# Patient Record
Sex: FEMALE | Race: WHITE | NOT HISPANIC OR LATINO | Employment: OTHER | URBAN - METROPOLITAN AREA
[De-identification: names, ages, dates, MRNs, and addresses within clinical notes are randomized per-mention and may not be internally consistent; named-entity substitution may affect disease eponyms.]

---

## 2017-03-30 ENCOUNTER — ALLSCRIPTS OFFICE VISIT (OUTPATIENT)
Dept: OTHER | Facility: OTHER | Age: 67
End: 2017-03-30

## 2017-04-05 ENCOUNTER — HOSPITAL ENCOUNTER (OUTPATIENT)
Dept: RADIOLOGY | Facility: MEDICAL CENTER | Age: 67
Discharge: HOME | End: 2017-04-05
Attending: FAMILY MEDICINE | Admitting: FAMILY MEDICINE
Payer: COMMERCIAL

## 2017-04-05 ENCOUNTER — OFFICE VISIT (OUTPATIENT)
Dept: URGENT CARE | Facility: MEDICAL CENTER | Age: 67
End: 2017-04-05
Payer: COMMERCIAL

## 2017-04-05 DIAGNOSIS — M79.675 PAIN OF TOE OF LEFT FOOT: ICD-10-CM

## 2017-04-05 PROCEDURE — 73660 X-RAY EXAM OF TOE(S): CPT

## 2017-04-05 PROCEDURE — 99213 OFFICE O/P EST LOW 20 MIN: CPT

## 2017-04-05 PROCEDURE — 29550 STRAPPING OF TOES: CPT

## 2017-04-05 PROCEDURE — S9088 SERVICES PROVIDED IN URGENT: HCPCS

## 2017-04-07 ENCOUNTER — GENERIC CONVERSION - ENCOUNTER (OUTPATIENT)
Dept: OTHER | Facility: OTHER | Age: 67
End: 2017-04-07

## 2017-06-12 DIAGNOSIS — Z00.00 ENCOUNTER FOR GENERAL ADULT MEDICAL EXAMINATION WITHOUT ABNORMAL FINDINGS: ICD-10-CM

## 2017-06-12 DIAGNOSIS — R73.02 IMPAIRED GLUCOSE TOLERANCE: ICD-10-CM

## 2017-06-12 DIAGNOSIS — E78.00 PURE HYPERCHOLESTEROLEMIA: ICD-10-CM

## 2017-07-21 DIAGNOSIS — E78.00 PURE HYPERCHOLESTEROLEMIA: ICD-10-CM

## 2017-07-21 DIAGNOSIS — R73.02 IMPAIRED GLUCOSE TOLERANCE: ICD-10-CM

## 2017-07-27 ENCOUNTER — APPOINTMENT (OUTPATIENT)
Dept: LAB | Facility: MEDICAL CENTER | Age: 67
End: 2017-07-27
Payer: COMMERCIAL

## 2017-07-27 DIAGNOSIS — R73.02 IMPAIRED GLUCOSE TOLERANCE: ICD-10-CM

## 2017-07-27 DIAGNOSIS — E78.00 PURE HYPERCHOLESTEROLEMIA: ICD-10-CM

## 2017-07-27 LAB
CHOLEST SERPL-MCNC: 182 MG/DL (ref 50–200)
HDLC SERPL-MCNC: 45 MG/DL (ref 40–60)
LDLC SERPL CALC-MCNC: 88 MG/DL (ref 0–100)
TRIGL SERPL-MCNC: 247 MG/DL

## 2017-07-27 PROCEDURE — 36415 COLL VENOUS BLD VENIPUNCTURE: CPT

## 2017-07-27 PROCEDURE — 83036 HEMOGLOBIN GLYCOSYLATED A1C: CPT

## 2017-07-27 PROCEDURE — 80061 LIPID PANEL: CPT

## 2017-08-01 ENCOUNTER — ALLSCRIPTS OFFICE VISIT (OUTPATIENT)
Dept: OTHER | Facility: OTHER | Age: 67
End: 2017-08-01

## 2017-08-04 ENCOUNTER — TRANSCRIBE ORDERS (OUTPATIENT)
Dept: ADMINISTRATIVE | Facility: HOSPITAL | Age: 67
End: 2017-08-04

## 2017-08-04 ENCOUNTER — APPOINTMENT (OUTPATIENT)
Dept: LAB | Facility: MEDICAL CENTER | Age: 67
End: 2017-08-04
Payer: COMMERCIAL

## 2017-08-04 DIAGNOSIS — E78.1 PURE HYPERGLYCERIDEMIA: ICD-10-CM

## 2017-08-04 DIAGNOSIS — R73.02 IMPAIRED GLUCOSE TOLERANCE: ICD-10-CM

## 2017-08-04 DIAGNOSIS — E78.00 PURE HYPERCHOLESTEROLEMIA: ICD-10-CM

## 2017-08-04 DIAGNOSIS — I10 ESSENTIAL (PRIMARY) HYPERTENSION: ICD-10-CM

## 2017-08-04 LAB
ALBUMIN SERPL BCP-MCNC: 3.3 G/DL (ref 3.5–5)
ALP SERPL-CCNC: 71 U/L (ref 46–116)
ALT SERPL W P-5'-P-CCNC: 24 U/L (ref 12–78)
ANION GAP SERPL CALCULATED.3IONS-SCNC: 8 MMOL/L (ref 4–13)
AST SERPL W P-5'-P-CCNC: 21 U/L (ref 5–45)
BILIRUB SERPL-MCNC: 0.68 MG/DL (ref 0.2–1)
BUN SERPL-MCNC: 18 MG/DL (ref 5–25)
CALCIUM SERPL-MCNC: 8.9 MG/DL (ref 8.3–10.1)
CHLORIDE SERPL-SCNC: 102 MMOL/L (ref 100–108)
CO2 SERPL-SCNC: 29 MMOL/L (ref 21–32)
CREAT SERPL-MCNC: 0.71 MG/DL (ref 0.6–1.3)
GFR SERPL CREATININE-BSD FRML MDRD: 90 ML/MIN/1.73SQ M
GLUCOSE P FAST SERPL-MCNC: 102 MG/DL (ref 65–99)
POTASSIUM SERPL-SCNC: 4 MMOL/L (ref 3.5–5.3)
PROT SERPL-MCNC: 6.9 G/DL (ref 6.4–8.2)
SODIUM SERPL-SCNC: 139 MMOL/L (ref 136–145)

## 2017-08-04 PROCEDURE — 36415 COLL VENOUS BLD VENIPUNCTURE: CPT

## 2017-08-04 PROCEDURE — 80053 COMPREHEN METABOLIC PANEL: CPT

## 2017-10-15 DIAGNOSIS — E78.00 PURE HYPERCHOLESTEROLEMIA: ICD-10-CM

## 2017-10-15 DIAGNOSIS — Z12.11 ENCOUNTER FOR SCREENING FOR MALIGNANT NEOPLASM OF COLON: ICD-10-CM

## 2017-10-15 DIAGNOSIS — I10 ESSENTIAL (PRIMARY) HYPERTENSION: ICD-10-CM

## 2017-10-15 DIAGNOSIS — E78.1 PURE HYPERGLYCERIDEMIA: ICD-10-CM

## 2017-10-15 DIAGNOSIS — R73.02 IMPAIRED GLUCOSE TOLERANCE: ICD-10-CM

## 2017-11-11 ENCOUNTER — APPOINTMENT (OUTPATIENT)
Dept: LAB | Facility: MEDICAL CENTER | Age: 67
End: 2017-11-11
Payer: COMMERCIAL

## 2017-11-11 DIAGNOSIS — R73.02 IMPAIRED GLUCOSE TOLERANCE: ICD-10-CM

## 2017-11-11 DIAGNOSIS — E78.00 PURE HYPERCHOLESTEROLEMIA: ICD-10-CM

## 2017-11-11 DIAGNOSIS — E78.1 PURE HYPERGLYCERIDEMIA: ICD-10-CM

## 2017-11-11 DIAGNOSIS — I10 ESSENTIAL (PRIMARY) HYPERTENSION: ICD-10-CM

## 2017-11-11 DIAGNOSIS — Z12.11 ENCOUNTER FOR SCREENING FOR MALIGNANT NEOPLASM OF COLON: ICD-10-CM

## 2017-11-11 LAB
BACTERIA UR QL AUTO: ABNORMAL /HPF
BASOPHILS # BLD AUTO: 0.01 THOUSANDS/ΜL (ref 0–0.1)
BASOPHILS NFR BLD AUTO: 0 % (ref 0–1)
BILIRUB UR QL STRIP: NEGATIVE
CLARITY UR: CLEAR
COLOR UR: YELLOW
EOSINOPHIL # BLD AUTO: 0.13 THOUSAND/ΜL (ref 0–0.61)
EOSINOPHIL NFR BLD AUTO: 2 % (ref 0–6)
ERYTHROCYTE [DISTWIDTH] IN BLOOD BY AUTOMATED COUNT: 13.2 % (ref 11.6–15.1)
GLUCOSE UR STRIP-MCNC: NEGATIVE MG/DL
HCT VFR BLD AUTO: 43.5 % (ref 34.8–46.1)
HEMOCCULT STL QL IA: NEGATIVE
HGB BLD-MCNC: 14.9 G/DL (ref 11.5–15.4)
HGB UR QL STRIP.AUTO: NEGATIVE
HYALINE CASTS #/AREA URNS LPF: ABNORMAL /LPF
KETONES UR STRIP-MCNC: NEGATIVE MG/DL
LEUKOCYTE ESTERASE UR QL STRIP: ABNORMAL
LYMPHOCYTES # BLD AUTO: 2.76 THOUSANDS/ΜL (ref 0.6–4.47)
LYMPHOCYTES NFR BLD AUTO: 32 % (ref 14–44)
MCH RBC QN AUTO: 32.3 PG (ref 26.8–34.3)
MCHC RBC AUTO-ENTMCNC: 34.3 G/DL (ref 31.4–37.4)
MCV RBC AUTO: 94 FL (ref 82–98)
MONOCYTES # BLD AUTO: 0.5 THOUSAND/ΜL (ref 0.17–1.22)
MONOCYTES NFR BLD AUTO: 6 % (ref 4–12)
NEUTROPHILS # BLD AUTO: 5.14 THOUSANDS/ΜL (ref 1.85–7.62)
NEUTS SEG NFR BLD AUTO: 60 % (ref 43–75)
NITRITE UR QL STRIP: POSITIVE
NON-SQ EPI CELLS URNS QL MICRO: ABNORMAL /HPF
NRBC BLD AUTO-RTO: 0 /100 WBCS
PH UR STRIP.AUTO: 6 [PH] (ref 4.5–8)
PLATELET # BLD AUTO: 210 THOUSANDS/UL (ref 149–390)
PMV BLD AUTO: 10.6 FL (ref 8.9–12.7)
PROT UR STRIP-MCNC: NEGATIVE MG/DL
RBC # BLD AUTO: 4.61 MILLION/UL (ref 3.81–5.12)
RBC #/AREA URNS AUTO: ABNORMAL /HPF
SP GR UR STRIP.AUTO: 1.01 (ref 1–1.03)
UROBILINOGEN UR QL STRIP.AUTO: 0.2 E.U./DL
WBC # BLD AUTO: 8.56 THOUSAND/UL (ref 4.31–10.16)
WBC #/AREA URNS AUTO: ABNORMAL /HPF

## 2017-11-11 PROCEDURE — G0328 FECAL BLOOD SCRN IMMUNOASSAY: HCPCS

## 2017-11-11 PROCEDURE — 36415 COLL VENOUS BLD VENIPUNCTURE: CPT

## 2017-11-11 PROCEDURE — 85025 COMPLETE CBC W/AUTO DIFF WBC: CPT

## 2017-11-11 PROCEDURE — 80053 COMPREHEN METABOLIC PANEL: CPT

## 2017-11-11 PROCEDURE — 81001 URINALYSIS AUTO W/SCOPE: CPT

## 2017-11-11 PROCEDURE — 80061 LIPID PANEL: CPT

## 2017-11-12 LAB
ALBUMIN SERPL BCP-MCNC: 3.4 G/DL (ref 3.5–5)
ALP SERPL-CCNC: 67 U/L (ref 46–116)
ALT SERPL W P-5'-P-CCNC: 27 U/L (ref 12–78)
ANION GAP SERPL CALCULATED.3IONS-SCNC: 8 MMOL/L (ref 4–13)
AST SERPL W P-5'-P-CCNC: 20 U/L (ref 5–45)
BILIRUB SERPL-MCNC: 0.7 MG/DL (ref 0.2–1)
BUN SERPL-MCNC: 15 MG/DL (ref 5–25)
CALCIUM SERPL-MCNC: 8.8 MG/DL (ref 8.3–10.1)
CHLORIDE SERPL-SCNC: 103 MMOL/L (ref 100–108)
CHOLEST SERPL-MCNC: 184 MG/DL (ref 50–200)
CO2 SERPL-SCNC: 30 MMOL/L (ref 21–32)
CREAT SERPL-MCNC: 0.66 MG/DL (ref 0.6–1.3)
GFR SERPL CREATININE-BSD FRML MDRD: 94 ML/MIN/1.73SQ M
GLUCOSE P FAST SERPL-MCNC: 101 MG/DL (ref 65–99)
HDLC SERPL-MCNC: 47 MG/DL (ref 40–60)
LDLC SERPL CALC-MCNC: 83 MG/DL (ref 0–100)
POTASSIUM SERPL-SCNC: 4.1 MMOL/L (ref 3.5–5.3)
PROT SERPL-MCNC: 7.2 G/DL (ref 6.4–8.2)
SODIUM SERPL-SCNC: 141 MMOL/L (ref 136–145)
TRIGL SERPL-MCNC: 271 MG/DL

## 2017-11-14 ENCOUNTER — ALLSCRIPTS OFFICE VISIT (OUTPATIENT)
Dept: OTHER | Facility: OTHER | Age: 67
End: 2017-11-14

## 2017-11-17 DIAGNOSIS — Z12.31 ENCOUNTER FOR SCREENING MAMMOGRAM FOR MALIGNANT NEOPLASM OF BREAST: ICD-10-CM

## 2017-12-20 ENCOUNTER — GENERIC CONVERSION - ENCOUNTER (OUTPATIENT)
Dept: OTHER | Facility: OTHER | Age: 67
End: 2017-12-20

## 2018-01-10 NOTE — PROGRESS NOTES
Assessment    1  Acute UTI (599 0) (N39 0)   2  Obesity (BMI 30-39 9) (278 00) (E66 9)   3  Hypertension (401 9) (I10)   4  Hypercholesterolemia (272 0) (E78 00)   5  Hypertriglyceridemia (272 1) (E78 1)    Plan  Acute UTI    · Ciprofloxacin HCl - 500 MG Oral Tablet; Take 1 tablet twice daily  Glucose intolerance (impaired glucose tolerance), Hypercholesterolemia,  Hypertriglyceridemia    · (1) COMPREHENSIVE METABOLIC PANEL; Status:Active; Requested for:01Mar2018;    · (1) LIPID PANEL FASTING W DIRECT LDL REFLEX; Status:Active; Requested  CYA:[id number]; Health Maintenance    · EKG/ECG- POC; Status:Complete;   Done: 45GCS8072 01:09PM    1  Acute UTI the patient's urinalysis is consistent with a urinary tract infection  We have initiated antibiotic therapy with ciprofloxacin at 500 mg twice a day  The patient is encouraged to drink extra fluids while she is on the antibiotic  2   Hypertension adequate control continue on hydrochlorothiazide 25 mg daily and lisinopril 40 mg daily  3   Hyperlipidemia continue lovastatin 20 mg daily along with healthy diet and make her red supplement for hypertriglyceridemia  4   Obesity recommend lifestyle adjustments to decreased calorie consumption and start regular walking exercise  Discussion/Summary    In summary the patient is encouraged to have a baseline colonoscopy which she has never had performed in the past  We also recommend that she have an annual mammogram as well as visit with her gynecologist  She is encouraged to continue with annual physical examinations and blood work with us for regular monitoring of cholesterol triglycerides and hypertension  She will continue her present medications with the addition of antibiotic therapy for her UTI  I would like to see her in 4 months for her next regular check up for her hypertension  total time of encounter was 45 minutes and 30 minutes was spent counseling        Chief Complaint  physical with ekg History of Present Illness  HM, Adult Female: The patient is being seen for a health maintenance evaluation  The last health maintenance visit was 1 year(s) ago  Social History: Household members include spouse  She is   Work status: working full time  The patient has never smoked cigarettes  She reports rare alcohol use  She has never used illicit drugs  General Health: The patient's health since the last visit is described as good  She has regular dental visits  She denies vision problems  She denies hearing loss  Immunizations status: up to date  Lifestyle:  She consumes a diverse and healthy diet  She has weight concerns  She does not exercise regularly  She does not use tobacco  She denies alcohol use  She denies drug use  Screening:   HPI: This 72-year-old female patient presents today for a annual health maintenance examination  She has no new complaints or medical issues  She has a history of overweight condition as well as hypertension  She anticipates 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  At that time she plans on focusing on adjusting her diet trying to lose weight and maintaining a healthy lifestyle      Active Problems    1  Arthritis (716 90) (M19 90)   2  Chronic bilateral low back pain with bilateral sciatica (724 2,724 3,338 29)   (M54 42,M54 41,G89 29)   3  Colon cancer screening (V76 51) (Z12 11)   4  Glucose intolerance (impaired glucose tolerance) (790 22) (R73 02)   5  Hypercholesterolemia (272 0) (E78 00)   6  Hypertension (401 9) (I10)   7  Hypertriglyceridemia (272 1) (E78 1)   8  Need for immunization against influenza (V04 81) (Z23)   9  Osteoarthritis of knee (715 36) (M17 10)   10  Pain of toe of left foot (729 5) (M79 675)   11  Skin lesion of face (709 9) (L98 9)   12   Temperature elevation (780 60) (R50 9)    Past Medical History    · History of Acute lower UTI (599 0) (N39 0)   · History of Cellulitis of left elbow (682 3) (L03 114)   · History of Elbow pain, left (719 42) (M25 522)   · History of Encounter for gynecological examination without abnormal finding (V72 31)  (Z01 419)   · History of Encounter for routine gynecological examination (V72 31) (Z01 419)   · History of Encounter for screening mammogram for malignant neoplasm of breast  (V76 12) (Z12 31)   · History of epistaxis (V12 69) (V05 749)   · History of Medial meniscus tear (836 0) (S83 249A)   · History of Need for influenza vaccination (V04 81) (Z23)   · History of Right foot pain (729 5) (M79 671)   · History of Right knee pain (719 46) (M25 561)   · History of Umbilical hernia (306 9) (K42 9)   · History of Visit for screening mammogram (V76 12) (Z12 31)    Surgical History    · History of Hysterectomy   · History of Knee Arthroscopy With Medial Meniscus Repair    Family History  Mother    · No pertinent family history  Family History    · Family history of diabetes mellitus (V18 0) (Z83 3)   · Family history of thyroid disease (V18 19) (Z80 46)    Social History    · Denied: History of Alcohol   · Daily Tea Consumption (1  Cups/Day)   · Denied: History of Drug use   · Exercise habits   · Never a smoker   · Denied: History of Never A Smoker    Current Meds   1  Aspirin 81 MG TABS; Therapy: (Recorded:23Iiu2705) to Recorded   2  Calcium + D 250-125 MG-UNIT TABS; Therapy: (Recorded:07Nov2014) to Recorded   3  CVS Daily Multiple Plus Iron TABS; Therapy: (Recorded:07Nov2014) to Recorded   4  HydroCHLOROthiazide 25 MG Oral Tablet; Take 1 tablet daily; Therapy: 28SQU5537 to (Evaluate:19Jun2018)  Requested for: 78GGF6499; Last   Rx:[id number] Ordered   5  Lisinopril 40 MG Oral Tablet; TAKE 1 TABLET DAILY AS DIRECTED; Therapy: 47MFZ9962 to (Evaluate:19Jun2018)  Requested for: 25EEY5237; Last   Rx:[id number] Ordered   6  Lovastatin 20 MG Oral Tablet; TAKE 1 TABLET AT BEDTIME;    Therapy: 17LPO4701 to (Evaluate:05Nov2017)  Requested for: 62RXN1896; Last   Rx:[id number] Ordered   7  MegaRed Omega-3 Krill Oil 500 MG Oral Capsule; one capsule daily Recorded    Allergies    1  Morphine Sulfate SOLN   2  Zithromax Z-Brice TABS    Vitals   Recorded: C0270720 12:57PM   Temperature 98 3 F   Heart Rate 71   Respiration 16   Systolic 174   Diastolic 76   Height 5 ft 6 5 in   Weight 250 lb 9 6 oz   BMI Calculated 39 84   BSA Calculated 2 21   O2 Saturation 97     Physical Exam    Constitutional   General appearance: No acute distress, well appearing and well nourished  Head and Face   Head and face: Normal     Eyes   Conjunctiva and lids: No swelling, erythema or discharge  Pupils and irises: Equal, round, reactive to light  Ophthalmoscopic examination: Normal fundi and optic discs  Ears, Nose, Mouth, and Throat   External inspection of ears and nose: Normal     Otoscopic examination: Tympanic membranes translucent with normal light reflex  Canals patent without erythema  Nasal mucosa, septum, and turbinates: Normal without edema or erythema  Lips, teeth, and gums: Normal, good dentition  Oropharynx: Normal with no erythema, edema, exudate or lesions  Neck   Neck: Supple, symmetric, trachea midline, no masses  Thyroid: Normal, no thyromegaly  Pulmonary   Respiratory effort: No increased work of breathing or signs of respiratory distress  Percussion of chest: Normal     Auscultation of lungs: Clear to auscultation  Cardiovascular   Auscultation of heart: Normal rate and rhythm, normal S1 and S2, no murmurs  Carotid pulses: 2+ bilaterally  Pedal pulses: 2+ bilaterally  Peripheral vascular exam: Normal     Examination of extremities for edema and/or varicosities: Normal     Abdomen   Abdomen: Non-tender, no masses  Liver and spleen: No hepatomegaly or splenomegaly  Lymphatic   Palpation of lymph nodes in neck: No lymphadenopathy      Musculoskeletal   Gait and station: Normal     Digits and nails: Normal without clubbing or cyanosis  Joints, bones, and muscles: Normal     Range of motion: Normal     Stability: Normal     Muscle strength/tone: Normal     Skin   Skin and subcutaneous tissue: Normal without rashes or lesions  Palpation of skin and subcutaneous tissue: Normal turgor  Neurologic   Cranial nerves: Cranial nerves II-XII intact  Cortical function: Normal mental status  Reflexes: 2+ and symmetric  Sensation: No sensory loss  Coordination: Normal finger to nose and heel to shin  Psychiatric   Judgment and insight: Normal     Orientation to person, place, and time: Normal     Recent and remote memory: Intact  Mood and affect: Normal        Results/Data  EKG/ECG- POC 92QBL4455 01:09PM Valdo Porras     Test Name Result Flag Reference   EKG/ECG 11/14/2017         Future Appointments    Date/Time Provider Specialty Site   03/16/2018 11:00 AM MANI Mason  Internal Medicine [facility] INTERNAL MED   11/19/2018 01:00 PM MANI Mason   Internal Medicine [address] MED     Signatures   Electronically signed by : MANI Denise ; Nov 15 2017  4:34PM EST                       (Author)

## 2018-01-11 NOTE — MISCELLANEOUS
Message  Return to work or school:     She is not able to return to work until 04/17/2017     Broken toe  Dr Starla Pryor        Signatures   Electronically signed by : Terrence Radford, ; Apr 7 2017  9:33AM EST                       (Author)

## 2018-01-12 NOTE — PROGRESS NOTES
Assessment    1  Chronic bilateral low back pain with bilateral sciatica (724 2,724 3,338 29)   (M54 42,M54 41,G89 29)    Plan  Chronic bilateral low back pain with bilateral sciatica    · Metaxalone 800 MG Oral Tablet (Skelaxin); TAKE 1 TABLET EVERY 6-8 HRS FOR  MUSCLE SPASM   · PredniSONE 5 MG Oral Tablet; 6 pills daily with food decrease by 1 pill every two  days    Skelaxin 800 mg every 6-8 hours as needed for back muscle spasm  Warm soaks to the lower back  Prednisone 30 milligrams decrease by 5 mg every 2 days  Discussion/Summary  In summary the patient appears to have lower back as him with bilateral sciatica and we will initiate a combination of redness and and muscle relaxers  Asked her to stay home from work for the rest of this week to allow her back to relax recuperate  Should she fail to improve she'll notify me and we will consider imaging of her lower back  Chief Complaint  patient is here for bilateral sciatica pain  History of Present Illness  HPI: This 57-year-old female patient presents today with low back pain radiating into the buttocks and down both legs  He denies any recent trauma such as falls  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  She has had similar symptoms in the past which did improve with a combination of steroids and muscular relaxers  She has no muscular weakness in either leg  Review of Systems    Constitutional: No fever, no chills, feels well, no tiredness, no recent weight gain or loss  ENT: no ear ache, no loss of hearing, no nosebleeds or nasal discharge, no sore throat or hoarseness  Cardiovascular: no complaints of slow or fast heart rate, no chest pain, no palpitations, no leg claudication or lower extremity edema  Respiratory: no complaints of shortness of breath, no wheezing, no dyspnea on exertion, no orthopnea or PND  Breasts: no complaints of breast pain, breast lump or nipple discharge  Gastrointestinal: no complaints of abdominal pain, no constipation, no nausea or diarrhea, no vomiting, no bloody stools  Genitourinary: no complaints of dysuria, no incontinence, no pelvic pain, no dysmenorrhea, no vaginal discharge or abnormal vaginal bleeding  Musculoskeletal: Low back pain with radiation into the buttocks bilaterally pain radiating down both legs  Integumentary: no complaints of skin rash or lesion, no itching or dry skin, no skin wounds  Neurological: no complaints of headache, no confusion, no numbness or tingling, no dizziness or fainting  Active Problems    1  Aftercare following surgery (V58 89) (Z48 89)   2  Arthritis (716 90) (M19 90)   3  Colon cancer screening (V76 51) (Z12 11)   4  Encounter for screening mammogram for malignant neoplasm of breast (V76 12)   (Z12 31)   5  Glucose intolerance (impaired glucose tolerance) (790 22) (R73 02)   6  Hypercholesterolemia (272 0) (E78 0)   7  Hypertension (401 9) (I10)   8  Hypertriglyceridemia (272 1) (E78 1)   9  Medial meniscus tear (836 0) (S83 249A)   10  Osteoarthritis of knee (715 36) (M17 9)   11  Overweight (278 02) (E66 3)   12  Pap smear, as part of routine gynecological examination (V76 2) (Z01 419)   13  Right foot pain (729 5) (M79 671)   14  Right knee pain (719 46) (M25 561)   15  Visit for screening mammogram (V76 12) (Z12 31)    Social History    · Denied: History of Alcohol   · Daily Tea Consumption (1  Cups/Day)   · Denied: History of Drug use   · Exercise habits   · Never a smoker   · Denied: History of Never A Smoker    Current Meds   1  Aspirin 81 MG TABS; Therapy: (Recorded:11Feb2014) to Recorded   2  Calcium + D 250-125 MG-UNIT TABS; Therapy: (Recorded:07Nov2014) to Recorded   3  CVS Daily Multiple Plus Iron TABS; Therapy: (Recorded:07Nov2014) to Recorded   4  Hydrochlorothiazide 25 MG Oral Tablet; Take 1 tablet daily;    Therapy: 04SEH5203 to (Last Rx:18Sho1585)  Requested for: 34Akc7536 Ordered 5  Lisinopril 40 MG Oral Tablet; TAKE 1 TABLET DAILY AS DIRECTED; Therapy: 45MAP9286 to (Tiara Lout)  Requested for: 84BEI1779; Last   Rx:90Llr2023 Ordered   6  Lovastatin 20 MG Oral Tablet; TAKE 1 TABLET AT BEDTIME; Therapy: 89MGF2041 to (Tiara Lout)  Requested for: 99HJZ7223; Last   Rx:52Epu4589 Ordered   7  MegaRed Omega-3 Krill Oil 500 MG Oral Capsule; one capsule daily Recorded   8  Multi-Day Vitamins TABS; Therapy: (Recorded:82Qou9505) to Recorded    Allergies    1  Morphine Sulfate SOLN   2  Zithromax Z-Brice TABS    Vitals   Recorded: 48QWL6834 63:00UE   Systolic 348   Diastolic 76   Heart Rate 76   Respiration 20   Temperature 97 8 F   O2 Saturation 98   Height 5 ft 7 in   Weight 251 lb 0 16 oz   BMI Calculated 39 31   BSA Calculated 2 23     Physical Exam    Constitutional   General appearance: No acute distress, well appearing and well nourished  Eyes   Conjunctiva and lids: No swelling, erythema or discharge  Ears, Nose, Mouth, and Throat   External inspection of ears and nose: Normal     Additional Exam:  Tenderness over the lower lumbar spine on palpation  There is bilateral lower back muscular spasm present on examination  Patient has normal reflexes in the patellar and Achilles locations with no evidence of any muscular weakness in either leg  Future Appointments    Date/Time Provider Specialty Site   11/10/2016 01:00 PM MANI Garduno   Internal Medicine [address] MED     Signatures   Electronically signed by : MANI Iqbal ; Aug 22 2016  5:33PM EST                       (Author)

## 2018-01-12 NOTE — MISCELLANEOUS
Message  Return to work or school:   Chapis Briggs is under my professional care   She was seen in my office on 8/22/2016   She is able to return to work on  8/29/2016            Signatures   Electronically signed by : MANI Fam ; Aug 22 2016  3:47PM EST                       (Author)

## 2018-01-13 VITALS
HEIGHT: 67 IN | TEMPERATURE: 97.9 F | SYSTOLIC BLOOD PRESSURE: 114 MMHG | OXYGEN SATURATION: 99 % | RESPIRATION RATE: 16 BRPM | WEIGHT: 252.03 LBS | DIASTOLIC BLOOD PRESSURE: 60 MMHG | HEART RATE: 72 BPM | BODY MASS INDEX: 39.56 KG/M2

## 2018-01-13 VITALS
HEIGHT: 67 IN | WEIGHT: 251 LBS | BODY MASS INDEX: 39.39 KG/M2 | DIASTOLIC BLOOD PRESSURE: 66 MMHG | SYSTOLIC BLOOD PRESSURE: 110 MMHG | OXYGEN SATURATION: 98 % | TEMPERATURE: 99.1 F | HEART RATE: 86 BPM | RESPIRATION RATE: 20 BRPM

## 2018-01-14 VITALS
RESPIRATION RATE: 16 BRPM | DIASTOLIC BLOOD PRESSURE: 76 MMHG | OXYGEN SATURATION: 97 % | BODY MASS INDEX: 39.33 KG/M2 | SYSTOLIC BLOOD PRESSURE: 132 MMHG | HEIGHT: 67 IN | TEMPERATURE: 98.3 F | HEART RATE: 71 BPM | WEIGHT: 250.6 LBS

## 2018-01-18 NOTE — PROGRESS NOTES
Assessment    1  Acute lower UTI (599 0) (N39 0)   2  Hypertension (401 9) (I10)   3  Hypertriglyceridemia (272 1) (E78 1)   4  Hypercholesterolemia (272 0) (E78 00)   5  Osteoarthritis of knee (715 36) (M17 9)   6  Arthritis (716 90) (M19 90)   7  Glucose intolerance (impaired glucose tolerance) (790 22) (R73 02)   8  Overweight (278 02) (E66 3)    Plan  Acute lower UTI    · (1) URINE CULTURE; Source:Urine, Clean Catch; Status:Active; Requested  for:10Nov2016;   Hypertension    · EKG/ECG- POC; Status:Active - Perform Order; Requested for:10Nov2016;     Patient will continue on her present medications for hypertension which include lisinopril 40 mg and hydrochlorothiazide 25 mg  She'll continue on her lovastatin 20 mg for hyperlipidemia and tristan-red for hypertriglyceridemia  Discussion/Summary  In summary patient has symptoms consistent with a urinary tract infection with cloudy urine and foul-smelling urine  She denies any dysuria or hematuria  A review of her urinalysis is consistent with the UTI  We've asked her to obtain a urine culture  Issues hypertension appears to be adequately controlled on her present medications and we asked her to continue on those  Patient has a history of hypertriglyceridemia and hypercholesterolemia  We reviewed the appropriate diet and also ask her to continue on her lovastatin and tristan-red treatments  She has chronic osteoarthritis of the knees which is compounded by her overweight condition  He is encouraged to lose weight  He also has mild glucose intolerance with a glucose of 103  The appropriate diet was reviewed with the patient  I'll see her in 4 months for her next office visit  Chief Complaint  patient is here for a yearly physical       History of Present Illness  , Adult Female: The patient is being seen for a health maintenance evaluation  The last health maintenance visit was 1 year(s) ago  Social History: Household members include spouse   She is   Work status: working full time  The patient has never smoked cigarettes  She reports never drinking alcohol  She has never used illicit drugs  General Health: The patient's health since the last visit is described as good  She has regular dental visits  She denies vision problems  She denies hearing loss  Immunizations status: up to date  Lifestyle:  She consumes a diverse and healthy diet  She has weight concerns  She does not exercise regularly  She does not use tobacco  She denies alcohol use  She denies drug use  Reproductive health: the patient is postmenopausal    Screening: cancer screening reviewed and current  metabolic screening reviewed and current  risk screening reviewed and current  HPI: This 46-year-old female patient presents today for an annual maintenance visit  He has a history of a recent left elbow inflammation gradually improving  She was evaluated for also bursitis by the orthopedic department  They believe that the was actually more of a hematoma  Patient does have a history of obesity as well as hypertension, also has a history of steal arthritis of the knees  Review of Systems    Constitutional: No fever, no chills, feels well, no tiredness, no recent weight gain or weight loss  Eyes: No complaints of eye pain, no red eyes, no eyesight problems, no discharge, no dry eyes, no itching of eyes  ENT: no complaints of earache, no loss of hearing, no nose bleeds, no nasal discharge, no sore throat, no hoarseness  Cardiovascular: No complaints of slow heart rate, no fast heart rate, no chest pain, no palpitations, no leg claudication, no lower extremity edema  Respiratory: No complaints of shortness of breath, no wheezing, no cough, no SOB on exertion, no orthopnea, no PND  Gastrointestinal: No complaints of abdominal pain, no constipation, no nausea or vomiting, no diarrhea, no bloody stools     Genitourinary: No complaints of dysuria, no incontinence, no pelvic pain, no dysmenorrhea, no vaginal discharge or bleeding  Musculoskeletal: arthralgias and myalgias  Integumentary: No complaints of skin rash or lesions, no itching, no skin wounds, no breast pain or lump  Neurological: No complaints of headache, no confusion, no convulsions, no numbness, no dizziness or fainting, no tingling, no limb weakness, no difficulty walking  Psychiatric: Not suicidal, no sleep disturbance, no anxiety or depression, no change in personality, no emotional problems  Endocrine: No complaints of proptosis, no hot flashes, no muscle weakness, no deepening of the voice, no feelings of weakness  Hematologic/Lymphatic: No complaints of swollen glands, no swollen glands in the neck, does not bleed easily, does not bruise easily  Active Problems    1  Arthritis (716 90) (M19 90)   2  Cellulitis of left elbow (682 3) (L03 114)   3  Chronic bilateral low back pain with bilateral sciatica (724 2,724 3,338 29)   (M54 42,M54 41,G89 29)   4  Colon cancer screening (V76 51) (Z12 11)   5  Elbow pain, left (719 42) (M25 522)   6  Encounter for gynecological examination without abnormal finding (V72 31) (Z01 419)   7  Encounter for screening mammogram for malignant neoplasm of breast (V76 12)   (Z12 31)   8  Glucose intolerance (impaired glucose tolerance) (790 22) (R73 02)   9  Gout, arthritis (274 00) (M10 9)   10  Hypercholesterolemia (272 0) (E78 00)   11  Hypertension (401 9) (I10)   12  Hypertriglyceridemia (272 1) (E78 1)   13  Medial meniscus tear (836 0) (S83 249A)   14  Osteoarthritis of knee (715 36) (M17 9)   15  Overweight (278 02) (E66 3)   16  Right foot pain (729 5) (M79 671)   17  Right knee pain (719 46) (M25 561)   18   Visit for screening mammogram (V76 12) (Z12 31)    Past Medical History    · History of Encounter for routine gynecological examination (V72 31) (Z01 419)   · History of epistaxis (V12 69) (W63 204)   · History of Umbilical hernia (608 9) (K42 9)    Surgical History    · History of Hysterectomy   · History of Knee Arthroscopy With Medial Meniscus Repair    Family History  Mother    · No pertinent family history  Family History    · Family history of diabetes mellitus (V18 0) (Z83 3)   · Family history of thyroid disease (V18 19) (Z80 46)    Social History    · Denied: History of Alcohol   · Daily Tea Consumption (1  Cups/Day)   · Denied: History of Drug use   · Exercise habits   · Never a smoker   · Denied: History of Never A Smoker    Current Meds   1  Aspirin 81 MG TABS; Therapy: (Recorded:11Feb2014) to Recorded   2  Calcium + D 250-125 MG-UNIT TABS; Therapy: (Recorded:07Nov2014) to Recorded   3  Cephalexin 500 MG Oral Capsule; TAKE 1 CAPSULE 4 TIMES DAILY UNTIL GONE;   Therapy: 93JRR7904 to (Deitra Miranda)  Requested for: 98WUT6806; Last   Rx:27Oct2016 Ordered   4  CVS Daily Multiple Plus Iron TABS; Therapy: (Recorded:07Nov2014) to Recorded   5  Doxycycline Hyclate 100 MG Oral Capsule; TAKE 1 CAPSULE TWICE DAILY UNTIL   GONE;   Therapy: 33EAK8581 to (Evaluate:31Oct2016)  Requested for: 92WCF2249; Last   Rx:27Oct2016 Ordered   6  HydroCHLOROthiazide 25 MG Oral Tablet; Take 1 tablet daily; Therapy: 84VUL2534 to (Last Rx:93Xmv6554)  Requested for: 76Zut2290 Ordered   7  Lisinopril 40 MG Oral Tablet; TAKE 1 TABLET DAILY AS DIRECTED; Therapy: 83TJA4113 to (Doneta Chacorta)  Requested for: 11UWD9259; Last   Rx:[id number] Ordered   8  Lovastatin 20 MG Oral Tablet; TAKE 1 TABLET AT BEDTIME; Therapy: 84TCL3868 to (Doneta Chacorta)  Requested for: 91VHC7259; Last   Rx:[id number] Ordered   9  MegaRed Omega-3 Krill Oil 500 MG Oral Capsule; one capsule daily Recorded    Allergies    1  Morphine Sulfate SOLN   2   Zithromax Z-Brice TABS    Vitals   Recorded: 32WHW7884 04:61LL   Systolic 221   Diastolic 80   Heart Rate 71   Respiration 20   Temperature 98 6 F   O2 Saturation 98   Height 5 ft 6 5 in   Weight 251 lb 0 64 oz   BMI Calculated 39 91 BSA Calculated 2 21     Physical Exam    Constitutional   General appearance: No acute distress, well appearing and well nourished  Head and Face   Head and face: Normal     Eyes   Conjunctiva and lids: No swelling, erythema or discharge  Pupils and irises: Equal, round, reactive to light  Ophthalmoscopic examination: Normal fundi and optic discs  Ears, Nose, Mouth, and Throat   External inspection of ears and nose: Normal     Otoscopic examination: Tympanic membranes translucent with normal light reflex  Canals patent without erythema  Nasal mucosa, septum, and turbinates: Normal without edema or erythema  Lips, teeth, and gums: Normal, good dentition  Oropharynx: Normal with no erythema, edema, exudate or lesions  Neck   Neck: Supple, symmetric, trachea midline, no masses  Thyroid: Normal, no thyromegaly  Pulmonary   Respiratory effort: No increased work of breathing or signs of respiratory distress  Percussion of chest: Normal     Auscultation of lungs: Clear to auscultation  Cardiovascular   Auscultation of heart: Normal rate and rhythm, normal S1 and S2, no murmurs  Carotid pulses: 2+ bilaterally  Examination of extremities for edema and/or varicosities: Normal     Abdomen   Abdomen: Non-tender, no masses  Liver and spleen: No hepatomegaly or splenomegaly  Lymphatic   Palpation of lymph nodes in neck: No lymphadenopathy  Musculoskeletal   Gait and station: Normal     Digits and nails: Normal without clubbing or cyanosis  Joints, bones, and muscles: Abnormal   Arthritic changes in the knees bilaterally  Stability: Normal     Skin   Skin and subcutaneous tissue: Normal without rashes or lesions  Neurologic   Cranial nerves: Cranial nerves II-XII intact  Cortical function: Normal mental status  Reflexes: 2+ and symmetric  Coordination: Normal finger to nose and heel to shin      Psychiatric   Judgment and insight: Normal     Orientation to person, place, and time: Normal     Recent and remote memory: Intact  Mood and affect: Normal        Future Appointments    Date/Time Provider Specialty Site   03/17/2017 01:00 PM MANI Hood  Internal Medicine [facility] INTERNAL MED   11/14/2017 01:00 PM MANI Hood   Internal Medicine [facility] INTERNAL MED   10/03/2017 01:20 PM Kevin Merino [facility]etrics/Gynecology [facility]     Signatures   Electronically signed by : MANI Rivers ; Nov 10 2016  7:03PM EST                       (Author)

## 2018-01-23 NOTE — MISCELLANEOUS
Message  Return to work or school:   Brigette Barney is under my professional care   She was seen in my office on 12/13/17   She is able to return to work on  12/21/17            Signatures   Electronically signed by : MANI Martinez ; Dec 22 2017  7:44AM EST                       (Author)

## 2018-02-06 ENCOUNTER — HOSPITAL ENCOUNTER (OUTPATIENT)
Dept: RADIOLOGY | Facility: HOSPITAL | Age: 68
Discharge: HOME | End: 2018-02-06
Attending: INTERNAL MEDICINE
Payer: COMMERCIAL

## 2018-02-06 DIAGNOSIS — Z12.31 ENCOUNTER FOR SCREENING MAMMOGRAM FOR MALIGNANT NEOPLASM OF BREAST: ICD-10-CM

## 2018-02-06 PROCEDURE — 77067 SCR MAMMO BI INCL CAD: CPT

## 2018-03-01 DIAGNOSIS — E78.00 PURE HYPERCHOLESTEROLEMIA: ICD-10-CM

## 2018-03-01 DIAGNOSIS — R73.02 IMPAIRED GLUCOSE TOLERANCE: ICD-10-CM

## 2018-03-01 DIAGNOSIS — E78.1 PURE HYPERGLYCERIDEMIA: ICD-10-CM

## 2018-04-20 ENCOUNTER — APPOINTMENT (OUTPATIENT)
Dept: LAB | Facility: MEDICAL CENTER | Age: 68
End: 2018-04-20
Payer: COMMERCIAL

## 2018-04-20 DIAGNOSIS — E78.00 PURE HYPERCHOLESTEROLEMIA: ICD-10-CM

## 2018-04-20 DIAGNOSIS — E78.1 PURE HYPERGLYCERIDEMIA: ICD-10-CM

## 2018-04-20 DIAGNOSIS — R73.02 IMPAIRED GLUCOSE TOLERANCE: ICD-10-CM

## 2018-04-20 LAB
ALBUMIN SERPL BCP-MCNC: 3.4 G/DL (ref 3.5–5)
ALP SERPL-CCNC: 63 U/L (ref 46–116)
ALT SERPL W P-5'-P-CCNC: 23 U/L (ref 12–78)
ANION GAP SERPL CALCULATED.3IONS-SCNC: 6 MMOL/L (ref 4–13)
AST SERPL W P-5'-P-CCNC: 17 U/L (ref 5–45)
BILIRUB SERPL-MCNC: 0.68 MG/DL (ref 0.2–1)
BUN SERPL-MCNC: 15 MG/DL (ref 5–25)
CALCIUM SERPL-MCNC: 8.9 MG/DL (ref 8.3–10.1)
CHLORIDE SERPL-SCNC: 102 MMOL/L (ref 100–108)
CHOLEST SERPL-MCNC: 165 MG/DL (ref 50–200)
CO2 SERPL-SCNC: 29 MMOL/L (ref 21–32)
CREAT SERPL-MCNC: 0.69 MG/DL (ref 0.6–1.3)
GFR SERPL CREATININE-BSD FRML MDRD: 92 ML/MIN/1.73SQ M
GLUCOSE P FAST SERPL-MCNC: 97 MG/DL (ref 65–99)
HDLC SERPL-MCNC: 49 MG/DL (ref 40–60)
LDLC SERPL CALC-MCNC: 80 MG/DL (ref 0–100)
POTASSIUM SERPL-SCNC: 4.3 MMOL/L (ref 3.5–5.3)
PROT SERPL-MCNC: 7.3 G/DL (ref 6.4–8.2)
SODIUM SERPL-SCNC: 137 MMOL/L (ref 136–145)
TRIGL SERPL-MCNC: 179 MG/DL

## 2018-04-20 PROCEDURE — 80061 LIPID PANEL: CPT

## 2018-04-20 PROCEDURE — 36415 COLL VENOUS BLD VENIPUNCTURE: CPT

## 2018-04-20 PROCEDURE — 80053 COMPREHEN METABOLIC PANEL: CPT

## 2018-04-23 ENCOUNTER — OFFICE VISIT (OUTPATIENT)
Dept: INTERNAL MEDICINE CLINIC | Facility: CLINIC | Age: 68
End: 2018-04-23
Payer: COMMERCIAL

## 2018-04-23 VITALS
WEIGHT: 238.4 LBS | TEMPERATURE: 99.1 F | RESPIRATION RATE: 14 BRPM | OXYGEN SATURATION: 98 % | DIASTOLIC BLOOD PRESSURE: 74 MMHG | HEIGHT: 66 IN | HEART RATE: 80 BPM | BODY MASS INDEX: 38.31 KG/M2 | SYSTOLIC BLOOD PRESSURE: 122 MMHG

## 2018-04-23 DIAGNOSIS — R73.02 GLUCOSE INTOLERANCE (IMPAIRED GLUCOSE TOLERANCE): ICD-10-CM

## 2018-04-23 DIAGNOSIS — M54.31 SCIATICA OF RIGHT SIDE: Primary | ICD-10-CM

## 2018-04-23 DIAGNOSIS — E78.00 HYPERCHOLESTEROLEMIA: ICD-10-CM

## 2018-04-23 DIAGNOSIS — E78.1 HYPERTRIGLYCERIDEMIA: ICD-10-CM

## 2018-04-23 DIAGNOSIS — I10 ESSENTIAL HYPERTENSION: ICD-10-CM

## 2018-04-23 DIAGNOSIS — E66.9 OBESITY (BMI 30-39.9): ICD-10-CM

## 2018-04-23 DIAGNOSIS — E78.5 HYPERLIPIDEMIA, UNSPECIFIED HYPERLIPIDEMIA TYPE: ICD-10-CM

## 2018-04-23 PROCEDURE — 99214 OFFICE O/P EST MOD 30 MIN: CPT | Performed by: INTERNAL MEDICINE

## 2018-04-23 PROCEDURE — 1101F PT FALLS ASSESS-DOCD LE1/YR: CPT | Performed by: INTERNAL MEDICINE

## 2018-04-23 RX ORDER — VITAMIN E 200 UNIT
2 CAPSULE ORAL DAILY
COMMUNITY
End: 2018-12-28 | Stop reason: ALTCHOICE

## 2018-04-23 RX ORDER — METAXALONE 800 MG/1
800 TABLET ORAL 3 TIMES DAILY
Qty: 30 TABLET | Refills: 0 | Status: SHIPPED | OUTPATIENT
Start: 2018-04-23 | End: 2018-08-24 | Stop reason: CLARIF

## 2018-04-23 NOTE — ASSESSMENT & PLAN NOTE
History of glucose intolerance with the patient's 12 lb weight loss her blood sugars now back under 100 she is commended on her success so far but encouraged to continue with efforts to reduce weight by decreasing calorie consumption and mild exercise  Follow-up assessment in 4 months

## 2018-04-23 NOTE — ASSESSMENT & PLAN NOTE
History of hyperlipidemia he improved control on lovastatin 20 mg at bedtime continue present dose no apparent side effects liver enzymes are normal recommend she continue to work on losing weight and maintain a low-cholesterol diet follow-up assessment in 4 months

## 2018-04-23 NOTE — ASSESSMENT & PLAN NOTE
Blood pressure on today's visit is 122/74 representing excellent control of her hypertension she is presently on a combination of lisinopril 40 mg daily and hydrochlorothiazide 25 mg daily for blood pressure control continue present medications electrolyte package appears to be fine and kidney function is normal follow-up assessment in 4 months

## 2018-04-23 NOTE — ASSESSMENT & PLAN NOTE
History of hypertriglyceridemia excellent control at the present time continue on tristan Red supplement as well as ache calculated were controlled triglyceride consumption    Continue with efforts to lose weight follow-up assessment in 4 months

## 2018-04-23 NOTE — PROGRESS NOTES
Assessment/Plan:    Glucose intolerance (impaired glucose tolerance)  History of glucose intolerance with the patient's 12 lb weight loss her blood sugars now back under 100 she is commended on her success so far but encouraged to continue with efforts to reduce weight by decreasing calorie consumption and mild exercise  Follow-up assessment in 4 months  Hypertension  Blood pressure on today's visit is 122/74 representing excellent control of her hypertension she is presently on a combination of lisinopril 40 mg daily and hydrochlorothiazide 25 mg daily for blood pressure control continue present medications electrolyte package appears to be fine and kidney function is normal follow-up assessment in 4 months    Sciatica of right side  Right side sciatic pain radiating from the buttocks down the right leg most likely the result of lifting of patient at work as her symptoms started shortly after this activity  She is presently using rest and Skelaxin muscle relaxer 800 mg every 8 hr  Should her symptoms worsen I indicated that a tapering dose of steroids might be beneficial at the present time she prefers not to take the steroids she was out of work over the weekend as well as today hopes to return on Wednesday the 25th she will let me know if the pain gets worse  Hypercholesterolemia  History of hyperlipidemia he improved control on lovastatin 20 mg at bedtime continue present dose no apparent side effects liver enzymes are normal recommend she continue to work on losing weight and maintain a low-cholesterol diet follow-up assessment in 4 months    Hypertriglyceridemia  History of hypertriglyceridemia excellent control at the present time continue on tristan Red supplement as well as ache calculated were controlled triglyceride consumption  Continue with efforts to lose weight follow-up assessment in 4 months    Obesity (BMI 30-39  9)  Obesity condition with a body mass index today of 38 48 her weight is 12 lb less than it was in November on her last visit with us she is working hard to lose weight she is encouraged to continue with her efforts and commended with the 12 lb that she has lost so far  Reassess in 4 months       Diagnoses and all orders for this visit:    Sciatica of right side  -     metaxalone (SKELAXIN) 800 mg tablet; Take 1 tablet (800 mg total) by mouth 3 (three) times a day    Hyperlipidemia, unspecified hyperlipidemia type  -     Lipid panel; Future  -     Comprehensive metabolic panel; Future    Glucose intolerance (impaired glucose tolerance)    Essential hypertension    Hypercholesterolemia    Hypertriglyceridemia    Obesity (BMI 30-39  9)    Other orders  -     Discontinue: aspirin 81 MG tablet; Take by mouth  -     Discontinue: Calcium Carb-Ergocalciferol 250-125 MG-UNIT TABS; Take by mouth  -     Multiple Vitamins-Iron (CVS DAILY MULTIPLE PLUS IRON) TABS; Take by mouth  -     MEGARED OMEGA-3 KRILL  MG CAPS; Take 1 capsule by mouth daily        Subjective:      Patient ID: Shilo Mcdowell is a 74 y o  female  This pleasant 28-year-old female patient presents today for a routine 4 month follow-up visit  She has lost 12 lb since her last visit with us and is working hard to reduce her weight further  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 [employer] and is looking forward to her halfway  She has had right leg sciatic nerve pain over the past 2 days  She has experienced this problem in the past it usually responds to rest and muscle relaxers and occasionally require steroid treatment  She can relate the onset of the pain to lifting up the patient at work  The patient had blood work performed which reviewed with her in detail today it is to review her lipid profile as well as chemistry survey and glucose    All aspects of her lipid profile have improved as has her blood sugar which is now normal         The following portions of the patient's history were reviewed and updated as appropriate:   She  has a past medical history of Cellulitis of left elbow (10/30/2016); Epistaxis; Medial meniscus tear (56/18/8717); and Umbilical hernia  She   Patient Active Problem List    Diagnosis Date Noted    Sciatica of right side 04/23/2018    Obesity (BMI 30-39 9) 11/15/2017    Hypertriglyceridemia 02/26/2016    Glucose intolerance (impaired glucose tolerance) 02/23/2016    Hypercholesterolemia 09/12/2014    Hypertension 09/12/2014     She  has a past surgical history that includes Hysterectomy and Knee arthroscopy w/ meniscal repair (07/26/2015)  Her family history includes Diabetes in her family; No Known Problems in her mother; Thyroid disease in her family  She  reports that she has never smoked  She does not have any smokeless tobacco history on file  She reports that she does not drink alcohol or use drugs       Review of Systems   Constitutional: Negative  HENT: Negative  Eyes: Negative  Respiratory: Negative  Cardiovascular: Negative  Gastrointestinal: Negative  Endocrine: Negative  Genitourinary: Negative  Musculoskeletal: Negative  Skin: Negative  Allergic/Immunologic: Negative  Neurological:        Right leg sciatic irritation   Hematological: Negative  Psychiatric/Behavioral: Negative  Objective:      /74   Pulse 80   Temp 99 1 °F (37 3 °C)   Resp 14   Ht 5' 6" (1 676 m)   Wt 108 kg (238 lb 6 4 oz)   SpO2 98%   BMI 38 48 kg/m²          Physical Exam   Constitutional: She is oriented to person, place, and time  She appears well-developed and well-nourished  No distress  HENT:   Head: Normocephalic and atraumatic     Right Ear: Tympanic membrane and external ear normal    Left Ear: Tympanic membrane and external ear normal    Nose: Nose normal    Mouth/Throat: Uvula is midline, oropharynx is clear and moist and mucous membranes are normal    Eyes: Conjunctivae are normal  Pupils are equal, round, and reactive to light  Right eye exhibits no discharge  Left eye exhibits no discharge  No scleral icterus  Neck: No JVD present  No thyromegaly present  Cardiovascular: Normal rate, regular rhythm, normal heart sounds and intact distal pulses  No murmur heard  Pulmonary/Chest: Effort normal and breath sounds normal  No respiratory distress  She has no wheezes  She has no rales  She exhibits no tenderness  Abdominal: Soft  Bowel sounds are normal    Musculoskeletal: Normal range of motion  She exhibits no edema  Lymphadenopathy:     She has no cervical adenopathy  Neurological: She is alert and oriented to person, place, and time  She has normal reflexes  Skin: Skin is warm, dry and intact  No rash noted  She is not diaphoretic  No erythema  No pallor  Psychiatric: She has a normal mood and affect   Her speech is normal and behavior is normal  Judgment and thought content normal

## 2018-04-23 NOTE — ASSESSMENT & PLAN NOTE
Obesity condition with a body mass index today of 38 48 her weight is 12 lb less than it was in November on her last visit with us she is working hard to lose weight she is encouraged to continue with her efforts and commended with the 12 lb that she has lost so far    Reassess in 4 months

## 2018-04-23 NOTE — ASSESSMENT & PLAN NOTE
Right side sciatic pain radiating from the buttocks down the right leg most likely the result of lifting of patient at work as her symptoms started shortly after this activity  She is presently using rest and Skelaxin muscle relaxer 800 mg every 8 hr  Should her symptoms worsen I indicated that a tapering dose of steroids might be beneficial at the present time she prefers not to take the steroids she was out of work over the weekend as well as today hopes to return on Wednesday the 25th she will let me know if the pain gets worse

## 2018-06-25 DIAGNOSIS — I10 ESSENTIAL HYPERTENSION: Primary | ICD-10-CM

## 2018-06-25 RX ORDER — HYDROCHLOROTHIAZIDE 25 MG/1
25 TABLET ORAL DAILY
Qty: 90 TABLET | Refills: 2 | Status: SHIPPED | OUTPATIENT
Start: 2018-06-25 | End: 2018-10-02 | Stop reason: SDUPTHER

## 2018-06-25 RX ORDER — LISINOPRIL 40 MG/1
40 TABLET ORAL DAILY
Qty: 90 TABLET | Refills: 2 | Status: SHIPPED | OUTPATIENT
Start: 2018-06-25 | End: 2018-09-20 | Stop reason: SDUPTHER

## 2018-07-23 DIAGNOSIS — E78.5 HYPERLIPIDEMIA, UNSPECIFIED HYPERLIPIDEMIA TYPE: Primary | ICD-10-CM

## 2018-07-23 RX ORDER — LOVASTATIN 20 MG/1
20 TABLET ORAL
Qty: 90 TABLET | Refills: 3 | Status: SHIPPED | OUTPATIENT
Start: 2018-07-23 | End: 2019-10-16 | Stop reason: SDUPTHER

## 2018-08-21 ENCOUNTER — APPOINTMENT (OUTPATIENT)
Dept: LAB | Facility: MEDICAL CENTER | Age: 68
End: 2018-08-21
Payer: COMMERCIAL

## 2018-08-21 DIAGNOSIS — E78.5 HYPERLIPIDEMIA, UNSPECIFIED HYPERLIPIDEMIA TYPE: ICD-10-CM

## 2018-08-21 LAB
CHOLEST SERPL-MCNC: 182 MG/DL (ref 50–200)
HDLC SERPL-MCNC: 51 MG/DL (ref 40–60)
LDLC SERPL CALC-MCNC: 89 MG/DL (ref 0–100)
NONHDLC SERPL-MCNC: 131 MG/DL
TRIGL SERPL-MCNC: 209 MG/DL

## 2018-08-21 PROCEDURE — 36415 COLL VENOUS BLD VENIPUNCTURE: CPT

## 2018-08-21 PROCEDURE — 80061 LIPID PANEL: CPT

## 2018-08-24 ENCOUNTER — OFFICE VISIT (OUTPATIENT)
Dept: INTERNAL MEDICINE CLINIC | Facility: CLINIC | Age: 68
End: 2018-08-24
Payer: COMMERCIAL

## 2018-08-24 VITALS
OXYGEN SATURATION: 98 % | HEART RATE: 74 BPM | WEIGHT: 246 LBS | TEMPERATURE: 98.2 F | DIASTOLIC BLOOD PRESSURE: 60 MMHG | SYSTOLIC BLOOD PRESSURE: 124 MMHG | RESPIRATION RATE: 16 BRPM | BODY MASS INDEX: 39.53 KG/M2 | HEIGHT: 66 IN

## 2018-08-24 DIAGNOSIS — E66.9 OBESITY (BMI 30-39.9): ICD-10-CM

## 2018-08-24 DIAGNOSIS — E78.1 HYPERTRIGLYCERIDEMIA: ICD-10-CM

## 2018-08-24 DIAGNOSIS — E78.00 HYPERCHOLESTEROLEMIA: ICD-10-CM

## 2018-08-24 DIAGNOSIS — Z00.00 ROUTINE GENERAL MEDICAL EXAMINATION AT A HEALTH CARE FACILITY: ICD-10-CM

## 2018-08-24 DIAGNOSIS — Z12.11 COLON CANCER SCREENING: Primary | ICD-10-CM

## 2018-08-24 DIAGNOSIS — I10 ESSENTIAL HYPERTENSION: ICD-10-CM

## 2018-08-24 PROCEDURE — 3078F DIAST BP <80 MM HG: CPT | Performed by: INTERNAL MEDICINE

## 2018-08-24 PROCEDURE — 3008F BODY MASS INDEX DOCD: CPT | Performed by: INTERNAL MEDICINE

## 2018-08-24 PROCEDURE — 99214 OFFICE O/P EST MOD 30 MIN: CPT | Performed by: INTERNAL MEDICINE

## 2018-08-24 PROCEDURE — 1160F RVW MEDS BY RX/DR IN RCRD: CPT | Performed by: INTERNAL MEDICINE

## 2018-08-24 PROCEDURE — 3074F SYST BP LT 130 MM HG: CPT | Performed by: INTERNAL MEDICINE

## 2018-08-24 NOTE — ASSESSMENT & PLAN NOTE
Lipid profile reviewed with the patient today recommend increasing tristan Red to 1000 mg daily in view of persistent elevation of triglycerides  Follow-up study should be performed in 4 months would consider at that time the addition of try core to her lovastatin if triglycerides continue at an elevated level

## 2018-08-24 NOTE — ASSESSMENT & PLAN NOTE
Hypertension remains under good control continue lisinopril at 40 mg daily hydrochlorothiazide 25 mg daily and follow-up assessment in 4 months

## 2018-08-24 NOTE — ASSESSMENT & PLAN NOTE
The patient's lipid profile was reviewed with her today recommend continuation of healthy diet low in cholesterol and saturated fats and continue on lovastatin 20 mg daily at bedtime  Follow-up testing should be performed in 4 months

## 2018-08-24 NOTE — ASSESSMENT & PLAN NOTE
Obesity with a body mass index of 39 71 the patient is familiar with the dangers of obesity and overweight condition  She is encouraged to work on reducing calorie consumption and maintain regular physical activity

## 2018-08-24 NOTE — PROGRESS NOTES
Assessment/Plan:    Hypertension  Hypertension remains under good control continue lisinopril at 40 mg daily hydrochlorothiazide 25 mg daily and follow-up assessment in 4 months  Hypercholesterolemia  The patient's lipid profile was reviewed with her today recommend continuation of healthy diet low in cholesterol and saturated fats and continue on lovastatin 20 mg daily at bedtime  Follow-up testing should be performed in 4 months  Hypertriglyceridemia  Lipid profile reviewed with the patient today recommend increasing tristan Red to 1000 mg daily in view of persistent elevation of triglycerides  Follow-up study should be performed in 4 months would consider at that time the addition of try core to her lovastatin if triglycerides continue at an elevated level  Obesity (BMI 30-39  9)  Obesity with a body mass index of 39 71 the patient is familiar with the dangers of obesity and overweight condition  She is encouraged to work on reducing calorie consumption and maintain regular physical activity  Diagnoses and all orders for this visit:    Colon cancer screening  -     Ambulatory referral to Gastroenterology; Future  -     Occult Blood, Fecal Immunochemical; Future    Routine general medical examination at a health care facility  -     Comprehensive metabolic panel; Future  -     CBC and differential; Future  -     Lipid panel; Future  -     UA w Reflex to Microscopic w Reflex to Culture -Lab Collect    Essential hypertension    Hypercholesterolemia    Hypertriglyceridemia    Obesity (BMI 30-39  9)        Subjective:      Patient ID: Pramod Red is a 74 y o  female  This is a routine follow-up visit for this 79-year-old female patient  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 and finds that she is busy of than ever since her long term  Today she and her  are going to [facility] does celebrate their anniversary  She has no specific medical complaints at this time          The following portions of the patient's history were reviewed and updated as appropriate:   She  has a past medical history of Cellulitis of left elbow (10/30/2016); Epistaxis; Medial meniscus tear (51/88/4405); and Umbilical hernia  She   Patient Active Problem List    Diagnosis Date Noted    Sciatica of right side 04/23/2018    Obesity (BMI 30-39 9) 11/15/2017    Hypertriglyceridemia 02/26/2016    Glucose intolerance (impaired glucose tolerance) 02/23/2016    Hypercholesterolemia 09/12/2014    Hypertension 09/12/2014     She  has a past surgical history that includes Hysterectomy and Knee arthroscopy w/ meniscal repair (07/26/2015)  Her family history includes Diabetes in her family; No Known Problems in her mother; Thyroid disease in her family  She  reports that she has never smoked  She does not have any smokeless tobacco history on file  She reports that she does not drink alcohol or use drugs  Current Outpatient Prescriptions   Medication Sig Dispense Refill    aspirin 81 MG tablet Take 81 mg by mouth daily   calcium-vitamin D (OSCAL) 250-125 MG-UNIT per tablet Take 1 tablet by mouth daily   hydrochlorothiazide (HYDRODIURIL) 25 mg tablet Take 1 tablet (25 mg total) by mouth daily 90 tablet 2    lisinopril (ZESTRIL) 40 mg tablet Take 1 tablet (40 mg total) by mouth daily 90 tablet 2    lovastatin (MEVACOR) 20 mg tablet Take 1 tablet (20 mg total) by mouth daily at bedtime 90 tablet 3    MEGARED OMEGA-3 KRILL  MG CAPS Take 2 capsules by mouth daily       Multiple Vitamins-Iron ([facility] DAILY MULTIPLE PLUS IRON) TABS Take by mouth       No current facility-administered medications for this visit       Review of Systems   All other systems reviewed and are negative          Objective:      /60   Pulse 74   Temp 98 2 °F (36 8 °C)   Resp 16   Ht 5' 6" (1 676 m)   Wt 112 kg (246 lb)   SpO2 98%   BMI 39 71 kg/m²          Physical Exam   Constitutional: She is oriented to person, place, and time  Vital signs are normal  She appears well-developed and well-nourished  She is cooperative  HENT:   Right Ear: Hearing, tympanic membrane, external ear and ear canal normal    Left Ear: Hearing, tympanic membrane, external ear and ear canal normal    Nose: Nose normal  No mucosal edema  Mouth/Throat: Uvula is midline, oropharynx is clear and moist and mucous membranes are normal    Eyes: Conjunctivae and lids are normal  Pupils are equal, round, and reactive to light  Neck: No JVD present  Carotid bruit is not present  No thyromegaly present  Cardiovascular: Normal rate, regular rhythm, normal heart sounds and intact distal pulses  No murmur heard  Pulmonary/Chest: Effort normal and breath sounds normal  No respiratory distress  She has no wheezes  She has no rales  She exhibits no tenderness  Abdominal: Soft  Normal appearance and bowel sounds are normal    Musculoskeletal: Normal range of motion  She exhibits no edema  Lymphadenopathy:     She has no cervical adenopathy  Neurological: She is alert and oriented to person, place, and time  She has normal reflexes  Skin: Skin is warm, dry and intact  No rash noted  No erythema  Psychiatric: She has a normal mood and affect  Her speech is normal and behavior is normal  Judgment and thought content normal  Cognition and memory are normal    Vitals reviewed

## 2018-09-20 DIAGNOSIS — I10 ESSENTIAL HYPERTENSION: ICD-10-CM

## 2018-09-20 RX ORDER — LISINOPRIL 40 MG/1
40 TABLET ORAL DAILY
Qty: 90 TABLET | Refills: 3 | Status: SHIPPED | OUTPATIENT
Start: 2018-09-20 | End: 2019-09-24 | Stop reason: SDUPTHER

## 2018-09-28 LAB
LEFT EYE DIABETIC RETINOPATHY: NORMAL
RIGHT EYE DIABETIC RETINOPATHY: NORMAL

## 2018-10-02 DIAGNOSIS — I10 ESSENTIAL HYPERTENSION: ICD-10-CM

## 2018-10-02 RX ORDER — HYDROCHLOROTHIAZIDE 25 MG/1
25 TABLET ORAL DAILY
Qty: 90 TABLET | Refills: 2 | Status: SHIPPED | OUTPATIENT
Start: 2018-10-02 | End: 2019-07-02 | Stop reason: SDUPTHER

## 2018-12-21 ENCOUNTER — APPOINTMENT (OUTPATIENT)
Dept: LAB | Facility: MEDICAL CENTER | Age: 68
End: 2018-12-21
Payer: COMMERCIAL

## 2018-12-21 ENCOUNTER — TRANSCRIBE ORDERS (OUTPATIENT)
Dept: ADMINISTRATIVE | Facility: HOSPITAL | Age: 68
End: 2018-12-21

## 2018-12-21 DIAGNOSIS — Z00.00 ROUTINE GENERAL MEDICAL EXAMINATION AT A HEALTH CARE FACILITY: ICD-10-CM

## 2018-12-21 DIAGNOSIS — Z12.11 COLON CANCER SCREENING: ICD-10-CM

## 2018-12-21 DIAGNOSIS — Z00.00 ROUTINE GENERAL MEDICAL EXAMINATION AT A HEALTH CARE FACILITY: Primary | ICD-10-CM

## 2018-12-21 LAB
ALBUMIN SERPL BCP-MCNC: 3.5 G/DL (ref 3.5–5)
ALP SERPL-CCNC: 65 U/L (ref 46–116)
ALT SERPL W P-5'-P-CCNC: 32 U/L (ref 12–78)
ANION GAP SERPL CALCULATED.3IONS-SCNC: 8 MMOL/L (ref 4–13)
AST SERPL W P-5'-P-CCNC: 23 U/L (ref 5–45)
BASOPHILS # BLD AUTO: 0.04 THOUSANDS/ΜL (ref 0–0.1)
BASOPHILS NFR BLD AUTO: 1 % (ref 0–1)
BILIRUB SERPL-MCNC: 0.55 MG/DL (ref 0.2–1)
BILIRUB UR QL STRIP: NEGATIVE
BUN SERPL-MCNC: 16 MG/DL (ref 5–25)
CALCIUM SERPL-MCNC: 9.1 MG/DL (ref 8.3–10.1)
CHLORIDE SERPL-SCNC: 103 MMOL/L (ref 100–108)
CHOLEST SERPL-MCNC: 193 MG/DL (ref 50–200)
CLARITY UR: NORMAL
CO2 SERPL-SCNC: 27 MMOL/L (ref 21–32)
COLOR UR: NORMAL
CREAT SERPL-MCNC: 0.73 MG/DL (ref 0.6–1.3)
EOSINOPHIL # BLD AUTO: 0.07 THOUSAND/ΜL (ref 0–0.61)
EOSINOPHIL NFR BLD AUTO: 1 % (ref 0–6)
ERYTHROCYTE [DISTWIDTH] IN BLOOD BY AUTOMATED COUNT: 13 % (ref 11.6–15.1)
GFR SERPL CREATININE-BSD FRML MDRD: 86 ML/MIN/1.73SQ M
GLUCOSE P FAST SERPL-MCNC: 94 MG/DL (ref 65–99)
GLUCOSE UR STRIP-MCNC: NEGATIVE MG/DL
HCT VFR BLD AUTO: 43.5 % (ref 34.8–46.1)
HDLC SERPL-MCNC: 53 MG/DL (ref 40–60)
HEMOCCULT STL QL IA: POSITIVE
HGB BLD-MCNC: 14.4 G/DL (ref 11.5–15.4)
HGB UR QL STRIP.AUTO: NEGATIVE
IMM GRANULOCYTES # BLD AUTO: 0.03 THOUSAND/UL (ref 0–0.2)
IMM GRANULOCYTES NFR BLD AUTO: 0 % (ref 0–2)
KETONES UR STRIP-MCNC: NEGATIVE MG/DL
LDLC SERPL CALC-MCNC: 104 MG/DL (ref 0–100)
LEUKOCYTE ESTERASE UR QL STRIP: NEGATIVE
LYMPHOCYTES # BLD AUTO: 2.41 THOUSANDS/ΜL (ref 0.6–4.47)
LYMPHOCYTES NFR BLD AUTO: 33 % (ref 14–44)
MCH RBC QN AUTO: 31.9 PG (ref 26.8–34.3)
MCHC RBC AUTO-ENTMCNC: 33.1 G/DL (ref 31.4–37.4)
MCV RBC AUTO: 97 FL (ref 82–98)
MONOCYTES # BLD AUTO: 0.48 THOUSAND/ΜL (ref 0.17–1.22)
MONOCYTES NFR BLD AUTO: 7 % (ref 4–12)
NEUTROPHILS # BLD AUTO: 4.23 THOUSANDS/ΜL (ref 1.85–7.62)
NEUTS SEG NFR BLD AUTO: 58 % (ref 43–75)
NITRITE UR QL STRIP: NEGATIVE
NONHDLC SERPL-MCNC: 140 MG/DL
NRBC BLD AUTO-RTO: 0 /100 WBCS
PH UR STRIP.AUTO: 5.5 [PH] (ref 4.5–8)
PLATELET # BLD AUTO: 209 THOUSANDS/UL (ref 149–390)
PMV BLD AUTO: 10.9 FL (ref 8.9–12.7)
POTASSIUM SERPL-SCNC: 4.1 MMOL/L (ref 3.5–5.3)
PROT SERPL-MCNC: 7.3 G/DL (ref 6.4–8.2)
PROT UR STRIP-MCNC: NEGATIVE MG/DL
RBC # BLD AUTO: 4.51 MILLION/UL (ref 3.81–5.12)
SODIUM SERPL-SCNC: 138 MMOL/L (ref 136–145)
SP GR UR STRIP.AUTO: 1.03 (ref 1–1.03)
TRIGL SERPL-MCNC: 179 MG/DL
UROBILINOGEN UR QL STRIP.AUTO: 0.2 E.U./DL
WBC # BLD AUTO: 7.26 THOUSAND/UL (ref 4.31–10.16)

## 2018-12-21 PROCEDURE — 36415 COLL VENOUS BLD VENIPUNCTURE: CPT

## 2018-12-21 PROCEDURE — 80061 LIPID PANEL: CPT

## 2018-12-21 PROCEDURE — 85025 COMPLETE CBC W/AUTO DIFF WBC: CPT

## 2018-12-21 PROCEDURE — 80053 COMPREHEN METABOLIC PANEL: CPT

## 2018-12-21 PROCEDURE — 81003 URINALYSIS AUTO W/O SCOPE: CPT | Performed by: INTERNAL MEDICINE

## 2018-12-21 PROCEDURE — G0328 FECAL BLOOD SCRN IMMUNOASSAY: HCPCS

## 2018-12-28 ENCOUNTER — OFFICE VISIT (OUTPATIENT)
Dept: INTERNAL MEDICINE CLINIC | Facility: CLINIC | Age: 68
End: 2018-12-28
Payer: COMMERCIAL

## 2018-12-28 VITALS
SYSTOLIC BLOOD PRESSURE: 134 MMHG | TEMPERATURE: 97.9 F | WEIGHT: 251 LBS | BODY MASS INDEX: 40.34 KG/M2 | DIASTOLIC BLOOD PRESSURE: 80 MMHG | HEIGHT: 66 IN | HEART RATE: 91 BPM | OXYGEN SATURATION: 98 %

## 2018-12-28 DIAGNOSIS — E78.5 HYPERLIPIDEMIA, UNSPECIFIED HYPERLIPIDEMIA TYPE: Primary | ICD-10-CM

## 2018-12-28 DIAGNOSIS — R19.5 HEME POSITIVE STOOL: ICD-10-CM

## 2018-12-28 DIAGNOSIS — R73.02 GLUCOSE INTOLERANCE (IMPAIRED GLUCOSE TOLERANCE): ICD-10-CM

## 2018-12-28 DIAGNOSIS — E66.9 OBESITY (BMI 30-39.9): ICD-10-CM

## 2018-12-28 DIAGNOSIS — Z12.39 SCREENING FOR BREAST CANCER: ICD-10-CM

## 2018-12-28 DIAGNOSIS — E66.01 MORBID OBESITY WITH BMI OF 40.0-44.9, ADULT (HCC): ICD-10-CM

## 2018-12-28 DIAGNOSIS — R00.2 PALPITATIONS: ICD-10-CM

## 2018-12-28 DIAGNOSIS — E78.00 HYPERCHOLESTEROLEMIA: ICD-10-CM

## 2018-12-28 DIAGNOSIS — I10 ESSENTIAL HYPERTENSION: ICD-10-CM

## 2018-12-28 DIAGNOSIS — E78.1 HYPERTRIGLYCERIDEMIA: ICD-10-CM

## 2018-12-28 PROBLEM — M54.31 SCIATICA OF RIGHT SIDE: Status: RESOLVED | Noted: 2018-04-23 | Resolved: 2018-12-28

## 2018-12-28 PROCEDURE — 99215 OFFICE O/P EST HI 40 MIN: CPT | Performed by: INTERNAL MEDICINE

## 2018-12-28 PROCEDURE — 93000 ELECTROCARDIOGRAM COMPLETE: CPT | Performed by: INTERNAL MEDICINE

## 2018-12-28 PROCEDURE — G0438 PPPS, INITIAL VISIT: HCPCS | Performed by: INTERNAL MEDICINE

## 2018-12-28 RX ORDER — OMEGA-3-ACID ETHYL ESTERS 1 G/1
2 CAPSULE, LIQUID FILLED ORAL 2 TIMES DAILY
Qty: 360 CAPSULE | Refills: 0
Start: 2018-12-28 | End: 2020-07-24 | Stop reason: ALTCHOICE

## 2018-12-28 NOTE — ASSESSMENT & PLAN NOTE
History of elevated cholesterol and triglycerides  The patient has been on tristan Red with limited success  She does have Lovaza at home which was originally prescribed for her  I suggested that she try taking this at 2 g twice a day for period of 4 months to see if it out performs the tristan Red medication  Will schedule her for follow-up blood test of lipid profile in 4 months  Proper diet exercise and weight reduction are of course recommended as well

## 2018-12-28 NOTE — ASSESSMENT & PLAN NOTE
History of glucose intolerance with mild blood sugar elevations review of her most recent blood work in sick indicates a return to normal fasting glucose  She is encouraged to strive to improve her exercise we have recommended consideration of aqua therapy and also decreasing carbohydrate and calorie consumption

## 2018-12-28 NOTE — ASSESSMENT & PLAN NOTE
Heme-positive stool with recent episode of left sided upper and lower quadrant discomfort suggestive of possible diverticulitis  I have recommended that she have a colonoscopy and I have referred her to colon and rectal services at [facility]   No localizing tenderness on today's examination nor any evidence of any masses on her examination of the abdomen

## 2018-12-28 NOTE — ASSESSMENT & PLAN NOTE
Blood pressure control remains good with blood pressure medication of 40 mg of lisinopril daily recommend follow-up assessment in 4 months

## 2018-12-28 NOTE — PROGRESS NOTES
Assessment and Plan:    Problem List Items Addressed This Visit     None        Health Maintenance Due   Topic Date Due    Hepatitis C Screening  1950    Medicare Annual Wellness Visit (AWV)  1950    CRC Screening: Colonoscopy  1950    DTaP,Tdap,and Td Vaccines (1 - Tdap) 11/27/1973    Pneumococcal PPSV23/PCV13 65+ Years / Low and Medium Risk (1 of 2 - PCV13) 11/27/2017    INFLUENZA VACCINE  07/01/2018         HPI:  Tevin Howell is a 79 y o  female here for her Initial Wellness Visit  Patient Active Problem List   Diagnosis    Glucose intolerance (impaired glucose tolerance)    Hypercholesterolemia    Hypertension    Hypertriglyceridemia    Obesity (BMI 30-39  9)    Sciatica of right side     Past Medical History:   Diagnosis Date    Cellulitis of left elbow 10/30/2016    Epistaxis     Medial meniscus tear 66/33/7299    Umbilical hernia      Past Surgical History:   Procedure Laterality Date    HYSTERECTOMY      KNEE ARTHROSCOPY W/ MENISCAL REPAIR  07/26/2015    with medial meniscus repair     Family History   Problem Relation Age of Onset    No Known Problems Mother     Thyroid disease Family     Diabetes Family         Diabetes Mellitus     History   Smoking Status    Never Smoker   Smokeless Tobacco    Not on file     Comment: Denied history of never a smoker per [employer]     History   Alcohol Use No      History   Drug Use No       Current Outpatient Prescriptions   Medication Sig Dispense Refill    aspirin 81 MG tablet Take 81 mg by mouth daily   calcium-vitamin D (OSCAL) 250-125 MG-UNIT per tablet Take 1 tablet by mouth daily        hydrochlorothiazide (HYDRODIURIL) 25 mg tablet Take 1 tablet (25 mg total) by mouth daily 90 tablet 2    lisinopril (ZESTRIL) 40 mg tablet Take 1 tablet (40 mg total) by mouth daily 90 tablet 3    lovastatin (MEVACOR) 20 mg tablet Take 1 tablet (20 mg total) by mouth daily at bedtime 90 tablet 3    MEGARED OMEGA-3 KRILL  MG CAPS Take 2 capsules by mouth daily       Multiple Vitamins-Iron (CVS DAILY MULTIPLE PLUS IRON) TABS Take by mouth       No current facility-administered medications for this visit  Allergies   Allergen Reactions    Lipitor [Atorvastatin]     Morphine      Reaction Date: [id number];     Morphine And Related     Zithromax [Azithromycin]      Reaction Date: [id number];      Immunization History   Administered Date(s) Administered    Influenza Quadrivalent, 6-35 Months IM 11/16/2016, 10/20/2017       Patient Care Team:  Matteo Alcazar MD as PCP - General    Medicare Screening Tests and Risk Assessments:      Health Risk Assessment:  Patient rates overall health as very good  Patient feels that their physical health rating is Same  Eyesight was rated as Same  Hearing was rated as Same  Patient feels that their emotional and mental health rating is Same  Pain experienced by patient in the last 7 days has been Some  Patient's pain rating has been 2/10  Patient states that she has experienced weight loss or gain in last 6 months  Emotional/Mental Health:  Patient has been feeling nervous/anxious  PHQ-9 Depression Screening:    Frequency of the following problems over the past two weeks:      1  Little interest or pleasure in doing things: 0 - not at all      2  Feeling down, depressed, or hopeless: 0 - not at all  PHQ-2 Score: 0          Broken Bones/Falls: Fall Risk Assessment:    In the past year, patient has experienced: No history of falling in past year          Bladder/Bowel:  Patient has leaked urine accidently in the last six months  Patient reports no loss of bowel control  Immunizations:  Patient has not had a flu vaccination within the last year  Patient has not received a pneumonia shot  Patient has not received a shingles shot  Patient has not received tetanus/diphtheria shot  Home Safety:  Patient does not have trouble with stairs inside or outside of their home  Patient currently reports that there are no safety hazards present in home, working smoke alarms, working carbon monoxide detectors  Preventative Screenings:   Breast cancer screening performed, 2/1/2018  colon cancer screen completed, cholesterol screen completed, glaucoma eye exam completed,     Nutrition:  Current diet: Regular with servings of the following:    Medications:  Patient is currently taking over-the-counter supplements  List of OTC medications includes: multivit, aspirin, calcium, krill oil   Patient is able to manage medications  Lifestyle Choices:  Patient reports no tobacco use  Patient has not smoked or used tobacco in the past   Patient reports no alcohol use  Patient drives a vehicle  Patient wears seat belt  Current level of exercise of physical activity described by patient as: moderate   Activities of Daily Living:  Can get out of bed by his or her self, able to dress self, able to make own meals, able to do own shopping, able to bathe self, can do own laundry/housekeeping, can manage own money, pay bills and track expenses    Previous Hospitalizations:  No hospitalization or ED visit in past 12 months        Advanced Directives:  Patient has decided on a power of   Patient has spoken to designated power of   Patient has completed advanced directive

## 2018-12-28 NOTE — PROGRESS NOTES
Assessment/Plan:    Glucose intolerance (impaired glucose tolerance)  History of glucose intolerance with mild blood sugar elevations review of her most recent blood work in sick indicates a return to normal fasting glucose  She is encouraged to strive to improve her exercise we have recommended consideration of aqua therapy and also decreasing carbohydrate and calorie consumption  Hypertension  Blood pressure control remains good with blood pressure medication of 40 mg of lisinopril daily recommend follow-up assessment in 4 months  Heme positive stool  Heme-positive stool with recent episode of left sided upper and lower quadrant discomfort suggestive of possible diverticulitis  I have recommended that she have a colonoscopy and I have referred her to colon and rectal services at [facility]   No localizing tenderness on today's examination nor any evidence of any masses on her examination of the abdomen  Hypercholesterolemia  History of elevated cholesterol and triglycerides  The patient has been on tristan Red with limited success  She does have Lovaza at home which was originally prescribed for her  I suggested that she try taking this at 2 g twice a day for period of 4 months to see if it out performs the tristan Red medication  Will schedule her for follow-up blood test of lipid profile in 4 months  Proper diet exercise and weight reduction are of course recommended as well  Hypertriglyceridemia  See discussion under hypercholesterolemia    Morbid obesity with BMI of 40 0-44 9, [facility])  Patient has a morbid obesity diagnosis based upon a body mass index of 40 51  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  We discussed of trying aqua therapy to enable her to do more exercising in the pool than she can do on land  She will consider this and evaluate the options in her community for the aqua therapy         Diagnoses and all orders for this visit:    Hyperlipidemia, unspecified hyperlipidemia type  -     omega-3-acid ethyl esters (LOVAZA) 1 g capsule; Take 2 capsules (2 g total) by mouth 2 (two) times a day  -     Lipid panel; Future    Screening for breast cancer  -     Mammo screening bilateral w cad; Future    Heme positive stool  -     Ambulatory referral to Colorectal Surgery; Future    Palpitations  -     POCT ECG    Hypercholesterolemia    Hypertriglyceridemia    Obesity (BMI 30-39  9)    Essential hypertension    Glucose intolerance (impaired glucose tolerance)        Subjective:      Patient ID: Christian Parks is a 79 y o  female  This is an physical examination and review of blood work for this 32-year-old female patient  She is now retired from the nursing profession  She is doing well with her custodial enjoying spending more time with her family that she has been able to do from many many years  She   She is  due for a mammogram in the next 3 months  She was provided with a request for that mammogram examination  Her only complaint is that of an episode of left side abdominal discomfort radiating from the upper quadrant down to the lower quadrant  She did not have any nausea or vomiting associated with this episode  She did not have any particular fevers or chills nor did she notice any blood mixed in with her stool but did have some evidence of mild mucus threads involved with the stool  She does have a heme-positive study on her stool test for this visit and she has been referred on to colon and rectal services at [facility] for a colonoscopy evaluation  The following portions of the patient's history were reviewed and updated as appropriate:   She  has a past medical history of Cellulitis of left elbow (10/30/2016); Epistaxis; Medial meniscus tear (97/48/2888); and Umbilical hernia    She   Patient Active Problem List    Diagnosis Date Noted    Heme positive stool 12/28/2018    Morbid obesity with BMI of 40 0-44 9, adult ([facility] [address] ) 12/28/2018    Hypertriglyceridemia 02/26/2016    Glucose intolerance (impaired glucose tolerance) 02/23/2016    Hypercholesterolemia 09/12/2014    Hypertension 09/12/2014     She  has a past surgical history that includes Hysterectomy and Knee arthroscopy w/ meniscal repair (07/26/2015)  Her family history includes Diabetes in her family; No Known Problems in her mother; Thyroid disease in her family  She  reports that she has never smoked  She has never used smokeless tobacco  She reports that she does not drink alcohol or use drugs  Current Outpatient Prescriptions   Medication Sig Dispense Refill    aspirin 81 MG tablet Take 81 mg by mouth daily   calcium-vitamin D (OSCAL) 250-125 MG-UNIT per tablet Take 1 tablet by mouth daily   hydrochlorothiazide (HYDRODIURIL) 25 mg tablet Take 1 tablet (25 mg total) by mouth daily 90 tablet 2    lisinopril (ZESTRIL) 40 mg tablet Take 1 tablet (40 mg total) by mouth daily 90 tablet 3    lovastatin (MEVACOR) 20 mg tablet Take 1 tablet (20 mg total) by mouth daily at bedtime 90 tablet 3    Multiple Vitamins-Iron (CVS DAILY MULTIPLE PLUS IRON) TABS Take by mouth      omega-3-acid ethyl esters (LOVAZA) 1 g capsule Take 2 capsules (2 g total) by mouth 2 (two) times a day 360 capsule 0     No current facility-administered medications for this visit       Review of Systems   Gastrointestinal: Positive for abdominal pain  Musculoskeletal: Positive for arthralgias and back pain  All other systems reviewed and are negative  Objective:      /80 (BP Location: Left arm, Patient Position: Sitting, Cuff Size: Adult)   Pulse 91   Temp 97 9 °F (36 6 °C) (Tympanic)   Ht 5' 6" (1 676 m)   Wt 114 kg (251 lb)   SpO2 98%   BMI 40 51 kg/m²          Physical Exam   Constitutional: She is oriented to person, place, and time  Vital signs are normal  She appears well-developed and well-nourished   She is cooperative  No distress  HENT:   Head: Normocephalic and atraumatic  Right Ear: Hearing, tympanic membrane, external ear and ear canal normal    Left Ear: Hearing, tympanic membrane, external ear and ear canal normal    Nose: Nose normal  No mucosal edema  Mouth/Throat: Uvula is midline, oropharynx is clear and moist and mucous membranes are normal  No oropharyngeal exudate  Eyes: Pupils are equal, round, and reactive to light  Conjunctivae and lids are normal  Right eye exhibits no discharge  Left eye exhibits no discharge  Neck: No JVD present  Carotid bruit is not present  No tracheal deviation present  No thyromegaly present  Cardiovascular: Normal rate, regular rhythm, normal heart sounds and intact distal pulses  No murmur heard  Pulmonary/Chest: Effort normal and breath sounds normal  No respiratory distress  She has no wheezes  She has no rales  She exhibits no tenderness  Abdominal: Soft  Normal appearance and bowel sounds are normal  She exhibits no distension and no mass  There is no tenderness  There is no rebound and no guarding  Musculoskeletal: Normal range of motion  She exhibits no edema, tenderness or deformity  Bilateral low back muscular tension   Lymphadenopathy:     She has no cervical adenopathy  Neurological: She is alert and oriented to person, place, and time  She has normal reflexes  No cranial nerve deficit  Coordination normal    Skin: Skin is warm, dry and intact  No rash noted  She is not diaphoretic  No erythema  No pallor  Psychiatric: She has a normal mood and affect  Her speech is normal and behavior is normal  Judgment and thought content normal  Cognition and memory are normal    Vitals reviewed

## 2018-12-28 NOTE — ASSESSMENT & PLAN NOTE
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

## 2019-01-11 PROBLEM — Z12.11 SCREENING FOR COLON CANCER: Status: ACTIVE | Noted: 2019-01-11

## 2019-01-11 PROBLEM — Z12.11 SCREENING FOR MALIGNANT NEOPLASM OF COLON: Status: ACTIVE | Noted: 2019-01-11

## 2019-01-24 PROCEDURE — 88305 TISSUE EXAM BY PATHOLOGIST: CPT | Performed by: PATHOLOGY

## 2019-01-25 ENCOUNTER — LAB REQUISITION (OUTPATIENT)
Dept: LAB | Facility: HOSPITAL | Age: 69
End: 2019-01-25
Payer: COMMERCIAL

## 2019-01-25 DIAGNOSIS — D12.5 BENIGN NEOPLASM OF SIGMOID COLON: ICD-10-CM

## 2019-01-25 DIAGNOSIS — Z12.11 ENCOUNTER FOR SCREENING FOR MALIGNANT NEOPLASM OF COLON: ICD-10-CM

## 2019-01-25 DIAGNOSIS — D12.3 BENIGN NEOPLASM OF TRANSVERSE COLON: ICD-10-CM

## 2019-01-25 DIAGNOSIS — K57.30 DIVERTICULOSIS OF LARGE INTESTINE WITHOUT PERFORATION OR ABSCESS WITHOUT BLEEDING: ICD-10-CM

## 2019-01-25 DIAGNOSIS — R19.5 OTHER FECAL ABNORMALITIES: ICD-10-CM

## 2019-02-26 ENCOUNTER — HOSPITAL ENCOUNTER (OUTPATIENT)
Dept: MAMMOGRAPHY | Facility: MEDICAL CENTER | Age: 69
Discharge: HOME | End: 2019-02-26
Payer: COMMERCIAL

## 2019-02-26 VITALS — WEIGHT: 245 LBS | BODY MASS INDEX: 38.45 KG/M2 | HEIGHT: 67 IN

## 2019-02-26 DIAGNOSIS — Z12.39 SCREENING FOR BREAST CANCER: ICD-10-CM

## 2019-02-26 PROCEDURE — 77067 SCR MAMMO BI INCL CAD: CPT

## 2019-05-03 ENCOUNTER — APPOINTMENT (OUTPATIENT)
Dept: LAB | Facility: MEDICAL CENTER | Age: 69
End: 2019-05-03
Payer: COMMERCIAL

## 2019-05-03 DIAGNOSIS — E78.5 HYPERLIPIDEMIA, UNSPECIFIED HYPERLIPIDEMIA TYPE: ICD-10-CM

## 2019-05-03 LAB
CHOLEST SERPL-MCNC: 186 MG/DL (ref 50–200)
HDLC SERPL-MCNC: 49 MG/DL (ref 40–60)
LDLC SERPL CALC-MCNC: 93 MG/DL (ref 0–100)
NONHDLC SERPL-MCNC: 137 MG/DL
TRIGL SERPL-MCNC: 222 MG/DL

## 2019-05-03 PROCEDURE — 80061 LIPID PANEL: CPT

## 2019-05-03 PROCEDURE — 36415 COLL VENOUS BLD VENIPUNCTURE: CPT

## 2019-05-07 ENCOUNTER — OFFICE VISIT (OUTPATIENT)
Dept: INTERNAL MEDICINE CLINIC | Facility: CLINIC | Age: 69
End: 2019-05-07
Payer: COMMERCIAL

## 2019-05-07 VITALS
HEIGHT: 67 IN | SYSTOLIC BLOOD PRESSURE: 122 MMHG | WEIGHT: 253.4 LBS | TEMPERATURE: 99 F | HEART RATE: 93 BPM | RESPIRATION RATE: 16 BRPM | DIASTOLIC BLOOD PRESSURE: 74 MMHG | BODY MASS INDEX: 39.77 KG/M2 | OXYGEN SATURATION: 96 %

## 2019-05-07 DIAGNOSIS — E78.1 HYPERTRIGLYCERIDEMIA: Primary | ICD-10-CM

## 2019-05-07 DIAGNOSIS — I10 ESSENTIAL HYPERTENSION: ICD-10-CM

## 2019-05-07 DIAGNOSIS — E78.00 HYPERCHOLESTEROLEMIA: ICD-10-CM

## 2019-05-07 DIAGNOSIS — E66.9 OBESITY (BMI 30-39.9): ICD-10-CM

## 2019-05-07 PROBLEM — Z12.11 SCREENING FOR MALIGNANT NEOPLASM OF COLON: Status: RESOLVED | Noted: 2019-01-11 | Resolved: 2019-05-07

## 2019-05-07 PROBLEM — E66.01 MORBID OBESITY WITH BMI OF 40.0-44.9, ADULT (HCC): Status: RESOLVED | Noted: 2018-12-28 | Resolved: 2019-05-07

## 2019-05-07 PROBLEM — R19.5 HEME POSITIVE STOOL: Status: RESOLVED | Noted: 2018-12-28 | Resolved: 2019-05-07

## 2019-05-07 PROBLEM — Z12.11 SCREENING FOR COLON CANCER: Status: RESOLVED | Noted: 2019-01-11 | Resolved: 2019-05-07

## 2019-05-07 PROCEDURE — 1160F RVW MEDS BY RX/DR IN RCRD: CPT | Performed by: INTERNAL MEDICINE

## 2019-05-07 PROCEDURE — 3078F DIAST BP <80 MM HG: CPT | Performed by: INTERNAL MEDICINE

## 2019-05-07 PROCEDURE — 3008F BODY MASS INDEX DOCD: CPT | Performed by: INTERNAL MEDICINE

## 2019-05-07 PROCEDURE — 1036F TOBACCO NON-USER: CPT | Performed by: INTERNAL MEDICINE

## 2019-05-07 PROCEDURE — 3074F SYST BP LT 130 MM HG: CPT | Performed by: INTERNAL MEDICINE

## 2019-05-07 PROCEDURE — 99214 OFFICE O/P EST MOD 30 MIN: CPT | Performed by: INTERNAL MEDICINE

## 2019-07-02 DIAGNOSIS — I10 ESSENTIAL HYPERTENSION: ICD-10-CM

## 2019-07-05 RX ORDER — HYDROCHLOROTHIAZIDE 25 MG/1
25 TABLET ORAL DAILY
Qty: 90 TABLET | Refills: 2 | Status: SHIPPED | OUTPATIENT
Start: 2019-07-05 | End: 2020-04-02 | Stop reason: SDUPTHER

## 2019-07-30 ENCOUNTER — APPOINTMENT (OUTPATIENT)
Dept: LAB | Facility: HOSPITAL | Age: 69
End: 2019-07-30
Payer: COMMERCIAL

## 2019-07-30 ENCOUNTER — OFFICE VISIT (OUTPATIENT)
Dept: INTERNAL MEDICINE CLINIC | Facility: CLINIC | Age: 69
End: 2019-07-30
Payer: COMMERCIAL

## 2019-07-30 VITALS
HEIGHT: 67 IN | TEMPERATURE: 98.5 F | OXYGEN SATURATION: 98 % | SYSTOLIC BLOOD PRESSURE: 138 MMHG | BODY MASS INDEX: 39.83 KG/M2 | WEIGHT: 253.8 LBS | HEART RATE: 105 BPM | DIASTOLIC BLOOD PRESSURE: 80 MMHG
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
## 2019-07-30 PROCEDURE — 99213 OFFICE O/P EST LOW 20 MIN: CPT | Performed by: INTERNAL MEDICINE

## 2019-07-30 PROCEDURE — 1160F RVW MEDS BY RX/DR IN RCRD: CPT | Performed by: INTERNAL MEDICINE

## 2019-07-30 PROCEDURE — 86618 LYME DISEASE ANTIBODY: CPT

## 2019-07-30 PROCEDURE — 3008F BODY MASS INDEX DOCD: CPT | Performed by: INTERNAL MEDICINE

## 2019-07-30 PROCEDURE — 1036F TOBACCO NON-USER: CPT | Performed by: INTERNAL MEDICINE

## 2019-07-30 PROCEDURE — 36415 COLL VENOUS BLD VENIPUNCTURE: CPT

## 2019-07-30 RX ORDER — DOXYCYCLINE HYCLATE 100 MG
100 TABLET ORAL 2 TIMES DAILY
Qty: 28 TABLET | Refills: 0 | Status: SHIPPED | OUTPATIENT
Start: 2019-07-30 | End: 2019-08-13

## 2019-07-30 NOTE — ASSESSMENT & PLAN NOTE
Patient is here emergently for evaluation  States that approximately 2 weeks ago did have a tick bite to the outer portion of her right ear  She was able to retrieve the tick after was imbedded  Over the past few days patient is begun to experience fever and chills myalgias and arthralgias  No identifiable rash  With a coincidence of having tick bite and having what seems to be systemic symptoms of possible Lyme disease patient will be going for Lyme disease titer  She was also started on doxycycline 100 mg p o  b i d   Patient has no rash on evaluation some slight diffuse muscle tenderness to palpation  She is afebrile with appointment today    She states that she has been taking some nonsteroidal anti-inflammatories which helped only slightly with her discomfort

## 2019-07-30 NOTE — PROGRESS NOTES
Assessment/Plan:    Tick bite  Patient is here emergently for evaluation  States that approximately 2 weeks ago did have a tick bite to the outer portion of her right ear  She was able to retrieve the tick after was imbedded  Over the past few days patient is begun to experience fever and chills myalgias and arthralgias  No identifiable rash  With a coincidence of having tick bite and having what seems to be systemic symptoms of possible Lyme disease patient will be going for Lyme disease titer  She was also started on doxycycline 100 mg p o  b i d   Patient has no rash on evaluation some slight diffuse muscle tenderness to palpation  She is afebrile with appointment today  She states that she has been taking some nonsteroidal anti-inflammatories which helped only slightly with her discomfort       Diagnoses and all orders for this visit:    Tick bite, initial encounter  -     doxycycline hyclate (VIBRA-TABS) 100 mg tablet; Take 1 tablet (100 mg total) by mouth 2 (two) times a day for 14 days  -     Lyme Antibody Profile with reflex to WB; Future          Subjective:      Patient ID: Bre Hollingsworth is a 79 y o  female  Patient is a 53-year-old female with a history of multiple medical problems as outlined previously  Patient is here today for evaluation acutely  Patient states she has had a tick bite to the outer portion of her left ear approximately 2 weeks ago and has begun to experience low-grade temperatures, myalgias and arthralgias diffusely over the last 2 days  Patient denies any rashes  She has no other systemic symptoms such as nasal congestion, sore throat or cough  The following portions of the patient's history were reviewed and updated as appropriate:   She  has a past medical history of Cellulitis of left elbow (10/30/2016), Epistaxis, Medial meniscus tear (11/11/2014), Morbid obesity with BMI of 40 0-44 9, adult ([facility] [address] ) (96/11/8790), and Umbilical hernia    She   Patient Active Problem List    Diagnosis Date Noted    Tick bite 07/30/2019    Obesity (BMI 30-39 9) 11/15/2017    Hypertriglyceridemia 02/26/2016    Hypercholesterolemia 09/12/2014    Hypertension 09/12/2014     She  has a past surgical history that includes Knee arthroscopy w/ meniscal repair (07/26/2015) and Total abdominal hysterectomy (2001)  Her family history includes Diabetes in her family; Hodgkin's lymphoma in her daughter; No Known Problems in her mother; Thyroid cancer in her daughter; Thyroid disease in her family  She  reports that she has never smoked  She has never used smokeless tobacco  She reports that she does not drink alcohol or use drugs  Current Outpatient Medications   Medication Sig Dispense Refill    aspirin 81 MG tablet Take 81 mg by mouth daily   calcium-vitamin D (OSCAL) 250-125 MG-UNIT per tablet Take 1 tablet by mouth daily   hydrochlorothiazide (HYDRODIURIL) 25 mg tablet Take 1 tablet (25 mg total) by mouth daily 90 tablet 2    lisinopril (ZESTRIL) 40 mg tablet Take 1 tablet (40 mg total) by mouth daily 90 tablet 3    lovastatin (MEVACOR) 20 mg tablet Take 1 tablet (20 mg total) by mouth daily at bedtime 90 tablet 3    Multiple Vitamins-Iron (CVS DAILY MULTIPLE PLUS IRON) TABS Take by mouth      doxycycline hyclate (VIBRA-TABS) 100 mg tablet Take 1 tablet (100 mg total) by mouth 2 (two) times a day for 14 days 28 tablet 0    omega-3-acid ethyl esters (LOVAZA) 1 g capsule Take 2 capsules (2 g total) by mouth 2 (two) times a day (Patient not taking: Reported on 7/30/2019) 360 capsule 0     No current facility-administered medications for this visit  Current Outpatient Medications on File Prior to Visit   Medication Sig    aspirin 81 MG tablet Take 81 mg by mouth daily   calcium-vitamin D (OSCAL) 250-125 MG-UNIT per tablet Take 1 tablet by mouth daily      hydrochlorothiazide (HYDRODIURIL) 25 mg tablet Take 1 tablet (25 mg total) by mouth daily    lisinopril (ZESTRIL) 40 mg tablet Take 1 tablet (40 mg total) by mouth daily    lovastatin (MEVACOR) 20 mg tablet Take 1 tablet (20 mg total) by mouth daily at bedtime    Multiple Vitamins-Iron (CVS DAILY MULTIPLE PLUS IRON) TABS Take by mouth    omega-3-acid ethyl esters (LOVAZA) 1 g capsule Take 2 capsules (2 g total) by mouth 2 (two) times a day (Patient not taking: Reported on 7/30/2019)     No current facility-administered medications on file prior to visit  She is allergic to lipitor [atorvastatin]; morphine; morphine and related; and zithromax [azithromycin]       Review of Systems   Constitutional: Positive for chills and fever  Negative for activity change, appetite change, diaphoresis, fatigue and unexpected weight change  HENT: Negative  Eyes: Negative  Respiratory: Negative  Cardiovascular: Negative  Gastrointestinal: Negative  Endocrine: Negative  Genitourinary: Negative for difficulty urinating and frequency  Musculoskeletal: Positive for arthralgias and myalgias  Negative for back pain, gait problem, joint swelling, neck pain and neck stiffness  Skin: Negative  Allergic/Immunologic: Negative  Neurological: Negative  Hematological: Negative  Psychiatric/Behavioral: Negative  Objective:      /80   Pulse 105   Temp 98 5 °F (36 9 °C)   Ht 5' 7" (1 702 m)   Wt 115 kg (253 lb 12 8 oz)   SpO2 98%   BMI 39 75 kg/m²          Physical Exam   Constitutional: She is oriented to person, place, and time  She appears well-developed and well-nourished  Pleasant articulate 68-year-old female who is awake alert in no acute distress and oriented x3   HENT:   Head: Normocephalic and atraumatic  Right Ear: External ear normal    Left Ear: External ear normal    Nose: Nose normal    Mouth/Throat: Oropharynx is clear and moist  No oropharyngeal exudate  Eyes: Pupils are equal, round, and reactive to light  Conjunctivae and EOM are normal    Neck: Normal range of motion   Neck supple  Cardiovascular: Normal rate, regular rhythm and normal heart sounds  Pulmonary/Chest: Effort normal and breath sounds normal    Abdominal: Soft  Bowel sounds are normal    Obese   Musculoskeletal: Normal range of motion  Neurological: She is alert and oriented to person, place, and time  Skin: Skin is dry  She is not diaphoretic  Psychiatric: She has a normal mood and affect  Her behavior is normal  Judgment and thought content normal    Nursing note and vitals reviewed

## 2019-07-31 LAB
B BURGDOR IGG SER IA-ACNC: 0.19
B BURGDOR IGM SER IA-ACNC: 0.2

## 2019-09-12 ENCOUNTER — TELEPHONE (OUTPATIENT)
Dept: INTERNAL MEDICINE CLINIC | Facility: CLINIC | Age: 69
End: 2019-09-12

## 2019-09-12 DIAGNOSIS — I10 ESSENTIAL HYPERTENSION: Primary | ICD-10-CM

## 2019-09-16 DIAGNOSIS — I10 ESSENTIAL HYPERTENSION: Primary | ICD-10-CM

## 2019-09-17 ENCOUNTER — APPOINTMENT (OUTPATIENT)
Dept: LAB | Facility: CLINIC | Age: 69
End: 2019-09-17
Payer: COMMERCIAL

## 2019-09-17 DIAGNOSIS — I10 ESSENTIAL HYPERTENSION: ICD-10-CM

## 2019-09-17 DIAGNOSIS — E78.00 HYPERCHOLESTEROLEMIA: ICD-10-CM

## 2019-09-17 DIAGNOSIS — E78.1 HYPERTRIGLYCERIDEMIA: ICD-10-CM

## 2019-09-17 LAB
ALBUMIN SERPL BCP-MCNC: 3.5 G/DL (ref 3.5–5)
ALP SERPL-CCNC: 73 U/L (ref 46–116)
ALT SERPL W P-5'-P-CCNC: 40 U/L (ref 12–78)
ANION GAP SERPL CALCULATED.3IONS-SCNC: 4 MMOL/L (ref 4–13)
AST SERPL W P-5'-P-CCNC: 26 U/L (ref 5–45)
BILIRUB SERPL-MCNC: 0.66 MG/DL (ref 0.2–1)
BUN SERPL-MCNC: 15 MG/DL (ref 5–25)
CALCIUM SERPL-MCNC: 9.1 MG/DL (ref 8.3–10.1)
CHLORIDE SERPL-SCNC: 105 MMOL/L (ref 100–108)
CHOLEST SERPL-MCNC: 192 MG/DL (ref 50–200)
CO2 SERPL-SCNC: 29 MMOL/L (ref 21–32)
CREAT SERPL-MCNC: 0.8 MG/DL (ref 0.6–1.3)
GFR SERPL CREATININE-BSD FRML MDRD: 77 ML/MIN/1.73SQ M
GLUCOSE P FAST SERPL-MCNC: 114 MG/DL (ref 65–99)
HDLC SERPL-MCNC: 46 MG/DL (ref 40–60)
LDLC SERPL CALC-MCNC: 94 MG/DL (ref 0–100)
NONHDLC SERPL-MCNC: 146 MG/DL
POTASSIUM SERPL-SCNC: 4.9 MMOL/L (ref 3.5–5.3)
PROT SERPL-MCNC: 7.4 G/DL (ref 6.4–8.2)
SODIUM SERPL-SCNC: 138 MMOL/L (ref 136–145)
TRIGL SERPL-MCNC: 261 MG/DL

## 2019-09-17 PROCEDURE — 80061 LIPID PANEL: CPT

## 2019-09-17 PROCEDURE — 80053 COMPREHEN METABOLIC PANEL: CPT

## 2019-09-17 PROCEDURE — 36415 COLL VENOUS BLD VENIPUNCTURE: CPT

## 2019-09-19 ENCOUNTER — OFFICE VISIT (OUTPATIENT)
Dept: INTERNAL MEDICINE CLINIC | Facility: CLINIC | Age: 69
End: 2019-09-19
Payer: COMMERCIAL

## 2019-09-19 VITALS
HEIGHT: 67 IN | WEIGHT: 253.4 LBS | DIASTOLIC BLOOD PRESSURE: 68 MMHG | OXYGEN SATURATION: 97 % | TEMPERATURE: 98.9 F | SYSTOLIC BLOOD PRESSURE: 118 MMHG | RESPIRATION RATE: 14 BRPM | HEART RATE: 81 BPM | BODY MASS INDEX: 39.77 KG/M2

## 2019-09-19 DIAGNOSIS — E66.9 OBESITY (BMI 30-39.9): ICD-10-CM

## 2019-09-19 DIAGNOSIS — E78.1 HYPERTRIGLYCERIDEMIA: ICD-10-CM

## 2019-09-19 DIAGNOSIS — E78.00 HYPERCHOLESTEROLEMIA: ICD-10-CM

## 2019-09-19 DIAGNOSIS — I10 ESSENTIAL HYPERTENSION: Primary | ICD-10-CM
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
## 2019-09-19 PROCEDURE — 99214 OFFICE O/P EST MOD 30 MIN: CPT | Performed by: INTERNAL MEDICINE

## 2019-09-19 PROCEDURE — 3008F BODY MASS INDEX DOCD: CPT | Performed by: INTERNAL MEDICINE

## 2019-09-19 PROCEDURE — 3074F SYST BP LT 130 MM HG: CPT | Performed by: INTERNAL MEDICINE

## 2019-09-19 PROCEDURE — 3078F DIAST BP <80 MM HG: CPT | Performed by: INTERNAL MEDICINE

## 2019-09-19 NOTE — ASSESSMENT & PLAN NOTE
Lipid profile reviewed with patient recommend low-cholesterol diet and continuation of lovastatin 20 mg daily and Lovaza 2 g twice a day healthy diet reviewed follow-up testing in 4 months

## 2019-09-19 NOTE — PROGRESS NOTES
Assessment/Plan:    Hypertension  Hypertension was reviewed during today's visit  Her blood pressure is noted to be 118/68 representing excellent control of her hypertension recommend continuation of present therapy including lisinopril 40 mg daily and hydrochlorothiazide 25 mg daily follow-up assessment of hypertension is recommended in 4 months  She has no event parents side effects of her medications nor any symptoms to indicate uncontrolled hypertension or uncontrolled hypotension    Obesity (BMI 30-39  9)  Of obesity with a body mass index of 39 69 recommend continued efforts to reduce calorie consumption maintain active physical exercise routine  The patient is aware the benefits of weight reduction  Recommend decreased calories by 25% daily    Hypertriglyceridemia  Triglyceride readings reviewed with the patient during today's visit  Recommend continuation of lovastatin 20 mg daily and Lovaza 2 g twice a day  Healthy diet reviewed recommend follow-up testing in 4 months    Hypercholesterolemia  Lipid profile reviewed with patient recommend low-cholesterol diet and continuation of lovastatin 20 mg daily and Lovaza 2 g twice a day healthy diet reviewed follow-up testing in 4 months  Diagnoses and all orders for this visit:    Essential hypertension  -     Comprehensive metabolic panel; Future    Hypercholesterolemia  -     Lipid panel; Future    Obesity (BMI 30-39  9)    Hypertriglyceridemia        Subjective:      Patient ID: Amy Allen is a 79 y o  female  This is a routine 4 month follow-up visit for this 56-year-old female patient  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  She has no specific complaints on today's visit  We reviewed her blood work as well as performed a routine examination today        The following portions of the patient's history were reviewed and updated as appropriate:   She  has a past medical history of Cellulitis of left elbow (10/30/2016), Epistaxis, Medial meniscus tear (11/11/2014), Morbid obesity with BMI of 40 0-44 9, adult ([facility] [address] ) (40/19/9803), and Umbilical hernia  She   Patient Active Problem List    Diagnosis Date Noted    Obesity (BMI 30-39 9) 11/15/2017    Hypertriglyceridemia 02/26/2016    Hypercholesterolemia 09/12/2014    Hypertension 09/12/2014     She  has a past surgical history that includes Knee arthroscopy w/ meniscal repair (07/26/2015) and Total abdominal hysterectomy (2001)  Her family history includes Diabetes in her family; Hodgkin's lymphoma in her daughter; No Known Problems in her mother; Thyroid cancer in her daughter; Thyroid disease in her family  She  reports that she has never smoked  She has never used smokeless tobacco  She reports that she does not drink alcohol or use drugs  Current Outpatient Medications   Medication Sig Dispense Refill    aspirin 81 MG tablet Take 81 mg by mouth daily   calcium-vitamin D (OSCAL) 250-125 MG-UNIT per tablet Take 1 tablet by mouth daily   hydrochlorothiazide (HYDRODIURIL) 25 mg tablet Take 1 tablet (25 mg total) by mouth daily 90 tablet 2    lisinopril (ZESTRIL) 40 mg tablet Take 1 tablet (40 mg total) by mouth daily 90 tablet 3    lovastatin (MEVACOR) 20 mg tablet Take 1 tablet (20 mg total) by mouth daily at bedtime 90 tablet 3    Multiple Vitamins-Iron (CVS DAILY MULTIPLE PLUS IRON) TABS Take by mouth      omega-3-acid ethyl esters (LOVAZA) 1 g capsule Take 2 capsules (2 g total) by mouth 2 (two) times a day 360 capsule 0     No current facility-administered medications for this visit       Review of Systems   All other systems reviewed and are negative  Objective:      /68   Pulse 81   Temp 98 9 °F (37 2 °C)   Resp 14   Ht 5' 7" (1 702 m)   Wt 115 kg (253 lb 6 4 oz)   SpO2 97%   BMI 39 69 kg/m²          Physical Exam   Constitutional: She is oriented to person, place, and time   Vital signs are normal  She appears well-developed and well-nourished  She is cooperative  HENT:   Right Ear: Hearing, tympanic membrane, external ear and ear canal normal    Left Ear: Hearing, tympanic membrane, external ear and ear canal normal    Nose: Nose normal  No mucosal edema  Mouth/Throat: Uvula is midline, oropharynx is clear and moist and mucous membranes are normal    Eyes: Pupils are equal, round, and reactive to light  Conjunctivae and lids are normal    Neck: No JVD present  Carotid bruit is not present  No thyromegaly present  Cardiovascular: Normal rate, regular rhythm, normal heart sounds and intact distal pulses  No murmur heard  Pulmonary/Chest: Effort normal and breath sounds normal  No respiratory distress  Abdominal: Normal appearance  Musculoskeletal: Normal range of motion  She exhibits no edema  Lymphadenopathy:     She has no cervical adenopathy  Neurological: She is alert and oriented to person, place, and time  She has normal reflexes  She displays normal reflexes  Skin: Skin is warm, dry and intact  Psychiatric: She has a normal mood and affect  Her speech is normal and behavior is normal  Judgment and thought content normal  Cognition and memory are normal    Vitals reviewed

## 2019-09-19 NOTE — ASSESSMENT & PLAN NOTE
Hypertension was reviewed during today's visit  Her blood pressure is noted to be 118/68 representing excellent control of her hypertension recommend continuation of present therapy including lisinopril 40 mg daily and hydrochlorothiazide 25 mg daily follow-up assessment of hypertension is recommended in 4 months    She has no event parents side effects of her medications nor any symptoms to indicate uncontrolled hypertension or uncontrolled hypotension

## 2019-09-19 NOTE — ASSESSMENT & PLAN NOTE
Triglyceride readings reviewed with the patient during today's visit  Recommend continuation of lovastatin 20 mg daily and Lovaza 2 g twice a day    Healthy diet reviewed recommend follow-up testing in 4 months

## 2019-09-19 NOTE — ASSESSMENT & PLAN NOTE
Of obesity with a body mass index of 39 69 recommend continued efforts to reduce calorie consumption maintain active physical exercise routine  The patient is aware the benefits of weight reduction    Recommend decreased calories by 25% daily

## 2019-09-24 DIAGNOSIS — I10 ESSENTIAL HYPERTENSION: ICD-10-CM

## 2019-09-24 RX ORDER — LISINOPRIL 40 MG/1
40 TABLET ORAL DAILY
Qty: 90 TABLET | Refills: 3 | Status: SHIPPED | OUTPATIENT
Start: 2019-09-24 | End: 2020-09-28 | Stop reason: SDUPTHER

## 2019-10-16 DIAGNOSIS — E78.5 HYPERLIPIDEMIA, UNSPECIFIED HYPERLIPIDEMIA TYPE: ICD-10-CM

## 2019-10-16 RX ORDER — LOVASTATIN 20 MG/1
20 TABLET ORAL
Qty: 90 TABLET | Refills: 3 | Status: SHIPPED | OUTPATIENT
Start: 2019-10-16 | End: 2020-12-17 | Stop reason: SDUPTHER

## 2020-01-17 ENCOUNTER — APPOINTMENT (OUTPATIENT)
Dept: LAB | Facility: MEDICAL CENTER | Age: 70
End: 2020-01-17
Payer: COMMERCIAL

## 2020-01-17 DIAGNOSIS — I10 ESSENTIAL HYPERTENSION, MALIGNANT: Primary | ICD-10-CM

## 2020-01-17 DIAGNOSIS — E78.00 HYPERCHOLESTEROLEMIA: ICD-10-CM

## 2020-01-17 LAB
ALBUMIN SERPL BCP-MCNC: 3.5 G/DL (ref 3.5–5)
ALP SERPL-CCNC: 71 U/L (ref 46–116)
ALT SERPL W P-5'-P-CCNC: 57 U/L (ref 12–78)
ANION GAP SERPL CALCULATED.3IONS-SCNC: 6 MMOL/L (ref 4–13)
AST SERPL W P-5'-P-CCNC: 38 U/L (ref 5–45)
BILIRUB SERPL-MCNC: 0.79 MG/DL (ref 0.2–1)
BUN SERPL-MCNC: 11 MG/DL (ref 5–25)
CALCIUM SERPL-MCNC: 9.1 MG/DL (ref 8.3–10.1)
CHLORIDE SERPL-SCNC: 104 MMOL/L (ref 100–108)
CHOLEST SERPL-MCNC: 201 MG/DL (ref 50–200)
CO2 SERPL-SCNC: 29 MMOL/L (ref 21–32)
CREAT SERPL-MCNC: 0.71 MG/DL (ref 0.6–1.3)
GFR SERPL CREATININE-BSD FRML MDRD: 88 ML/MIN/1.73SQ M
GLUCOSE P FAST SERPL-MCNC: 108 MG/DL (ref 65–99)
HDLC SERPL-MCNC: 52 MG/DL
LDLC SERPL CALC-MCNC: 112 MG/DL (ref 0–100)
NONHDLC SERPL-MCNC: 149 MG/DL
POTASSIUM SERPL-SCNC: 4.4 MMOL/L (ref 3.5–5.3)
PROT SERPL-MCNC: 7.4 G/DL (ref 6.4–8.2)
SODIUM SERPL-SCNC: 139 MMOL/L (ref 136–145)
TRIGL SERPL-MCNC: 184 MG/DL

## 2020-01-17 PROCEDURE — 36415 COLL VENOUS BLD VENIPUNCTURE: CPT

## 2020-01-17 PROCEDURE — 80061 LIPID PANEL: CPT

## 2020-01-17 PROCEDURE — 80053 COMPREHEN METABOLIC PANEL: CPT

## 2020-01-21 ENCOUNTER — OFFICE VISIT (OUTPATIENT)
Dept: INTERNAL MEDICINE CLINIC | Facility: CLINIC | Age: 70
End: 2020-01-21
Payer: COMMERCIAL

## 2020-01-21 VITALS
RESPIRATION RATE: 16 BRPM | HEART RATE: 85 BPM | OXYGEN SATURATION: 98 % | TEMPERATURE: 98.9 F | HEIGHT: 67 IN | DIASTOLIC BLOOD PRESSURE: 70 MMHG | WEIGHT: 252.2 LBS | BODY MASS INDEX: 39.58 KG/M2 | SYSTOLIC BLOOD PRESSURE: 128 MMHG

## 2020-01-21 DIAGNOSIS — M25.512 PAIN OF BOTH SHOULDER JOINTS: ICD-10-CM

## 2020-01-21 DIAGNOSIS — Z12.11 COLON CANCER SCREENING: ICD-10-CM

## 2020-01-21 DIAGNOSIS — Z13.29 SCREENING FOR HYPOTHYROIDISM: ICD-10-CM

## 2020-01-21 DIAGNOSIS — E78.1 HYPERTRIGLYCERIDEMIA: ICD-10-CM

## 2020-01-21 DIAGNOSIS — E78.00 HYPERCHOLESTEROLEMIA: ICD-10-CM

## 2020-01-21 DIAGNOSIS — M25.511 PAIN OF BOTH SHOULDER JOINTS: ICD-10-CM

## 2020-01-21 DIAGNOSIS — Z13.0 SCREENING FOR DEFICIENCY ANEMIA: ICD-10-CM

## 2020-01-21 DIAGNOSIS — E66.9 OBESITY (BMI 30-39.9): ICD-10-CM

## 2020-01-21 DIAGNOSIS — I10 ESSENTIAL HYPERTENSION: Primary | ICD-10-CM

## 2020-01-21 PROCEDURE — 99214 OFFICE O/P EST MOD 30 MIN: CPT | Performed by: INTERNAL MEDICINE

## 2020-01-21 PROCEDURE — 3288F FALL RISK ASSESSMENT DOCD: CPT | Performed by: INTERNAL MEDICINE

## 2020-01-21 PROCEDURE — 3008F BODY MASS INDEX DOCD: CPT | Performed by: INTERNAL MEDICINE

## 2020-01-21 PROCEDURE — 3725F SCREEN DEPRESSION PERFORMED: CPT | Performed by: INTERNAL MEDICINE

## 2020-01-21 PROCEDURE — 1036F TOBACCO NON-USER: CPT | Performed by: INTERNAL MEDICINE

## 2020-01-21 PROCEDURE — 3074F SYST BP LT 130 MM HG: CPT | Performed by: INTERNAL MEDICINE

## 2020-01-21 PROCEDURE — 1160F RVW MEDS BY RX/DR IN RCRD: CPT | Performed by: INTERNAL MEDICINE

## 2020-01-21 PROCEDURE — 1101F PT FALLS ASSESS-DOCD LE1/YR: CPT | Performed by: INTERNAL MEDICINE

## 2020-01-21 PROCEDURE — 3078F DIAST BP <80 MM HG: CPT | Performed by: INTERNAL MEDICINE

## 2020-01-22 NOTE — ASSESSMENT & PLAN NOTE
We reviewed with the patient during today's visit her lipid profile recommend continuation of low-cholesterol diet along with efforts to reduce body weight and continuation of lovastatin/Mevacor 20 mg daily follow-up testing is recommended in 4-6 months

## 2020-01-22 NOTE — ASSESSMENT & PLAN NOTE
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

## 2020-01-22 NOTE — ASSESSMENT & PLAN NOTE
Assessment of the patient's hypertension today indicates good blood pressure control reading is 128/70 recommend continuation of lisinopril at 40 mg daily and hydrochlorothiazide 25 mg daily  She has no apparent side effects of these medications and reassessment of hypertension is recommended in 4 months

## 2020-01-22 NOTE — ASSESSMENT & PLAN NOTE
Bilateral shoulder region discomfort may be arthritic in nature but also could be possibly secondary to her statin medication Mevacor 20 mg at bedtime  I have recommended a trial of coenzyme Q10 100 mg daily for period of 3-4 weeks  If benefits are shown then I would recommend continuation of this supplementation    If symptoms persist would consider x-ray evaluation of shoulders

## 2020-01-22 NOTE — ASSESSMENT & PLAN NOTE
I have reviewed the patient's triglyceride readings with her during today's visit the do show a downward trend and encouraged her to continue be careful with her carbohydrate and concentrated sugar consumption  Recommend continuation of all Mevacor 20 mg daily and a follow-up cholesterol profile in 4-6 months

## 2020-01-22 NOTE — PROGRESS NOTES
Assessment/Plan:    Hypertension  Assessment of the patient's hypertension today indicates good blood pressure control reading is 128/70 recommend continuation of lisinopril at 40 mg daily and hydrochlorothiazide 25 mg daily  She has no apparent side effects of these medications and reassessment of hypertension is recommended in 4 months  Pain of both shoulder joints  Bilateral shoulder region discomfort may be arthritic in nature but also could be possibly secondary to her statin medication Mevacor 20 mg at bedtime  I have recommended a trial of coenzyme Q10 100 mg daily for period of 3-4 weeks  If benefits are shown then I would recommend continuation of this supplementation  If symptoms persist would consider x-ray evaluation of shoulders    Obesity (BMI 30-39  9)  Recommend continued efforts to decreased calorie consumption and maintain regular walking exercise in an effort to reduce the patient's body weight  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  Hypertriglyceridemia  I have reviewed the patient's triglyceride readings with her during today's visit the do show a downward trend and encouraged her to continue be careful with her carbohydrate and concentrated sugar consumption  Recommend continuation of all Mevacor 20 mg daily and a follow-up cholesterol profile in 4-6 months  Hypercholesterolemia  We reviewed with the patient during today's visit her lipid profile recommend continuation of low-cholesterol diet along with efforts to reduce body weight and continuation of lovastatin/Mevacor 20 mg daily follow-up testing is recommended in 4-6 months  Diagnoses and all orders for this visit:    Screening for hypothyroidism  -     TSH, 3rd generation with Free T4 reflex; Future    Screening for deficiency anemia  -     CBC and differential; Future    Essential hypertension  -     Comprehensive metabolic panel;  Future  -     UA w Reflex to Microscopic w Reflex to Culture -Lab Collect    Hypercholesterolemia  -     Lipid panel; Future    Colon cancer screening  -     Occult Blood, Fecal Immunochemical; Future    Hypertriglyceridemia    Pain of both shoulder joints        Subjective:      Patient ID: Bri Simons is a 81 y o  female  This is a routine 4 month follow-up visit for this 82-year-old female patient presents today in the company of her grandchild and   She has complaints of achiness in her shoulders  We discussed the fossa bili that this could be arthritic verses possibly her statin medication  I have recommended that she take coenzyme Q 100, 100 mg daily for period of 3-4 weeks  Many times people that are on statin medications if they are having arthralgias or myalgias will find some benefit to this supplement  She has no chest pain palpitations shortness of breath to indicate any cardiac issues at the present time  Weight in this patient is essentially the same with a past 6 months  She is encouraged to continue to strive to reduce her total body weight  The following portions of the patient's history were reviewed and updated as appropriate:   She  has a past medical history of Cellulitis of left elbow (10/30/2016), Epistaxis, Medial meniscus tear (11/11/2014), Morbid obesity with BMI of 40 0-44 9, adult ([facility] [address] ) (04/61/5512), and Umbilical hernia  She   Patient Active Problem List    Diagnosis Date Noted    Pain of both shoulder joints 01/21/2020    Obesity (BMI 30-39 9) 11/15/2017    Hypertriglyceridemia 02/26/2016    Hypercholesterolemia 09/12/2014    Hypertension 09/12/2014     She  has a past surgical history that includes Knee arthroscopy w/ meniscal repair (07/26/2015) and Total abdominal hysterectomy (2001)  Her family history includes Diabetes in her family; Hodgkin's lymphoma in her daughter; No Known Problems in her mother; Thyroid cancer in her daughter; Thyroid disease in her family    She  reports that she has never smoked  She has never used smokeless tobacco  She reports that she does not drink alcohol or use drugs  Current Outpatient Medications   Medication Sig Dispense Refill    aspirin 81 MG tablet Take 81 mg by mouth daily   calcium-vitamin D (OSCAL) 250-125 MG-UNIT per tablet Take 1 tablet by mouth daily   hydrochlorothiazide (HYDRODIURIL) 25 mg tablet Take 1 tablet (25 mg total) by mouth daily 90 tablet 2    lisinopril (ZESTRIL) 40 mg tablet Take 1 tablet (40 mg total) by mouth daily 90 tablet 3    lovastatin (MEVACOR) 20 mg tablet Take 1 tablet (20 mg total) by mouth daily at bedtime 90 tablet 3    Multiple Vitamins-Iron (CVS DAILY MULTIPLE PLUS IRON) TABS Take by mouth      omega-3-acid ethyl esters (LOVAZA) 1 g capsule Take 2 capsules (2 g total) by mouth 2 (two) times a day 360 capsule 0     No current facility-administered medications for this visit       Review of Systems   Musculoskeletal: Positive for arthralgias and myalgias  All other systems reviewed and are negative  Objective:      /70   Pulse 85   Temp 98 9 °F (37 2 °C)   Resp 16   Ht 5' 7" (1 702 m)   Wt 114 kg (252 lb 3 2 oz)   SpO2 98%   BMI 39 50 kg/m²          Physical Exam   Constitutional: She is oriented to person, place, and time  Vital signs are normal  She appears well-developed and well-nourished  She is cooperative  HENT:   Right Ear: Hearing, tympanic membrane, external ear and ear canal normal    Left Ear: Hearing, tympanic membrane, external ear and ear canal normal    Nose: Nose normal  No mucosal edema  Mouth/Throat: Uvula is midline, oropharynx is clear and moist and mucous membranes are normal    Eyes: Pupils are equal, round, and reactive to light  Conjunctivae and lids are normal    Neck: No JVD present  Carotid bruit is not present  No thyromegaly present  Cardiovascular: Normal rate, regular rhythm, normal heart sounds and intact distal pulses  No murmur heard    Pulmonary/Chest: Effort normal and breath sounds normal  No stridor  She has no wheezes  She has no rales  Abdominal: Normal appearance  Musculoskeletal: Normal range of motion  She exhibits no edema  Lymphadenopathy:     She has no cervical adenopathy  Neurological: She is alert and oriented to person, place, and time  She has normal reflexes  She displays normal reflexes  Skin: Skin is warm, dry and intact  Psychiatric: She has a normal mood and affect  Her speech is normal and behavior is normal  Judgment and thought content normal  Cognition and memory are normal    Vitals reviewed

## 2020-04-02 DIAGNOSIS — I10 ESSENTIAL HYPERTENSION: ICD-10-CM

## 2020-04-02 RX ORDER — HYDROCHLOROTHIAZIDE 25 MG/1
25 TABLET ORAL DAILY
Qty: 90 TABLET | Refills: 2 | Status: SHIPPED | OUTPATIENT
Start: 2020-04-02 | End: 2021-02-10 | Stop reason: SDUPTHER

## 2020-06-29 ENCOUNTER — TELEPHONE (OUTPATIENT)
Dept: INTERNAL MEDICINE CLINIC | Facility: CLINIC | Age: 70
End: 2020-06-29

## 2020-06-29 DIAGNOSIS — F51.01 PRIMARY INSOMNIA: Primary | ICD-10-CM

## 2020-06-29 RX ORDER — ZOLPIDEM TARTRATE 5 MG/1
5 TABLET ORAL
Qty: 30 TABLET | Refills: 0 | Status: SHIPPED | OUTPATIENT
Start: 2020-06-29 | End: 2020-08-17 | Stop reason: SDUPTHER

## 2020-07-21 ENCOUNTER — APPOINTMENT (OUTPATIENT)
Dept: LAB | Facility: MEDICAL CENTER | Age: 70
End: 2020-07-21
Payer: COMMERCIAL

## 2020-07-21 ENCOUNTER — TRANSCRIBE ORDERS (OUTPATIENT)
Dept: LAB | Facility: MEDICAL CENTER | Age: 70
End: 2020-07-21

## 2020-07-21 DIAGNOSIS — I10 ESSENTIAL HYPERTENSION: Primary | ICD-10-CM

## 2020-07-21 DIAGNOSIS — Z13.29 SCREENING FOR HYPOTHYROIDISM: ICD-10-CM

## 2020-07-21 DIAGNOSIS — Z12.11 COLON CANCER SCREENING: ICD-10-CM

## 2020-07-21 DIAGNOSIS — Z13.0 SCREENING FOR DEFICIENCY ANEMIA: ICD-10-CM

## 2020-07-21 DIAGNOSIS — E78.00 HYPERCHOLESTEROLEMIA: ICD-10-CM

## 2020-07-21 DIAGNOSIS — I10 ESSENTIAL HYPERTENSION: ICD-10-CM

## 2020-07-21 LAB
ALBUMIN SERPL BCP-MCNC: 3.4 G/DL (ref 3.5–5)
ALP SERPL-CCNC: 64 U/L (ref 46–116)
ALT SERPL W P-5'-P-CCNC: 29 U/L (ref 12–78)
ANION GAP SERPL CALCULATED.3IONS-SCNC: 5 MMOL/L (ref 4–13)
AST SERPL W P-5'-P-CCNC: 19 U/L (ref 5–45)
BACTERIA UR QL AUTO: ABNORMAL /HPF
BASOPHILS # BLD AUTO: 0.03 THOUSANDS/ΜL (ref 0–0.1)
BASOPHILS NFR BLD AUTO: 0 % (ref 0–1)
BILIRUB SERPL-MCNC: 0.7 MG/DL (ref 0.2–1)
BILIRUB UR QL STRIP: NEGATIVE
BUN SERPL-MCNC: 15 MG/DL (ref 5–25)
CALCIUM SERPL-MCNC: 9.2 MG/DL (ref 8.3–10.1)
CHLORIDE SERPL-SCNC: 104 MMOL/L (ref 100–108)
CHOLEST SERPL-MCNC: 198 MG/DL (ref 50–200)
CLARITY UR: CLEAR
CO2 SERPL-SCNC: 28 MMOL/L (ref 21–32)
COLOR UR: YELLOW
CREAT SERPL-MCNC: 0.73 MG/DL (ref 0.6–1.3)
EOSINOPHIL # BLD AUTO: 0.13 THOUSAND/ΜL (ref 0–0.61)
EOSINOPHIL NFR BLD AUTO: 2 % (ref 0–6)
ERYTHROCYTE [DISTWIDTH] IN BLOOD BY AUTOMATED COUNT: 12.9 % (ref 11.6–15.1)
GFR SERPL CREATININE-BSD FRML MDRD: 85 ML/MIN/1.73SQ M
GLUCOSE P FAST SERPL-MCNC: 119 MG/DL (ref 65–99)
GLUCOSE UR STRIP-MCNC: NEGATIVE MG/DL
HCT VFR BLD AUTO: 44.2 % (ref 34.8–46.1)
HDLC SERPL-MCNC: 46 MG/DL
HEMOCCULT STL QL IA: NEGATIVE
HGB BLD-MCNC: 14.6 G/DL (ref 11.5–15.4)
HGB UR QL STRIP.AUTO: NEGATIVE
HYALINE CASTS #/AREA URNS LPF: ABNORMAL /LPF
IMM GRANULOCYTES # BLD AUTO: 0.02 THOUSAND/UL (ref 0–0.2)
IMM GRANULOCYTES NFR BLD AUTO: 0 % (ref 0–2)
KETONES UR STRIP-MCNC: NEGATIVE MG/DL
LDLC SERPL CALC-MCNC: 107 MG/DL (ref 0–100)
LEUKOCYTE ESTERASE UR QL STRIP: ABNORMAL
LYMPHOCYTES # BLD AUTO: 3.22 THOUSANDS/ΜL (ref 0.6–4.47)
LYMPHOCYTES NFR BLD AUTO: 38 % (ref 14–44)
MCH RBC QN AUTO: 32.2 PG (ref 26.8–34.3)
MCHC RBC AUTO-ENTMCNC: 33 G/DL (ref 31.4–37.4)
MCV RBC AUTO: 98 FL (ref 82–98)
MONOCYTES # BLD AUTO: 0.54 THOUSAND/ΜL (ref 0.17–1.22)
MONOCYTES NFR BLD AUTO: 6 % (ref 4–12)
NEUTROPHILS # BLD AUTO: 4.53 THOUSANDS/ΜL (ref 1.85–7.62)
NEUTS SEG NFR BLD AUTO: 54 % (ref 43–75)
NITRITE UR QL STRIP: NEGATIVE
NON-SQ EPI CELLS URNS QL MICRO: ABNORMAL /HPF
NONHDLC SERPL-MCNC: 152 MG/DL
NRBC BLD AUTO-RTO: 0 /100 WBCS
PH UR STRIP.AUTO: 5.5 [PH]
PLATELET # BLD AUTO: 227 THOUSANDS/UL (ref 149–390)
PMV BLD AUTO: 10.7 FL (ref 8.9–12.7)
POTASSIUM SERPL-SCNC: 4.6 MMOL/L (ref 3.5–5.3)
PROT SERPL-MCNC: 7.3 G/DL (ref 6.4–8.2)
PROT UR STRIP-MCNC: NEGATIVE MG/DL
RBC # BLD AUTO: 4.53 MILLION/UL (ref 3.81–5.12)
RBC #/AREA URNS AUTO: ABNORMAL /HPF
SODIUM SERPL-SCNC: 137 MMOL/L (ref 136–145)
SP GR UR STRIP.AUTO: 1.02 (ref 1–1.03)
TRIGL SERPL-MCNC: 223 MG/DL
TSH SERPL DL<=0.05 MIU/L-ACNC: 3.36 UIU/ML (ref 0.36–3.74)
UROBILINOGEN UR QL STRIP.AUTO: 0.2 E.U./DL
WBC # BLD AUTO: 8.47 THOUSAND/UL (ref 4.31–10.16)
WBC #/AREA URNS AUTO: ABNORMAL /HPF

## 2020-07-21 PROCEDURE — 85025 COMPLETE CBC W/AUTO DIFF WBC: CPT

## 2020-07-21 PROCEDURE — 36415 COLL VENOUS BLD VENIPUNCTURE: CPT

## 2020-07-21 PROCEDURE — G0328 FECAL BLOOD SCRN IMMUNOASSAY: HCPCS

## 2020-07-21 PROCEDURE — 80053 COMPREHEN METABOLIC PANEL: CPT

## 2020-07-21 PROCEDURE — 80061 LIPID PANEL: CPT

## 2020-07-21 PROCEDURE — 81001 URINALYSIS AUTO W/SCOPE: CPT | Performed by: INTERNAL MEDICINE

## 2020-07-21 PROCEDURE — 84443 ASSAY THYROID STIM HORMONE: CPT

## 2020-07-24 ENCOUNTER — OFFICE VISIT (OUTPATIENT)
Dept: INTERNAL MEDICINE CLINIC | Facility: CLINIC | Age: 70
End: 2020-07-24
Payer: COMMERCIAL

## 2020-07-24 VITALS
BODY MASS INDEX: 38.77 KG/M2 | SYSTOLIC BLOOD PRESSURE: 128 MMHG | WEIGHT: 247 LBS | DIASTOLIC BLOOD PRESSURE: 80 MMHG | HEIGHT: 67 IN | OXYGEN SATURATION: 97 % | TEMPERATURE: 98.7 F | HEART RATE: 97 BPM

## 2020-07-24 DIAGNOSIS — R73.02 GLUCOSE INTOLERANCE (IMPAIRED GLUCOSE TOLERANCE): ICD-10-CM

## 2020-07-24 DIAGNOSIS — Z12.31 SCREENING MAMMOGRAM, ENCOUNTER FOR: ICD-10-CM

## 2020-07-24 DIAGNOSIS — E78.1 HYPERTRIGLYCERIDEMIA: ICD-10-CM

## 2020-07-24 DIAGNOSIS — E78.00 HYPERCHOLESTEROLEMIA: ICD-10-CM

## 2020-07-24 DIAGNOSIS — I10 ESSENTIAL HYPERTENSION: Primary | ICD-10-CM

## 2020-07-24 PROBLEM — M25.511 PAIN OF BOTH SHOULDER JOINTS: Status: RESOLVED | Noted: 2020-01-21 | Resolved: 2020-07-24

## 2020-07-24 PROBLEM — M25.512 PAIN OF BOTH SHOULDER JOINTS: Status: RESOLVED | Noted: 2020-01-21 | Resolved: 2020-07-24

## 2020-07-24 PROCEDURE — G0439 PPPS, SUBSEQ VISIT: HCPCS | Performed by: INTERNAL MEDICINE

## 2020-07-24 PROCEDURE — 3074F SYST BP LT 130 MM HG: CPT | Performed by: INTERNAL MEDICINE

## 2020-07-24 PROCEDURE — 3079F DIAST BP 80-89 MM HG: CPT | Performed by: INTERNAL MEDICINE

## 2020-07-24 PROCEDURE — 1125F AMNT PAIN NOTED PAIN PRSNT: CPT | Performed by: INTERNAL MEDICINE

## 2020-07-24 PROCEDURE — 1170F FXNL STATUS ASSESSED: CPT | Performed by: INTERNAL MEDICINE

## 2020-07-24 PROCEDURE — 3008F BODY MASS INDEX DOCD: CPT | Performed by: INTERNAL MEDICINE

## 2020-07-24 PROCEDURE — 99215 OFFICE O/P EST HI 40 MIN: CPT | Performed by: INTERNAL MEDICINE

## 2020-07-24 PROCEDURE — 1036F TOBACCO NON-USER: CPT | Performed by: INTERNAL MEDICINE

## 2020-07-24 PROCEDURE — 1160F RVW MEDS BY RX/DR IN RCRD: CPT | Performed by: INTERNAL MEDICINE

## 2020-07-24 RX ORDER — OMEGA-3-ACID ETHYL ESTERS 1 G/1
2 CAPSULE, LIQUID FILLED ORAL 2 TIMES DAILY
COMMUNITY
End: 2020-07-24 | Stop reason: ALTCHOICE

## 2020-07-24 NOTE — ASSESSMENT & PLAN NOTE
Elevation of triglycerides reviewed during today's visit recommend reducing sugars and concentrated carbohydrates in the diet continue lovastatin at 20 mg daily and follow-up blood work in 6 months

## 2020-07-24 NOTE — ASSESSMENT & PLAN NOTE
Lipid profile reviewed with the patient today recommend continued low-cholesterol diet continuation of lovastatin at 20 mg daily and follow-up testing in 6 months

## 2020-07-24 NOTE — ASSESSMENT & PLAN NOTE
Review of the patient's blood work indicates mild elevation in fasting blood sugar with a reading 119 recommend reducing concentrated sugars as well as carbohydrates follow-up blood work in 6 months

## 2020-07-24 NOTE — PROGRESS NOTES
Assessment and Plan:     Problem List Items Addressed This Visit     None           Preventive health issues were discussed with patient, and age appropriate screening tests were ordered as noted in patient's After Visit Summary  Personalized health advice and appropriate referrals for health education or preventive services given if needed, as noted in patient's After Visit Summary  History of Present Illness:     Patient presents for Medicare Annual Wellness visit    Patient Care Team:  John Lazcano MD as PCP - General  John Lazcano MD as PCP - [address] [place], [place] [address] [facility]     Problem List:     Patient Active Problem List   Diagnosis    Hypercholesterolemia    Hypertension    Hypertriglyceridemia    Obesity (BMI 30-39  9)    Pain of both shoulder joints      Past Medical and Surgical History:     Past Medical History:   Diagnosis Date    Cellulitis of left elbow 10/30/2016    Epistaxis     Medial meniscus tear 11/11/2014    Morbid obesity with BMI of 40 0-44 9, [facility] 38/50/4485    Umbilical hernia      Past Surgical History:   Procedure Laterality Date    KNEE ARTHROSCOPY W/ MENISCAL REPAIR  07/26/2015    with medial meniscus repair    TOTAL ABDOMINAL HYSTERECTOMY  2001      Family History:     Family History   Problem Relation Age of Onset    No Known Problems Mother     Thyroid disease Family     Diabetes Family         Diabetes Mellitus    Thyroid cancer Daughter     Hodgkin's lymphoma Daughter       Social History:        Social History     Socioeconomic History    Marital status: /Civil Union     Spouse name: Not on file    Number of children: Not on file    Years of education: Not on file    Highest education level: Not on file   Occupational History    Not on file   Social Needs    Financial resource strain: Not on file    Food insecurity:     Worry: Not on file     Inability: Not on file    Transportation needs:     Medical: Not on file Non-medical: Not on file   Tobacco Use    Smoking status: Never Smoker    Smokeless tobacco: Never Used    Tobacco comment: Denied history of never a smoker per [employer]   Substance and Sexual Activity    Alcohol use: No    Drug use: No    Sexual activity: Not on file   Lifestyle    Physical activity:     Days per week: Not on file     Minutes per session: Not on file    Stress: Not on file   Relationships    Social connections:     Talks on phone: Not on file     Gets together: Not on file     Attends [employer] service: Not on file     Active member of club or organization: Not on file     Attends meetings of clubs or organizations: Not on file     Relationship status: Not on file    Intimate partner violence:     Fear of current or ex partner: Not on file     Emotionally abused: Not on file     Physically abused: Not on file     Forced sexual activity: Not on file   Other Topics Concern    Not on file   Social History Narrative    Exercise habits    Daily Tea Consumption (1 Cup/Day)      Medications and Allergies:     Current Outpatient Medications   Medication Sig Dispense Refill    calcium-vitamin D (OSCAL) 250-125 MG-UNIT per tablet Take 1 tablet by mouth daily   hydrochlorothiazide (HYDRODIURIL) 25 mg tablet Take 1 tablet (25 mg total) by mouth daily 90 tablet 2    lisinopril (ZESTRIL) 40 mg tablet Take 1 tablet (40 mg total) by mouth daily 90 tablet 3    lovastatin (MEVACOR) 20 mg tablet Take 1 tablet (20 mg total) by mouth daily at bedtime 90 tablet 3    Multiple Vitamins-Iron ([facility] DAILY MULTIPLE PLUS IRON) TABS Take by mouth      omega-3-acid ethyl esters (LOVAZA) 1 g capsule Take 2 g by mouth 2 (two) times a day      zolpidem (AMBIEN) 5 mg tablet Take 1 tablet (5 mg total) by mouth daily at bedtime as needed for sleep 30 tablet 0    aspirin 81 MG tablet Take 81 mg by mouth daily        omega-3-acid ethyl esters (LOVAZA) 1 g capsule Take 2 capsules (2 g total) by mouth 2 (two) times a day (Patient not taking: Reported on 7/24/2020) 360 capsule 0     No current facility-administered medications for this visit  Allergies   Allergen Reactions    Lipitor [Atorvastatin]     Morphine      Reaction Date: [id number];     Morphine And Related     Zithromax [Azithromycin]      Reaction Date: [id number];       Immunizations:     Immunization History   Administered Date(s) Administered    Influenza Quadrivalent, 6-35 Months IM 11/16/2016, 10/20/2017      Health Maintenance:         Topic Date Due    Hepatitis C Screening  1950    MAMMOGRAM  02/26/2020    CRC Screening: FOBTx3/FIT  07/21/2021         Topic Date Due    DTaP,Tdap,and Td Vaccines (1 - Tdap) 11/27/1963    Pneumococcal Vaccine: 65+ Years (1 of 2 - PCV13) 11/27/2017    Influenza Vaccine  07/01/2020      Medicare Health Risk Assessment:     There were no vitals taken for this visit  Last Medicare Wellness visit information reviewed, patient interviewed and updates made to the record today  Health Risk Assessment:   Patient rates overall health as very good  Patient feels that their physical health rating is same  Eyesight was rated as same  Hearing was rated as same  Patient feels that their emotional and mental health rating is same  Pain experienced in the last 7 days has been none  Patient states that she has experienced no weight loss or gain in last 6 months  Fall Risk Screening: In the past year, patient has experienced: no history of falling in past year      Urinary Incontinence Screening:   Patient has leaked urine accidently in the last six months  occasionally    Home Safety:  Patient does not have trouble with stairs inside or outside of their home  Patient has working smoke alarms and has working carbon monoxide detector  Home safety hazards include: none  Nutrition:   Current diet is Regular  Medications:   Patient is currently taking over-the-counter supplements   OTC medications include: see medication list  Patient is able to manage medications  Activities of Daily Living (ADLs)/Instrumental Activities of Daily Living (IADLs):   Walk and transfer into and out of bed and chair?: Yes  Dress and groom yourself?: Yes    Bathe or shower yourself?: Yes    Feed yourself? Yes  Do your laundry/housekeeping?: Yes  Manage your money, pay your bills and track your expenses?: Yes  Make your own meals?: Yes    Do your own shopping?: Yes    Previous Hospitalizations:   Any hospitalizations or ED visits within the last 12 months?: No      Advance Care Planning:   Living will: Yes    Durable POA for healthcare:  Yes    Advanced directive: Yes      Cognitive Screening:   Provider or family/friend/caregiver concerned regarding cognition?: No    PREVENTIVE SCREENINGS      Cardiovascular Screening:    General: Screening Not Indicated and History Lipid Disorder      Diabetes Screening:     General: Screening Current      Colorectal Cancer Screening:     General: Screening Current      Breast Cancer Screening:     General: Screening Current      Cervical Cancer Screening:    General: Screening Not Indicated      Osteoporosis Screening:    General: Screening Not Indicated      Abdominal Aortic Aneurysm (AAA) Screening:        General: Screening Not Indicated      Lung Cancer Screening:     General: Screening Not Indicated      Hepatitis C Screening:    General: Screening Not Indicated      Herlinda Green MD

## 2020-07-24 NOTE — PROGRESS NOTES
Assessment/Plan:    Glucose intolerance (impaired glucose tolerance)  Review of the patient's blood work indicates mild elevation in fasting blood sugar with a reading 119 recommend reducing concentrated sugars as well as carbohydrates follow-up blood work in 6 months    Hypertension  Assessment of hypertension today indicates good control blood pressure the patient has no signs or symptoms of uncontrolled hypertension or hypotension recommend the continuation of current hypertensive therapy which is a combination of lisinopril 40 mg daily and hydrochlorothiazide 25 mg daily follow-up evaluation in 4 months    Hypertriglyceridemia  Elevation of triglycerides reviewed during today's visit recommend reducing sugars and concentrated carbohydrates in the diet continue lovastatin at 20 mg daily and follow-up blood work in 6 months    Hypercholesterolemia  Lipid profile reviewed with the patient today recommend continued low-cholesterol diet continuation of lovastatin at 20 mg daily and follow-up testing in 6 months       Diagnoses and all orders for this visit:    Essential hypertension    Screening mammogram, encounter for  -     Mammo screening bilateral w cad; Future    Hypercholesterolemia  -     Lipid panel; Future    Glucose intolerance (impaired glucose tolerance)  -     Comprehensive metabolic panel; Future    Hypertriglyceridemia    Other orders  -     Discontinue: omega-3-acid ethyl esters (LOVAZA) 1 g capsule; Take 2 g by mouth 2 (two) times a day        Subjective:      Patient ID: Omar Cody is a 81 y o  female  This 82-year-old female patient presents today for an annual complete physical examination  We have also reviewed her current comprehensive blood work        The following portions of the patient's history were reviewed and updated as appropriate:   She  has a past medical history of Cellulitis of left elbow (10/30/2016), Epistaxis, Medial meniscus tear (11/11/2014), Morbid obesity with BMI of 40 0-44 9, adult ([facility] ) (62/84/3601), and Umbilical hernia  She   Patient Active Problem List    Diagnosis Date Noted    Glucose intolerance (impaired glucose tolerance) 07/24/2020    Obesity (BMI 30-39 9) 11/15/2017    Hypertriglyceridemia 02/26/2016    Hypercholesterolemia 09/12/2014    Hypertension 09/12/2014     She  has a past surgical history that includes Knee arthroscopy w/ meniscal repair (07/26/2015) and Total abdominal hysterectomy (2001)  Her family history includes Diabetes in her family; Hodgkin's lymphoma in her daughter; No Known Problems in her mother; Thyroid cancer in her daughter; Thyroid disease in her family  She  reports that she has never smoked  She has never used smokeless tobacco  She reports that she does not drink alcohol or use drugs  Current Outpatient Medications   Medication Sig Dispense Refill    calcium-vitamin D (OSCAL) 250-125 MG-UNIT per tablet Take 1 tablet by mouth daily   hydrochlorothiazide (HYDRODIURIL) 25 mg tablet Take 1 tablet (25 mg total) by mouth daily 90 tablet 2    lisinopril (ZESTRIL) 40 mg tablet Take 1 tablet (40 mg total) by mouth daily 90 tablet 3    lovastatin (MEVACOR) 20 mg tablet Take 1 tablet (20 mg total) by mouth daily at bedtime 90 tablet 3    Multiple Vitamins-Iron ([facility] DAILY MULTIPLE PLUS IRON) TABS Take by mouth      zolpidem (AMBIEN) 5 mg tablet Take 1 tablet (5 mg total) by mouth daily at bedtime as needed for sleep 30 tablet 0    aspirin 81 MG tablet Take 81 mg by mouth daily  No current facility-administered medications for this visit       Review of Systems   All other systems reviewed and are negative  Objective:      /80   Pulse 97   Temp 98 7 °F (37 1 °C)   Ht 5' 7" (1 702 m)   Wt 112 kg (247 lb)   SpO2 97%   BMI 38 69 kg/m²          Physical Exam   Constitutional: She is oriented to person, place, and time  Vital signs are normal  She appears well-developed and well-nourished   She is cooperative  No distress  HENT:   Right Ear: Hearing, tympanic membrane, external ear and ear canal normal    Left Ear: Hearing, tympanic membrane, external ear and ear canal normal    Nose: Nose normal  No mucosal edema  Mouth/Throat: Uvula is midline, oropharynx is clear and moist and mucous membranes are normal    Eyes: Pupils are equal, round, and reactive to light  Conjunctivae and lids are normal  Right eye exhibits no discharge  Left eye exhibits no discharge  Neck: No JVD present  Carotid bruit is not present  No thyromegaly present  Cardiovascular: Normal rate, regular rhythm, normal heart sounds and intact distal pulses  No murmur heard  Pulmonary/Chest: Effort normal and breath sounds normal  No stridor  No respiratory distress  She has no wheezes  She has no rales  Abdominal: Soft  Normal appearance and bowel sounds are normal  She exhibits no distension and no mass  There is no tenderness  There is no guarding  Musculoskeletal: Normal range of motion  She exhibits no edema, tenderness or deformity  Lymphadenopathy:     She has no cervical adenopathy  Neurological: She is alert and oriented to person, place, and time  She has normal reflexes  She displays normal reflexes  No cranial nerve deficit  Skin: Skin is warm, dry and intact  She is not diaphoretic  Psychiatric: She has a normal mood and affect  Her speech is normal and behavior is normal  Judgment and thought content normal  Cognition and memory are normal    Vitals reviewed  BMI Counseling: Body mass index is 38 69 kg/m²  The BMI is above normal  Nutrition recommendations include reducing portion sizes, consuming healthier snacks, decreasing soda and/or juice intake, moderation in carbohydrate intake and reducing intake of cholesterol

## 2020-07-24 NOTE — ASSESSMENT & PLAN NOTE
Assessment of hypertension today indicates good control blood pressure the patient has no signs or symptoms of uncontrolled hypertension or hypotension recommend the continuation of current hypertensive therapy which is a combination of lisinopril 40 mg daily and hydrochlorothiazide 25 mg daily follow-up evaluation in 4 months

## 2020-07-24 NOTE — PATIENT INSTRUCTIONS
Medicare Preventive Visit Patient Instructions  Thank you for completing your Welcome to Medicare Visit or Medicare Annual Wellness Visit today  Your next wellness visit will be due in one year (7/24/2021)  The screening/preventive services that you may require over the next 5-10 years are detailed below  Some tests may not apply to you based off risk factors and/or age  Screening tests ordered at today's visit but not completed yet may show as past due  Also, please note that scanned in results may not display below  Preventive Screenings:  Service Recommendations Previous Testing/Comments   Colorectal Cancer Screening  * Colonoscopy    * Fecal Occult Blood Test (FOBT)/Fecal Immunochemical Test (FIT)  * Fecal DNA/Cologuard Test  * Flexible Sigmoidoscopy Age: 54-65 years old   Colonoscopy: every 10 years (may be performed more frequently if at higher risk)  OR  FOBT/FIT: every 1 year  OR  Cologuard: every 3 years  OR  Sigmoidoscopy: every 5 years  Screening may be recommended earlier than age 48 if at higher risk for colorectal cancer  Also, an individualized decision between you and your healthcare provider will decide whether screening between the ages of 74-80 would be appropriate  Colonoscopy: 01/24/2019  FOBT/FIT: 07/21/2020  Cologuard: Not on file  Sigmoidoscopy: Not on file    Screening Current     Breast Cancer Screening Age: 36 years old  Frequency: every 1-2 years  Not required if history of left and right mastectomy Mammogram: 02/26/2019    Screening Current   Cervical Cancer Screening Between the ages of 21-29, pap smear recommended once every 3 years  Between the ages of 33-67, can perform pap smear with HPV co-testing every 5 years     Recommendations may differ for women with a history of total hysterectomy, cervical cancer, or abnormal pap smears in past  Pap Smear: Not on file    Screening Not Indicated   Hepatitis C Screening Once for adults born between 1945 and 1965  More frequently in patients at high risk for Hepatitis C Hep C Antibody: Not on file       Diabetes Screening 1-2 times per year if you're at risk for diabetes or have pre-diabetes Fasting glucose: 119 mg/dL   A1C: 6 4 %    Screening Current   Cholesterol Screening Once every 5 years if you don't have a lipid disorder  May order more often based on risk factors  Lipid panel: 07/21/2020    Screening Not Indicated  History Lipid Disorder     Other Preventive Screenings Covered by Medicare:  1  Abdominal Aortic Aneurysm (AAA) Screening: covered once if your at risk  You're considered to be at risk if you have a family history of AAA  2  Lung Cancer Screening: covers low dose CT scan once per year if you meet all of the following conditions: (1) Age 50-69; (2) No signs or symptoms of lung cancer; (3) Current smoker or have quit smoking within the last 15 years; (4) You have a tobacco smoking history of at least 30 pack years (packs per day multiplied by number of years you smoked); (5) You get a written order from a healthcare provider  3  Glaucoma Screening: covered annually if you're considered high risk: (1) You have diabetes OR (2) Family history of glaucoma OR (3)  aged 48 and older OR (3)  American aged 72 and older  3  Osteoporosis Screening: covered every 2 years if you meet one of the following conditions: (1) You're estrogen deficient and at risk for osteoporosis based off medical history and other findings; (2) Have a vertebral abnormality; (3) On glucocorticoid therapy for more than 3 months; (4) Have primary hyperparathyroidism; (5) On osteoporosis medications and need to assess response to drug therapy  · Last bone density test (DXA Scan): Not on file  5  HIV Screening: covered annually if you're between the age of 12-76  Also covered annually if you are younger than 13 and older than 72 with risk factors for HIV infection   For pregnant patients, it is covered up to 3 times per pregnancy  Immunizations:  Immunization Recommendations   Influenza Vaccine Annual influenza vaccination during flu season is recommended for all persons aged >= 6 months who do not have contraindications   Pneumococcal Vaccine (Prevnar and Pneumovax)  * Prevnar = PCV13  * Pneumovax = PPSV23   Adults 25-60 years old: 1-3 doses may be recommended based on certain risk factors  Adults 72 years old: Prevnar (PCV13) vaccine recommended followed by Pneumovax (PPSV23) vaccine  If already received PPSV23 since turning 65, then PCV13 recommended at least one year after PPSV23 dose  Hepatitis B Vaccine 3 dose series if at intermediate or high risk (ex: diabetes, end stage renal disease, liver disease)   Tetanus (Td) Vaccine - COST NOT COVERED BY MEDICARE PART B Following completion of primary series, a booster dose should be given every 10 years to maintain immunity against tetanus  Td may also be given as tetanus wound prophylaxis  Tdap Vaccine - COST NOT COVERED BY MEDICARE PART B Recommended at least once for all adults  For pregnant patients, recommended with each pregnancy  Shingles Vaccine (Shingrix) - COST NOT COVERED BY MEDICARE PART B  2 shot series recommended in those aged 48 and above     Health Maintenance Due:      Topic Date Due    Hepatitis C Screening  1950    MAMMOGRAM  02/26/2020    CRC Screening: FOBTx3/FIT  07/21/2021     Immunizations Due:      Topic Date Due    DTaP,Tdap,and Td Vaccines (1 - Tdap) 11/27/1963    Pneumococcal Vaccine: 65+ Years (1 of 2 - PCV13) 11/27/2017    Influenza Vaccine  07/01/2020     Advance Directives   What are advance directives? Advance directives are legal documents that state your wishes and plans for medical care  These plans are made ahead of time in case you lose your ability to make decisions for yourself  Advance directives can apply to any medical decision, such as the treatments you want, and if you want to donate organs     What are the types of advance directives? There are many types of advance directives, and each state has rules about how to use them  You may choose a combination of any of the following:  · Living will: This is a written record of the treatment you want  You can also choose which treatments you do not want, which to limit, and which to stop at a certain time  This includes surgery, medicine, IV fluid, and tube feedings  · Durable power of  for healthcare [facility]): This is a written record that states who you want to make healthcare choices for you when you are unable to make them for yourself  This person, called a proxy, is usually a family member or a friend  You may choose more than 1 proxy  · Do not resuscitate (DNR) order:  A DNR order is used in case your heart stops beating or you stop breathing  It is a request not to have certain forms of treatment, such as CPR  A DNR order may be included in other types of advance directives  · Medical directive: This covers the care that you want if you are in a coma, near death, or unable to make decisions for yourself  You can list the treatments you want for each condition  Treatment may include pain medicine, surgery, blood transfusions, dialysis, IV or tube feedings, and a ventilator (breathing machine)  · Values history: This document has questions about your views, beliefs, and how you feel and think about life  This information can help others choose the care that you would choose  Why are advance directives important? An advance directive helps you control your care  Although spoken wishes may be used, it is better to have your wishes written down  Spoken wishes can be misunderstood, or not followed  Treatments may be given even if you do not want them  An advance directive may make it easier for your family to make difficult choices about your care  Urinary Incontinence   Urinary incontinence (UI)  is when you lose control of your bladder   UI develops because your bladder cannot store or empty urine properly  The 3 most common types of UI are stress incontinence, urge incontinence, or both  Medicines:   · May be given to help strengthen your bladder control  Report any side effects of medication to your healthcare provider  Do pelvic muscle exercises often:  Your pelvic muscles help you stop urinating  Squeeze these muscles tight for 5 seconds, then relax for 5 seconds  Gradually work up to squeezing for 10 seconds  Do 3 sets of 15 repetitions a day, or as directed  This will help strengthen your pelvic muscles and improve bladder control  Train your bladder:  Go to the bathroom at set times, such as every 2 hours, even if you do not feel the urge to go  You can also try to hold your urine when you feel the urge to go  For example, hold your urine for 5 minutes when you feel the urge to go  As that becomes easier, hold your urine for 10 minutes  Self-care:   · Keep a UI record  Write down how often you leak urine and how much you leak  Make a note of what you were doing when you leaked urine  · Drink liquids as directed  You may need to limit the amount of liquid you drink to help control your urine leakage  Do not drink any liquid right before you go to bed  Limit or do not have drinks that contain caffeine or alcohol  · Prevent constipation  Eat a variety of high-fiber foods  Good examples are high-fiber cereals, beans, vegetables, and whole-grain breads  Walking is the best way to trigger your intestines to have a bowel movement  · Exercise regularly and maintain a healthy weight  Weight loss and exercise will decrease pressure on your bladder and help you control your leakage  · Use a catheter as directed  to help empty your bladder  A catheter is a tiny, plastic tube that is put into your bladder to drain your urine  · Go to behavior therapy as directed  Behavior therapy may be used to help you learn to control your urge to urinate      Weight Management   Why it is important to manage your weight:  Being overweight increases your risk of health conditions such as heart disease, high blood pressure, type 2 diabetes, and certain types of cancer  It can also increase your risk for osteoarthritis, sleep apnea, and other respiratory problems  Aim for a slow, steady weight loss  Even a small amount of weight loss can lower your risk of health problems  How to lose weight safely:  A safe and healthy way to lose weight is to eat fewer calories and get regular exercise  You can lose up about 1 pound a week by decreasing the number of calories you eat by 500 calories each day  Healthy meal plan for weight management:  A healthy meal plan includes a variety of foods, contains fewer calories, and helps you stay healthy  A healthy meal plan includes the following:  · Eat whole-grain foods more often  A healthy meal plan should contain fiber  Fiber is the part of grains, fruits, and vegetables that is not broken down by your body  Whole-grain foods are healthy and provide extra fiber in your diet  Some examples of whole-grain foods are whole-wheat breads and pastas, oatmeal, brown rice, and bulgur  · Eat a variety of vegetables every day  Include dark, leafy greens such as spinach, kale, leslie greens, and mustard greens  Eat yellow and orange vegetables such as carrots, sweet potatoes, and winter squash  · Eat a variety of fruits every day  Choose fresh or canned fruit (canned in its own juice or light syrup) instead of juice  Fruit juice has very little or no fiber  · Eat low-fat dairy foods  Drink fat-free (skim) milk or 1% milk  Eat fat-free yogurt and low-fat cottage cheese  Try low-fat cheeses such as mozzarella and other reduced-fat cheeses  · Choose meat and other protein foods that are low in fat  Choose beans or other legumes such as split peas or lentils   Choose fish, skinless poultry (chicken or turkey), or lean cuts of red meat (beef or pork)  Before you cook meat or poultry, cut off any visible fat  · Use less fat and oil  Try baking foods instead of frying them  Add less fat, such as margarine, sour cream, regular salad dressing and mayonnaise to foods  Eat fewer high-fat foods  Some examples of high-fat foods include french fries, doughnuts, ice cream, and cakes  · Eat fewer sweets  Limit foods and drinks that are high in sugar  This includes candy, cookies, regular soda, and sweetened drinks  Exercise:  Exercise at least 30 minutes per day on most days of the week  Some examples of exercise include walking, biking, dancing, and swimming  You can also fit in more physical activity by taking the stairs instead of the elevator or parking farther away from stores  Ask your healthcare provider about the best exercise plan for you  © Copyright [employer] 2018 Information is for End User's use only and may not be sold, redistributed or otherwise used for commercial purposes   All illustrations and images included in CareNotes® are the copyrighted property of [employer]  or [address] [place] [address]

## 2020-08-17 DIAGNOSIS — F51.01 PRIMARY INSOMNIA: ICD-10-CM

## 2020-08-17 RX ORDER — ZOLPIDEM TARTRATE 5 MG/1
5 TABLET ORAL
Qty: 30 TABLET | Refills: 0 | Status: SHIPPED | OUTPATIENT
Start: 2020-08-17 | End: 2020-11-24

## 2020-09-16 ENCOUNTER — TELEPHONE (OUTPATIENT)
Dept: INTERNAL MEDICINE CLINIC | Facility: CLINIC | Age: 70
End: 2020-09-16

## 2020-09-16 DIAGNOSIS — F32.9 REACTIVE DEPRESSION: Primary | ICD-10-CM

## 2020-09-16 RX ORDER — MIRTAZAPINE 7.5 MG/1
7.5 TABLET, FILM COATED ORAL
Qty: 30 TABLET | Refills: 2 | Status: SHIPPED | OUTPATIENT
Start: 2020-09-16 | End: 2020-09-28

## 2020-09-16 NOTE — TELEPHONE ENCOUNTER
Call was returned to the patient and I have reviewed her condition with sleep difficulty and prescribed new medication please see note on chart for further information

## 2020-09-16 NOTE — PROGRESS NOTES
Patient called today still having difficulty sleeping at night  She has a great deal of difficulty putting her mind at rest at the end of the day  She has been using Ambien periodically for sleep but we would prefer not to use that chronically  In view of the apparent mild depression symptoms from talking to the patient today of recommended that we start Remeron 7 5 mg at bedtime

## 2020-09-28 DIAGNOSIS — I10 ESSENTIAL HYPERTENSION: ICD-10-CM

## 2020-09-28 RX ORDER — LISINOPRIL 40 MG/1
40 TABLET ORAL DAILY
Qty: 90 TABLET | Refills: 3 | Status: SHIPPED | OUTPATIENT
Start: 2020-09-28 | End: 2021-09-23 | Stop reason: SDUPTHER

## 2020-10-01 ENCOUNTER — HOSPITAL ENCOUNTER (OUTPATIENT)
Dept: MAMMOGRAPHY | Facility: MEDICAL CENTER | Age: 70
Discharge: HOME | End: 2020-10-01
Payer: COMMERCIAL

## 2020-10-01 VITALS — HEIGHT: 67 IN | BODY MASS INDEX: 37.67 KG/M2 | WEIGHT: 240 LBS

## 2020-10-01 DIAGNOSIS — Z12.31 SCREENING MAMMOGRAM, ENCOUNTER FOR: ICD-10-CM

## 2020-10-01 PROCEDURE — 77067 SCR MAMMO BI INCL CAD: CPT

## 2020-10-01 PROCEDURE — 77063 BREAST TOMOSYNTHESIS BI: CPT

## 2020-11-03 LAB
LEFT EYE DIABETIC RETINOPATHY: NORMAL
RIGHT EYE DIABETIC RETINOPATHY: NORMAL

## 2020-11-17 ENCOUNTER — LAB (OUTPATIENT)
Dept: LAB | Facility: MEDICAL CENTER | Age: 70
End: 2020-11-17
Payer: COMMERCIAL

## 2020-11-17 ENCOUNTER — TRANSCRIBE ORDERS (OUTPATIENT)
Dept: ADMINISTRATIVE | Facility: HOSPITAL | Age: 70
End: 2020-11-17

## 2020-11-17 DIAGNOSIS — R73.02 IMPAIRED GLUCOSE TOLERANCE ASSOCIATED WITH DRUGS: ICD-10-CM

## 2020-11-17 DIAGNOSIS — E78.00 HYPERCHOLESTEROLEMIA: ICD-10-CM

## 2020-11-17 DIAGNOSIS — T50.905A IMPAIRED GLUCOSE TOLERANCE ASSOCIATED WITH DRUGS: Primary | ICD-10-CM

## 2020-11-17 DIAGNOSIS — R73.02 IMPAIRED GLUCOSE TOLERANCE ASSOCIATED WITH DRUGS: Primary | ICD-10-CM

## 2020-11-17 DIAGNOSIS — T50.905A IMPAIRED GLUCOSE TOLERANCE ASSOCIATED WITH DRUGS: ICD-10-CM

## 2020-11-17 LAB
ALBUMIN SERPL BCP-MCNC: 3.4 G/DL (ref 3.5–5)
ALP SERPL-CCNC: 67 U/L (ref 46–116)
ALT SERPL W P-5'-P-CCNC: 32 U/L (ref 12–78)
ANION GAP SERPL CALCULATED.3IONS-SCNC: 5 MMOL/L (ref 4–13)
AST SERPL W P-5'-P-CCNC: 24 U/L (ref 5–45)
BILIRUB SERPL-MCNC: 0.71 MG/DL (ref 0.2–1)
BUN SERPL-MCNC: 15 MG/DL (ref 5–25)
CALCIUM ALBUM COR SERPL-MCNC: 9.7 MG/DL (ref 8.3–10.1)
CALCIUM SERPL-MCNC: 9.2 MG/DL (ref 8.3–10.1)
CHLORIDE SERPL-SCNC: 103 MMOL/L (ref 100–108)
CHOLEST SERPL-MCNC: 196 MG/DL (ref 50–200)
CO2 SERPL-SCNC: 31 MMOL/L (ref 21–32)
CREAT SERPL-MCNC: 0.88 MG/DL (ref 0.6–1.3)
GFR SERPL CREATININE-BSD FRML MDRD: 68 ML/MIN/1.73SQ M
GLUCOSE P FAST SERPL-MCNC: 107 MG/DL (ref 65–99)
HDLC SERPL-MCNC: 54 MG/DL
LDLC SERPL CALC-MCNC: 107 MG/DL (ref 0–100)
NONHDLC SERPL-MCNC: 142 MG/DL
POTASSIUM SERPL-SCNC: 4.2 MMOL/L (ref 3.5–5.3)
PROT SERPL-MCNC: 7.2 G/DL (ref 6.4–8.2)
SODIUM SERPL-SCNC: 139 MMOL/L (ref 136–145)
TRIGL SERPL-MCNC: 174 MG/DL

## 2020-11-17 PROCEDURE — 36415 COLL VENOUS BLD VENIPUNCTURE: CPT

## 2020-11-17 PROCEDURE — 80061 LIPID PANEL: CPT

## 2020-11-17 PROCEDURE — 80053 COMPREHEN METABOLIC PANEL: CPT

## 2020-11-24 ENCOUNTER — OFFICE VISIT (OUTPATIENT)
Dept: INTERNAL MEDICINE CLINIC | Facility: CLINIC | Age: 70
End: 2020-11-24
Payer: COMMERCIAL

## 2020-11-24 VITALS
BODY MASS INDEX: 39.02 KG/M2 | SYSTOLIC BLOOD PRESSURE: 124 MMHG | TEMPERATURE: 99.4 F | DIASTOLIC BLOOD PRESSURE: 76 MMHG | HEIGHT: 67 IN | OXYGEN SATURATION: 98 % | WEIGHT: 248.6 LBS | HEART RATE: 88 BPM

## 2020-11-24 DIAGNOSIS — R73.02 GLUCOSE INTOLERANCE (IMPAIRED GLUCOSE TOLERANCE): ICD-10-CM

## 2020-11-24 DIAGNOSIS — E78.1 HYPERTRIGLYCERIDEMIA: ICD-10-CM

## 2020-11-24 DIAGNOSIS — I10 ESSENTIAL HYPERTENSION: ICD-10-CM

## 2020-11-24 DIAGNOSIS — E78.00 HYPERCHOLESTEROLEMIA: Primary | ICD-10-CM

## 2020-11-24 PROCEDURE — 3074F SYST BP LT 130 MM HG: CPT | Performed by: INTERNAL MEDICINE

## 2020-11-24 PROCEDURE — 3078F DIAST BP <80 MM HG: CPT | Performed by: INTERNAL MEDICINE

## 2020-11-24 PROCEDURE — 1160F RVW MEDS BY RX/DR IN RCRD: CPT | Performed by: INTERNAL MEDICINE

## 2020-11-24 PROCEDURE — 3008F BODY MASS INDEX DOCD: CPT | Performed by: INTERNAL MEDICINE

## 2020-11-24 PROCEDURE — 99214 OFFICE O/P EST MOD 30 MIN: CPT | Performed by: INTERNAL MEDICINE

## 2020-11-24 PROCEDURE — 1036F TOBACCO NON-USER: CPT | Performed by: INTERNAL MEDICINE

## 2020-12-17 DIAGNOSIS — E78.5 HYPERLIPIDEMIA, UNSPECIFIED HYPERLIPIDEMIA TYPE: ICD-10-CM

## 2020-12-17 RX ORDER — LOVASTATIN 20 MG/1
20 TABLET ORAL
Qty: 90 TABLET | Refills: 3 | Status: SHIPPED | OUTPATIENT
Start: 2020-12-17 | End: 2022-01-31 | Stop reason: SDUPTHER

## 2021-02-10 DIAGNOSIS — I10 ESSENTIAL HYPERTENSION: ICD-10-CM

## 2021-02-10 RX ORDER — HYDROCHLOROTHIAZIDE 25 MG/1
25 TABLET ORAL DAILY
Qty: 90 TABLET | Refills: 3 | OUTPATIENT
Start: 2021-02-10 | End: 2021-06-07

## 2021-03-19 ENCOUNTER — APPOINTMENT (OUTPATIENT)
Dept: LAB | Facility: MEDICAL CENTER | Age: 71
End: 2021-03-19
Payer: COMMERCIAL

## 2021-03-19 ENCOUNTER — TRANSCRIBE ORDERS (OUTPATIENT)
Dept: ADMINISTRATIVE | Facility: HOSPITAL | Age: 71
End: 2021-03-19

## 2021-03-19 DIAGNOSIS — R73.02 IMPAIRED GLUCOSE TOLERANCE: Primary | ICD-10-CM

## 2021-03-19 DIAGNOSIS — R73.02 IMPAIRED GLUCOSE TOLERANCE: ICD-10-CM

## 2021-03-19 DIAGNOSIS — E78.00 HYPERCHOLESTEROLEMIA: ICD-10-CM

## 2021-03-19 LAB
ALBUMIN SERPL BCP-MCNC: 3.7 G/DL (ref 3.5–5)
ALP SERPL-CCNC: 67 U/L (ref 46–116)
ALT SERPL W P-5'-P-CCNC: 44 U/L (ref 12–78)
ANION GAP SERPL CALCULATED.3IONS-SCNC: 5 MMOL/L (ref 4–13)
AST SERPL W P-5'-P-CCNC: 29 U/L (ref 5–45)
BILIRUB SERPL-MCNC: 0.86 MG/DL (ref 0.2–1)
BUN SERPL-MCNC: 18 MG/DL (ref 5–25)
CALCIUM SERPL-MCNC: 9.3 MG/DL (ref 8.3–10.1)
CHLORIDE SERPL-SCNC: 105 MMOL/L (ref 100–108)
CHOLEST SERPL-MCNC: 193 MG/DL (ref 50–200)
CO2 SERPL-SCNC: 30 MMOL/L (ref 21–32)
CREAT SERPL-MCNC: 0.8 MG/DL (ref 0.6–1.3)
GFR SERPL CREATININE-BSD FRML MDRD: 76 ML/MIN/1.73SQ M
GLUCOSE P FAST SERPL-MCNC: 120 MG/DL (ref 65–99)
HDLC SERPL-MCNC: 54 MG/DL
LDLC SERPL CALC-MCNC: 96 MG/DL (ref 0–100)
NONHDLC SERPL-MCNC: 139 MG/DL
POTASSIUM SERPL-SCNC: 4.2 MMOL/L (ref 3.5–5.3)
PROT SERPL-MCNC: 7.3 G/DL (ref 6.4–8.2)
SODIUM SERPL-SCNC: 140 MMOL/L (ref 136–145)
TRIGL SERPL-MCNC: 217 MG/DL

## 2021-03-19 PROCEDURE — 80053 COMPREHEN METABOLIC PANEL: CPT

## 2021-03-19 PROCEDURE — 80061 LIPID PANEL: CPT

## 2021-03-19 PROCEDURE — 36415 COLL VENOUS BLD VENIPUNCTURE: CPT

## 2021-03-24 ENCOUNTER — OFFICE VISIT (OUTPATIENT)
Dept: INTERNAL MEDICINE CLINIC | Facility: CLINIC | Age: 71
End: 2021-03-24
Payer: COMMERCIAL

## 2021-03-24 VITALS
SYSTOLIC BLOOD PRESSURE: 122 MMHG | BODY MASS INDEX: 40.09 KG/M2 | HEIGHT: 67 IN | TEMPERATURE: 97.7 F | DIASTOLIC BLOOD PRESSURE: 72 MMHG | HEART RATE: 96 BPM | WEIGHT: 255.4 LBS | OXYGEN SATURATION: 96 %

## 2021-03-24 DIAGNOSIS — E78.1 HYPERTRIGLYCERIDEMIA: ICD-10-CM

## 2021-03-24 DIAGNOSIS — R73.02 GLUCOSE INTOLERANCE (IMPAIRED GLUCOSE TOLERANCE): ICD-10-CM

## 2021-03-24 DIAGNOSIS — I10 ESSENTIAL HYPERTENSION: Primary | ICD-10-CM

## 2021-03-24 DIAGNOSIS — R10.32 LEFT LOWER QUADRANT ABDOMINAL PAIN: ICD-10-CM

## 2021-03-24 DIAGNOSIS — E78.00 HYPERCHOLESTEROLEMIA: ICD-10-CM

## 2021-03-24 PROCEDURE — 1160F RVW MEDS BY RX/DR IN RCRD: CPT | Performed by: INTERNAL MEDICINE

## 2021-03-24 PROCEDURE — 99215 OFFICE O/P EST HI 40 MIN: CPT | Performed by: INTERNAL MEDICINE

## 2021-03-24 PROCEDURE — 3078F DIAST BP <80 MM HG: CPT | Performed by: INTERNAL MEDICINE

## 2021-03-24 PROCEDURE — 3074F SYST BP LT 130 MM HG: CPT | Performed by: INTERNAL MEDICINE

## 2021-03-24 PROCEDURE — 1036F TOBACCO NON-USER: CPT | Performed by: INTERNAL MEDICINE

## 2021-03-24 PROCEDURE — 3008F BODY MASS INDEX DOCD: CPT | Performed by: INTERNAL MEDICINE

## 2021-03-24 NOTE — ASSESSMENT & PLAN NOTE
Her the patient on today's visit recounts an episode of left lower quadrant abdominal pain which occurred on March 7th it was accompanied by single bowel movement with blood mixed with it  She does have diverticuli in the sigmoid colon  Suspect that this was most likely a diverticular bleed  She has had no further symptoms of blood in her stool nor any abdominal pain since this episode  I reviewed her last colonoscopy which was 3 years ago I recommended that she have an updated colonoscopy this year

## 2021-03-24 NOTE — ASSESSMENT & PLAN NOTE
Blood pressure assessment of the patient today indicates good reading whole 122/72  Recommend that she continue on her current therapy of lisinopril 40 mg daily and hydrochlorothiazide 25 mg daily she reports no signs or symptoms of uncontrolled hypertension or hypotension  Follow-up assessment in 4 months has been requested

## 2021-03-24 NOTE — ASSESSMENT & PLAN NOTE
A review of the patient's triglyceride reading was performed today it shows an increase in triglyceride values  This corresponds with her upward trend in fasting blood sugars  I have recommend the patient work on adjusting her diet to decrease calorie consumption overall but especially carbohydrates and concentrated sugars  Her weight has increased over the past 5-6 months by 15 lb which is likely a factor in her increasing sugar in triglycerides a follow-up evaluation in 4 months has been requested

## 2021-03-24 NOTE — ASSESSMENT & PLAN NOTE
Assessment of the patient's hyperlipidemia was performed today reviewing her most recent blood work during today's visit    Of the patient's lipid profile remains within acceptable ranges recommend that she continue on a low-cholesterol diet and also continue her lovastatin at 20 mg a day follow-up testing in 4 months has been recommended

## 2021-03-24 NOTE — PROGRESS NOTES
Assessment/Plan:    Glucose intolerance (impaired glucose tolerance)    A review of the patient's comprehensive metabolic profile performed for this visit indicates a fasting blood sugar of 120 which is up from previous testing  I have indicated to the patient we would like her to adjust her diet to decreased calorie consumption and also decreased consumption of carbohydrates as well as concentrated sugars period of follow-up fasting blood sugar and hemoglobin A1c have been requested for her next visit in 4 months  Hypertension    Blood pressure assessment of the patient today indicates good reading whole 122/72  Recommend that she continue on her current therapy of lisinopril 40 mg daily and hydrochlorothiazide 25 mg daily she reports no signs or symptoms of uncontrolled hypertension or hypotension  Follow-up assessment in 4 months has been requested  Hypercholesterolemia    Assessment of the patient's hyperlipidemia was performed today reviewing her most recent blood work during today's visit  Of the patient's lipid profile remains within acceptable ranges recommend that she continue on a low-cholesterol diet and also continue her lovastatin at 20 mg a day follow-up testing in 4 months has been recommended    Hypertriglyceridemia   A review of the patient's triglyceride reading was performed today it shows an increase in triglyceride values  This corresponds with her upward trend in fasting blood sugars  I have recommend the patient work on adjusting her diet to decrease calorie consumption overall but especially carbohydrates and concentrated sugars  Her weight has increased over the past 5-6 months by 15 lb which is likely a factor in her increasing sugar in triglycerides a follow-up evaluation in 4 months has been requested      Left lower quadrant abdominal pain   Her the patient on today's visit recounts an episode of left lower quadrant abdominal pain which occurred on March 7th it was accompanied by single bowel movement with blood mixed with it  She does have diverticuli in the sigmoid colon  Suspect that this was most likely a diverticular bleed  She has had no further symptoms of blood in her stool nor any abdominal pain since this episode  I reviewed her last colonoscopy which was 3 years ago I recommended that she have an updated colonoscopy this year  Diagnoses and all orders for this visit:    Hypercholesterolemia  -     Lipid panel; Future    Glucose intolerance (impaired glucose tolerance)  -     Comprehensive metabolic panel; Future  -     Hemoglobin A1C; Future    Essential hypertension    Hypertriglyceridemia        Subjective:      Patient ID: Samara Olsen is a 78 y o  female  This 71-year-old female patient presents today for a routine 4 month follow-up visit  She has a history of hyper lipidemia hypertriglyceridemia, hypertension  She has describes symptoms indicating that she most likely did have a mild case of COVID-19 in January of this year  Her daughter was tested positive who lives with the patient  Patient had symptoms of chills headache tearing of the eyes and has significant decrease in sense of smell and taste  She did not seek any medical attention for the symptoms and did not have any formal testing  Given the symptoms she describes I am quite sure that she most likely had a mild case the COVID-19  Patient also indicates that since her last visit on March 7th of this year she experienced a episode of left lower quadrant abdominal discomfort of which was followed by a bowel movement with some blood mixed in with it the blood was bright red  The episode was brief did not prolong over any extended period of days  She did not have any fevers or chills  She did have some mild cramping in the left lower quadrant period by previous colonoscopy she is a history of diverticuli in the sigmoid colon    It is advisable that she have a follow-up colonoscopy this year as her last examination was 3 years ago and 2 polyps were removed  The following portions of the patient's history were reviewed and updated as appropriate:   She  has a past medical history of Cellulitis of left elbow (10/30/2016), Epistaxis, Medial meniscus tear (11/11/2014), Morbid obesity with BMI of 40 0-44 9, adult ([address] ) (16/70/[birth year]89), and Umbilical hernia  She   Patient Active Problem List    Diagnosis Date Noted    Left lower quadrant abdominal pain 03/24/2021    Glucose intolerance (impaired glucose tolerance) 07/24/2020    Obesity (BMI 30-39 9) 11/15/2017    Hypertriglyceridemia 02/26/2016    Hypercholesterolemia 09/12/2014    Hypertension 09/12/2014     She  has a past surgical history that includes Knee arthroscopy w/ meniscal repair (07/26/2015) and Total abdominal hysterectomy (2001)  Her family history includes Diabetes in her family; Hodgkin's lymphoma (age of onset: 32) in her daughter; No Known Problems in her maternal grandfather, maternal grandmother, mother, paternal aunt, paternal aunt, paternal grandfather, and paternal grandmother; Thyroid cancer (age of onset: 40) in her daughter; Thyroid disease in her family  She  reports that she has never smoked  She has never used smokeless tobacco  She reports that she does not drink alcohol or use drugs  Current Outpatient Medications   Medication Sig Dispense Refill    calcium-vitamin D (OSCAL) 250-125 MG-UNIT per tablet Take 1 tablet by mouth daily   hydrochlorothiazide (HYDRODIURIL) 25 mg tablet Take 1 tablet (25 mg total) by mouth daily 90 tablet 3    Krill Oil 500 MG CAPS       lisinopril (ZESTRIL) 40 mg tablet Take 1 tablet (40 mg total) by mouth daily 90 tablet 3    lovastatin (MEVACOR) 20 mg tablet Take 1 tablet (20 mg total) by mouth daily at bedtime 90 tablet 3    Multiple Vitamins-Minerals (MULTIVITAMIN ADULT PO)        No current facility-administered medications for this visit           Review of Systems   Gastrointestinal: Positive for blood in stool  Psychiatric/Behavioral: Positive for sleep disturbance  All other systems reviewed and are negative  Objective:      /72   Pulse 96   Temp 97 7 °F (36 5 °C) (Tympanic)   Ht 5' 7" (1 702 m)   Wt 116 kg (255 lb 6 4 oz)   SpO2 96%   BMI 40 00 kg/m²          Physical Exam  Vitals signs reviewed  Constitutional:       General: She is not in acute distress  Appearance: Normal appearance  She is well-developed  She is not ill-appearing  HENT:      Right Ear: Hearing, tympanic membrane, ear canal and external ear normal       Left Ear: Hearing, tympanic membrane, ear canal and external ear normal       Nose: Nose normal  No mucosal edema  Mouth/Throat:      Pharynx: Uvula midline  Eyes:      General: Lids are normal       Conjunctiva/sclera: Conjunctivae normal       Pupils: Pupils are equal, round, and reactive to light  Neck:      Thyroid: No thyromegaly  Vascular: No carotid bruit or JVD  Cardiovascular:      Rate and Rhythm: Normal rate and regular rhythm  Heart sounds: Normal heart sounds  No murmur  Pulmonary:      Effort: Pulmonary effort is normal  No respiratory distress  Breath sounds: Normal breath sounds  No wheezing, rhonchi or rales  Abdominal:      General: Bowel sounds are normal       Palpations: Abdomen is soft  Musculoskeletal: Normal range of motion  Lymphadenopathy:      Cervical: No cervical adenopathy  Skin:     General: Skin is warm and dry  Neurological:      Mental Status: She is alert and oriented to person, place, and time  Deep Tendon Reflexes: Reflexes are normal and symmetric  Reflexes normal    Psychiatric:         Speech: Speech normal          Behavior: Behavior normal  Behavior is cooperative  Thought Content: Thought content normal          Judgment: Judgment normal        BMI Counseling: Body mass index is 40 kg/m²   The BMI is above normal  Nutrition recommendations include reducing portion sizes, decreasing overall calorie intake, moderation in carbohydrate intake and reducing intake of saturated fat and trans fat

## 2021-03-24 NOTE — ASSESSMENT & PLAN NOTE
A review of the patient's comprehensive metabolic profile performed for this visit indicates a fasting blood sugar of 120 which is up from previous testing  I have indicated to the patient we would like her to adjust her diet to decreased calorie consumption and also decreased consumption of carbohydrates as well as concentrated sugars period of follow-up fasting blood sugar and hemoglobin A1c have been requested for her next visit in 4 months

## 2021-06-07 ENCOUNTER — HOSPITAL ENCOUNTER (INPATIENT)
Facility: HOSPITAL | Age: 71
LOS: 1 days | Discharge: HOME | DRG: 309 | End: 2021-06-07
Attending: EMERGENCY MEDICINE | Admitting: INTERNAL MEDICINE
Payer: COMMERCIAL

## 2021-06-07 VITALS
SYSTOLIC BLOOD PRESSURE: 131 MMHG | OXYGEN SATURATION: 93 % | DIASTOLIC BLOOD PRESSURE: 79 MMHG | TEMPERATURE: 99.3 F | RESPIRATION RATE: 16 BRPM | WEIGHT: 255 LBS | HEART RATE: 88 BPM | BODY MASS INDEX: 39.94 KG/M2

## 2021-06-07 DIAGNOSIS — I47.1 SVT (SUPRAVENTRICULAR TACHYCARDIA) (HCC): Primary | ICD-10-CM

## 2021-06-07 DIAGNOSIS — B02.9 ZOSTER: ICD-10-CM

## 2021-06-07 DIAGNOSIS — B02.9 SHINGLES: ICD-10-CM

## 2021-06-07 PROBLEM — I47.10 SVT (SUPRAVENTRICULAR TACHYCARDIA): Status: ACTIVE | Noted: 2021-06-07

## 2021-06-07 LAB
ANION GAP SERPL CALCULATED.3IONS-SCNC: 8 MMOL/L (ref 4–13)
ATRIAL RATE: 122 BPM
ATRIAL RATE: 125 BPM
ATRIAL RATE: 88 BPM
BASOPHILS # BLD AUTO: 0.04 THOUSANDS/ΜL (ref 0–0.1)
BASOPHILS NFR BLD AUTO: 0 % (ref 0–1)
BUN SERPL-MCNC: 10 MG/DL (ref 5–25)
CALCIUM SERPL-MCNC: 9.5 MG/DL (ref 8.3–10.1)
CHLORIDE SERPL-SCNC: 102 MMOL/L (ref 100–108)
CO2 SERPL-SCNC: 27 MMOL/L (ref 21–32)
CREAT SERPL-MCNC: 0.72 MG/DL (ref 0.6–1.3)
EOSINOPHIL # BLD AUTO: 0.04 THOUSAND/ΜL (ref 0–0.61)
EOSINOPHIL NFR BLD AUTO: 0 % (ref 0–6)
ERYTHROCYTE [DISTWIDTH] IN BLOOD BY AUTOMATED COUNT: 13.1 % (ref 11.6–15.1)
GFR SERPL CREATININE-BSD FRML MDRD: 86 ML/MIN/1.73SQ M
GLUCOSE SERPL-MCNC: 103 MG/DL (ref 65–140)
HCT VFR BLD AUTO: 46.5 % (ref 34.8–46.1)
HGB BLD-MCNC: 15.5 G/DL (ref 11.5–15.4)
IMM GRANULOCYTES # BLD AUTO: 0.06 THOUSAND/UL (ref 0–0.2)
IMM GRANULOCYTES NFR BLD AUTO: 1 % (ref 0–2)
LYMPHOCYTES # BLD AUTO: 2.84 THOUSANDS/ΜL (ref 0.6–4.47)
LYMPHOCYTES NFR BLD AUTO: 32 % (ref 14–44)
MCH RBC QN AUTO: 32.1 PG (ref 26.8–34.3)
MCHC RBC AUTO-ENTMCNC: 33.3 G/DL (ref 31.4–37.4)
MCV RBC AUTO: 96 FL (ref 82–98)
MONOCYTES # BLD AUTO: 0.65 THOUSAND/ΜL (ref 0.17–1.22)
MONOCYTES NFR BLD AUTO: 7 % (ref 4–12)
NEUTROPHILS # BLD AUTO: 5.37 THOUSANDS/ΜL (ref 1.85–7.62)
NEUTS SEG NFR BLD AUTO: 60 % (ref 43–75)
NRBC BLD AUTO-RTO: 0 /100 WBCS
P AXIS: -29 DEGREES
P AXIS: -30 DEGREES
P AXIS: 26 DEGREES
PLATELET # BLD AUTO: 221 THOUSANDS/UL (ref 149–390)
PMV BLD AUTO: 10.7 FL (ref 8.9–12.7)
POTASSIUM SERPL-SCNC: 3.7 MMOL/L (ref 3.5–5.3)
PR INTERVAL: 192 MS
PR INTERVAL: 246 MS
PR INTERVAL: 272 MS
QRS AXIS: -19 DEGREES
QRS AXIS: -34 DEGREES
QRS AXIS: -41 DEGREES
QRSD INTERVAL: 120 MS
QRSD INTERVAL: 122 MS
QRSD INTERVAL: 78 MS
QT INTERVAL: 356 MS
QT INTERVAL: 370 MS
QT INTERVAL: 394 MS
QTC INTERVAL: 447 MS
QTC INTERVAL: 513 MS
QTC INTERVAL: 561 MS
RBC # BLD AUTO: 4.83 MILLION/UL (ref 3.81–5.12)
SODIUM SERPL-SCNC: 137 MMOL/L (ref 136–145)
T WAVE AXIS: -34 DEGREES
T WAVE AXIS: -36 DEGREES
T WAVE AXIS: 0 DEGREES
TROPONIN I SERPL-MCNC: <0.02 NG/ML
TSH SERPL DL<=0.05 MIU/L-ACNC: 2.51 UIU/ML (ref 0.36–3.74)
VENTRICULAR RATE: 122 BPM
VENTRICULAR RATE: 125 BPM
VENTRICULAR RATE: 88 BPM
WBC # BLD AUTO: 9 THOUSAND/UL (ref 4.31–10.16)

## 2021-06-07 PROCEDURE — 99291 CRITICAL CARE FIRST HOUR: CPT | Performed by: EMERGENCY MEDICINE

## 2021-06-07 PROCEDURE — 84484 ASSAY OF TROPONIN QUANT: CPT | Performed by: EMERGENCY MEDICINE

## 2021-06-07 PROCEDURE — 96361 HYDRATE IV INFUSION ADD-ON: CPT

## 2021-06-07 PROCEDURE — 99223 1ST HOSP IP/OBS HIGH 75: CPT | Performed by: INTERNAL MEDICINE

## 2021-06-07 PROCEDURE — 99285 EMERGENCY DEPT VISIT HI MDM: CPT

## 2021-06-07 PROCEDURE — 80048 BASIC METABOLIC PNL TOTAL CA: CPT | Performed by: EMERGENCY MEDICINE

## 2021-06-07 PROCEDURE — 96365 THER/PROPH/DIAG IV INF INIT: CPT

## 2021-06-07 PROCEDURE — 85025 COMPLETE CBC W/AUTO DIFF WBC: CPT | Performed by: EMERGENCY MEDICINE

## 2021-06-07 PROCEDURE — 84443 ASSAY THYROID STIM HORMONE: CPT | Performed by: EMERGENCY MEDICINE

## 2021-06-07 PROCEDURE — 36415 COLL VENOUS BLD VENIPUNCTURE: CPT | Performed by: EMERGENCY MEDICINE

## 2021-06-07 PROCEDURE — 93010 ELECTROCARDIOGRAM REPORT: CPT | Performed by: INTERNAL MEDICINE

## 2021-06-07 PROCEDURE — 99222 1ST HOSP IP/OBS MODERATE 55: CPT | Performed by: INTERNAL MEDICINE

## 2021-06-07 PROCEDURE — 93005 ELECTROCARDIOGRAM TRACING: CPT

## 2021-06-07 PROCEDURE — NC001 PR NO CHARGE: Performed by: INTERNAL MEDICINE

## 2021-06-07 RX ORDER — VALACYCLOVIR HYDROCHLORIDE 1 G/1
1000 TABLET, FILM COATED ORAL EVERY 8 HOURS SCHEDULED
Qty: 21 TABLET | Refills: 0 | Status: SHIPPED | OUTPATIENT
Start: 2021-06-07 | End: 2021-06-14

## 2021-06-07 RX ORDER — ASPIRIN 81 MG/1
81 TABLET ORAL DAILY
Status: DISCONTINUED | OUTPATIENT
Start: 2021-06-07 | End: 2021-06-07 | Stop reason: HOSPADM

## 2021-06-07 RX ORDER — DILTIAZEM HYDROCHLORIDE 5 MG/ML
20 INJECTION INTRAVENOUS ONCE
Status: COMPLETED | OUTPATIENT
Start: 2021-06-07 | End: 2021-06-07

## 2021-06-07 RX ORDER — ASPIRIN 81 MG/1
81 TABLET ORAL DAILY
Qty: 90 TABLET | Refills: 0 | Status: SHIPPED | OUTPATIENT
Start: 2021-06-07

## 2021-06-07 RX ORDER — PRAVASTATIN SODIUM 20 MG
20 TABLET ORAL
Status: CANCELLED | OUTPATIENT
Start: 2021-06-07

## 2021-06-07 RX ORDER — HYDROCHLOROTHIAZIDE 25 MG/1
25 TABLET ORAL DAILY
Status: CANCELLED | OUTPATIENT
Start: 2021-06-07

## 2021-06-07 RX ORDER — LISINOPRIL 20 MG/1
40 TABLET ORAL DAILY
Status: CANCELLED | OUTPATIENT
Start: 2021-06-07

## 2021-06-07 RX ORDER — VALACYCLOVIR HYDROCHLORIDE 1 G/1
1000 TABLET, FILM COATED ORAL EVERY 8 HOURS SCHEDULED
Status: DISCONTINUED | OUTPATIENT
Start: 2021-06-07 | End: 2021-06-07 | Stop reason: HOSPADM

## 2021-06-07 RX ADMIN — DILTIAZEM HYDROCHLORIDE 5 MG/HR: 5 INJECTION INTRAVENOUS at 14:00

## 2021-06-07 RX ADMIN — SODIUM CHLORIDE 1000 ML: 0.9 INJECTION, SOLUTION INTRAVENOUS at 12:55

## 2021-06-07 RX ADMIN — DILTIAZEM HYDROCHLORIDE 20 MG: 5 INJECTION INTRAVENOUS at 13:59

## 2021-06-07 RX ADMIN — METOPROLOL TARTRATE 25 MG: 25 TABLET, FILM COATED ORAL at 16:54

## 2021-06-07 NOTE — CONSULTS
Consultation - General Cardiology Team 2  Jordyn Hendrickson 78 y o  female MRN: [id number]  Unit/Bed#: ED 16 Encounter: [id number]      Inpatient consult to Cardiology  Consult performed by: Makenzie Kaur MD  Consult ordered by: Danielle Robles MD        PCP: Tiara Caba MD     History of Present Illness   Physician Requesting Consult: Danielle Robles MD  Reason for Consult / Principal Problem: SVT    HPI: Domenico Ramos is a 78y o  year old female with no history of cardiac disease who presented from a screening colonoscopy  Patient was noted to be in a supraventricular tachycardia on preop work up but was asymptomatic  Patient denies any prior history of arrhythmia  Denies history of CP, palpitations, lightheadedness, dizziness, SOB, PND, orthopnea or any other associated complaints  While in the ED, patient tried vagal maneuvers x3 with intermittent improvement but soon reverted back  Patient was subsequently started on Cardizem ggt  Review of Systems  ROS as noted above       Historical Information   Past Medical History:   Diagnosis Date    Cellulitis of left elbow 10/30/2016    Epistaxis     Medial meniscus tear 11/11/2014    Morbid obesity with BMI of 40 0-44 9, adult ([facility] ) 26/11/4342    Umbilical hernia      Past Surgical History:   Procedure Laterality Date    KNEE ARTHROSCOPY W/ MENISCAL REPAIR  07/26/2015    with medial meniscus repair    TOTAL ABDOMINAL HYSTERECTOMY  2001     Social History     Substance and Sexual Activity   Alcohol Use No     Social History     Substance and Sexual Activity   Drug Use No     Social History     Tobacco Use   Smoking Status Never Smoker   Smokeless Tobacco Never Used   Tobacco Comment    Denied history of never a smoker per All[employer]     Family History:   Family History   Problem Relation Age of Onset    No Known Problems Mother     Thyroid disease Family     Diabetes Family         Diabetes Mellitus    Thyroid cancer Daughter 40    Hodgkin's lymphoma Daughter 32    No Known Problems Maternal Grandmother     No Known Problems Maternal Grandfather     No Known Problems Paternal Grandmother     No Known Problems Paternal Grandfather     No Known Problems Paternal Aunt     No Known Problems Paternal Aunt        Meds/Allergies   Hospital Medications:   Current Facility-Administered Medications   Medication Dose Route Frequency    valACYclovir (VALTREX) tablet 1,000 mg  1,000 mg Oral Q8H [address]     Home Medications: (Not in a hospital admission)      Allergies   Allergen Reactions    Lipitor [Atorvastatin]     Morphine      Reaction Date: [id number];     Morphine And Related     Zithromax [Azithromycin]      Reaction Date: [id number];        Objective   Vitals: Blood pressure 160/86, pulse (!) 120, temperature 99 3 °F (37 4 °C), temperature source Tympanic, resp  rate 19, weight 116 kg (255 lb), SpO2 95 %    Orthostatic Blood Pressures      Most Recent Value   Blood Pressure  160/86 filed at 06/07/2021 1205        Invasive Devices     Peripheral Intravenous Line            Peripheral IV 06/07/21 Left Antecubital less than 1 day              Physical Exam  GEN: Toby Wade appears well, alert and oriented x 3, pleasant and cooperative   HEENT:  Normocephalic, atraumatic, anicteric, moist mucous membranes  NECK: No JVD or carotid bruits   HEART: reg rhythm, tachy rate, normal S1 and S2, no murmurs, clicks, gallops or rubs   LUNGS: Clear to auscultation bilaterally; no wheezes, rales, or rhonchi; respiration nonlabored   ABDOMEN:  Normoactive bowel sounds, soft, no tenderness, no distention  EXTREMITIES: peripheral pulses palpable; no edema  NEURO: no gross focal findings; cranial nerves grossly intact   SKIN:  Dry, intact, warm to touch, circular rash patches on back consistent w/ ring worm; small vesicular patch at anal verge     Lab Results:   CBC with diff:   Results from last 7 days   Lab Units 06/07/21  1252   WBC Thousand/uL 9 00   RBC Million/uL 4  83   HEMOGLOBIN g/dL 15 5*   HEMATOCRIT % 46 5*   MCV fL 96   MCH pg 32 1   MCHC g/dL 33 3   RDW % 13 1   MPV fL 10 7   PLATELETS Thousands/uL 221     CMP:   Results from last 7 days   Lab Units 06/07/21  1252   SODIUM mmol/L 137   POTASSIUM mmol/L 3 7   CHLORIDE mmol/L 102   CO2 mmol/L 27   BUN mg/dL 10   CREATININE mg/dL 0 72   CALCIUM mg/dL 9 5   EGFR ml/min/1 73sq m 86     Troponin:   0   Lab Value Date/Time    TROPONINI <0 02 06/07/2021 1252     BNP:   Results from last 7 days   Lab Units 06/07/21  1252   POTASSIUM mmol/L 3 7   CHLORIDE mmol/L 102   CO2 mmol/L 27   BUN mg/dL 10   CREATININE mg/dL 0 72   CALCIUM mg/dL 9 5   EGFR ml/min/1 73sq m 86     Coags:     TSH:   Results from last 7 days   Lab Units 06/07/21  1252   TSH 3RD GENERATON uIU/mL 2 510     Magnesium:     Lipid Profile:     Results from last 7 days   Lab Units 06/07/21  1252   TROPONIN I ng/mL <0 02     Results from last 7 days   Lab Units 06/07/21  1252   POTASSIUM mmol/L 3 7   CO2 mmol/L 27   CHLORIDE mmol/L 102   BUN mg/dL 10   CREATININE mg/dL 0 72     Results from last 7 days   Lab Units 06/07/21  1252   HEMOGLOBIN g/dL 15 5*   HEMATOCRIT % 46 5*   PLATELETS Thousands/uL 221         Imaging: I have personally reviewed pertinent reports  No orders to display     ECHO: No results found for this or any previous visit  CATH/STRESS TEST:   n/a    EKG:   Date: 6/7/21  Interpretation: at first SVT w/ RBBB and 1st degree AVB           VTE Prophylaxis: Heparin       Assessment/Plan     Assessment:    1  Supraventricular Tachycardia Possibly 2/2 Short 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  2  hypertension    Plan:  - d/c Cardizem ggt  - c/w HCTZ 25mg PO Daily  - c/w Lisinopril 40mg PO Daily  - Replete electrolytes PRN  - start metoprolol 25 mg bid  - zio patch (can be done o/p)  - echo (can be done o/p)    Case discussed and reviewed with Dr Charlie Ross who agrees with my assessment and plan      Thank you for involving us in the care of your patient  Avery Garcia MD  Cardiology Fellow PGY-4    ==========================================================================================    Counseling / Coordination of Care  Total floor / unit time spent today 45 minutes minutes  Greater than 50% of total time was spent with the patient and / or family counseling and / or coordination of care  A description of the counseling / coordination of care:         Epic/ Allscripts/Care Everywhere records reviewed:     ** Please Note: Fluency DirectDictation voice to text software may have been used in the creation of this document   **

## 2021-06-07 NOTE — ED PROVIDER NOTES
History  Chief Complaint   Patient presents with    Rapid Heart Rate     pt did bowel prep for a colonoscopy where they found her to be tachycardic and sent her in for an eval     HPI  Patient 78year old female presenting with rapidheart rate  Patient with history of hypertension and hyperlipidemia but otherwise no significant past medical history  Patient was due to get colonoscopy today and after her bowel prep patient was noted to have rapid heart rate in the 120s to 130s and anesthesiologist did not want to proceed with the colonoscopy  Patient was thus brought to the ED for evaluation  Patient states that she is asymptomatic and was not even aware that she had a rapid heart rate  She did not experience chest pain, shortness of breath, n/v, diaphoresis, weakness, numbness  She did however feel dehydrated after the bowel prep despite having drank fluids to counter it  Prior to Admission Medications   Prescriptions Last Dose Informant Patient Reported? Taking? Krill Oil 500 MG CAPS   Yes No   Multiple Vitamins-Minerals (MULTIVITAMIN ADULT PO)   Yes No   calcium-vitamin D (OSCAL) 250-125 MG-UNIT per tablet   Yes No   Sig: Take 1 tablet by mouth daily     hydrochlorothiazide (HYDRODIURIL) 25 mg tablet   No No   Sig: Take 1 tablet (25 mg total) by mouth daily   lisinopril (ZESTRIL) 40 mg tablet   No No   Sig: Take 1 tablet (40 mg total) by mouth daily   lovastatin (MEVACOR) 20 mg tablet   No No   Sig: Take 1 tablet (20 mg total) by mouth daily at bedtime      Facility-Administered Medications: None       Past Medical History:   Diagnosis Date    Cellulitis of left elbow 10/30/2016    Epistaxis     Medial meniscus tear 11/11/2014    Morbid obesity with BMI of 40 0-44 9, adult ([facility] ) 73/31/7288    Umbilical hernia        Past Surgical History:   Procedure Laterality Date    KNEE ARTHROSCOPY W/ MENISCAL REPAIR  07/26/2015    with medial meniscus repair    TOTAL ABDOMINAL HYSTERECTOMY  2001       Family History   Problem Relation Age of Onset    No Known Problems Mother     Thyroid disease Family     Diabetes Family         Diabetes Mellitus    Thyroid cancer Daughter 40    Hodgkin's lymphoma Daughter 32    No Known Problems Maternal Grandmother     No Known Problems Maternal Grandfather     No Known Problems Paternal Grandmother     No Known Problems Paternal Grandfather     No Known Problems Paternal Aunt     No Known Problems Paternal Aunt      I have reviewed and agree with the history as documented  E-Cigarette/Vaping     E-Cigarette/Vaping Substances     Social History     Tobacco Use    Smoking status: Never Smoker    Smokeless tobacco: Never Used    Tobacco comment: Denied history of never a smoker per [employer]   Substance Use Topics    Alcohol use: No    Drug use: No        Review of Systems   Constitutional: Negative for chills, diaphoresis, fever and unexpected weight change  HENT: Negative for ear pain and sore throat  Eyes: Negative for visual disturbance  Respiratory: Negative for cough, chest tightness and shortness of breath  Cardiovascular: Negative for chest pain and leg swelling  Gastrointestinal: Negative for abdominal distention, abdominal pain, constipation, diarrhea, nausea and vomiting  Endocrine: Negative  Genitourinary: Negative for difficulty urinating and dysuria  Musculoskeletal: Negative  Skin: Positive for rash (Right buttock)  Allergic/Immunologic: Negative  Neurological: Negative  Hematological: Negative  Psychiatric/Behavioral: Negative  All other systems reviewed and are negative        Physical Exam  ED Triage Vitals   Temperature Pulse Respirations Blood Pressure SpO2   06/07/21 1205 06/07/21 1205 06/07/21 1205 06/07/21 1205 06/07/21 1205   99 3 °F (37 4 °C) (!) 120 19 160/86 95 %      Temp Source Heart Rate Source Patient Position - Orthostatic VS BP Location FiO2 (%)   06/07/21 1205 06/07/21 1615 06/07/21 1615 06/07/21 1615 --   Tympanic Monitor Sitting Right arm       Pain Score       --                    Orthostatic Vital Signs  Vitals:    06/07/21 1205 06/07/21 1615 06/07/21 1730 06/07/21 1800   BP: 160/86 146/81 122/73 131/79   Pulse: (!) 120 97 82 88   Patient Position - Orthostatic VS:  Sitting         Physical Exam  Vitals signs and nursing note reviewed  Constitutional:       General: She is not in acute distress  Appearance: Normal appearance  She is not ill-appearing  HENT:      Head: Normocephalic and atraumatic  Right Ear: External ear normal       Left Ear: External ear normal       Nose: Nose normal       Mouth/Throat:      Mouth: Mucous membranes are moist       Pharynx: Oropharynx is clear  Eyes:      General: No scleral icterus  Right eye: No discharge  Left eye: No discharge  Extraocular Movements: Extraocular movements intact  Conjunctiva/sclera: Conjunctivae normal       Pupils: Pupils are equal, round, and reactive to light  Neck:      Musculoskeletal: Normal range of motion and neck supple  Cardiovascular:      Rate and Rhythm: Regular rhythm  Tachycardia present  Pulses: Normal pulses  Heart sounds: Normal heart sounds  Pulmonary:      Effort: Pulmonary effort is normal       Breath sounds: Normal breath sounds  Abdominal:      General: Abdomen is flat  Bowel sounds are normal  There is no distension  Palpations: Abdomen is soft  Tenderness: There is no abdominal tenderness  There is no guarding or rebound  Musculoskeletal: Normal range of motion  Skin:     General: Skin is warm and dry  Capillary Refill: Capillary refill takes less than 2 seconds  Findings: Rash (Multiple vesicular rash in the right buttock) present  Neurological:      General: No focal deficit present  Mental Status: She is alert and oriented to person, place, and time  Mental status is at baseline     Psychiatric:         Mood and Affect: Mood normal  Behavior: Behavior normal          Thought Content: Thought content normal          Judgment: Judgment normal          ED Medications  Medications   sodium chloride 0 9 % bolus 1,000 mL (0 mL Intravenous Stopped 6/7/21 1355)   diltiazem (CARDIZEM) injection 20 mg (20 mg Intravenous Given 6/7/21 1359)   diltiazem (CARDIZEM) 125 mg in sodium chloride 0 9 % 125 mL infusion (0 mg/hr Intravenous Stopped 6/7/21 1656)       Diagnostic Studies  Results Reviewed     Procedure Component Value Units Date/Time    TSH [[id number]]  (Normal) Collected: 06/07/21 1252    Lab Status: Final result Specimen: Blood from Arm, Left Updated: 06/07/21 1422     TSH 3RD GENERATON 2 510 uIU/mL     Narrative:      Patients undergoing fluorescein dye angiography may retain small amounts of fluorescein in the body for 48-72 hours post procedure  Samples containing fluorescein can produce falsely depressed TSH values  If the patient had this procedure,a specimen should be resubmitted post fluorescein clearance        Troponin I [[id number]]  (Normal) Collected: 06/07/21 1252    Lab Status: Final result Specimen: Blood from Arm, Left Updated: 06/07/21 1332     Troponin I <0 02 ng/mL     Basic metabolic panel [[id number]] Collected: 06/07/21 1252    Lab Status: Final result Specimen: Blood from Arm, Left Updated: 06/07/21 1329     Sodium 137 mmol/L      Potassium 3 7 mmol/L      Chloride 102 mmol/L      CO2 27 mmol/L      ANION GAP 8 mmol/L      BUN 10 mg/dL      Creatinine 0 72 mg/dL      Glucose 103 mg/dL      Calcium 9 5 mg/dL      eGFR 86 ml/min/1 73sq m     Narrative:      [place] guidelines for Chronic Kidney Disease (CKD):     Stage 1 with normal or high GFR (GFR > 90 mL/min/1 73 square meters)    Stage 2 Mild CKD (GFR = 60-89 mL/min/1 73 square meters)    Stage 3A Moderate CKD (GFR = 45-59 mL/min/1 73 square meters)    Stage 3B Moderate CKD (GFR = 30-44 mL/min/1 73 square meters)    Stage 4 Severe CKD (GFR = 15-29 mL/min/1 73 square meters)    Stage 5 End Stage CKD (GFR <15 mL/min/1 73 square meters)  Note: GFR calculation is accurate only with a steady state creatinine    CBC and differential [[id number]]  (Abnormal) Collected: 06/07/21 1252    Lab Status: Final result Specimen: Blood from Arm, Left Updated: 06/07/21 1305     WBC 9 00 Thousand/uL      RBC 4 83 Million/uL      Hemoglobin 15 5 g/dL      Hematocrit 46 5 %      MCV 96 fL      MCH 32 1 pg      MCHC 33 3 g/dL      RDW 13 1 %      MPV 10 7 fL      Platelets 016 Thousands/uL      nRBC 0 /100 WBCs      Neutrophils Relative 60 %      Immat GRANS % 1 %      Lymphocytes Relative 32 %      Monocytes Relative 7 %      Eosinophils Relative 0 %      Basophils Relative 0 %      Neutrophils Absolute 5 37 Thousands/µL      Immature Grans Absolute 0 06 Thousand/uL      Lymphocytes Absolute 2 84 Thousands/µL      Monocytes Absolute 0 65 Thousand/µL      Eosinophils Absolute 0 04 Thousand/µL      Basophils Absolute 0 04 Thousands/µL                  No orders to display         Procedures  Procedures      ED Course     Procedure Note: EKG  Date/Time: 06/09/21 7:52 PM   Interpreted by: Wilma Anderson  Indications / Diagnosis: Palpitations  ECG reviewed by me, the ED Provider: yes   The EKG demonstrates: SVT  Rhythm: regular rhythm   Intervals: normal intervals  Axis: normal axis  QRS/Blocks: RBBB  ST Changes: No acute ST Changes, no STD/JUAN  Identification of Seniors at Risk      Most Recent Value   (ISAR) Identification of Seniors at Risk   Before the illness or injury that brought you to the Emergency, did you need someone to help you on a regular basis? 0 Filed at: 06/07/2021 1209   In the last 24 hours, have you needed more help than usual?  0 Filed at: 06/07/2021 1209   Have you been hospitalized for one or more nights during the past 6 months?   0 Filed at: 06/07/2021 1209   In general, do you see well?  0 Filed at: 06/07/2021 1209   In general, do you have serious problems with your memory? 0 Filed at: 06/07/2021 1209   Do you take more than three different medications every day? 1 Filed at: 06/07/2021 1209   ISAR Score  1 Filed at: 06/07/2021 1209                              [facility]  Number of Diagnoses or Management Options  Shingles:   SVT (supraventricular tachycardia) [facility]):   Patient 78year old female with SVT  Patient is asymptomatic   Heart rate in the 120s to 130s  Cardiac workup in addition to TSH   Patient given fluids for presumed dehydration from bowel prep  With vagal maneuver patient returns to sinus with heart rate down to 80s from 120s-130s but reverts back to SVT after about 30 seconds  Multiple vagal maneuver attempts with same result as above  Started on cardizem bolus and gtt with patient returning to sinus rhythm with rate in the 70s   Cardiology consulted and recommended admission  Patient with vesicular lesions in the right buttock that is painful most likely due to shingles  Patient treated for shingles with acyclovir  Patient to be admitted to [facility]  Cardiology following       Disposition  Final diagnoses:   SVT [facility]supraventricular tachycardia) ([facility] )   Shingles     Time reflects when diagnosis was documented in both MDM as applicable and the Disposition within this note     Time User Action Codes Description Comment    6/7/2021  2:58 PM Pepito Hendrickson Add [I47 1] SVT (supraventricular tachycardia) ([facility] )     6/7/2021  2:58 PM Pepito Hendrickson Add [B02 9] Shingles     6/7/2021  5:58 PM Arnulfo VIDALES Add [B02 9] Zoster       ED Disposition     ED Disposition Condition Date/Time Comment    Admit Stable Mon Jun 7, 2021  2:58 PM Case was discussed with Dr Abigail Ornelas and the patient's admission status was agreed to be Admission Status: inpatient status to the service of Dr Abigail Ornelas           Follow-up Information     Follow up With Specialties Details Why Contact Info Additional 23 [facility] Cardiology Follow up on 6/21/2021 @10:00AM [address] [facility] [address], [address], [facility], [address] Michelle Coyle MD Internal Medicine Follow up in 1 week(s)  [address]  [address] [facility], [address]  [phone]             Discharge Medication List as of 6/7/2021  6:11 PM      START taking these medications    Details   aspirin (ECOTRIN LOW STRENGTH) 81 mg EC tablet Take 1 tablet (81 mg total) by mouth daily, Starting Mon 6/7/2021, Normal      metoprolol tartrate (LOPRESSOR) 25 mg tablet Take 1 tablet (25 mg total) by mouth every 12 (twelve) hours, Starting Tue 6/8/2021, Normal      valACYclovir (VALTREX) 1,000 mg tablet Take 1 tablet (1,000 mg total) by mouth every 8 (eight) hours for 7 days, Starting Mon 6/7/2021, Until Mon 6/14/2021, Normal         CONTINUE these medications which have NOT CHANGED    Details   calcium-vitamin D (OSCAL) 250-125 MG-UNIT per tablet Take 1 tablet by mouth daily  , Until Discontinued, Historical Med      Krill Oil 500 MG CAPS Historical Med      lisinopril (ZESTRIL) 40 mg tablet Take 1 tablet (40 mg total) by mouth daily, Starting Mon 9/28/2020, Normal      lovastatin (MEVACOR) 20 mg tablet Take 1 tablet (20 mg total) by mouth daily at bedtime, Starting Thu 12/17/2020, Normal      Multiple Vitamins-Minerals (MULTIVITAMIN ADULT PO) Historical Med         STOP taking these medications       hydrochlorothiazide (HYDRODIURIL) 25 mg tablet Comments:   Reason for Stopping:             Outpatient Discharge Orders   Activity as tolerated       PDMP Review       Value Time User    PDMP Reviewed  Yes 8/17/2020  2:33 PM Helena Coyle MD           ED Provider  Attending physically available and evaluated Jarad Lassiter  I managed the patient along with the ED Attending      Electronically Signed by         Carmen Gordon MD  06/09/21 [id number]

## 2021-06-07 NOTE — ASSESSMENT & PLAN NOTE
Patient has vesicular rash between her buttocks  Does complain of burning  Patient started on valacyclovir    Continue for 7 days

## 2021-06-07 NOTE — ASSESSMENT & PLAN NOTE
Accidentally diagnosed when patient came in for colonoscopy  Patient asymptomatic  Patient was started on Cardizem drip  Fortunately patient converted to normal sinus rhythm  Was evaluated by Cardiology  Patient started on metoprolol, aspirin  Cleared for discharge from Cardiology standpoint with close outpatient follow-up with echo/Holter

## 2021-06-07 NOTE — ED ATTENDING ATTESTATION
6/7/2021  IXavi MD, saw and evaluated the patient  I have discussed the patient with the resident/non-physician practitioner and agree with the resident's/non-physician practitioner's findings, Plan of Care, and MDM as documented in the resident's/non-physician practitioner's note, except where noted  All available labs and Radiology studies were reviewed  I was present for key portions of any procedure(s) performed by the resident/non-physician practitioner and I was immediately available to provide assistance  At this point I agree with the current assessment done in the Emergency Department  I have conducted an independent evaluation of this patient a history and physical is as follows: This is a 80-year-old woman who presents to the emergency department tachycardia  Patient was scheduled for an outpatient colonoscopy today, did her prep last night  When she presented for the procedure, she was noted to be in a regular tachycardia and was sent to the emergency department as anesthesia was uncomfortable doing her sedation given her heart rate  The patient does not have a history of underlying arrhythmia  She does not have underlying heart disease  She denies shortness of breath  She does not feel palpitations  Inside of her prep, the patient states that she has been drinking plenty of fluids over the last 24 hours  She denies diaphoresis or nausea  Her review of systems otherwise -12 systems reviewed  On exam the patient is awake, alert, interactive  She has normal mental status, normal work of breathing, normal perfusion  On secondary survey, the patient is tachycardic with a heart rate of 135  On the monitor, her heart rate appears regular  The patient's lungs are clear bilaterally with good air movement throughout abdomen is soft nontender without masses, rebound, guarding  Extremities are intact  She has good pulses throughout  Skin is warm and well perfused    She is neurologically intact with normal skin and back exam   ED course:  EKG obtained, patient in a supraventricular tachycardia with right bundle branch block, new since her prior EKG  Vagal maneuvers attempted, patient briefly broke to sinus rhythm with a narrow QRS with no right bundle branch, but patient immediately went back up into a rapid rhythm of 125  Discussed with patient that we could attempt adenosine, although I doubt that it would work as patient did not maintain her break with vagal maneuvers  Repeat vagal maneuvers with same affect, but patient again became tachycardic  Will plan to give patient dose of Cardizem, placed on Cardizem drip    Will discuss with Cardiology regarding oral mitali blockade verses admission for new SVT with right bundle branch block  ED Course         Critical Care Time  CriticalCare Time  Performed by: Sera Goss MD  Authorized by: Sera Goss MD     Critical care provider statement:     Critical care time (minutes):  36    Critical care time was exclusive of:  Separately billable procedures and treating other patients and teaching time    Critical care was time spent personally by me on the following activities:  Blood draw for specimens, obtaining history from patient or surrogate, development of treatment plan with patient or surrogate, discussions with consultants, discussions with primary provider, evaluation of patient's response to treatment, examination of patient, review of old charts, re-evaluation of patient's condition, ordering and review of radiographic studies, ordering and review of laboratory studies and ordering and performing treatments and interventions      Critical care time for dysrhythmia

## 2021-06-07 NOTE — ASSESSMENT & PLAN NOTE
Patient has vesicular rash between her buttocks  Does complain of burning  Patient started on valacyclovir  Continue for now

## 2021-06-07 NOTE — QUICK NOTE
Lakeshia Cornejo came to endoscopy today, 120s to 130s heart rate on entering the suite, asymptomatic and without any other problem or complaint, P waves apparent when below 120s, anesthesia since this was new declined to proceed with colonoscopy and no medications were given  If she can be seen for routine visit soon for evaluation would appreciate it  I have included my office staff on note so that she can be rescheduled after workup

## 2021-06-07 NOTE — H&P
[address] [facility] 1950, 78 y o  female MRN: [id number]  Unit/Bed#: ED 16 Encounter: [id number]  Primary Care Provider: Breanna Garcia MD   Date and time admitted to hospital: 6/7/2021 11:51 AM    * SVT (supraventricular tachycardia) [facility])  Assessment & Plan  Accidentally diagnosed when patient came in for colonoscopy  Patient asymptomatic  Vagal maneuvers were attempted 3 times in the ER which would help very transiently but then patient reverts back to SVT  Patient started on Cardizem drip  Continue for now  Will consult Cardiology  Patient without any known cardiac history  Order echocardiogram     Zoster  Assessment & Plan  Patient has vesicular rash between her buttocks  Does complain of burning  Patient started on valacyclovir  Continue for now  Hypertension  Assessment & Plan  Blood pressure stable  Continue hydrochlorothiazide and lisinopril  VTE Prophylaxis: Enoxaparin (Lovenox)  / sequential compression device   Code Status: full  POLST: POLST form is not discussed and not completed at this time  Discussion with family: pt    Anticipated Length of Stay:  Patient will be admitted on an Inpatient basis with an anticipated length of stay of    2 midnights  Justification for Hospital Stay: above    Total Time for Visit, including Counseling / Coordination of Care: 45 minutes  Greater than 50% of this total time spent on direct patient counseling and coordination of care  Chief Complaint:   tachycardia    History of Present Illness:    Wagner Day is a 78 y o  female who presents with tachycardia  She came for outpatient colonoscopy and was noted to be tachycardic so sent to ER  Patient otherwise asymptomatic  Denies any known cardiac history  Review of Systems:    Review of Systems   Constitutional: Negative for chills and fever  HENT: Negative for congestion and sore throat      Respiratory: Negative for cough and shortness of breath  Cardiovascular: Negative for chest pain and palpitations  Gastrointestinal: Negative for abdominal pain, nausea and vomiting  Genitourinary: Negative for difficulty urinating, flank pain, frequency and urgency  Musculoskeletal: Negative for arthralgias and myalgias  Skin: Positive for rash  Negative for color change  Neurological: Negative for dizziness and light-headedness  Psychiatric/Behavioral: Negative for agitation, behavioral problems and confusion  Past Medical and Surgical History:     Past Medical History:   Diagnosis Date    Cellulitis of left elbow 10/30/2016    Epistaxis     Medial meniscus tear 11/11/2014    [facility] 40 0-44 9[facility] 51/94/7982    Umbilical hernia        Past Surgical History:   Procedure Laterality Date    KNEE ARTHROSCOPY W/ MENISCAL REPAIR  07/26/2015    with medial meniscus repair    TOTAL ABDOMINAL HYSTERECTOMY  2001       Meds/Allergies:    Prior to Admission medications    Medication Sig Start Date End Date Taking? Authorizing Provider   calcium-vitamin D (OSCAL) 250-125 MG-UNIT per tablet Take 1 tablet by mouth daily  Historical Provider, MD   hydrochlorothiazide (HYDRODIURIL) 25 mg tablet Take 1 tablet (25 mg total) by mouth daily 2/10/21   Isac Leventhal, MD [place] 500 MG CAPS     Historical Provider, MD   lisinopril (ZESTRIL) 40 mg tablet Take 1 tablet (40 mg total) by mouth daily 9/28/20   Isac Leventhal, MD   lovastatin (MEVACOR) 20 mg tablet Take 1 tablet (20 mg total) by mouth daily at bedtime 12/17/20   Isac Leventhal, MD   Multiple Vitamins-Minerals (MULTIVITAMIN ADULT PO)     Historical Provider, MD     I have reviewed home medications with patient personally  Allergies:    Allergies   Allergen Reactions    Lipitor [Atorvastatin]     Morphine      Reaction Date: [id number];     Morphine And Related     Zithromax [Azithromycin]      Reaction Date: [id number]; Social History:     Marital Status: /Civil Union   Occupation:   Patient Pre-hospital Living Situation:   Patient Pre-hospital Level of Mobility:   Patient Pre-hospital Diet Restrictions:   Substance Use History:   Social History     Substance and Sexual Activity   Alcohol Use No     Social History     Tobacco Use   Smoking Status Never Smoker   Smokeless Tobacco Never Used   Tobacco Comment    Denied history of never a smoker per [employer]     Social History     Substance and Sexual Activity   Drug Use No       Family History:    Family History   Problem Relation Age of Onset    No Known Problems Mother     Thyroid disease Family     Diabetes Family         Diabetes Mellitus    Thyroid cancer Daughter 40    Hodgkin's lymphoma Daughter 32    No Known Problems Maternal Grandmother     No Known Problems Maternal Grandfather     No Known Problems Paternal Grandmother     No Known Problems Paternal Grandfather     No Known Problems Paternal Aunt     No Known Problems Paternal Aunt        Physical Exam:     Vitals:   Blood Pressure: 160/86 (06/07/21 1205)  Pulse: (!) 120 (06/07/21 1205)  Temperature: 99 3 °F (37 4 °C) (06/07/21 1205)  Temp Source: Tympanic (06/07/21 1205)  Respirations: 19 (06/07/21 1205)  Weight - Scale: 116 kg (255 lb) (06/07/21 1205)  SpO2: 95 % (06/07/21 1205)    Physical Exam    Constitutional: Pt appears comfortable  Not in any acute distress  HENT:   Head: Normocephalic and atraumatic  Eyes: EOM are normal    Neck: Neck supple  Cardiovascular: tachycardia, regular rhythm, normal heart sounds  No murmur heard  Pulmonary/Chest: Effort normal, air entry b/l equal  No respiratory distress  Pt has no wheezes or crackles  Abdominal: Soft  Non-distended, Non-tender  Bowel sounds are normal    Musculoskeletal: Normal range of motion  Neurological: awake, alert  Moving all extremities spontaneously  Psychiatric: normal mood and affect       Additional Data:     Lab Results: I have personally reviewed pertinent reports  Results from last 7 days   Lab Units 06/07/21  1252   WBC Thousand/uL 9 00   HEMOGLOBIN g/dL 15 5*   HEMATOCRIT % 46 5*   PLATELETS Thousands/uL 221   NEUTROS PCT % 60   LYMPHS PCT % 32   MONOS PCT % 7   EOS PCT % 0     Results from last 7 days   Lab Units 06/07/21  1252   SODIUM mmol/L 137   POTASSIUM mmol/L 3 7   CHLORIDE mmol/L 102   CO2 mmol/L 27   BUN mg/dL 10   CREATININE mg/dL 0 72   ANION GAP mmol/L 8   CALCIUM mg/dL 9 5   GLUCOSE RANDOM mg/dL 103                       Imaging: I have personally reviewed pertinent reports  No orders to display       EKG, Pathology, and Other Studies Reviewed on Admission:   · EKG: as above    Allscripts / Epic Records Reviewed: Yes     ** Please Note: This note has been constructed using a voice recognition system   **

## 2021-06-07 NOTE — DISCHARGE SUMMARY
[address] [facility]  Discharge- Waneta Dubin 1950, 78 y o  female MRN: [id number]  Unit/Bed#: ED 16 Encounter: [id number]  Primary Care Provider: Chery Lainez MD   Date and time admitted to hospital: 6/7/2021 11:51 AM    * SVT (supraventricular tachycardia) [facility])  Assessment & Plan  Accidentally diagnosed when patient came in for colonoscopy  Patient asymptomatic  Patient was started on Cardizem drip  Fortunately patient converted to normal sinus rhythm  Was evaluated by Cardiology  Patient started on metoprolol, aspirin  Cleared for discharge from Cardiology standpoint with close outpatient follow-up with echo/Holter  Zoster  Assessment & Plan  Patient has vesicular rash between her buttocks  Does complain of burning  Patient started on valacyclovir  Continue for 7 days    Hypertension  Assessment & Plan  Blood pressure stable  Stop HCTZ  Continue lisinopril  Metoprolol started  Discharging Physician / Practitioner: Melany Tovar MD  PCP: Chery Lainez MD  Admission Date:   Admission Orders (From admission, onward)     Ordered        06/07/21 1459  Inpatient Admission  Once                   Discharge Date: 06/07/21    Resolved Problems  Date Reviewed: 3/24/2021    None          Consultations During Hospital Stay:  · cardio    Procedures Performed:   · none     Outpatient Tests Requested:  As advised by cardiology    Complications:  none    Reason for Admission:  SVT    Hospital Course:     Waneta Dubin is a 78 y o  female patient who originally presented to the hospital on 6/7/2021 due to SVT  She came for outpatient colonoscopy initially  But was noted to be tachycardic so was sent to ER for evaluation he was noted to be SVT/atrial flutter  Patient was started on Cardizem drip  She when she converted to normal sinus rhythm  Was seen by Cardiology  Cleared for discharge from this standpoint      The patient, initially admitted to the hospital as inpatient, was discharged earlier than expected given the following: Rapid improvement       Please see above list of diagnoses and related plan for additional information  Condition at Discharge: good     Discharge Day Visit / Exam:     Subjective:  Pt seen and examined by me this morning  Pt continues to be asymptomatic  Vitals: Blood Pressure: 122/73 (06/07/21 1730)  Pulse: 82 (06/07/21 1730)  Temperature: 99 3 °F (37 4 °C) (06/07/21 1205)  Temp Source: Tympanic (06/07/21 1205)  Respirations: 18 (06/07/21 1730)  Weight - Scale: 116 kg (255 lb) (06/07/21 1205)  SpO2: 95 % (06/07/21 1730)  Exam:   Physical Exam    Constitutional: Pt appears comfortable  Not in any acute distress  HENT:   Head: Normocephalic and atraumatic  Eyes: EOM are normal    Neck: Neck supple  Cardiovascular: Normal rate, regular rhythm, normal heart sounds  No murmur heard  Pulmonary/Chest: Effort normal, air entry b/l equal  No respiratory distress  Pt has no wheezes or crackles  Abdominal: Soft  Non-distended, Non-tender  Bowel sounds are normal    Musculoskeletal: Normal range of motion  Neurological: awake, alert  Moving all extremities spontaneously  Psychiatric: normal mood and affect  Discussion with Family: pt, daughter at bedside    Discharge instructions/Information to patient and family:   See after visit summary for information provided to patient and family  Provisions for Follow-Up Care:  See after visit summary for information related to follow-up care and any pertinent home health orders  Disposition:     Home    For Discharges to [facility] SNF:   · Not Applicable to this Patient - Not Applicable to this Patient    Planned Readmission: no     Discharge Statement:  I spent 35 minutes discharging the patient  This time was spent on the day of discharge  I had direct contact with the patient on the day of discharge   Greater than 50% of the total time was spent examining patient, answering all patient questions, arranging and discussing plan of care with patient as well as directly providing post-discharge instructions  Additional time then spent on discharge activities  Discharge Medications:  See after visit summary for reconciled discharge medications provided to patient and family        ** Please Note: This note has been constructed using a voice recognition system **

## 2021-06-07 NOTE — ASSESSMENT & PLAN NOTE
Accidentally diagnosed when patient came in for colonoscopy  Patient asymptomatic  Vagal maneuvers were attempted 3 times in the ER which would help very transiently but then patient reverts back to SVT  Patient started on Cardizem drip  Continue for now  Will consult Cardiology  Patient without any known cardiac history    Order echocardiogram

## 2021-06-07 NOTE — UTILIZATION REVIEW
Initial Clinical Review    Admission: Date/Time/Statement:   Admission Orders (From admission, onward)     Ordered        06/07/21 1459  Inpatient Admission  Once                   Orders Placed This Encounter   Procedures    Inpatient Admission     Standing Status:   Standing     Number of Occurrences:   1     Order Specific Question:   Level of Care     Answer:   Med Surg [16]     Order Specific Question:   Estimated length of stay     Answer:   More than 2 Midnights     Order Specific Question:   Certification     Answer:   I certify that inpatient services are medically necessary for this patient for a duration of greater than two midnights  See H&P and MD Progress Notes for additional information about the patient's course of treatment  ED Arrival Information     Expected Arrival Acuity Means of Arrival Escorted By Service Admission Type    - 6/7/2021 11:29 Urgent Walk-In Self General Medicine Urgent    Arrival Complaint    Rapid Heart Rate        Chief Complaint   Patient presents with    Rapid Heart Rate     pt did bowel prep for a colonoscopy where they found her to be tachycardic and sent her in for an eval       Initial Presentation: 78year old female, presented to the ED @ [facility] from home via  Walk in  Admitted asInpatient due to  SVT  Date: 06/07/2021   For outpatient colonoscopy and was noted to be tachycardic so sent to ER  Accidentally diagnosed when patient came in for colonoscopy  Patient asymptomatic  Vagal maneuvers were attempted 3 times in the ER which would help very transiently but then patient reverts back to SVT  Patient started on Cardizem drip  Continue for now  Consult Cardio  ECHO  VTE Prophylaxis: Enoxaparin (Lovenox)  / sequential compression device     06/07/2021  Consult Cardio:  SVT/Aflutter - now in NSR  Possibly due to prep for colonoscopy  Asymptomatic  She is stable for discharge as converted    Will need to start b-blockers, and discontinue HCTZ on discharge  We will arrange for Zio monitor and echo as outpt        ED Triage Vitals   Temperature Pulse Respirations Blood Pressure SpO2   21 1205 21 1205 21 1205 21 1205 21 1205   99 3 °F (37 4 °C) (!) 120 19 160/86 95 %      Temp Source Heart Rate Source Patient Position - Orthostatic VS BP Location FiO2 (%)   21 1205 21 1615 21 1615 21 1615 --   Tympanic Monitor Sitting Right arm       Pain Score       --                 Wt Readings from Last 1 Encounters:   21 116 kg (255 lb)     Additional Vital Signs:   Date/Time  Temp  Pulse  Resp  BP  MAP (mmHg)  SpO2  O2 Device  Patient Position - Orthostatic VS   21 1800  --  88  16  131/79  101  93 %  --  --   21 1730  --  82  18  122/73  93  95 %  --  --   21 1615  --  97  18  146/81  --  96 %  None (Room air)  Sitting     2021 @ 1201  EC, Sinus rhythm with 1st degree A-V block    Pertinent Labs/Diagnostic Test Results:     Results from last 7 days   Lab Units 21  1252   WBC Thousand/uL 9 00   HEMOGLOBIN g/dL 15 5*   HEMATOCRIT % 46 5*   PLATELETS Thousands/uL 221   NEUTROS ABS Thousands/µL 5 37     Results from last 7 days   Lab Units 21  1252   SODIUM mmol/L 137   POTASSIUM mmol/L 3 7   CHLORIDE mmol/L 102   CO2 mmol/L 27   ANION GAP mmol/L 8   BUN mg/dL 10   CREATININE mg/dL 0 72   EGFR ml/min/1 73sq m 86   CALCIUM mg/dL 9 5     Results from last 7 days   Lab Units 21  1252   GLUCOSE RANDOM mg/dL 103     Results from last 7 days   Lab Units 21  1252   TROPONIN I ng/mL <0 02     Results from last 7 days   Lab Units 21  1252   TSH 3RD GENERATON uIU/mL 2 510     ED Treatment:   Medication Administration from 2021 1129 to 2021 1854       Date/Time Order Dose Route Action     2021 1255 sodium chloride 0 9 % bolus 1,000 mL 1,000 mL Intravenous New Bag     2021 3156 diltiazem (CARDIZEM) injection 20 mg 20 mg Intravenous Given 06/07/2021 1400 diltiazem (CARDIZEM) 125 mg in sodium chloride 0 9 % 125 mL infusion 5 mg/hr Intravenous New Bag     06/07/2021 1654 metoprolol tartrate (LOPRESSOR) tablet 25 mg 25 mg Oral Given        Past Medical History:   Diagnosis Date    Cellulitis of left elbow 10/30/2016    Epistaxis     Medial meniscus tear 11/11/2014    Morbid obesity with BMI of 40 0-44 9, adult ([facility] ) 04/43/3055    Umbilical hernia      Present on Admission:   Hypertension      Admitting Diagnosis: Rapid heart rate [R00 0]  Age/Sex: 78 y o  female  Admission Orders:  Scheduled Medications:  aspirin, 81 mg, Oral, Daily  metoprolol tartrate, 25 mg, Oral, Q12H HALLE  valACYclovir, 1,000 mg, Oral, Q8H [facility] & long-term      Continuous IV Infusions:     PRN Meds:       IP CONSULT TO CARDIOLOGY    Network Utilization Review Department  ATTENTION: Please call with any questions or concerns to [phone] and carefully listen to the prompts so that you are directed to the right person  All voicemails are confidential   Cleotis Dirk all requests for admission clinical reviews, approved or denied determinations and any other requests to dedicated fax number below belonging to the campus where the patient is receiving treatment   List of dedicated fax numbers for the Facilities:  [address] DENIALS (Administrative/Medical Necessity) [phone]   [address] (Maternity/NICU/Pediatrics) [phone]   [phone] [address] [place] [address] [phone]   [place] [facility] [place] [address] [phone]

## 2021-06-08 ENCOUNTER — TRANSITIONAL CARE MANAGEMENT (OUTPATIENT)
Dept: INTERNAL MEDICINE CLINIC | Facility: CLINIC | Age: 71
End: 2021-06-08

## 2021-06-08 NOTE — UTILIZATION REVIEW
Inpatient Admission Authorization Request   NOTIFICATION OF INPATIENT ADMISSION/INPATIENT AUTHORIZATION REQUEST   SERVICING FACILITY:   [facility]  Address: [address] [place] [address], [address] [place] [address]  Tax ID: [id number]  NPI: [id number]  Place of Service: Inpatient [address] Code: 24     ATTENDING PROVIDER:  Attending Name and NPI#: Niecy Teixeira Md [[id number]]  Address: [address] [place] [address], [address] [place] [address]  Phone: [phone]     UTILIZATION REVIEW CONTACT:  Lima Sheffield Utilization   Network Utilization Review Department  Phone: [phone]  Fax: [phone]  Email: [email or web address]     PHYSICIAN ADVISORY SERVICES:  FOR [id number] REVIEW - MEDICAL NECESSITY DENIAL  Phone: [phone]  Fax: [phone]  Email: [email or web address]     TYPE OF REQUEST:  Inpatient Status     ADMISSION INFORMATION:  ADMISSION DATE/TIME: 6/7/21 1459  PATIENT DIAGNOSIS CODE/DESCRIPTION:  Rapid heart rate [R00 0]  DISCHARGE DATE/TIME: 6/7/2021  6:23 PM  DISCHARGE DISPOSITION (IF DISCHARGED): Home/Self Care     IMPORTANT INFORMATION:  Please contact the Lima Sheffield directly with any questions or concerns regarding this request  Department voicemails are confidential     Send requests for admission clinical reviews, concurrent reviews, approvals, and administrative denials due to lack of clinical to fax [phone]  Notification of Discharge   This is a Notification of Discharge from our facility [address]  Please be advised that this patient has been discharge from our facility  Below you will find the admission and discharge date and time including the patients disposition  UTILIZATION REVIEW CONTACT:  Lima Sheffield  Utilization   Network Utilization Review Department  Phone: [phone] x carefully listen to the prompts   All voicemails are confidential   Email: [email or web address]     PHYSICIAN ADVISORY SERVICES:  FOR [id number] REVIEW - MEDICAL NECESSITY DENIAL  Phone: [phone]  Fax: [phone]  Email: [email or web address]     PRESENTATION DATE: 6/7/2021 11:51 AM  OBERVATION ADMISSION DATE:   INPATIENT ADMISSION DATE: 6/7/21 1459   DISCHARGE DATE: 6/7/2021  6:23 PM  DISPOSITION: Home/Self Care Home/Self Care      IMPORTANT INFORMATION:  Send all requests for admission clinical reviews, approved or denied determinations and any other requests to dedicated fax number below belonging to the campus where the patient is receiving treatment   List of dedicated fax numbers:  [address] DENIALS (Administrative/Medical Necessity) [phone]   [address] (Maternity/NICU/Pediatrics) [phone]   Anai Crane [phone]   Reshma Blount [phone]   Vijaya Traylor [phone]   [address] [phone]   [facility]  [phone]   [address] [place] [address], [address]  [address] [address] [address] [facility] [phone]

## 2021-06-10 ENCOUNTER — RA CDI HCC (OUTPATIENT)
Dept: OTHER | Facility: HOSPITAL | Age: 71
End: 2021-06-10

## 2021-06-10 NOTE — PROGRESS NOTES
[facility]  coding opportunities          Chart reviewed, no opportunity found: CHART REVIEWED, NO OPPORTUNITY FOUND                     Patients insurance company: [facility]

## 2021-06-14 ENCOUNTER — OFFICE VISIT (OUTPATIENT)
Dept: INTERNAL MEDICINE CLINIC | Facility: CLINIC | Age: 71
End: 2021-06-14
Payer: COMMERCIAL

## 2021-06-14 VITALS
RESPIRATION RATE: 16 BRPM | TEMPERATURE: 97.6 F | SYSTOLIC BLOOD PRESSURE: 126 MMHG | BODY MASS INDEX: 40.34 KG/M2 | DIASTOLIC BLOOD PRESSURE: 80 MMHG | HEART RATE: 81 BPM | HEIGHT: 67 IN | WEIGHT: 257 LBS | OXYGEN SATURATION: 98 %

## 2021-06-14 DIAGNOSIS — I45.10 RBBB: ICD-10-CM

## 2021-06-14 DIAGNOSIS — I44.0 1ST DEGREE AV BLOCK: ICD-10-CM

## 2021-06-14 DIAGNOSIS — I10 ESSENTIAL HYPERTENSION: ICD-10-CM

## 2021-06-14 DIAGNOSIS — B02.9 HERPES ZOSTER WITHOUT COMPLICATION: ICD-10-CM

## 2021-06-14 DIAGNOSIS — B36.9 FUNGAL DERMATITIS: ICD-10-CM

## 2021-06-14 DIAGNOSIS — I47.1 SVT (SUPRAVENTRICULAR TACHYCARDIA) (HCC): Primary | ICD-10-CM

## 2021-06-14 PROBLEM — R10.32 LEFT LOWER QUADRANT ABDOMINAL PAIN: Status: RESOLVED | Noted: 2021-03-24 | Resolved: 2021-06-14

## 2021-06-14 PROCEDURE — 1111F DSCHRG MED/CURRENT MED MERGE: CPT | Performed by: INTERNAL MEDICINE

## 2021-06-14 PROCEDURE — 99495 TRANSJ CARE MGMT MOD F2F 14D: CPT | Performed by: INTERNAL MEDICINE

## 2021-06-14 PROCEDURE — 93000 ELECTROCARDIOGRAM COMPLETE: CPT | Performed by: INTERNAL MEDICINE

## 2021-06-14 NOTE — ASSESSMENT & PLAN NOTE
Patient found to be in supraventricular tachycardia when she arrived for a colonoscopy  No prior history of cardiac arrhythmia as  The patient was completely asymptomatic  She was started on metoprolol tartrate 25 mg q 12 hours during hospitalization and reports to our office today on this medication  She reports no side effects of the drug nor any symptoms consistent with SVT period upcoming she has a visit with Cardiology as well as a 24 hour Holter monitor and echocardiogram   Will review results of studies when they have been completed    Follow-up in 1 month

## 2021-06-14 NOTE — ASSESSMENT & PLAN NOTE
Electrocardiogram shows a first-degree AV block which is asymptomatic recommend continued observation    Patient currently on metoprolol tartrate 25 mg q 12 hours continue to monitor on beta-blocker therapy

## 2021-06-14 NOTE — PROGRESS NOTES
Assessment/Plan:    1st degree AV block    Electrocardiogram shows a first-degree AV block which is asymptomatic recommend continued observation  Patient currently on metoprolol tartrate 25 mg q 12 hours continue to monitor on beta-blocker therapy    Hypertension   Blood pressure assessment today indicates a reading of 126/80 utilizing a combination of  Lisinopril 40 mg daily and metoprolol tartrate 25 mg twice a day  Patient previously was on hydrochlorothiazide which was discontinued during hospitalization recommend continued surveillance of blood pressure follow-up in 1 month    RBBB   Right bundle branch block noted on electrocardiograms finding is asymptomatic to the patient  Recommend continued observation and surveillance    SVT (supraventricular tachycardia) ([facility] )   Patient found to be in supraventricular tachycardia when she arrived for a colonoscopy  No prior history of cardiac arrhythmia as  The patient was completely asymptomatic  She was started on metoprolol tartrate 25 mg q 12 hours during hospitalization and reports to our office today on this medication  She reports no side effects of the drug nor any symptoms consistent with SVT period upcoming she has a visit with Cardiology as well as a 24 hour Holter monitor and echocardiogram   Will review results of studies when they have been completed  Follow-up in 1 month    Fungal dermatitis   Fungal dermatitis of the patient's back she has been using nystatin powder seems to be working well recommend continued application twice a day    Herpes zoster without complication   Herpes zoster of the buttocks area 3 small ulcerations are noted the use of Valtrex recommended in the emergency room patient does not feel it is necessary if she does not have any symptoms from these 3 small lesions    I agree at this point she is fine without the Valtrex period    TCM Call (since 5/14/2021)     Date and time call was made  6/8/2021  1:37 PM    Hospital care reviewed  Records reviewed    Patient was hospitialized at  [facility]        Date of Admission  06/07/21    Date of discharge  06/07/21    Diagnosis  SVT    Disposition  Home    Current Symptoms  None      TCM Call (since 5/14/2021)     Post hospital issues  None    Scheduled for follow up? Yes    I have advised the patient to call PCP with any new or worsening symptoms  Shine Richards CMA    Counseling  Patient    Comments  Patient appointment scheduled for 6/14/21             Diagnoses and all orders for this visit:    SVT (supraventricular tachycardia) ([facility] )  -     POCT ECG  -     Comprehensive metabolic panel; Future  -     metoprolol tartrate (LOPRESSOR) 25 mg tablet; Take 1 tablet (25 mg total) by mouth every 12 (twelve) hours    Essential hypertension    Fungal dermatitis    Herpes zoster without complication    1st degree AV block  -     Lyme Antibody Profile with reflex to WB; Future        Subjective:      Patient ID: Omar Cody is a 78 y o  female  This is a transition of care visit for this 73-year-old female patient  She was hospitalized at [facility] after arriving for her routine colonoscopy in being found to have a tachycardia  She was transferred to the emergency room for further evaluation  She was found to be in supraventricular tachycardia and treated during the stay with Cardizem time period IV hydration was also administered  The patient was asymptomatic denying any sensation of chest pain palpitations or shortness of breath  She did not have any lightheadedness nausea or vomiting  She has no prior history of SVT  Pending at this time are a echocardiogram and 24 hour Holter monitor  Patient was discharged on metoprolol tartrate 25 mg twice a day and continues on this medication    Her during the patient's examination today we found her heart rhythm to be irregular electrocardiogram was performed and it shows a sinus rhythm with premature atrial contractions  The following portions of the patient's history were reviewed and updated as appropriate:   She  has a past medical history of Cellulitis of left elbow (10/30/2016), Epistaxis, Medial meniscus tear (11/11/2014), Morbid obesity with BMI of 40 0-44 9, adult ([facility] ) (73/68/1720), and Umbilical hernia  She   Patient Active Problem List    Diagnosis Date Noted    Fungal dermatitis 06/14/2021    1st degree AV block 06/14/2021    RBBB 06/14/2021    SVT (supraventricular tachycardia) ([facility] ) 06/07/2021    Herpes zoster without complication 49/54/9052    Glucose intolerance (impaired glucose tolerance) 07/24/2020    Obesity (BMI 30-39 9) 11/15/2017    Hypertriglyceridemia 02/26/2016    Hypercholesterolemia 09/12/2014    Hypertension 09/12/2014     She  has a past surgical history that includes Knee arthroscopy w/ meniscal repair (07/26/2015) and Total abdominal hysterectomy (2001)  Her family history includes Diabetes in her family; Hodgkin's lymphoma (age of onset: 32) in her daughter; No Known Problems in her maternal grandfather, maternal grandmother, mother, paternal aunt, paternal aunt, paternal grandfather, and paternal grandmother; Thyroid cancer (age of onset: 40) in her daughter; Thyroid disease in her family  She  reports that she has never smoked  She has never used smokeless tobacco  She reports that she does not drink alcohol and does not use drugs  Current Outpatient Medications   Medication Sig Dispense Refill    aspirin (ECOTRIN LOW STRENGTH) 81 mg EC tablet Take 1 tablet (81 mg total) by mouth daily 90 tablet 0    calcium-vitamin D (OSCAL) 250-125 MG-UNIT per tablet Take 1 tablet by mouth daily        Krill Oil 500 MG CAPS       lisinopril (ZESTRIL) 40 mg tablet Take 1 tablet (40 mg total) by mouth daily 90 tablet 3    lovastatin (MEVACOR) 20 mg tablet Take 1 tablet (20 mg total) by mouth daily at bedtime 90 tablet 3    metoprolol tartrate (LOPRESSOR) 25 mg tablet Take 1 tablet (25 mg total) by mouth every 12 (twelve) hours 60 tablet 2    Multiple Vitamins-Minerals (MULTIVITAMIN ADULT PO)        No current facility-administered medications for this visit       Review of Systems   All other systems reviewed and are negative  Objective:      /80   Pulse 81   Temp 97 6 °F (36 4 °C) (Skin)   Resp 16   Ht 5' 7" (1 702 m)   Wt 117 kg (257 lb)   SpO2 98%   BMI 40 25 kg/m²          Physical Exam  Vitals reviewed  Constitutional:       General: She is not in acute distress  Appearance: Normal appearance  She is well-developed  She is not ill-appearing  HENT:      Head: Normocephalic  Right Ear: Hearing and external ear normal       Left Ear: Hearing and external ear normal       Nose: Nose normal  No mucosal edema  Mouth/Throat:      Pharynx: Uvula midline  Eyes:      General: Lids are normal       Conjunctiva/sclera: Conjunctivae normal       Pupils: Pupils are equal, round, and reactive to light  Neck:      Thyroid: No thyromegaly  Vascular: No carotid bruit or JVD  Cardiovascular:      Rate and Rhythm: Normal rate  Rhythm irregular  Heart sounds: Normal heart sounds  No murmur heard  Pulmonary:      Effort: Pulmonary effort is normal  No respiratory distress  Breath sounds: Normal breath sounds  No wheezing, rhonchi or rales  Abdominal:      General: Bowel sounds are normal       Palpations: Abdomen is soft  Musculoskeletal:         General: Normal range of motion  Right lower leg: Edema present  Left lower leg: Edema present  Comments: Trace edema in the extremities bilaterally at the ankle level   Lymphadenopathy:      Cervical: No cervical adenopathy  Skin:     General: Skin is warm and dry  Neurological:      Mental Status: She is alert and oriented to person, place, and time  Deep Tendon Reflexes: Reflexes are normal and symmetric   Reflexes normal    Psychiatric:         Speech: Speech normal          Behavior: Behavior normal  Behavior is cooperative  Thought Content:  Thought content normal          Judgment: Judgment normal

## 2021-06-14 NOTE — ASSESSMENT & PLAN NOTE
Fungal dermatitis of the patient's back she has been using nystatin powder seems to be working well recommend continued application twice a day

## 2021-06-14 NOTE — ASSESSMENT & PLAN NOTE
Blood pressure assessment today indicates a reading of 126/80 utilizing a combination of  Lisinopril 40 mg daily and metoprolol tartrate 25 mg twice a day    Patient previously was on hydrochlorothiazide which was discontinued during hospitalization recommend continued surveillance of blood pressure follow-up in 1 month

## 2021-06-14 NOTE — ASSESSMENT & PLAN NOTE
Right bundle branch block noted on electrocardiograms finding is asymptomatic to the patient    Recommend continued observation and surveillance

## 2021-06-14 NOTE — ASSESSMENT & PLAN NOTE
Herpes zoster of the buttocks area 3 small ulcerations are noted the use of Valtrex recommended in the emergency room patient does not feel it is necessary if she does not have any symptoms from these 3 small lesions    I agree at this point she is fine without the Valtrex period

## 2021-06-15 ENCOUNTER — APPOINTMENT (OUTPATIENT)
Dept: LAB | Facility: MEDICAL CENTER | Age: 71
End: 2021-06-15
Payer: COMMERCIAL

## 2021-06-15 DIAGNOSIS — R73.02 GLUCOSE INTOLERANCE (IMPAIRED GLUCOSE TOLERANCE): ICD-10-CM

## 2021-06-15 DIAGNOSIS — I47.1 SVT (SUPRAVENTRICULAR TACHYCARDIA) (HCC): ICD-10-CM

## 2021-06-15 DIAGNOSIS — I44.0 1ST DEGREE AV BLOCK: ICD-10-CM

## 2021-06-15 LAB
ALBUMIN SERPL BCP-MCNC: 3.3 G/DL (ref 3.5–5)
ALP SERPL-CCNC: 64 U/L (ref 46–116)
ALT SERPL W P-5'-P-CCNC: 29 U/L (ref 12–78)
ANION GAP SERPL CALCULATED.3IONS-SCNC: 6 MMOL/L (ref 4–13)
AST SERPL W P-5'-P-CCNC: 24 U/L (ref 5–45)
BILIRUB SERPL-MCNC: 0.48 MG/DL (ref 0.2–1)
BUN SERPL-MCNC: 11 MG/DL (ref 5–25)
CALCIUM ALBUM COR SERPL-MCNC: 9.4 MG/DL (ref 8.3–10.1)
CALCIUM SERPL-MCNC: 8.8 MG/DL (ref 8.3–10.1)
CHLORIDE SERPL-SCNC: 108 MMOL/L (ref 100–108)
CO2 SERPL-SCNC: 28 MMOL/L (ref 21–32)
CREAT SERPL-MCNC: 0.59 MG/DL (ref 0.6–1.3)
EST. AVERAGE GLUCOSE BLD GHB EST-MCNC: 126 MG/DL
GFR SERPL CREATININE-BSD FRML MDRD: 94 ML/MIN/1.73SQ M
GLUCOSE P FAST SERPL-MCNC: 106 MG/DL (ref 65–99)
HBA1C MFR BLD: 6 %
POTASSIUM SERPL-SCNC: 4.4 MMOL/L (ref 3.5–5.3)
PROT SERPL-MCNC: 6.8 G/DL (ref 6.4–8.2)
SODIUM SERPL-SCNC: 142 MMOL/L (ref 136–145)

## 2021-06-15 PROCEDURE — 36415 COLL VENOUS BLD VENIPUNCTURE: CPT

## 2021-06-15 PROCEDURE — 83036 HEMOGLOBIN GLYCOSYLATED A1C: CPT

## 2021-06-15 PROCEDURE — 86618 LYME DISEASE ANTIBODY: CPT

## 2021-06-15 PROCEDURE — 80053 COMPREHEN METABOLIC PANEL: CPT

## 2021-06-16 LAB — B BURGDOR IGG+IGM SER-ACNC: 42

## 2021-06-21 ENCOUNTER — OFFICE VISIT (OUTPATIENT)
Dept: CARDIOLOGY CLINIC | Facility: CLINIC | Age: 71
End: 2021-06-21
Payer: COMMERCIAL

## 2021-06-21 VITALS
SYSTOLIC BLOOD PRESSURE: 132 MMHG | BODY MASS INDEX: 40.06 KG/M2 | HEART RATE: 75 BPM | WEIGHT: 255.2 LBS | HEIGHT: 67 IN | DIASTOLIC BLOOD PRESSURE: 78 MMHG

## 2021-06-21 DIAGNOSIS — I47.1 SVT (SUPRAVENTRICULAR TACHYCARDIA) (HCC): Primary | ICD-10-CM

## 2021-06-21 DIAGNOSIS — E66.9 OBESITY (BMI 35.0-39.9 WITHOUT COMORBIDITY): ICD-10-CM

## 2021-06-21 DIAGNOSIS — I10 HYPERTENSION, UNSPECIFIED TYPE: ICD-10-CM

## 2021-06-21 PROCEDURE — 99214 OFFICE O/P EST MOD 30 MIN: CPT | Performed by: NURSE PRACTITIONER

## 2021-06-21 PROCEDURE — 1036F TOBACCO NON-USER: CPT | Performed by: NURSE PRACTITIONER

## 2021-06-21 PROCEDURE — 3078F DIAST BP <80 MM HG: CPT | Performed by: NURSE PRACTITIONER

## 2021-06-21 PROCEDURE — 1160F RVW MEDS BY RX/DR IN RCRD: CPT | Performed by: NURSE PRACTITIONER

## 2021-06-21 PROCEDURE — 3075F SYST BP GE 130 - 139MM HG: CPT | Performed by: NURSE PRACTITIONER

## 2021-06-21 PROCEDURE — 3008F BODY MASS INDEX DOCD: CPT | Performed by: NURSE PRACTITIONER

## 2021-06-21 RX ORDER — ACETAMINOPHEN 160 MG
2000 TABLET,DISINTEGRATING ORAL DAILY
COMMUNITY

## 2021-06-21 NOTE — PROGRESS NOTES
Cardiology Follow Up    Jarad Lassiter  1950  [id number]  [facility]  [facility]  [address]  [phone]  [phone]    1  SVT (supraventricular tachycardia) (HCC)         Interval History:   Ms Judith Gardner presented to [place] [address] ED on 6/07/21 with a rapid heart rate  Ms Genoveva You underwent a bowel prep for a colonoscopy  Noted to have a rapid heart rate 120-130 by the anesthesiologist   They did not proceed with the colonoscopy and she was sent to the emergency room for evaluation  She was asymptomatic  Cardiology was consulted  Potassium 3 7  SVT versus atrial flutter was started on IV Cardizem and converted to normal sinus rhythm  HCTZ was stopped  Aspirin 81 mg daily metoprolol  Tartrate 25 mg b i d was started she was scheduled for an outpatient ZIO patch and echocardiogram   She was discharged home  Ms Judith Gardner presents to our office for a recent hospitalization follow up visit  Jose Toney admits to occasional irregular heart beats  She denies CP, palpitations, dyspnea with minimal exertion, lightheadedness or dizziness  She admits to waking up between 3 am and 4 am without the ability to fall back to sleep  HPI:  Hypertension  Hyperlipidemia 3/19/21 , , HDL 54, LDL 96  Obesity   Patient Active Problem List   Diagnosis    Hypercholesterolemia    Hypertension    Hypertriglyceridemia    Obesity (BMI 30-39  9)    Glucose intolerance (impaired glucose tolerance)    SVT (supraventricular tachycardia) (HCC)    Herpes zoster without complication    Fungal dermatitis    1st degree AV block    RBBB     Past Medical History:   Diagnosis Date    Cellulitis of left elbow 10/30/2016    Epistaxis     Medial meniscus tear 11/11/2014    Morbid obesity with BMI of 40 0-44 9, adult ([facility] ) 54/05/2348    Umbilical hernia      Social History     Socioeconomic History    Marital status: /Civil Union     Spouse name: Not on file    Number of children: Not on file    Years of education: Not on file    Highest education level: Not on file   Occupational History    Not on file   Tobacco Use    Smoking status: Never Smoker    Smokeless tobacco: Never Used    Tobacco comment: Denied history of never a smoker per [employer]   Substance and Sexual Activity    Alcohol use: No    Drug use: No    Sexual activity: Not on file   Other Topics Concern    Not on file   Social History Narrative    Exercise habits    Daily Tea Consumption (1 Cup/Day)     Social Determinants of Health     Financial Resource Strain:     Difficulty of Paying Living Expenses:    Food Insecurity:     Worried About Running Out of Food in the Last Year:     Ran Out of Food in the Last Year:    Transportation Needs:     Lack of Transportation (Medical):      Lack of Transportation (Non-Medical):    Physical Activity:     Days of Exercise per Week:     Minutes of Exercise per Session:    Stress:     Feeling of Stress :    Social Connections:     Frequency of Communication with Friends and Family:     Frequency of Social Gatherings with Friends and Family:     Attends Mandaeism Services:     Active Member of Clubs or Organizations:     Attends Club or Organization Meetings:     Marital Status:    Intimate Partner Violence:     Fear of Current or Ex-Partner:     Emotionally Abused:     Physically Abused:     Sexually Abused:       Family History   Problem Relation Age of Onset    No Known Problems Mother     Thyroid disease Family     Diabetes Family         Diabetes Mellitus    Thyroid cancer Daughter 40    Hodgkin's lymphoma Daughter 32    No Known Problems Maternal Grandmother     No Known Problems Maternal Grandfather     No Known Problems Paternal Grandmother     No Known Problems Paternal Grandfather     No Known Problems Paternal Aunt     No Known Problems Paternal Aunt      Past Surgical History:   Procedure Laterality Date    KNEE ARTHROSCOPY W/ MENISCAL REPAIR  07/26/2015    with medial meniscus repair    TOTAL ABDOMINAL HYSTERECTOMY  2001       Current Outpatient Medications:     aspirin (ECOTRIN LOW STRENGTH) 81 mg EC tablet, Take 1 tablet (81 mg total) by mouth daily, Disp: 90 tablet, Rfl: 0    calcium-vitamin D (OSCAL) 250-125 MG-UNIT per tablet, Take 1 tablet by mouth daily  , Disp: , Rfl:     cholecalciferol (VITAMIN D3) 1,000 units tablet, Take 1,000 Units by mouth daily, Disp: , Rfl:     Krill Oil 500 MG CAPS, , Disp: , Rfl:     lisinopril (ZESTRIL) 40 mg tablet, Take 1 tablet (40 mg total) by mouth daily, Disp: 90 tablet, Rfl: 3    lovastatin (MEVACOR) 20 mg tablet, Take 1 tablet (20 mg total) by mouth daily at bedtime, Disp: 90 tablet, Rfl: 3    metoprolol tartrate (LOPRESSOR) 25 mg tablet, Take 1 tablet (25 mg total) by mouth every 12 (twelve) hours, Disp: 180 tablet, Rfl: 2    Multiple Vitamins-Minerals (MULTIVITAMIN ADULT PO), , Disp: , Rfl:   Allergies   Allergen Reactions    Lipitor [Atorvastatin]     Morphine      Reaction Date: [id number];     Morphine And Related     Zithromax [Azithromycin]      Reaction Date: [id number];        Labs:  Appointment on 06/15/2021   Component Date Value    Lyme total antibody 06/15/2021 42     Sodium 06/15/2021 142     Potassium 06/15/2021 4 4     Chloride 06/15/2021 108     CO2 06/15/2021 28     ANION GAP 06/15/2021 6     BUN 06/15/2021 11     Creatinine 06/15/2021 0 59*    Glucose, Fasting 06/15/2021 106*    Calcium 06/15/2021 8 8     Corrected Calcium 06/15/2021 9 4     AST 06/15/2021 24     ALT 06/15/2021 29     Alkaline Phosphatase 06/15/2021 64     Total Protein 06/15/2021 6 8     Albumin 06/15/2021 3 3*    Total Bilirubin 06/15/2021 0 48     eGFR 06/15/2021 94     Hemoglobin A1C 06/15/2021 6 0*    EAG 06/15/2021 126    Admission on 06/07/2021, Discharged on 06/07/2021   Component Date Value    WBC 06/07/2021 9 00     RBC 06/07/2021 4 83     Hemoglobin 06/07/2021 15 5*    Hematocrit 06/07/2021 46 5*    MCV 06/07/2021 96     MCH 06/07/2021 32 1     MCHC 06/07/2021 33 3     RDW 06/07/2021 13 1     MPV 06/07/2021 10 7     Platelets 94/31/0381 221     nRBC 06/07/2021 0     Neutrophils Relative 06/07/2021 60     Immat GRANS % 06/07/2021 1     Lymphocytes Relative 06/07/2021 32     Monocytes Relative 06/07/2021 7     Eosinophils Relative 06/07/2021 0     Basophils Relative 06/07/2021 0     Neutrophils Absolute 06/07/2021 5 37     Immature Grans Absolute 06/07/2021 0 06     Lymphocytes Absolute 06/07/2021 2 84     Monocytes Absolute 06/07/2021 0 65     Eosinophils Absolute 06/07/2021 0 04     Basophils Absolute 06/07/2021 0 04     Sodium 06/07/2021 137     Potassium 06/07/2021 3 7     Chloride 06/07/2021 102     CO2 06/07/2021 27     ANION GAP 06/07/2021 8     BUN 06/07/2021 10     Creatinine 06/07/2021 0 72     Glucose 06/07/2021 103     Calcium 06/07/2021 9 5     eGFR 06/07/2021 86     Troponin I 06/07/2021 <0 02     TSH 3RD GENERATON 06/07/2021 2 510     Ventricular Rate 06/07/2021 88     Atrial Rate 06/07/2021 88     ME Interval 06/07/2021 192     QRSD Interval 06/07/2021 78     QT Interval 06/07/2021 370     QTC Interval 06/07/2021 447     P Axis 06/07/2021 26     QRS Axis 06/07/2021 -41     T Wave Axis 06/07/2021 0     Ventricular Rate 06/07/2021 122     Atrial Rate 06/07/2021 122     ME Interval 06/07/2021 272     QRSD Interval 06/07/2021 120     QT Interval 06/07/2021 394     QTC Interval 06/07/2021 561     P Axis 06/07/2021 -29     QRS Axis 06/07/2021 -19     T Wave Axis 06/07/2021 -36     Ventricular Rate 06/07/2021 125     Atrial Rate 06/07/2021 125     ME Interval 06/07/2021 246     QRSD Interval 06/07/2021 122     QT Interval 06/07/2021 356     QTC Interval 06/07/2021 513     P Axis 06/07/2021 -30     QRS Axis 06/07/2021 -29     T Wave Axis 06/07/2021 -34      Imaging: No results found  Review of Systems:  Review of Systems   Musculoskeletal: Positive for arthralgias and myalgias  All other systems reviewed and are negative  Physical Exam:  Physical Exam  Vitals reviewed  Constitutional:       Appearance: She is obese  Eyes:      Pupils: Pupils are equal, round, and reactive to light  Cardiovascular:      Rate and Rhythm: Normal rate and regular rhythm  Pulses: Normal pulses  Heart sounds: Normal heart sounds  Comments: Ectopic beats   Pulmonary:      Effort: Pulmonary effort is normal       Breath sounds: Normal breath sounds  Abdominal:      General: Bowel sounds are normal       Palpations: Abdomen is soft  Musculoskeletal:         General: Normal range of motion  Cervical back: Normal range of motion and neck supple  Right lower leg: No edema  Left lower leg: No edema  Skin:     General: Skin is warm and dry  Capillary Refill: Capillary refill takes less than 2 seconds  Neurological:      General: No focal deficit present  Mental Status: She is alert and oriented to person, place, and time  Psychiatric:         Mood and Affect: Mood normal          Behavior: Behavior normal          Discussion/Summary:  1  SVT vs atrial fibrillation- possibly due to hypokalemia in the setting of bowel prep for colonoscopy  EKG shows NSR, 1st degree AVB with PAC, 2 D echocardiogram to be done in the near future, Zio Patch pending  Hand out information provided on the Zio patch  Continue on Metoprolol tartrate 25mg Q 12 hours  Continue on ASA 81mg daily  2  Hypertension- controlled on Metoprolol tartrate 25mg Q12 hours and Lisinopril 40mg daily, 2gm sodium diet   3  Obesity BMI 39 97- possible sleep apnea, review Zio patch for overnight bradycardia

## 2021-06-24 ENCOUNTER — TELEPHONE (OUTPATIENT)
Dept: CARDIOLOGY CLINIC | Facility: CLINIC | Age: 71
End: 2021-06-24

## 2021-06-28 ENCOUNTER — CLINICAL SUPPORT (OUTPATIENT)
Dept: CARDIOLOGY CLINIC | Facility: CLINIC | Age: 71
End: 2021-06-28
Payer: COMMERCIAL

## 2021-06-28 DIAGNOSIS — I47.1 SVT (SUPRAVENTRICULAR TACHYCARDIA) (HCC): ICD-10-CM

## 2021-06-28 PROCEDURE — 93246 EXT ECG>7D<15D RECORDING: CPT | Performed by: NURSE PRACTITIONER

## 2021-07-07 ENCOUNTER — HOSPITAL ENCOUNTER (OUTPATIENT)
Dept: NON INVASIVE DIAGNOSTICS | Age: 71
Discharge: HOME | End: 2021-07-07
Payer: COMMERCIAL

## 2021-07-07 DIAGNOSIS — I47.1 SVT (SUPRAVENTRICULAR TACHYCARDIA) (HCC): ICD-10-CM

## 2021-07-07 PROCEDURE — 93306 TTE W/DOPPLER COMPLETE: CPT

## 2021-07-07 PROCEDURE — 93306 TTE W/DOPPLER COMPLETE: CPT | Performed by: INTERNAL MEDICINE

## 2021-07-22 ENCOUNTER — TELEPHONE (OUTPATIENT)
Dept: INTERNAL MEDICINE CLINIC | Facility: CLINIC | Age: 71
End: 2021-07-22

## 2021-07-22 NOTE — TELEPHONE ENCOUNTER
Patient has upcoming AWV and she was in ER last month and they did a lot of blood work and wants to know does she still need to get blood work and she is concerned it will not be covered by insurance  Please call patient back at [phone]

## 2021-07-22 NOTE — TELEPHONE ENCOUNTER
Please let the patient know that we have will we need from the blood work performed in the  emergency room    She does not need to go in for additional work at this time thank you

## 2021-07-27 ENCOUNTER — OFFICE VISIT (OUTPATIENT)
Dept: INTERNAL MEDICINE CLINIC | Facility: CLINIC | Age: 71
End: 2021-07-27
Payer: COMMERCIAL

## 2021-07-27 VITALS
WEIGHT: 255.4 LBS | HEIGHT: 67 IN | TEMPERATURE: 99.6 F | HEART RATE: 77 BPM | OXYGEN SATURATION: 96 % | SYSTOLIC BLOOD PRESSURE: 134 MMHG | DIASTOLIC BLOOD PRESSURE: 80 MMHG | BODY MASS INDEX: 40.09 KG/M2

## 2021-07-27 DIAGNOSIS — I47.1 SVT (SUPRAVENTRICULAR TACHYCARDIA) (HCC): Primary | ICD-10-CM

## 2021-07-27 DIAGNOSIS — Z00.00 HEALTHCARE MAINTENANCE: ICD-10-CM

## 2021-07-27 DIAGNOSIS — E78.1 HYPERTRIGLYCERIDEMIA: ICD-10-CM

## 2021-07-27 DIAGNOSIS — I10 HYPERTENSION, UNSPECIFIED TYPE: ICD-10-CM

## 2021-07-27 DIAGNOSIS — E78.00 HYPERCHOLESTEROLEMIA: ICD-10-CM

## 2021-07-27 DIAGNOSIS — R73.02 GLUCOSE INTOLERANCE (IMPAIRED GLUCOSE TOLERANCE): ICD-10-CM

## 2021-07-27 PROBLEM — B02.9 HERPES ZOSTER WITHOUT COMPLICATION: Status: RESOLVED | Noted: 2021-06-07 | Resolved: 2021-07-27

## 2021-07-27 PROCEDURE — 3725F SCREEN DEPRESSION PERFORMED: CPT | Performed by: INTERNAL MEDICINE

## 2021-07-27 PROCEDURE — 1036F TOBACCO NON-USER: CPT | Performed by: INTERNAL MEDICINE

## 2021-07-27 PROCEDURE — G0439 PPPS, SUBSEQ VISIT: HCPCS | Performed by: INTERNAL MEDICINE

## 2021-07-27 PROCEDURE — 3075F SYST BP GE 130 - 139MM HG: CPT | Performed by: INTERNAL MEDICINE

## 2021-07-27 PROCEDURE — 3288F FALL RISK ASSESSMENT DOCD: CPT | Performed by: INTERNAL MEDICINE

## 2021-07-27 PROCEDURE — 1170F FXNL STATUS ASSESSED: CPT | Performed by: INTERNAL MEDICINE

## 2021-07-27 PROCEDURE — 3079F DIAST BP 80-89 MM HG: CPT | Performed by: INTERNAL MEDICINE

## 2021-07-27 PROCEDURE — 1125F AMNT PAIN NOTED PAIN PRSNT: CPT | Performed by: INTERNAL MEDICINE

## 2021-07-27 PROCEDURE — 99215 OFFICE O/P EST HI 40 MIN: CPT | Performed by: INTERNAL MEDICINE

## 2021-07-27 PROCEDURE — 1160F RVW MEDS BY RX/DR IN RCRD: CPT | Performed by: INTERNAL MEDICINE

## 2021-07-27 PROCEDURE — 3008F BODY MASS INDEX DOCD: CPT | Performed by: INTERNAL MEDICINE

## 2021-07-27 NOTE — ASSESSMENT & PLAN NOTE
Recommend continuation of low-cholesterol diet a lipid profile has been requested and will be reviewed with the patient when completed

## 2021-07-27 NOTE — PATIENT INSTRUCTIONS
Medicare Preventive Visit Patient Instructions  Thank you for completing your Welcome to Medicare Visit or Medicare Annual Wellness Visit today  Your next wellness visit will be due in one year (7/28/2022)  The screening/preventive services that you may require over the next 5-10 years are detailed below  Some tests may not apply to you based off risk factors and/or age  Screening tests ordered at today's visit but not completed yet may show as past due  Also, please note that scanned in results may not display below  Preventive Screenings:  Service Recommendations Previous Testing/Comments   Colorectal Cancer Screening  * Colonoscopy    * Fecal Occult Blood Test (FOBT)/Fecal Immunochemical Test (FIT)  * Fecal DNA/Cologuard Test  * Flexible Sigmoidoscopy Age: 54-65 years old   Colonoscopy: every 10 years (may be performed more frequently if at higher risk)  OR  FOBT/FIT: every 1 year  OR  Cologuard: every 3 years  OR  Sigmoidoscopy: every 5 years  Screening may be recommended earlier than age 48 if at higher risk for colorectal cancer  Also, an individualized decision between you and your healthcare provider will decide whether screening between the ages of 74-80 would be appropriate  Colonoscopy: 01/24/2019  FOBT/FIT: 07/21/2020  Cologuard: Not on file  Sigmoidoscopy: Not on file    Screening Current     Breast Cancer Screening Age: 36 years old  Frequency: every 1-2 years  Not required if history of left and right mastectomy Mammogram: 10/01/2020    Screening Current   Cervical Cancer Screening Between the ages of 21-29, pap smear recommended once every 3 years  Between the ages of 33-67, can perform pap smear with HPV co-testing every 5 years     Recommendations may differ for women with a history of total hysterectomy, cervical cancer, or abnormal pap smears in past  Pap Smear: Not on file    Screening Not Indicated   Hepatitis C Screening Once for adults born between 1945 and 1965  More frequently in patients at high risk for Hepatitis C Hep C Antibody: Not on file        Diabetes Screening 1-2 times per year if you're at risk for diabetes or have pre-diabetes Fasting glucose: 106 mg/dL   A1C: 6 0 %    Screening Current   Cholesterol Screening Once every 5 years if you don't have a lipid disorder  May order more often based on risk factors  Lipid panel: 03/19/2021    Screening Not Indicated  History Lipid Disorder     Other Preventive Screenings Covered by Medicare:  1  Abdominal Aortic Aneurysm (AAA) Screening: covered once if your at risk  You're considered to be at risk if you have a family history of AAA  2  Lung Cancer Screening: covers low dose CT scan once per year if you meet all of the following conditions: (1) Age 50-69; (2) No signs or symptoms of lung cancer; (3) Current smoker or have quit smoking within the last 15 years; (4) You have a tobacco smoking history of at least 30 pack years (packs per day multiplied by number of years you smoked); (5) You get a written order from a healthcare provider  3  Glaucoma Screening: covered annually if you're considered high risk: (1) You have diabetes OR (2) Family history of glaucoma OR (3)  aged 48 and older OR (3)  American aged 72 and older  3  Osteoporosis Screening: covered every 2 years if you meet one of the following conditions: (1) You're estrogen deficient and at risk for osteoporosis based off medical history and other findings; (2) Have a vertebral abnormality; (3) On glucocorticoid therapy for more than 3 months; (4) Have primary hyperparathyroidism; (5) On osteoporosis medications and need to assess response to drug therapy  · Last bone density test (DXA Scan): Not on file  5  HIV Screening: covered annually if you're between the age of 12-76  Also covered annually if you are younger than 13 and older than 72 with risk factors for HIV infection   For pregnant patients, it is covered up to 3 times per pregnancy  Immunizations:  Immunization Recommendations   Influenza Vaccine Annual influenza vaccination during flu season is recommended for all persons aged >= 6 months who do not have contraindications   Pneumococcal Vaccine (Prevnar and Pneumovax)  * Prevnar = PCV13  * Pneumovax = PPSV23   Adults 25-60 years old: 1-3 doses may be recommended based on certain risk factors  Adults 72 years old: Prevnar (PCV13) vaccine recommended followed by Pneumovax (PPSV23) vaccine  If already received PPSV23 since turning 65, then PCV13 recommended at least one year after PPSV23 dose  Hepatitis B Vaccine 3 dose series if at intermediate or high risk (ex: diabetes, end stage renal disease, liver disease)   Tetanus (Td) Vaccine - COST NOT COVERED BY MEDICARE PART B Following completion of primary series, a booster dose should be given every 10 years to maintain immunity against tetanus  Td may also be given as tetanus wound prophylaxis  Tdap Vaccine - COST NOT COVERED BY MEDICARE PART B Recommended at least once for all adults  For pregnant patients, recommended with each pregnancy  Shingles Vaccine (Shingrix) - COST NOT COVERED BY MEDICARE PART B  2 shot series recommended in those aged 48 and above     Health Maintenance Due:      Topic Date Due    Hepatitis C Screening  Never done    Breast Cancer Screening: Mammogram  10/01/2021    Colorectal Cancer Screening  01/24/2029     Immunizations Due:      Topic Date Due    Pneumococcal Vaccine: 65+ Years (1 of 2 - PPSV23) Never done    COVID-19 Vaccine (1) Never done    DTaP,Tdap,and Td Vaccines (1 - Tdap) Never done    Influenza Vaccine (1) 09/01/2021     Advance Directives   What are advance directives? Advance directives are legal documents that state your wishes and plans for medical care  These plans are made ahead of time in case you lose your ability to make decisions for yourself   Advance directives can apply to any medical decision, such as the treatments you want, and if you want to donate organs  What are the types of advance directives? There are many types of advance directives, and each state has rules about how to use them  You may choose a combination of any of the following:  · Living will: This is a written record of the treatment you want  You can also choose which treatments you do not want, which to limit, and which to stop at a certain time  This includes surgery, medicine, IV fluid, and tube feedings  · Durable power of  for healthcare [facility]): This is a written record that states who you want to make healthcare choices for you when you are unable to make them for yourself  This person, called a proxy, is usually a family member or a friend  You may choose more than 1 proxy  · Do not resuscitate (DNR) order:  A DNR order is used in case your heart stops beating or you stop breathing  It is a request not to have certain forms of treatment, such as CPR  A DNR order may be included in other types of advance directives  · Medical directive: This covers the care that you want if you are in a coma, near death, or unable to make decisions for yourself  You can list the treatments you want for each condition  Treatment may include pain medicine, surgery, blood transfusions, dialysis, IV or tube feedings, and a ventilator (breathing machine)  · Values history: This document has questions about your views, beliefs, and how you feel and think about life  This information can help others choose the care that you would choose  Why are advance directives important? An advance directive helps you control your care  Although spoken wishes may be used, it is better to have your wishes written down  Spoken wishes can be misunderstood, or not followed  Treatments may be given even if you do not want them  An advance directive may make it easier for your family to make difficult choices about your care     Fall Prevention    Fall prevention  includes ways to make your home and other areas safer  It also includes ways you can move more carefully to prevent a fall  Health conditions that cause changes in your blood pressure, vision, or muscle strength and coordination may increase your risk for falls  Medicines may also increase your risk for falls if they make you dizzy, weak, or sleepy  Fall prevention tips:   · Stand or sit up slowly  · Use assistive devices as directed  · Wear shoes that fit well and have soles that   · Wear a personal alarm  · Stay active  · Manage your medical conditions  Home Safety Tips:  · Add items to prevent falls in the bathroom  · Keep paths clear  · Install bright lights in your home  · Keep items you use often on shelves within reach  · Paint or place reflective tape on the edges of your stairs  Urinary Incontinence   Urinary incontinence (UI)  is when you lose control of your bladder  UI develops because your bladder cannot store or empty urine properly  The 3 most common types of UI are stress incontinence, urge incontinence, or both  Medicines:   · May be given to help strengthen your bladder control  Report any side effects of medication to your healthcare provider  Do pelvic muscle exercises often:  Your pelvic muscles help you stop urinating  Squeeze these muscles tight for 5 seconds, then relax for 5 seconds  Gradually work up to squeezing for 10 seconds  Do 3 sets of 15 repetitions a day, or as directed  This will help strengthen your pelvic muscles and improve bladder control  Train your bladder:  Go to the bathroom at set times, such as every 2 hours, even if you do not feel the urge to go  You can also try to hold your urine when you feel the urge to go  For example, hold your urine for 5 minutes when you feel the urge to go  As that becomes easier, hold your urine for 10 minutes  Self-care:   · Keep a UI record  Write down how often you leak urine and how much you leak   Make a note of what you were doing when you leaked urine  · Drink liquids as directed  You may need to limit the amount of liquid you drink to help control your urine leakage  Do not drink any liquid right before you go to bed  Limit or do not have drinks that contain caffeine or alcohol  · Prevent constipation  Eat a variety of high-fiber foods  Good examples are high-fiber cereals, beans, vegetables, and whole-grain breads  Walking is the best way to trigger your intestines to have a bowel movement  · Exercise regularly and maintain a healthy weight  Weight loss and exercise will decrease pressure on your bladder and help you control your leakage  · Use a catheter as directed  to help empty your bladder  A catheter is a tiny, plastic tube that is put into your bladder to drain your urine  · Go to behavior therapy as directed  Behavior therapy may be used to help you learn to control your urge to urinate  Weight Management   Why it is important to manage your weight:  Being overweight increases your risk of health conditions such as heart disease, high blood pressure, type 2 diabetes, and certain types of cancer  It can also increase your risk for osteoarthritis, sleep apnea, and other respiratory problems  Aim for a slow, steady weight loss  Even a small amount of weight loss can lower your risk of health problems  How to lose weight safely:  A safe and healthy way to lose weight is to eat fewer calories and get regular exercise  You can lose up about 1 pound a week by decreasing the number of calories you eat by 500 calories each day  Healthy meal plan for weight management:  A healthy meal plan includes a variety of foods, contains fewer calories, and helps you stay healthy  A healthy meal plan includes the following:  · Eat whole-grain foods more often  A healthy meal plan should contain fiber  Fiber is the part of grains, fruits, and vegetables that is not broken down by your body   Whole-grain foods are healthy and provide extra fiber in your diet  Some examples of whole-grain foods are whole-wheat breads and pastas, oatmeal, brown rice, and bulgur  · Eat a variety of vegetables every day  Include dark, leafy greens such as spinach, kale, leslie greens, and mustard greens  Eat yellow and orange vegetables such as carrots, sweet potatoes, and winter squash  · Eat a variety of fruits every day  Choose fresh or canned fruit (canned in its own juice or light syrup) instead of juice  Fruit juice has very little or no fiber  · Eat low-fat dairy foods  Drink fat-free (skim) milk or 1% milk  Eat fat-free yogurt and low-fat cottage cheese  Try low-fat cheeses such as mozzarella and other reduced-fat cheeses  · Choose meat and other protein foods that are low in fat  Choose beans or other legumes such as split peas or lentils  Choose fish, skinless poultry (chicken or turkey), or lean cuts of red meat (beef or pork)  Before you cook meat or poultry, cut off any visible fat  · Use less fat and oil  Try baking foods instead of frying them  Add less fat, such as margarine, sour cream, regular salad dressing and mayonnaise to foods  Eat fewer high-fat foods  Some examples of high-fat foods include french fries, doughnuts, ice cream, and cakes  · Eat fewer sweets  Limit foods and drinks that are high in sugar  This includes candy, cookies, regular soda, and sweetened drinks  Exercise:  Exercise at least 30 minutes per day on most days of the week  Some examples of exercise include walking, biking, dancing, and swimming  You can also fit in more physical activity by taking the stairs instead of the elevator or parking farther away from stores  Ask your healthcare provider about the best exercise plan for you  © Copyright [employer] 2018 Information is for End User's use only and may not be sold, redistributed or otherwise used for commercial purposes   All illustrations and images included in CareNotes® are the copyrighted property of Radha KHAN  or Viola Barney

## 2021-07-27 NOTE — ASSESSMENT & PLAN NOTE
Blood pressure assessment today shows a reading of 134/80 she is asymptomatic of any episodes of  Hypertension or hypotension continue present care four-month follow-up assessment is recommended

## 2021-07-27 NOTE — ASSESSMENT & PLAN NOTE
Mild glucose intolerance is noted on the patient's blood work  Fasting blood sugar reading of 106 is noted hemoglobin A1c reading is a reading of 6 0%  Recommend continued care in consumption of excessive carbohydrates as well as concentrated sugars  A follow-up in 6 months is recommended

## 2021-07-27 NOTE — PROGRESS NOTES
Assessment/Plan:    Glucose intolerance (impaired glucose tolerance)    Mild glucose intolerance is noted on the patient's blood work  Fasting blood sugar reading of 106 is noted hemoglobin A1c reading is a reading of 6 0%  Recommend continued care in consumption of excessive carbohydrates as well as concentrated sugars  A follow-up in 6 months is recommended  SVT (supraventricular tachycardia) ([facility])    Recently diagnosed supraventricular tachycardia reviewed with the patient we are awaiting the return of her 2 week Holter monitor to evaluate effectiveness of current therapy with metoprolol tartrate at 25 mg every 12 hours  I have requested a magnesium level to review current levels and I reviewed her comprehensive metabolic profile as it rid pertains to electrolyte balance  Follow-up with Cardiology is recommended  Hypertension    Blood pressure assessment today shows a reading of 134/80 she is asymptomatic of any episodes of  Hypertension or hypotension continue present care four-month follow-up assessment is recommended    Hypertriglyceridemia   Patient is currently requested to have a lipid profile to evaluate current triglyceride readings low triglyceride diet for weight reduction is recommended    Hypercholesterolemia   Recommend continuation of low-cholesterol diet a lipid profile has been requested and will be reviewed with the patient when completed  Diagnoses and all orders for this visit:    SVT (supraventricular tachycardia) ([facility] [address] )  -     Magnesium; Future    Hypercholesterolemia  -     Lipid panel; Future    Glucose intolerance (impaired glucose tolerance)    Hypertension, unspecified type    Hypertriglyceridemia        Subjective:      Patient ID: Carlos De La Vega is a 78 y o  female  This 16-year-old female patient presents today for an annual complete physical examination and review of comprehensive blood work    She has recently been diagnosed with supraventricular tachycardia which was detected when she was in for a routine colonoscopy  She recently  Completed a 2 week Holter monitor the report on the study have not returned at this time  She denies any chest pains palpitations or shortness of breath has had no significant peripheral edema  The following portions of the patient's history were reviewed and updated as appropriate:   She  has a past medical history of Cellulitis of left elbow (10/30/2016), Epistaxis, Medial meniscus tear (11/11/2014), Morbid obesity with BMI of 40 0-44 9, adult ([facility] [address] ) (95/32/8965), and Umbilical hernia  She   Patient Active Problem List    Diagnosis Date Noted    Fungal dermatitis 06/14/2021    1st degree AV block 06/14/2021    RBBB 06/14/2021    SVT (supraventricular tachycardia) (HCC) 06/07/2021    Glucose intolerance (impaired glucose tolerance) 07/24/2020    Obesity (BMI 30-39 9) 11/15/2017    Hypertriglyceridemia 02/26/2016    Hypercholesterolemia 09/12/2014    Hypertension 09/12/2014     She  has a past surgical history that includes Knee arthroscopy w/ meniscal repair (07/26/2015) and Total abdominal hysterectomy (2001)  Her family history includes Diabetes in her family; Hodgkin's lymphoma (age of onset: 32) in her daughter; No Known Problems in her maternal grandfather, maternal grandmother, mother, paternal aunt, paternal aunt, paternal grandfather, and paternal grandmother; Thyroid cancer (age of onset: 40) in her daughter; Thyroid disease in her family  She  reports that she has never smoked  She has never used smokeless tobacco  She reports that she does not drink alcohol and does not use drugs  Current Outpatient Medications   Medication Sig Dispense Refill    aspirin (ECOTRIN LOW STRENGTH) 81 mg EC tablet Take 1 tablet (81 mg total) by mouth daily 90 tablet 0    calcium-vitamin D (OSCAL) 250-125 MG-UNIT per tablet Take 1 tablet by mouth daily        cholecalciferol (VITAMIN D3) 1,000 units tablet Take 1,000 Units by mouth daily  KRILL OIL PO 1200 mg QD      lisinopril (ZESTRIL) 40 mg tablet Take 1 tablet (40 mg total) by mouth daily 90 tablet 3    lovastatin (MEVACOR) 20 mg tablet Take 1 tablet (20 mg total) by mouth daily at bedtime 90 tablet 3    metoprolol tartrate (LOPRESSOR) 25 mg tablet Take 1 tablet (25 mg total) by mouth every 12 (twelve) hours 180 tablet 2    Multiple Vitamins-Minerals (MULTIVITAMIN ADULT PO)        No current facility-administered medications for this visit       Review of Systems   Constitutional: Positive for fatigue  Musculoskeletal: Positive for arthralgias and myalgias  All other systems reviewed and are negative  Objective:      /80   Pulse 77   Temp 99 6 °F (37 6 °C)   Ht 5' 7" (1 702 m)   Wt 116 kg (255 lb 6 4 oz)   SpO2 96%   BMI 40 00 kg/m²          Physical Exam  Vitals reviewed  Constitutional:       General: She is not in acute distress  Appearance: Normal appearance  She is well-developed  She is not ill-appearing  HENT:      Head: Normocephalic  Right Ear: Hearing, tympanic membrane, ear canal and external ear normal       Left Ear: Hearing, tympanic membrane, ear canal and external ear normal       Nose: Nose normal  No mucosal edema  Mouth/Throat:      Pharynx: Uvula midline  No oropharyngeal exudate or posterior oropharyngeal erythema  Eyes:      General: Lids are normal  No scleral icterus  Right eye: No discharge  Left eye: No discharge  Extraocular Movements: Extraocular movements intact  Conjunctiva/sclera: Conjunctivae normal       Pupils: Pupils are equal, round, and reactive to light  Neck:      Thyroid: No thyromegaly  Vascular: No carotid bruit or JVD  Cardiovascular:      Rate and Rhythm: Normal rate and regular rhythm  Heart sounds: Normal heart sounds  No murmur heard  Pulmonary:      Effort: Pulmonary effort is normal  No respiratory distress  Breath sounds: Normal breath sounds   No wheezing, rhonchi or rales  Abdominal:      General: Bowel sounds are normal  There is no distension  Palpations: Abdomen is soft  There is no mass  Tenderness: There is no abdominal tenderness  There is no guarding  Musculoskeletal:         General: No swelling or tenderness  Normal range of motion  Cervical back: Normal range of motion and neck supple  No rigidity or tenderness  Right lower leg: No edema  Left lower leg: No edema  Lymphadenopathy:      Cervical: No cervical adenopathy  Skin:     General: Skin is warm and dry  Coloration: Skin is not jaundiced or pale  Neurological:      Mental Status: She is alert and oriented to person, place, and time  Mental status is at baseline  Deep Tendon Reflexes: Reflexes are normal and symmetric  Reflexes normal    Psychiatric:         Mood and Affect: Mood normal          Speech: Speech normal          Behavior: Behavior normal  Behavior is cooperative  Thought Content:  Thought content normal          Judgment: Judgment normal

## 2021-07-27 NOTE — ASSESSMENT & PLAN NOTE
Recently diagnosed supraventricular tachycardia reviewed with the patient we are awaiting the return of her 2 week Holter monitor to evaluate effectiveness of current therapy with metoprolol tartrate at 25 mg every 12 hours  I have requested a magnesium level to review current levels and I reviewed her comprehensive metabolic profile as it rid pertains to electrolyte balance  Follow-up with Cardiology is recommended

## 2021-07-27 NOTE — PROGRESS NOTES
Assessment and Plan:     Problem List Items Addressed This Visit     None          Falls Plan of Care: balance, strength, and gait training instructions were provided  Preventive health issues were discussed with patient, and age appropriate screening tests were ordered as noted in patient's After Visit Summary  Personalized health advice and appropriate referrals for health education or preventive services given if needed, as noted in patient's After Visit Summary  History of Present Illness:     Patient presents for Medicare Annual Wellness visit    Patient Care Team:  Pedro Hernandez MD as PCP - General  Pedro Hernandez MD as PCP - [address] [place] [address] ([facility])  [facility]     Problem List:     Patient Active Problem List   Diagnosis    Hypercholesterolemia    Hypertension    Hypertriglyceridemia    Obesity (BMI 30-39  9)    Glucose intolerance (impaired glucose tolerance)    SVT (supraventricular tachycardia) (HCC)    Herpes zoster without complication    Fungal dermatitis    1st degree AV block    RBBB      Past Medical and Surgical History:     Past Medical History:   Diagnosis Date    Cellulitis of left elbow 10/30/2016    Epistaxis     Medial meniscus tear 11/11/2014    Morbid obesity with BMI of 40 0-44 9, adult ([facility] [address] ) 20/05/7004    Umbilical hernia      Past Surgical History:   Procedure Laterality Date    KNEE ARTHROSCOPY W/ MENISCAL REPAIR  07/26/2015    with medial meniscus repair    TOTAL ABDOMINAL HYSTERECTOMY  2001      Family History:     Family History   Problem Relation Age of Onset    No Known Problems Mother     Thyroid disease Family     Diabetes Family         Diabetes Mellitus    Thyroid cancer Daughter 40    Hodgkin's lymphoma Daughter 32    No Known Problems Maternal Grandmother     No Known Problems Maternal Grandfather     No Known Problems Paternal Grandmother     No Known Problems Paternal Grandfather     No Known Problems Paternal Aunt     No Known Problems Paternal Aunt       Social History:     Social History     Socioeconomic History    Marital status:      Spouse name: None    Number of children: None    Years of education: None    Highest education level: None   Occupational History    None   Tobacco Use    Smoking status: Never Smoker    Smokeless tobacco: Never Used    Tobacco comment: Denied history of never a smoker per [employer]   Substance and Sexual Activity    Alcohol use: No    Drug use: No    Sexual activity: None   Other Topics Concern    None   Social History Narrative    Exercise habits    Daily Tea Consumption (1 Cup/Day)     Social Determinants of Health     Financial Resource Strain:     Difficulty of Paying Living Expenses:    Food Insecurity:     Worried About Running Out of Food in the Last Year:     Ran Out of Food in the Last Year:    Transportation Needs:     Lack of Transportation (Medical):  Lack of Transportation (Non-Medical):    Physical Activity:     Days of Exercise per Week:     Minutes of Exercise per Session:    Stress:     Feeling of Stress :    Social Connections:     Frequency of Communication with Friends and Family:     Frequency of Social Gatherings with Friends and Family:     Attends Catholic Services:     Active Member of Clubs or Organizations:     Attends Club or Organization Meetings:     Marital Status:    Intimate Partner Violence:     Fear of Current or Ex-Partner:     Emotionally Abused:     Physically Abused:     Sexually Abused:       Medications and Allergies:     Current Outpatient Medications   Medication Sig Dispense Refill    aspirin (ECOTRIN LOW STRENGTH) 81 mg EC tablet Take 1 tablet (81 mg total) by mouth daily 90 tablet 0    calcium-vitamin D (OSCAL) 250-125 MG-UNIT per tablet Take 1 tablet by mouth daily        cholecalciferol (VITAMIN D3) 1,000 units tablet Take 1,000 Units by mouth daily Patient states that she has experienced no weight loss or gain in last 6 months  Depression Screening:   PHQ-2 Score: 0  PHQ-9 Score: 0      Fall Risk Screening: In the past year, patient has experienced: history of falling in past year    Number of falls: 1  Injured during fall?: Yes    Feels unsteady when standing or walking?: No    Worried about falling?: Yes      Urinary Incontinence Screening:   Patient has leaked urine accidently in the last six months  Home Safety:  Patient does not have trouble with stairs inside or outside of their home  Patient has working smoke alarms and has working carbon monoxide detector  Home safety hazards include: none  Nutrition:   Current diet is Regular  Medications:   Patient is currently taking over-the-counter supplements  OTC medications include: see medication list  Patient is able to manage medications  Activities of Daily Living (ADLs)/Instrumental Activities of Daily Living (IADLs):   Walk and transfer into and out of bed and chair?: Yes  Dress and groom yourself?: Yes    Bathe or shower yourself?: Yes    Feed yourself? Yes  Do your laundry/housekeeping?: Yes  Manage your money, pay your bills and track your expenses?: Yes  Make your own meals?: Yes    Do your own shopping?: Yes    Previous Hospitalizations:   Any hospitalizations or ED visits within the last 12 months?: Yes    How many hospitalizations have you had in the last year?: 1-2    Advance Care Planning:   Living will: Yes    Durable POA for healthcare:  Yes    Advanced directive: Yes      PREVENTIVE SCREENINGS      Cardiovascular Screening:    General: Screening Not Indicated and History Lipid Disorder      Diabetes Screening:     General: Screening Current      Colorectal Cancer Screening:     General: Screening Current      Breast Cancer Screening:     General: Screening Current      Cervical Cancer Screening:    General: Screening Not Indicated      Lung Cancer Screening:     General: Screening Not Indicated    Screening, Brief Intervention, and Referral to Treatment (SBIRT)    Screening  Typical number of drinks in a day: 0  Typical number of drinks in a week: 0  Interpretation: Low risk drinking behavior      Single Item Drug Screening:  How often have you used an illegal drug (including marijuana) or a prescription medication for non-medical reasons in the past year? never    Single Item Drug Screen Score: 0  Interpretation: Negative screen for possible drug use disorder      Holly Huagn MD

## 2021-07-27 NOTE — ASSESSMENT & PLAN NOTE
Patient is currently requested to have a lipid profile to evaluate current triglyceride readings low triglyceride diet for weight reduction is recommended

## 2021-07-28 ENCOUNTER — CLINICAL SUPPORT (OUTPATIENT)
Dept: CARDIOLOGY CLINIC | Facility: CLINIC | Age: 71
End: 2021-07-28
Payer: COMMERCIAL

## 2021-07-28 DIAGNOSIS — I47.1 SVT (SUPRAVENTRICULAR TACHYCARDIA) (HCC): ICD-10-CM

## 2021-07-28 PROCEDURE — 93246 EXT ECG>7D<15D RECORDING: CPT | Performed by: NURSE PRACTITIONER

## 2021-07-29 ENCOUNTER — TELEPHONE (OUTPATIENT)
Dept: INTERNAL MEDICINE CLINIC | Facility: CLINIC | Age: 71
End: 2021-07-29

## 2021-07-29 NOTE — TELEPHONE ENCOUNTER
Call returned to the patient she shows a fairly high burden of supraventricular tachycardia on her 2 week Holter monitor    Recommend consultation with Cardiology for further guidance my thoughts would be to increase her beta-blocker from 25 mg of metoprolol tartrate twice a day to 50 mg twice a day

## 2021-07-30 ENCOUNTER — TELEPHONE (OUTPATIENT)
Dept: INTERNAL MEDICINE CLINIC | Facility: CLINIC | Age: 71
End: 2021-07-30

## 2021-07-30 DIAGNOSIS — I47.1 SVT (SUPRAVENTRICULAR TACHYCARDIA) (HCC): ICD-10-CM

## 2021-07-30 NOTE — PROGRESS NOTES
Her discussed 2 week Holter with patient there is significant burden of supraventricular tachycardia despite being on metoprolol 25 mg twice a day will increase metoprolol to 50 mg twice a day patient will watch her blood pressure at home if she has any hypotension from this higher dose metoprolol we can back off the lisinopril to make room for the metoprolol    She has an appointment to see [facility]iology in several weeks

## 2021-08-03 ENCOUNTER — APPOINTMENT (OUTPATIENT)
Dept: LAB | Facility: MEDICAL CENTER | Age: 71
End: 2021-08-03
Payer: COMMERCIAL

## 2021-08-03 DIAGNOSIS — I47.1 SVT (SUPRAVENTRICULAR TACHYCARDIA) (HCC): ICD-10-CM

## 2021-08-03 DIAGNOSIS — E78.00 HYPERCHOLESTEROLEMIA: ICD-10-CM

## 2021-08-03 LAB
CHOLEST SERPL-MCNC: 174 MG/DL (ref 50–200)
HDLC SERPL-MCNC: 50 MG/DL
LDLC SERPL CALC-MCNC: 78 MG/DL (ref 0–100)
MAGNESIUM SERPL-MCNC: 2.1 MG/DL (ref 1.6–2.6)
NONHDLC SERPL-MCNC: 124 MG/DL
TRIGL SERPL-MCNC: 231 MG/DL

## 2021-08-03 PROCEDURE — 83735 ASSAY OF MAGNESIUM: CPT

## 2021-08-03 PROCEDURE — 80061 LIPID PANEL: CPT

## 2021-08-03 PROCEDURE — 36415 COLL VENOUS BLD VENIPUNCTURE: CPT

## 2021-08-12 DIAGNOSIS — I47.1 SVT (SUPRAVENTRICULAR TACHYCARDIA) (HCC): ICD-10-CM

## 2021-08-26 ENCOUNTER — CONSULT (OUTPATIENT)
Dept: CARDIOLOGY CLINIC | Facility: CLINIC | Age: 71
End: 2021-08-26
Payer: COMMERCIAL

## 2021-08-26 VITALS
HEART RATE: 63 BPM | SYSTOLIC BLOOD PRESSURE: 126 MMHG | BODY MASS INDEX: 40.02 KG/M2 | WEIGHT: 255 LBS | DIASTOLIC BLOOD PRESSURE: 78 MMHG | HEIGHT: 67 IN

## 2021-08-26 DIAGNOSIS — I47.1 SVT (SUPRAVENTRICULAR TACHYCARDIA) (HCC): Primary | ICD-10-CM

## 2021-08-26 DIAGNOSIS — E66.9 OBESITY (BMI 30-39.9): ICD-10-CM

## 2021-08-26 DIAGNOSIS — R73.02 GLUCOSE INTOLERANCE (IMPAIRED GLUCOSE TOLERANCE): ICD-10-CM

## 2021-08-26 DIAGNOSIS — I44.0 1ST DEGREE AV BLOCK: ICD-10-CM

## 2021-08-26 DIAGNOSIS — E78.2 MIXED HYPERLIPIDEMIA: ICD-10-CM

## 2021-08-26 DIAGNOSIS — I10 ESSENTIAL HYPERTENSION: ICD-10-CM

## 2021-08-26 PROCEDURE — 3074F SYST BP LT 130 MM HG: CPT | Performed by: INTERNAL MEDICINE

## 2021-08-26 PROCEDURE — 99204 OFFICE O/P NEW MOD 45 MIN: CPT | Performed by: INTERNAL MEDICINE

## 2021-08-26 PROCEDURE — 1036F TOBACCO NON-USER: CPT | Performed by: INTERNAL MEDICINE

## 2021-08-26 PROCEDURE — 3008F BODY MASS INDEX DOCD: CPT | Performed by: INTERNAL MEDICINE

## 2021-08-26 PROCEDURE — 93000 ELECTROCARDIOGRAM COMPLETE: CPT | Performed by: INTERNAL MEDICINE

## 2021-08-26 PROCEDURE — 3078F DIAST BP <80 MM HG: CPT | Performed by: INTERNAL MEDICINE

## 2021-08-26 NOTE — LETTER
August 31, 2021     Harvinder Otero, [address] [place] [address]  2nd Floor, [facility] [id number]    Patient: Amy Allen   YOB: 1950   Date of Visit: 8/26/2021       Dear Dr Juan Medellin:    Thank you for referring Colton Renteria to me for evaluation  Below are my notes for this consultation  If you have questions, please do not hesitate to call me  I look forward to following your patient along with you  Sincerely,        Yana Staley MD        CC: PRANAY Hart MA Waldron Noble, MD  8/31/2021 10:28 AM  Sign when Signing Visit   Consultation - Electrophysiology-Cardiology (EP)   Amy Allen 78 y o  female MRN: [id number]  Unit/Bed#:  Encounter: [id number]      1  SVT (supraventricular tachycardia) (HCC)  POCT ECG   2  1st degree AV block     3  Obesity (BMI 30-39 9)     4  Glucose intolerance (impaired glucose tolerance)     5  Essential hypertension     6   Mixed hyperlipidemia           Consults  Physician Requesting Consult: Aleah Hardy, *   Reason for Consult / Principal Problem:  Management of SVT      Assessment/Plan     SVT  PAT  History of palpitations with visit to the ED; colonoscopy was canceled due to episode of palpitation  Event monitor reveals episodes of paroxysmal atrial tachycardia  Patient is currently on metoprolol      Recommend medication use-flecainide/sotalol or dofetilide  Patient does have first-degree AV block and RBBB - Hence preferred to avoid flecainide  Dofetilide or sotalol may be the preferred medication-price them, will need admission for 3 days to start this medication and monitor for ability to tolerate the medication        Obesity / overweight  BMI-39 9  This increases the risk of-CAD, CVA, vascular disease, diabetes, kidney dysfunction, hypertension, hyperlipidemia  Diet is responsible for 80% of weight gain   Advice nutritional counseling and healthy diet  Also advised to increase activity  He is going to follow up with his primary care        Obstructive sleep apnea   Patient has history of snoring, morning fatigue and daytime sleepiness at least on some days  Examination is also suggestive  Recommended to follow up with primary care and Pulmonary Medicine        Hypertension   Blood pressure-126/78  Medication-metoprolol, lisinopril  My recommendation-continue with the same        Mixed hyperlipidemia   Medication-lovastatin  No myositis or myalgia   My recommendation-continue with the same        Impaired glucose tolerance  Hemoglobin A1c -6   Medication -none  My recommendation-follow with primary      Conduction system disease  First-degree AV block   RBBB   Patient can have worsening conduction with initiation of antiarrhythmic   This may necessitate evaluation for device if needed  We will know once we start her on her medication          Summary of my recommendation for the patient   Price dofetilide/sotalol for initiation-3 day hospital stay  Evaluate and treat for sleep apnea              History of Present Illness   HPI: Nano Melendez is a 78y o  year old female has been referred to me by Dr Dhaliwal Gravely for management of palpitation, SVT, suspected atrial tachycardia      The patient has significant medical illnesses which include  SVT, suspected atrial tachycardia   Obesity  Obstructive sleep apnea   Hypertension   Hyperlipidemia  Diabetes    June 2021, came in to [facility]'s ED with rapid heart rate  Patient's colonoscopy was postponed as she was in rapid heart rate  Patient did have an event monitor which suggests bursts of paroxysmal atrial tachycardia    As far as cardiac symptoms are concerned  Angina -negative  Orthopnea -no new worsening  Paroxysmal nocturnal dyspnea -negative  Leg swelling-some chronic swelling  Palpitations -intermittent  Presyncope-negative  Syncope -negative; patient does give a history of 1 mechanical fall in the last year  Orthostatic lightheadedness -rare  Exertional intolerance-present      Snoring-present  morning fatigue-occasional  Daytime sleepiness-occasional      Social history  Tobacco use-Never smoker  Alcohol use-does not abuse alcohol  Energy drink use-none  Recreational drug use-none      Family history  Diabetes, thyroid disease, lymphoma      Historical Information   Past Medical History:   Diagnosis Date    Cellulitis of left elbow 10/30/2016    Epistaxis     Medial meniscus tear 11/11/2014    Morbid obesity with BMI of 40 0-44 9, adult ([facility] ) 18/19/6253    Umbilical hernia      Past Surgical History:   Procedure Laterality Date    KNEE ARTHROSCOPY W/ MENISCAL REPAIR  07/26/2015    with medial meniscus repair    TOTAL ABDOMINAL HYSTERECTOMY  2001     Social History     Substance and Sexual Activity   Alcohol Use No     Social History     Substance and Sexual Activity   Drug Use No     Social History     Tobacco Use   Smoking Status Never Smoker   Smokeless Tobacco Never Used   Tobacco Comment    Denied history of never a smoker per [employer]     Social History     Socioeconomic History    Marital status:      Spouse name: Not on file    Number of children: Not on file    Years of education: Not on file    Highest education level: Not on file   Occupational History    Not on file   Tobacco Use    Smoking status: Never Smoker    Smokeless tobacco: Never Used    Tobacco comment: Denied history of never a smoker per [employer]   Substance and Sexual Activity    Alcohol use: No    Drug use: No    Sexual activity: Not on file   Other Topics Concern    Not on file   Social History Narrative    Exercise habits    Daily Tea Consumption (1 Cup/Day)     Social Determinants of Health     Financial Resource Strain:     Difficulty of Paying Living Expenses:    Food Insecurity:     Worried About Running Out of Food in the Last Year:     Ran Out of Food in the Last Year:    Transportation Needs:     Lack of Transportation (Medical):      Lack of Transportation (Non-Medical):    Physical Activity:     Days of Exercise per Week:     Minutes of Exercise per Session:    Stress:     Feeling of Stress :    Social Connections:     Frequency of Communication with Friends and Family:     Frequency of Social Gatherings with Friends and Family:     Attends Christian Services:     Active Member of Clubs or Organizations:     Attends Club or Organization Meetings:     Marital Status:    Intimate Partner Violence:     Fear of Current or Ex-Partner:     Emotionally Abused:     Physically Abused:     Sexually Abused:    Family History:  Family History   Problem Relation Age of Onset    No Known Problems Mother     Thyroid disease Family     Diabetes Family         Diabetes Mellitus    Thyroid cancer Daughter 40    Hodgkin's lymphoma Daughter 32    No Known Problems Maternal Grandmother     No Known Problems Maternal Grandfather     No Known Problems Paternal Grandmother     No Known Problems Paternal Grandfather     No Known Problems Paternal Aunt     No Known Problems Paternal Aunt          Meds/Allergies      No current facility-administered medications for this visit  (Not in a hospital admission)      Allergies   Allergen Reactions    Lipitor [Atorvastatin]     Morphine      Reaction Date: [id number];     Morphine And Related     Zithromax [Azithromycin]      Reaction Date: [id number];            Objective   Vitals:   Visit Vitals  /78 (BP Location: Left arm, Patient Position: Sitting, Cuff Size: Large)   Pulse 63   Ht 5' 7" (1 702 m)   Wt 116 kg (255 lb)   BMI 39 94 kg/m²   OB Status Hysterectomy   Smoking Status Never Smoker   BSA 2 24 m²      Vitals:    08/26/21 1556   Weight: 116 kg (255 lb)   [unfilled]    Invasive Devices     None                   ROS  Review of Systems   All other systems reviewed and are negative  As described in my history of present illness        PHYSICAL EXAM  Physical Exam  Vitals reviewed  Constitutional:       General: She is not in acute distress  Appearance: Normal appearance  She is obese  She is not ill-appearing  HENT:      Head: Normocephalic and atraumatic  Right Ear: External ear normal       Left Ear: External ear normal       Nose: Nose normal       Mouth/Throat:      Comments: Posterior pharynx is crowded  Eyes:      General: No scleral icterus  Extraocular Movements: Extraocular movements intact  Conjunctiva/sclera: Conjunctivae normal       Pupils: Pupils are equal, round, and reactive to light  Neck:      Comments: Short thick neck  Cardiovascular:      Rate and Rhythm: Normal rate and regular rhythm  Pulses: Normal pulses  Heart sounds: Normal heart sounds  No murmur heard  Pulmonary:      Effort: Pulmonary effort is normal  No respiratory distress  Breath sounds: Normal breath sounds  No wheezing  Abdominal:      General: Bowel sounds are normal  There is no distension  Tenderness: There is no abdominal tenderness  Comments: Central obesity present   Musculoskeletal:         General: No swelling, tenderness or deformity  Cervical back: No rigidity  Skin:     Coloration: Skin is not jaundiced  Findings: No bruising  Neurological:      Mental Status: She is alert  Mental status is at baseline  Motor: No weakness  Psychiatric:         Mood and Affect: Mood normal          Thought Content:  Thought content normal                LAB RESULTS:      CBC:  Results from Last 12 Months   Lab Units 06/07/21  1252   WBC Thousand/uL 9 00   HEMOGLOBIN g/dL 15 5*   HEMATOCRIT % 46 5*   MCV fL 96   PLATELETS Thousands/uL 221   MCH pg 32 1   MCHC g/dL 33 3   RDW % 13 1   MPV fL 10 7   NRBC AUTO /100 WBCs 0        CMP:  Results from Last 12 Months   Lab Units 06/15/21  0811 06/07/21  1252 03/19/21  0903 11/17/20  0823   POTASSIUM mmol/L 4 4 3 7 4 2 4 2   CHLORIDE mmol/L 108 102 105 103   CO2 mmol/L 28 27 30 31   BUN mg/dL 11 10 18 15   CREATININE mg/dL 0 59* 0 72 0 80 0 88   CALCIUM mg/dL 8 8 9 5 9 3 9 2   AST U/L 24  --  29 24   ALT U/L 29  --  44 32   ALK PHOS U/L 64  --  67 67   EGFR ml/min/1 73sq m 94 86 76 68        Magnesium:   Results from Last 12 Months   Lab Units 21  0851   MAGNESIUM mg/dL 2 1       A1C:  Results from Last 12 Months   Lab Units 06/15/21  0811   HEMOGLOBIN A1C % 6 0*        TSH:  No results for input(s): TSH in the last 8784 hours  PT/INR:  No results for input(s): PROTIME, INR in the last 8784 hours      Lipid Panel:  Results from Last 12 Months   Lab Units 21  0851 21  0903 20  0823   CHOLESTEROL mg/dL 174 193 196   TRIGLYCERIDES mg/dL 231* 217* 174*   HDL mg/dL 50 54 54   NON-HDL-CHOL (CHOL-HDL) mg/dl 124 139 142        Troponin:  Results from Last 12 Months   Lab Units 21  1252   TROPONIN I ng/mL <0 02            Event monitor   2021  Predominantly sinus rhythm  Numerous episodes of SVT  The fastest is 12 beats long   The longest is 29s long    Average heart rate is 67 per minute                Imaging:  Echocardiogram  Results for orders placed during the hospital encounter of 21    Echo complete with contrast if indicated    [address]    Transthoracic Echocardiogram  2D, M-mode, Doppler, and Color Doppler    Study date:  2021    Patient: Yovani Mckee  MR number: [id number]  Account number: [de-identified]  : 1950  Age: 78 years  Gender: Female  Status: Outpatient  Location: [facility]  Height: 67 in  Weight: 254 5 lb  BP: 132/ 78 mmHg    Indications: Supraventricular Tachycardia    Diagnoses: I47 1 - Supraventricular tachycardia    Sonographer:  Slava Moody RDCS  Primary Physician:  Garett Obando MD  Referring Physician:  Pearl Dye:  [facility]  Interpreting Physician:  Edgar Nesbitt MD    SUMMARY    LEFT VENTRICLE:  Systolic function was normal  Ejection fraction was estimated to be 60 %  There were no regional wall motion abnormalities  Wall thickness was at the upper limits of normal     MITRAL VALVE:  There was trace regurgitation  TRICUSPID VALVE:  There was mild regurgitation  PULMONIC VALVE:  There was trace regurgitation  AORTA:  There was mild dilatation of the ascending aorta  4 0 cm    PROCEDURE: The study was performed in the [facility]  This was a routine study  The transthoracic approach was used  The study included complete 2D imaging, M-mode, complete spectral Doppler, and color Doppler  The heart rate was 78 bpm, at the start of the study  Images were obtained from the parasternal, apical, subcostal, and suprasternal notch acoustic windows  Echocardiographic views were limited due to lung interference  Image quality was  adequate  LEFT VENTRICLE: Size was normal  Systolic function was normal  Ejection fraction was estimated to be 60 %  There were no regional wall motion abnormalities  Wall thickness was at the upper limits of normal  No evidence of apical thrombus  DOPPLER: Left ventricular diastolic function parameters were normal     RIGHT VENTRICLE: The size was normal  Systolic function was normal  Wall thickness was normal     LEFT ATRIUM: Size was normal     RIGHT ATRIUM: Size was normal     MITRAL VALVE: Valve structure was normal  There was normal leaflet separation  DOPPLER: The transmitral velocity was within the normal range  There was no evidence for stenosis  There was trace regurgitation  AORTIC VALVE: The valve was trileaflet  Leaflets exhibited normal thickness and normal cuspal separation  DOPPLER: Transaortic velocity was within the normal range  There was no evidence for stenosis  There was no significant  regurgitation  TRICUSPID VALVE: The valve structure was normal  There was normal leaflet separation  DOPPLER: The transtricuspid velocity was within the normal range  There was no evidence for stenosis  There was mild regurgitation  PULMONIC VALVE: Leaflets exhibited normal thickness, no calcification, and normal cuspal separation  DOPPLER: The transpulmonic velocity was within the normal range  There was trace regurgitation  PERICARDIUM: There was no pericardial effusion  The pericardium was normal in appearance  AORTA: The root exhibited normal size  There was mild dilatation of the ascending aorta  4 0 cm    SYSTEMIC VEINS: IVC: The inferior vena cava was normal in size  SYSTEM MEASUREMENT TABLES    2D  %FS: 35 32 %  Ao Diam: 3 28 cm  Ao asc: 3 98 cm  EDV(Teich): 106 34 ml  EF(Teich): 64 62 %  ESV(Teich): 37 62 ml  IVSd: 1 15 cm  LA Area: 12 19 cm2  LA Diam: 3 65 cm  LVEDV MOD A4C: 70 62 ml  LVEF MOD A4C: 67 79 %  LVESV MOD A4C: 22 75 ml  LVIDd: 4 78 cm  LVIDs: 3 09 cm  LVLd A4C: 7 14 cm  LVLs A4C: 5 9 cm  LVOT Diam: 2 cm  LVPWd: 1 02 cm  RA Area: 14 94 cm2  RVIDd: 3 26 cm  SV MOD A4C: 47 88 ml  SV(Teich): 68 72 ml    MM  TAPSE: 1 91 cm    PW  E' Sept: 0 1 m/s  E/E' Sept: 6 47  MV A G[place]: 0 87 m/s  MV Dec Charlton: 3 44 m/s2  MV DecT: 189 73 ms  MV E [place]: 0 65 m/s  MV E/A Ratio: 0 75  MV PHT: 55 02 ms  MVA By PHT: 4 cm2    [facility] Accredited [facility]chocardi[facility]    Prepared and electronically signed by    Arely Thacker MD  Signed 07-Jul-2021 15:00:59    No results found for this or any previous visit  Stress Test:   No results found for this or any previous visit  Cardiac catheterization :  No results found for this or any previous visit  DEVICE CHECK:       No results found for this or any previous visit          Code Status: [unfilled]  Advance Directive and Living Will:      Power of :    POLST:      Counseling / Coordination of Care  Detailed discussion with regards to management of SVT, paroxysmal atrial tachycardia      Black Dumont, MD

## 2021-08-26 NOTE — PROGRESS NOTES
Consultation - Electrophysiology-Cardiology (EP)   Harjinder Lowry 78 y o  female MRN: [id number]  Unit/Bed#:  Encounter: [id number]      1  SVT (supraventricular tachycardia) (HCC)  POCT ECG   2  1st degree AV block     3  Obesity (BMI 30-39 9)     4  Glucose intolerance (impaired glucose tolerance)     5  Essential hypertension     6   Mixed hyperlipidemia           Consults  Physician Requesting Consult: Nadir Vora, *   Reason for Consult / Principal Problem:  Management of SVT      Assessment/Plan     SVT  PAT  History of palpitations with visit to the ED; colonoscopy was canceled due to episode of palpitation  Event monitor reveals episodes of paroxysmal atrial tachycardia  Patient is currently on metoprolol      Recommend medication use-flecainide/sotalol or dofetilide  Patient does have first-degree AV block and RBBB - Hence preferred to avoid flecainide  Dofetilide or sotalol may be the preferred medication-price them, will need admission for 3 days to start this medication and monitor for ability to tolerate the medication        Obesity / overweight  BMI-39 9  This increases the risk of-CAD, CVA, vascular disease, diabetes, kidney dysfunction, hypertension, hyperlipidemia  Diet is responsible for 80% of weight gain   Advice nutritional counseling and healthy diet  Also advised to increase activity  He is going to follow up with his primary care        Obstructive sleep apnea   Patient has history of snoring, morning fatigue and daytime sleepiness at least on some days  Examination is also suggestive  Recommended to follow up with primary care and Pulmonary Medicine        Hypertension   Blood pressure-126/78  Medication-metoprolol, lisinopril  My recommendation-continue with the same        Mixed hyperlipidemia   Medication-lovastatin  No myositis or myalgia   My recommendation-continue with the same        Impaired glucose tolerance  Hemoglobin A1c -6   Medication -none  My recommendation-follow with primary      Conduction system disease  First-degree AV block   RBBB   Patient can have worsening conduction with initiation of antiarrhythmic   This may necessitate evaluation for device if needed  We will know once we start her on her medication          Summary of my recommendation for the patient   Price dofetilide/sotalol for initiation-3 day hospital stay  Evaluate and treat for sleep apnea              History of Present Illness   HPI: Amy Allen is a 78y o  year old female has been referred to me by Dr Juan Medellin for management of palpitation, SVT, suspected atrial tachycardia      The patient has significant medical illnesses which include  SVT, suspected atrial tachycardia   Obesity  Obstructive sleep apnea   Hypertension   Hyperlipidemia  Diabetes    June 2021, came in to [facility]'s ED with rapid heart rate  Patient's colonoscopy was postponed as she was in rapid heart rate  Patient did have an event monitor which suggests bursts of paroxysmal atrial tachycardia    As far as cardiac symptoms are concerned  Angina -negative  Orthopnea -no new worsening  Paroxysmal nocturnal dyspnea -negative  Leg swelling-some chronic swelling  Palpitations -intermittent  Presyncope-negative  Syncope -negative; patient does give a history of 1 mechanical fall in the last year  Orthostatic lightheadedness -rare  Exertional intolerance-present      Snoring-present  morning fatigue-occasional  Daytime sleepiness-occasional      Social history  Tobacco use-Never smoker  Alcohol use-does not abuse alcohol  Energy drink use-none  Recreational drug use-none      Family history  Diabetes, thyroid disease, lymphoma      Historical Information   Past Medical History:   Diagnosis Date    Cellulitis of left elbow 10/30/2016    Epistaxis     Medial meniscus tear 11/11/2014    Morbid obesity with BMI of 40 0-44 9, adult ([facility] [address] ) 23/61/8151    Umbilical hernia      Past Surgical History:   Procedure Laterality Date    KNEE ARTHROSCOPY W/ MENISCAL REPAIR  07/26/2015    with medial meniscus repair    TOTAL ABDOMINAL HYSTERECTOMY  2001     Social History     Substance and Sexual Activity   Alcohol Use No     Social History     Substance and Sexual Activity   Drug Use No     Social History     Tobacco Use   Smoking Status Never Smoker   Smokeless Tobacco Never Used   Tobacco Comment    Denied history of never a smoker per [employer]     Social History     Socioeconomic History    Marital status:      Spouse name: Not on file    Number of children: Not on file    Years of education: Not on file    Highest education level: Not on file   Occupational History    Not on file   Tobacco Use    Smoking status: Never Smoker    Smokeless tobacco: Never Used    Tobacco comment: Denied history of never a smoker per [employer]   Substance and Sexual Activity    Alcohol use: No    Drug use: No    Sexual activity: Not on file   Other Topics Concern    Not on file   Social History Narrative    Exercise habits    Daily Tea Consumption (1 Cup/Day)     Social Determinants of Health     Financial Resource Strain:     Difficulty of Paying Living Expenses:    Food Insecurity:     Worried About Running Out of Food in the Last Year:     Ran Out of Food in the Last Year:    Transportation Needs:     Lack of Transportation (Medical):  Lack of Transportation (Non-Medical):    Physical Activity:     Days of Exercise per Week:     Minutes of Exercise per Session:    Stress:     Feeling of Stress :    Social Connections:     Frequency of Communication with Friends and Family:     Frequency of Social Gatherings with Friends and Family:     Attends Buddhist Services:     Active Member of Clubs or Organizations:     Attends Club or Organization Meetings:     Marital Status:    Intimate Partner Violence:     Fear of Current or Ex-Partner:     Emotionally Abused:     Physically Abused:     Sexually Abused:          Family History:  Family History   Problem Relation Age of Onset    No Known Problems Mother     Thyroid disease Family     Diabetes Family         Diabetes Mellitus    Thyroid cancer Daughter 40    Hodgkin's lymphoma Daughter 32    No Known Problems Maternal Grandmother     No Known Problems Maternal Grandfather     No Known Problems Paternal Grandmother     No Known Problems Paternal Grandfather     No Known Problems Paternal Aunt     No Known Problems Paternal Aunt          Meds/Allergies      No current facility-administered medications for this visit  (Not in a hospital admission)      Allergies   Allergen Reactions    Lipitor [Atorvastatin]     Morphine      Reaction Date: [id number];     Morphine And Related     Zithromax [Azithromycin]      Reaction Date: [id number];            Objective   Vitals:   Visit Vitals  /78 (BP Location: Left arm, Patient Position: Sitting, Cuff Size: Large)   Pulse 63   Ht 5' 7" (1 702 m)   Wt 116 kg (255 lb)   BMI 39 94 kg/m²   OB Status Hysterectomy   Smoking Status Never Smoker   BSA 2 24 m²      Vitals:    08/26/21 1556   Weight: 116 kg (255 lb)   [unfilled]    Invasive Devices     None                   ROS  Review of Systems   All other systems reviewed and are negative  As described in my history of present illness        PHYSICAL EXAM  Physical Exam  Vitals reviewed  Constitutional:       General: She is not in acute distress  Appearance: Normal appearance  She is obese  She is not ill-appearing  HENT:      Head: Normocephalic and atraumatic  Right Ear: External ear normal       Left Ear: External ear normal       Nose: Nose normal       Mouth/Throat:      Comments: Posterior pharynx is crowded  Eyes:      General: No scleral icterus  Extraocular Movements: Extraocular movements intact  Conjunctiva/sclera: Conjunctivae normal       Pupils: Pupils are equal, round, and reactive to light     Neck:      Comments: Short thick neck  Cardiovascular:      Rate and Rhythm: Normal rate and regular rhythm  Pulses: Normal pulses  Heart sounds: Normal heart sounds  No murmur heard  Pulmonary:      Effort: Pulmonary effort is normal  No respiratory distress  Breath sounds: Normal breath sounds  No wheezing  Abdominal:      General: Bowel sounds are normal  There is no distension  Tenderness: There is no abdominal tenderness  Comments: Central obesity present   Musculoskeletal:         General: No swelling, tenderness or deformity  Cervical back: No rigidity  Skin:     Coloration: Skin is not jaundiced  Findings: No bruising  Neurological:      Mental Status: She is alert  Mental status is at baseline  Motor: No weakness  Psychiatric:         Mood and Affect: Mood normal          Thought Content: Thought content normal                LAB RESULTS:      CBC:  Results from Last 12 Months   Lab Units 06/07/21  1252   WBC Thousand/uL 9 00   HEMOGLOBIN g/dL 15 5*   HEMATOCRIT % 46 5*   MCV fL 96   PLATELETS Thousands/uL 221   MCH pg 32 1   MCHC g/dL 33 3   RDW % 13 1   MPV fL 10 7   NRBC AUTO /100 WBCs 0        CMP:  Results from Last 12 Months   Lab Units 06/15/21  0811 06/07/21  1252 03/19/21  0903 11/17/20  0823   POTASSIUM mmol/L 4 4 3 7 4 2 4 2   CHLORIDE mmol/L 108 102 105 103   CO2 mmol/L 28 27 30 31   BUN mg/dL 11 10 18 15   CREATININE mg/dL 0 59* 0 72 0 80 0 88   CALCIUM mg/dL 8 8 9 5 9 3 9 2   AST U/L 24  --  29 24   ALT U/L 29  --  44 32   ALK PHOS U/L 64  --  67 67   EGFR ml/min/1 73sq m 94 86 76 68        Magnesium:   Results from Last 12 Months   Lab Units 08/03/21  0851   MAGNESIUM mg/dL 2 1       A1C:  Results from Last 12 Months   Lab Units 06/15/21  0811   HEMOGLOBIN A1C % 6 0*        TSH:  No results for input(s): TSH in the last 8784 hours  PT/INR:  No results for input(s): PROTIME, INR in the last 8784 hours      Lipid Panel:  Results from Last 12 Months   Lab Units 21  0851 21  0903 20  0823   CHOLESTEROL mg/dL 174 193 196   TRIGLYCERIDES mg/dL 231* 217* 174*   HDL mg/dL 50 54 54   NON-HDL-CHOL (CHOL-HDL) mg/dl 124 139 142        Troponin:  Results from Last 12 Months   Lab Units 21  1252   TROPONIN I ng/mL <0 02            Event monitor   2021  Predominantly sinus rhythm  Numerous episodes of SVT  The fastest is 12 beats long   The longest is 29s long    Average heart rate is 67 per minute                Imaging:  Echocardiogram  Results for orders placed during the hospital encounter of 21    Echo complete with contrast if indicated    Narrative  [facility]  [address]    Transthoracic Echocardiogram  2D, M-mode, Doppler, and Color Doppler    Study date:  2021    Patient: Delfino Martinez  MR number: [id number]  Account number: [de-identified]  : 1950  Age: 78 years  Gender: Female  Status: Outpatient  [facility]  Height: 67 in  Weight: 254 5 lb  BP: 132/ 78 mmHg    Indications: Supraventricular Tachycardia    Diagnoses: I47 1 - Supraventricular tachycardia    Sonographer:  Heather Pryor RDCS  Primary Physician:  Norma Marcus MD  Referring Physician:  Troy Staton:  [facility]  Interpreting Physician:  Antia hCeung MD    SUMMARY    LEFT VENTRICLE:  Systolic function was normal  Ejection fraction was estimated to be 60 %  There were no regional wall motion abnormalities  Wall thickness was at the upper limits of normal     MITRAL VALVE:  There was trace regurgitation  TRICUSPID VALVE:  There was mild regurgitation  PULMONIC VALVE:  There was trace regurgitation  AORTA:  There was mild dilatation of the ascending aorta  4 0 cm    PROCEDURE: The study was performed in the [facility]  This was a routine study  The transthoracic approach was used   The study included complete 2D imaging, M-mode, complete spectral Doppler, and color Doppler  The heart rate was 78 bpm, at the start of the study  Images were obtained from the parasternal, apical, subcostal, and suprasternal notch acoustic windows  Echocardiographic views were limited due to lung interference  Image quality was  adequate  LEFT VENTRICLE: Size was normal  Systolic function was normal  Ejection fraction was estimated to be 60 %  There were no regional wall motion abnormalities  Wall thickness was at the upper limits of normal  No evidence of apical thrombus  DOPPLER: Left ventricular diastolic function parameters were normal     RIGHT VENTRICLE: The size was normal  Systolic function was normal  Wall thickness was normal     LEFT ATRIUM: Size was normal     RIGHT ATRIUM: Size was normal     MITRAL VALVE: Valve structure was normal  There was normal leaflet separation  DOPPLER: The transmitral velocity was within the normal range  There was no evidence for stenosis  There was trace regurgitation  AORTIC VALVE: The valve was trileaflet  Leaflets exhibited normal thickness and normal cuspal separation  DOPPLER: Transaortic velocity was within the normal range  There was no evidence for stenosis  There was no significant  regurgitation  TRICUSPID VALVE: The valve structure was normal  There was normal leaflet separation  DOPPLER: The transtricuspid velocity was within the normal range  There was no evidence for stenosis  There was mild regurgitation  PULMONIC VALVE: Leaflets exhibited normal thickness, no calcification, and normal cuspal separation  DOPPLER: The transpulmonic velocity was within the normal range  There was trace regurgitation  PERICARDIUM: There was no pericardial effusion  The pericardium was normal in appearance  AORTA: The root exhibited normal size  There was mild dilatation of the ascending aorta  4 0 cm    SYSTEMIC VEINS: IVC: The inferior vena cava was normal in size      SYSTEM MEASUREMENT TABLES    2D  %FS: 35 32 %  Ao Diam: 3 28 cm  Ao asc: 3 98 cm  EDV(Teich): 106 34 ml  EF(Teich): 64 62 %  ESV(Teich): 37 62 ml  IVSd: 1 15 cm  LA Area: 12 19 cm2  LA Diam: 3 65 cm  LVEDV MOD A4C: 70 62 ml  LVEF MOD A4C: 67 79 %  LVESV MOD A4C: 22 75 ml  LVIDd: 4 78 cm  LVIDs: 3 09 cm  LVLd A4C: 7 14 cm  LVLs A4C: 5 9 cm  LVOT Diam: 2 cm  LVPWd: 1 02 cm  RA Area: 14 94 cm2  RVIDd: 3 26 cm  SV MOD A4C: 47 88 ml  SV(Teich): 68 72 ml    MM  TAPSE: 1 91 cm    PW  E' Sept: 0 1 m/s  E/E' Sept: 6 47  MV A [place]: 0 87 m/s  MV Dec Linn: 3 44 m/s2  MV DecT: 189 73 ms  MV E [place]: 0 65 m/s  MV E/A Ratio: 0 75  MV PHT: 55 02 ms  MVA By PHT: 4 cm2    [facility]redited [facility]    Prepared and electronically signed by    Radha Cantrell MD  Signed 07-Jul-2021 15:00:59    No results found for this or any previous visit  Stress Test:   No results found for this or any previous visit  Cardiac catheterization :  No results found for this or any previous visit  DEVICE CHECK:       No results found for this or any previous visit          Code Status: [unfilled]  Advance Directive and Living Will:      Power of :    POLST:      Counseling / Coordination of Care  Detailed discussion with regards to management of SVT, paroxysmal atrial tachycardia      Yana Staley MD

## 2021-08-31 ENCOUNTER — TELEPHONE (OUTPATIENT)
Dept: CARDIOLOGY CLINIC | Facility: CLINIC | Age: 71
End: 2021-08-31

## 2021-08-31 NOTE — TELEPHONE ENCOUNTER
Patient referred by Dr Corine Iyer for med admission, can we have price for Dofetilide/Sotalol  Thank you

## 2021-09-01 ENCOUNTER — TELEPHONE (OUTPATIENT)
Dept: INTERNAL MEDICINE CLINIC | Facility: CLINIC | Age: 71
End: 2021-09-01

## 2021-09-01 DIAGNOSIS — I47.1 SVT (SUPRAVENTRICULAR TACHYCARDIA) (HCC): Primary | ICD-10-CM

## 2021-09-01 NOTE — TELEPHONE ENCOUNTER
Patient is waiting to set the sleep study first and the after she will call back to schedule the medication treatment

## 2021-09-01 NOTE — TELEPHONE ENCOUNTER
Niranjan Burciaga said she saw cardiology and he suggested she have a sleep study done and also see pulmonary, so she is asking for a referral   Patient advised to set up appt for referral, she refused

## 2021-09-02 DIAGNOSIS — G47.30 SLEEP APNEA, UNSPECIFIED TYPE: ICD-10-CM

## 2021-09-02 DIAGNOSIS — I47.1 SVT (SUPRAVENTRICULAR TACHYCARDIA) (HCC): Primary | ICD-10-CM

## 2021-09-02 NOTE — PROGRESS NOTES
Note from Cardiology reviewed    I have recommended and referred the patient for sleep evaluation to diagnose possible sleep apnea

## 2021-09-07 ENCOUNTER — OFFICE VISIT (OUTPATIENT)
Dept: SLEEP CENTER | Facility: CLINIC | Age: 71
End: 2021-09-07
Payer: COMMERCIAL

## 2021-09-07 VITALS
BODY MASS INDEX: 40.34 KG/M2 | WEIGHT: 257 LBS | OXYGEN SATURATION: 98 % | HEIGHT: 67 IN | SYSTOLIC BLOOD PRESSURE: 120 MMHG | DIASTOLIC BLOOD PRESSURE: 84 MMHG | HEART RATE: 61 BPM

## 2021-09-07 DIAGNOSIS — G47.30 SLEEP APNEA, UNSPECIFIED TYPE: Primary | ICD-10-CM

## 2021-09-07 PROCEDURE — 3008F BODY MASS INDEX DOCD: CPT | Performed by: NURSE PRACTITIONER

## 2021-09-07 PROCEDURE — 99205 OFFICE O/P NEW HI 60 MIN: CPT | Performed by: NURSE PRACTITIONER

## 2021-09-07 PROCEDURE — 1036F TOBACCO NON-USER: CPT | Performed by: NURSE PRACTITIONER

## 2021-09-07 PROCEDURE — 3079F DIAST BP 80-89 MM HG: CPT | Performed by: NURSE PRACTITIONER

## 2021-09-07 PROCEDURE — 3074F SYST BP LT 130 MM HG: CPT | Performed by: NURSE PRACTITIONER

## 2021-09-07 PROCEDURE — 1160F RVW MEDS BY RX/DR IN RCRD: CPT | Performed by: NURSE PRACTITIONER

## 2021-09-07 NOTE — PATIENT INSTRUCTIONS
1  Schedule diagnostic sleep study with CPAP titration study to follow, if needed  2  Schedule DME appointment for CPAP equipment, if needed  3  Schedule follow up visit for CPAP compliance and recheck        Nursing Support:  When: Monday through Friday 7A-5PM except holidays  Where: Our direct line is [phone]  If you are having a true emergency please call 911  In the event that the line is busy or it is after hours please leave a voice message and we will return your call  Please speak clearly, leaving your full name, birth date, best number to reach you and the reason for your call  Medication refills: We will need the name of the medication, the dosage, the ordering provider, whether you get a 30 or 90 day refill, and the pharmacy name and address  Medications will be ordered by the provider only  Nurses cannot call in prescriptions  Please allow 7 days for medication refills  Physician requested updates: If your provider requested that you call with an update after starting medication, please be ready to provide us the medication and dosage, what time you take your medication, the time you attempt to fall asleep, time you fall asleep, when you wake up, and what time you get out of bed  Sleep Study Results: We will contact you with sleep study results and/or next steps after the physician has reviewed your testing

## 2021-09-07 NOTE — PROGRESS NOTES
Consultation - [address], 1950, MRN: [id number]    9/7/2021        Reason for Consult / Principal Problem:  Evaluation of possible Obstructive Sleep Apnea  Obesity       Thank you for the opportunity of participating in the evaluation and care of this patient in the Sleep Clinic at [facility]  Subjective:     HPI: Nano Melendez is a 78y o  year old female  She presents for a consultation regarding concern of possible obstructive sleep apnea  She was found to be in SVT when she went to [place] [address] to have a colonoscopy  She was evaluated in the ED and sent home on metoprolol  She then had a holter monitor and was still having significant cardiac arrhythmia  She had evaluation with Dr Debi Echevarria and a diagnostic  sleep study has been ordered  She has been essentially asymptomatic throughout  She has had difficulty with sleep initiation and sleep maintenance insomnia for many years  This worsened shortly after her  passed away  She used ambien 5mg for a brief period of time, which helped with sleep initiation and sleep maintenance  She did not wish to become dependent on medication and continues to experience difficulty with both sleep initiation and sleep maintenance  In addition to the SVT, her comorbid conditions include obesity, HTN  Labs were reviewed and there is some increase in hemoglobin and hematocrit over the past year  Review of Systems      Genitourinary need to urinate more than twice a night and post menopausal (no peroids)   Cardiology palpitations/fluttering feeling in the chest   Gastrointestinal none   Neurology need to move extremities   Constitutional fatigue   Integumentary none   Psychiatry none   Musculoskeletal joint pain   Pulmonary snoring   ENT none   Endocrine none   Hematological none       Employment:  She is a retired RN    She for [employer] for 45 years and worked [employer] for many years  Sleep Schedule:       Bedtime:  11:00pm      Latency:  Up to an hour      Wakeup time:  8:00am without the use of an alarm    Awakenings:       Frequency:  2 times per night      Causes: For urination      Duration: This can take up to an hour at times    Daytime Sleepiness / Inappropriate Sleep:       Most severe:  She is busy throughout the day and may become tired if more physically active than usual       Naps :  One to two times per week      Time:  Afternoons  (2-3:00pm)      Duration:  45-60 minutes       Inappropriate drowsiness / sleep:  She does not doze unintentionally    Snoring:  She snores lightly, if napping  Night time snoring was not reported  Apnea: No witnessed apnea    Change in Weight:  Her weight has been stable    Restless Leg Syndrome:  no clinical symptoms consistent with this diagnosis     Other Complaints: It takes her some time to get positioned for sleep, due to pain in her legs and feet  No reports of sleep walking, sleep talking, sleep paralysis or hallucinations surrounding sleep  No reports of headaches in the am or during the night  No reports of bruxism  Social History:      Caffeine:  She has been avoiding caffeine since arrhythmia  She was drinking one soda and one cup of hot tea in the winter  Tobacco:   reports that she has never smoked  She has never used smokeless tobacco      E-cig/Vaping:    E-Cigarette/Vaping    E-Cigarette Use Never User       E-Cigarette/Vaping Substances    Nicotine No     THC No     CBD No     Flavoring No     Other No          Alcohol:   reports no history of alcohol use  Drugs:   reports no history of drug use         The review of systems and following portions of the patient's history were reviewed and updated as appropriate: allergies, current medications, past family history, past medical history, past social history, past surgical history and problem list         Objective:       Vitals: 09/07/21 1400   BP: 120/84   Pulse: 61   SpO2: 98%   Weight: 117 kg (257 lb)   Height: 5' 7" (1 702 m)     Body mass index is 40 25 kg/m²  Neck Circumference: 17  [place] Sleepiness Scale: Total score: 3      Current Outpatient Medications:     aspirin (ECOTRIN LOW STRENGTH) 81 mg EC tablet, Take 1 tablet (81 mg total) by mouth daily, Disp: 90 tablet, Rfl: 0    calcium-vitamin D (OSCAL) 250-125 MG-UNIT per tablet, Take 1 tablet by mouth daily  , Disp: , Rfl:     cholecalciferol (VITAMIN D3) 1,000 units tablet, Take 1,000 Units by mouth daily, Disp: , Rfl:     KRILL OIL PO, 1200 mg QD, Disp: , Rfl:     lisinopril (ZESTRIL) 40 mg tablet, Take 1 tablet (40 mg total) by mouth daily, Disp: 90 tablet, Rfl: 3    lovastatin (MEVACOR) 20 mg tablet, Take 1 tablet (20 mg total) by mouth daily at bedtime, Disp: 90 tablet, Rfl: 3    metoprolol tartrate (LOPRESSOR) 25 mg tablet, Take 2 tablets (50 mg total) by mouth every 12 (twelve) hours, Disp: 360 tablet, Rfl: 3    Multiple Vitamins-Minerals (MULTIVITAMIN ADULT PO), , Disp: , Rfl:     Physical Exam  General Appearance:   Alert, cooperative, no distress, appears stated age, obese     Head:   Normocephalic, without obvious abnormality, atraumatic     Eyes:   PERRL, conjunctiva/corneas clear, EOM's intact          Nose:  Nares normal, septum deviated, mucosa normal, no drainage or sinus tenderness           Throat:  Lips, teeth and gums normal; tongue normal size and  shape and midline in position; mucosa moist with low-lying soft palatal tissue, uvula normal, tonsils not visualized, Mallampati class 3       Neck:  Supple, symmetrical, trachea midline, no adenopathy;  Thyroid: No enlargement, tenderness or nodules; no carotid bruit or JVD     Lungs:      Clear to auscultation bilaterally, respirations unlabored     Heart:   Slightly irregular rate and rhythm, S1 and S2 normal, no murmur, rub or gallop       Extremities:  Extremities normal, atraumatic, no cyanosis or edema Skin:  Skin color, texture, turgor normal, no rashes or lesions       Neurologic:  No focal deficits noted       Sleep Study Results:  No prior sleep study      ASSESSMENT / PLAN     1  Sleep apnea, unspecified type  Ambulatory referral to Sleep Medicine    CPAP Study         Counseling / Coordination of Care  Total clinic time spent today 60 minutes  Greater than 50% of total time was spent with the patient and / or family counseling and / or coordination of care  A description of the counseling / coordination of care:     diagnostic results, instructions for management, risk factor reductions, prognosis, patient and family education, impressions, risks and benefits of treatment options and importance of compliance with treatment    Today we discussed the anatomy and physiology of the upper airway  I pointed out how changes in this region can result in both snoring and abnormal breathing events including apneas and hypopneas  I explained the most common co-morbidities of untreated sleep apnea  After this we talked about some forms of treatment including application of positive airway pressure, mandibular advancement devices and surgery  In order to evaluate the possibility of Obstructive Sleep Apnea as a cause of the patient's symptoms, a diagnostic sleep study will be completed to identify the presence or absence of abnormal nocturnal breathing  If significant abnormal nocturnal breathing is detected, nasal CPAP will be titrated to find the optimum pressure needed to maintain upper airway patency during sleep  Following testing, the patient will return to the Sleep [facility] for set up of CPAP equipment, if needed, followed by a compliance follow up visit 31-91 days after set up  The following instructions have been given to the patient today:    Patient Instructions   1  Schedule diagnostic sleep study with CPAP titration study to follow, if needed  2   Schedule DME appointment for CPAP equipment, if needed  3  Schedule follow up visit for CPAP compliance and recheck        Nursing Support:  When: Monday through Friday 7A-5PM except holidays  Where: Our direct line is [phone]  If you are having a true emergency please call 911  In the event that the line is busy or it is after hours please leave a voice message and we will return your call  Please speak clearly, leaving your full name, birth date, best number to reach you and the reason for your call  Medication refills: We will need the name of the medication, the dosage, the ordering provider, whether you get a 30 or 90 day refill, and the pharmacy name and address  Medications will be ordered by the provider only  Nurses cannot call in prescriptions  Please allow 7 days for medication refills  Physician requested updates: If your provider requested that you call with an update after starting medication, please be ready to provide us the medication and dosage, what time you take your medication, the time you attempt to fall asleep, time you fall asleep, when you wake up, and what time you get out of bed  Sleep Study Results: We will contact you with sleep study results and/or next steps after the physician has reviewed your testing          Janice Alexandre, [address] [facility]

## 2021-09-20 ENCOUNTER — HOSPITAL ENCOUNTER (OUTPATIENT)
Dept: SLEEP CENTER | Facility: CLINIC | Age: 71
Discharge: HOME | End: 2021-09-20
Payer: COMMERCIAL

## 2021-09-20 DIAGNOSIS — I47.1 SVT (SUPRAVENTRICULAR TACHYCARDIA) (HCC): ICD-10-CM

## 2021-09-20 PROCEDURE — 95810 POLYSOM 6/> YRS 4/> PARAM: CPT

## 2021-09-20 PROCEDURE — 95810 POLYSOM 6/> YRS 4/> PARAM: CPT | Performed by: INTERNAL MEDICINE

## 2021-09-21 NOTE — PROGRESS NOTES
Sleep Study Documentation    Pre-Sleep Study       Sleep testing procedure explained to patient:YES    Patient napped prior to study:NO    [address] [place] worker after 12PM   Caffeine use:NO    Alcohol:Dayshift workers after 5PM: Alcohol use:NO    Typical day for patient:YES       Study Documentation    Sleep Study Indications: Excessive daytime sleepiness and snoring  History of a-fib and cardiac arrhythmia  Sleep Study: Diagnostic   Snore: Moderate  Supplemental O2: no      Minimum SaO2 86%  Baseline SaO2 95%            EKG abnormalities: yes:  Comments: Frequent PACs  EEG abnormalities: no    Sleep Study Recorded < 2 hours: N/A    Sleep Study Recorded > 2 hours but incomplete study: N/A    Sleep Study Recorded 6 hours but no sleep obtained: NO    Patient classification: retired       Post-Sleep Study    Medication used at bedtime or during sleep study:YES other prescription medications    Patient reports time it took to fall asleep:greater than 60 minutes    Patient reports waking up during study:3 or more times  Patient reports returning to sleep in greater than 30 minutes  Patient reports sleeping 2 to 4 hours without dreaming  Patient reports sleep during study:typical    Patient rated sleepiness: Somewhat sleepy or tired    PAP treatment:no

## 2021-09-23 ENCOUNTER — TELEPHONE (OUTPATIENT)
Dept: SLEEP CENTER | Facility: CLINIC | Age: 71
End: 2021-09-23

## 2021-09-23 DIAGNOSIS — I10 ESSENTIAL HYPERTENSION: ICD-10-CM

## 2021-09-23 RX ORDER — LISINOPRIL 40 MG/1
40 TABLET ORAL DAILY
Qty: 90 TABLET | Refills: 3 | Status: SHIPPED | OUTPATIENT
Start: 2021-09-23

## 2021-09-23 NOTE — TELEPHONE ENCOUNTER
Patient called, states she saw her results on My Chart, asking to know what the next steps are? States she has another study scheduled 10/1/2021, asking if she needs that  I advised that we are awaiting Kala's recommendations and will call her  Patient has multiple questions and would like if Leann Guillermo called her  Leann Guillermo, are you willing to call her? Patient also states that the reason for having the study was SVT, but on the report it says a-fib  States she has never had a-fib and that is totally different than SVT  She would like that corrected  I did send a message to [facility], manager to have that corrected

## 2021-10-01 ENCOUNTER — HOSPITAL ENCOUNTER (OUTPATIENT)
Dept: SLEEP CENTER | Facility: CLINIC | Age: 71
Discharge: HOME | End: 2021-10-01
Payer: COMMERCIAL

## 2021-10-01 DIAGNOSIS — G47.30 SLEEP APNEA, UNSPECIFIED TYPE: ICD-10-CM

## 2021-10-01 PROCEDURE — 95811 POLYSOM 6/>YRS CPAP 4/> PARM: CPT

## 2021-10-01 PROCEDURE — 95811 POLYSOM 6/>YRS CPAP 4/> PARM: CPT | Performed by: INTERNAL MEDICINE

## 2021-10-05 ENCOUNTER — TELEPHONE (OUTPATIENT)
Dept: SLEEP CENTER | Facility: CLINIC | Age: 71
End: 2021-10-05

## 2021-10-05 DIAGNOSIS — G47.30 SLEEP APNEA, UNSPECIFIED TYPE: Primary | ICD-10-CM

## 2021-10-12 DIAGNOSIS — Z12.31 SCREENING MAMMOGRAM, ENCOUNTER FOR: Primary | ICD-10-CM

## 2021-11-23 ENCOUNTER — RA CDI HCC (OUTPATIENT)
Dept: OTHER | Facility: HOSPITAL | Age: 71
End: 2021-11-23

## 2021-11-24 ENCOUNTER — OFFICE VISIT (OUTPATIENT)
Dept: CARDIOLOGY CLINIC | Facility: CLINIC | Age: 71
End: 2021-11-24
Payer: COMMERCIAL

## 2021-11-24 VITALS
WEIGHT: 258.8 LBS | SYSTOLIC BLOOD PRESSURE: 128 MMHG | BODY MASS INDEX: 40.62 KG/M2 | HEART RATE: 66 BPM | DIASTOLIC BLOOD PRESSURE: 80 MMHG | HEIGHT: 67 IN | OXYGEN SATURATION: 96 %

## 2021-11-24 DIAGNOSIS — I47.1 SVT (SUPRAVENTRICULAR TACHYCARDIA) (HCC): Primary | ICD-10-CM

## 2021-11-24 DIAGNOSIS — E78.2 MIXED HYPERLIPIDEMIA: ICD-10-CM

## 2021-11-24 DIAGNOSIS — I10 PRIMARY HYPERTENSION: ICD-10-CM

## 2021-11-24 PROCEDURE — 99214 OFFICE O/P EST MOD 30 MIN: CPT | Performed by: INTERNAL MEDICINE

## 2021-11-24 PROCEDURE — 1036F TOBACCO NON-USER: CPT | Performed by: INTERNAL MEDICINE

## 2021-11-24 PROCEDURE — 3079F DIAST BP 80-89 MM HG: CPT | Performed by: INTERNAL MEDICINE

## 2021-11-24 PROCEDURE — 3074F SYST BP LT 130 MM HG: CPT | Performed by: INTERNAL MEDICINE

## 2021-11-24 PROCEDURE — 1160F RVW MEDS BY RX/DR IN RCRD: CPT | Performed by: INTERNAL MEDICINE

## 2021-11-24 RX ORDER — METOPROLOL TARTRATE 50 MG/1
50 TABLET, FILM COATED ORAL EVERY 12 HOURS SCHEDULED
Qty: 180 TABLET | Refills: 3 | Status: SHIPPED | OUTPATIENT
Start: 2021-11-24

## 2021-11-30 ENCOUNTER — OFFICE VISIT (OUTPATIENT)
Dept: INTERNAL MEDICINE CLINIC | Facility: CLINIC | Age: 71
End: 2021-11-30
Payer: COMMERCIAL

## 2021-11-30 VITALS
SYSTOLIC BLOOD PRESSURE: 132 MMHG | HEIGHT: 67 IN | TEMPERATURE: 96.9 F | HEART RATE: 61 BPM | WEIGHT: 261.6 LBS | BODY MASS INDEX: 41.06 KG/M2 | RESPIRATION RATE: 14 BRPM | DIASTOLIC BLOOD PRESSURE: 84 MMHG | OXYGEN SATURATION: 97 %

## 2021-11-30 DIAGNOSIS — I47.1 SVT (SUPRAVENTRICULAR TACHYCARDIA) (HCC): Primary | ICD-10-CM

## 2021-11-30 DIAGNOSIS — E78.2 MIXED HYPERLIPIDEMIA: ICD-10-CM

## 2021-11-30 DIAGNOSIS — I10 PRIMARY HYPERTENSION: ICD-10-CM

## 2021-11-30 DIAGNOSIS — L98.9 SKIN LESIONS: ICD-10-CM

## 2021-11-30 DIAGNOSIS — R73.02 GLUCOSE INTOLERANCE (IMPAIRED GLUCOSE TOLERANCE): ICD-10-CM

## 2021-11-30 DIAGNOSIS — G47.33 OSA (OBSTRUCTIVE SLEEP APNEA): ICD-10-CM

## 2021-11-30 DIAGNOSIS — Z11.59 NEED FOR HEPATITIS C SCREENING TEST: ICD-10-CM

## 2021-11-30 PROBLEM — B36.9 FUNGAL DERMATITIS: Status: RESOLVED | Noted: 2021-06-14 | Resolved: 2021-11-30

## 2021-11-30 PROCEDURE — 99214 OFFICE O/P EST MOD 30 MIN: CPT | Performed by: INTERNAL MEDICINE

## 2021-11-30 PROCEDURE — 3008F BODY MASS INDEX DOCD: CPT | Performed by: INTERNAL MEDICINE

## 2021-12-02 ENCOUNTER — HOSPITAL ENCOUNTER (OUTPATIENT)
Dept: MAMMOGRAPHY | Facility: MEDICAL CENTER | Age: 71
Discharge: HOME | End: 2021-12-02
Payer: COMMERCIAL

## 2021-12-02 DIAGNOSIS — Z12.31 SCREENING MAMMOGRAM, ENCOUNTER FOR: ICD-10-CM

## 2021-12-02 PROCEDURE — 77063 BREAST TOMOSYNTHESIS BI: CPT

## 2021-12-02 PROCEDURE — 77067 SCR MAMMO BI INCL CAD: CPT

## 2022-01-10 ENCOUNTER — OFFICE VISIT (OUTPATIENT)
Dept: SLEEP CENTER | Facility: CLINIC | Age: 72
End: 2022-01-10
Payer: COMMERCIAL

## 2022-01-10 VITALS
DIASTOLIC BLOOD PRESSURE: 82 MMHG | OXYGEN SATURATION: 97 % | BODY MASS INDEX: 41.72 KG/M2 | WEIGHT: 265.8 LBS | HEIGHT: 67 IN | TEMPERATURE: 99.7 F | HEART RATE: 62 BPM | SYSTOLIC BLOOD PRESSURE: 134 MMHG

## 2022-01-10 DIAGNOSIS — I47.1 SVT (SUPRAVENTRICULAR TACHYCARDIA) (HCC): ICD-10-CM

## 2022-01-10 DIAGNOSIS — G47.33 OSA (OBSTRUCTIVE SLEEP APNEA): Primary | ICD-10-CM

## 2022-01-10 DIAGNOSIS — I10 PRIMARY HYPERTENSION: ICD-10-CM

## 2022-01-10 PROCEDURE — 3075F SYST BP GE 130 - 139MM HG: CPT | Performed by: INTERNAL MEDICINE

## 2022-01-10 PROCEDURE — 1036F TOBACCO NON-USER: CPT | Performed by: INTERNAL MEDICINE

## 2022-01-10 PROCEDURE — 3008F BODY MASS INDEX DOCD: CPT | Performed by: INTERNAL MEDICINE

## 2022-01-10 PROCEDURE — 3079F DIAST BP 80-89 MM HG: CPT | Performed by: INTERNAL MEDICINE

## 2022-01-10 PROCEDURE — 99213 OFFICE O/P EST LOW 20 MIN: CPT | Performed by: INTERNAL MEDICINE

## 2022-01-10 PROCEDURE — 1160F RVW MEDS BY RX/DR IN RCRD: CPT | Performed by: INTERNAL MEDICINE

## 2022-01-10 NOTE — PROGRESS NOTES
Review of Systems      Genitourinary none   Cardiology palpitations/fluttering feeling in the chest   Gastrointestinal none   Neurology none   Constitutional none   Integumentary rash or dry skin and none   Psychiatry none   Musculoskeletal joint pain and sciatica   Pulmonary none   ENT none   Endocrine none   Hematological none

## 2022-01-10 NOTE — ASSESSMENT & PLAN NOTE
-Initially patient presented to sleep medicine due to SVT seen on routine colonoscopy   -Patient states palpitations have significantly decreased since starting CPAP and also increasing dose of metoprolol

## 2022-01-10 NOTE — LETTER
January 10, 2022     Octavio Anthony, [address]  [address], [address] [facility]    Patient: Tevin Howell   YOB: 1950   Date of Visit: 1/10/2022       Dear Dr Sosa Lopez:    Thank you for referring Rk Bojorquez to me for evaluation  Below are my notes for this consultation  If you have questions, please do not hesitate to call me  I look forward to following your patient along with you  Sincerely,        Preeti Birch,         CC: No Recipients  Isi Carrasquillo MD  1/10/2022 12:12 PM  Attested  Pulmonary/Sleep Follow Up Note   Tevin Howell 73 y o  female MRN: [id number]  1/10/2022      Assessment and Plan:    37-year-old female past medical history hypertension, SVT, obesity presents for follow-up of mild  LOLI  Initial AHI 5 6  Tolerating CPAP well with 100% compliance, average use 7 hours, residual AHI 0 0  Sleep study showed PLM 60 2  Patient states that that was only during the sleep study and at home she is not uncomfortable and has minimal leg movements  She states her symptoms have greatly improved  1  LOLI (obstructive sleep apnea)  Assessment & Plan:  -Diagnostic sleep study 09/20/2021 showed mild obstructive sleep apnea with AHI 5 6, PLM 52 3   -CPAP titration showed ideal pressure at 8 with PLM 60 2   -Magnesium, potassium, TSH ordered and were normal   -No iron panel was performed   -Compliance data shows 100% use with average usage 7 hours   -Pressure setting 8-15, median pressure of 10, max 14  -Residual AHI is 0 0   -Patient states her symptoms have vastly improved   -Tolerating CPAP well  -Follow up in 1 year      2  Primary hypertension  Assessment & Plan:  Stable       3   SVT (supraventricular tachycardia) (HCC)  Assessment & Plan:  -Initially patient presented to sleep medicine due to SVT seen on routine colonoscopy   -Patient states palpitations have significantly decreased since starting CPAP and also increasing dose of metoprolol No follow-ups on file  History of Present Illness   HPI:  Jarad Lassiter is a 73 y o  female Past medical history obesity, hypertension, SVT presents for follow up for mild LOLI  Patient had diagnostic sleep study on 09/20/2021 showing mild obstructive sleep apnea with 5 6 HI with PLM 52 3, CPAP titration was done subsequently  Initially she complained of daytime sleepiness, fatigue, snoring  She had difficulty with sleep initiation and sleep maintenance with insomnia for many years  Since she started using CPAP she feels her symptoms have improved greatly  She has less daytime sleepiness, less fatigue, is more alert  She states that although her sleep study showed increased limb movements she is not uncomfortable at home  She was uncomfortable during the sleep study but has no issues with like movements at home  Since starting CPAP and increasing her her dose of metoprolol she has also felt less palpitations  Review of Systems   Constitutional: Negative  Eyes: Negative  Respiratory: Negative  Cardiovascular: Positive for palpitations  Decreased since starting CPAP   Gastrointestinal: Negative  Endocrine: Negative  Genitourinary: Negative  Musculoskeletal: Positive for arthralgias  Allergic/Immunologic: Negative  Neurological: Negative  Psychiatric/Behavioral: Negative          Historical Information   Past Medical History:   Diagnosis Date    Cellulitis of left elbow 10/30/2016    Epistaxis     Medial meniscus tear 11/11/2014    Morbid obesity with BMI of 40 0-44 9, adult ([facility] [address] ) 58/98/2911    Umbilical hernia      Past Surgical History:   Procedure Laterality Date    KNEE ARTHROSCOPY W/ MENISCAL REPAIR  07/26/2015    with medial meniscus repair    OOPHORECTOMY Bilateral 2002    TOTAL ABDOMINAL HYSTERECTOMY  2002     Family History   Problem Relation Age of Onset    No Known Problems Mother     Thyroid disease Family     Diabetes Family Diabetes Mellitus    Thyroid cancer Daughter 40    Hodgkin's lymphoma Daughter 32    No Known Problems Maternal Grandmother     No Known Problems Maternal Grandfather     No Known Problems Paternal Grandmother     No Known Problems Paternal Grandfather     No Known Problems Paternal Aunt     No Known Problems Paternal Aunt          Meds/Allergies     Current Outpatient Medications:     aspirin (ECOTRIN LOW STRENGTH) 81 mg EC tablet, Take 1 tablet (81 mg total) by mouth daily, Disp: 90 tablet, Rfl: 0    calcium-vitamin D (OSCAL) 250-125 MG-UNIT per tablet, Take 1 tablet by mouth daily  , Disp: , Rfl:     Cholecalciferol (Vitamin D3) 50 MCG (2000 UT) capsule, Take 2,000 Units by mouth daily  , Disp: , Rfl:     KRILL OIL PO, 1200 mg QD, Disp: , Rfl:     lisinopril (ZESTRIL) 40 mg tablet, Take 1 tablet (40 mg total) by mouth daily, Disp: 90 tablet, Rfl: 3    lovastatin (MEVACOR) 20 mg tablet, Take 1 tablet (20 mg total) by mouth daily at bedtime, Disp: 90 tablet, Rfl: 3    metoprolol tartrate (LOPRESSOR) 50 mg tablet, Take 1 tablet (50 mg total) by mouth every 12 (twelve) hours, Disp: 180 tablet, Rfl: 3    Multiple Vitamins-Minerals (MULTIVITAMIN ADULT PO), , Disp: , Rfl:   Allergies   Allergen Reactions    Lipitor [Atorvastatin]     Morphine      Reaction Date: [id number];     Morphine And Related     Zithromax [Azithromycin]      Reaction Date: [id number];        Vitals: Blood pressure 134/82, pulse 62, temperature 99 7 °F (37 6 °C), height 5' 6 5" (1 689 m), weight 121 kg (265 lb 12 8 oz), SpO2 97 %  Body mass index is 42 26 kg/m²   Oxygen Therapy  SpO2: 97 % (ra)      Physical Exam  General:  Awake alert and oriented x 3, conversant without conversational dyspnea, NAD, normal affect  HEENT:   Sclera noninjected, nonicteric OU, Nares patent,  no craniofacial abnormalities, Mucous membranes, moist, no oral lesions, normal dentition, Mallampati class 3-4  NECK:  Trachea midline, no accessory muscle use, no stridor,  JVP not elevated  CARDIAC: Reg, single s1/S2, no m/r/g  PULM: CTA bilaterally no wheezing, rhonchi or rales  EXT: No cyanosis, no clubbing, no edema, normal capillary refill      Labs: I have personally reviewed pertinent lab results  Lab Results   Component Value Date    WBC 9 00 06/07/2021    HGB 15 5 (H) 06/07/2021    HCT 46 5 (H) 06/07/2021    MCV 96 06/07/2021     06/07/2021     Lab Results   Component Value Date    GLUCOSE 103 10/14/2015    CALCIUM 8 8 06/15/2021     10/14/2015    K 4 4 06/15/2021    CO2 28 06/15/2021     06/15/2021    BUN 11 06/15/2021    CREATININE 0 59 (L) 06/15/2021     No results found for: IGE  Lab Results   Component Value Date    ALT 29 06/15/2021    AST 24 06/15/2021    ALKPHOS 64 06/15/2021    BILITOT 0 68 10/14/2015         Imaging and other studies: I have personally reviewed pertinent reports  No orders to display         Sleep studies: I have personally reviewed pertinent reports  diagnostic sleep study on 09/20/2021 showed mild obstructive sleep apnea with AHI 5 6 and PLM 52 3     CPAP titration showed ideal pressure of 8 with PLM of 60 2    Compliance report:I have personally reviewed pertinent reports  Type of CPAP:  Auto CPAP                                   Percent usage: 100                                   Average time used: 7hrs                                                                     Residual AHI: 0 0      EKG, Pathology, and Other Studies: I have personally reviewed pertinent reports  Laurie Martinez MD  Internal medicine PGY 3       Portions of the record may have been created with voice recognition software  Occasional wrong word or "sound a like" substitutions may have occurred due to the inherent limitations of voice recognition software  Read the chart carefully and recognize, using context, where substitutions have occurred    Attestation signed by Hai Bravo DO at 1/10/2022  9:08 PM:  I have personally seen and examined the patient on 1/10/22 at 11:05  I discussed the patient with the resident including, but not limited to, verifying findings; reviewing labs and x-rays; developing the plan of care  I have reviewed the note and assessment performed by the resident and agree with the residents documented findings and plan of care with the following additions/exceptions  Please see my following comments for details and adjustments  Assessment/Plan  73 y o  y/o M with PMHx of SVT who comes in for LOLI follow up  1   Mild LOLI (AHI - 5 6) on autoPAP with excellent compliance and clinical response  Residual AHI - 0 0   (DME - [employer])  -  Check a baseline polysomnogram to assess for obstructive sleep apnea      -  I discussed in depth the diagnostic studies and treatment options involved with obstructive sleep apnea      -  I also discussed in depth the risk of leaving sleep apnea untreated including hypertension, heart failure, arrhythmia, MI and stroke  -  The patient is agreeable to undergo testing and treatment of obstructive sleep apnea  He understands that pitfalls she may encounter along the way and is willing to attempt CPAP treatment  2   Periodic limb movement in sleep (PLM index - 60)  Asymptomatic          -  No additional work up or pharmacologic therapy at this time  Mrs Anson Strange returns to the office for sleep apnea follow up  She states that she is doing very well  She denies morning headache, dry mouth or mask intolerance  She has found that she is more refreshed when she wakes up  She denies morning headache or dry mouth  We discussed periodic limb movement or restless leg symptoms  She does not  feel she needs extra testing      Vitals:    01/10/22 1100   BP: 134/82   Pulse: 62   Temp: 99 7 °F (37 6 °C)   SpO2: 97%   RA  General:  Patient is awake, alert, non-toxic and in no acute respiratory distress  Eyes: PERRL, no scleral icterus  Neck: No JVD  CV:  Regular, +S1 and S2, No murmurs, gallops or rubs appreciated  Lungs: Clear to auscultation bilateral without wheeze, rales or rhonci  Abdomen: Soft, +BS, Non-tender, non-distended  Extremities: No clubbing, cyanosis or edema  Neuro: No focal motor/sensory deficits  Skin: Warm, No rashes or ulcerations    Lab Results   Component Value Date    WBC 9 00 06/07/2021    HGB 15 5 (H) 06/07/2021    HCT 46 5 (H) 06/07/2021    MCV 96 06/07/2021     06/07/2021     Lab Results   Component Value Date    SODIUM 142 06/15/2021    K 4 4 06/15/2021     06/15/2021    CO2 28 06/15/2021    BUN 11 06/15/2021    CREATININE 0 59 (L) 06/15/2021    GLUC 103 06/07/2021    CALCIUM 8 8 06/15/2021     Lab Results   Component Value Date    ALT 29 06/15/2021    AST 24 06/15/2021    ALKPHOS 64 06/15/2021    BILITOT 0 68 10/14/2015       Sleep studies: I have personally reviewed pertinent reports  diagnostic sleep study on 09/20/2021 showed mild obstructive sleep apnea with AHI 5 6 and PLM 52 3      CPAP titration showed ideal pressure of 8 with PLM of 60 2     Compliance report:I have personally reviewed pertinent reports         Type of CPAP:  Auto CPAP                                   Percent usage: 100                                   Average time used: 7hrs                                                                     Residual AHI: 0 0

## 2022-01-10 NOTE — ASSESSMENT & PLAN NOTE
-Diagnostic sleep study 09/20/2021 showed mild obstructive sleep apnea with AHI 5 6, PLM 52 3   -CPAP titration showed ideal pressure at 8 with PLM 60 2   -Magnesium, potassium, TSH ordered and were normal   -No iron panel was performed   -Compliance data shows 100% use with average usage 7 hours   -Pressure setting 8-15, median pressure of 10, max 14  -Residual AHI is 0 0   -Patient states her symptoms have vastly improved   -Tolerating CPAP well  -Follow up in 1 year

## 2022-01-10 NOTE — PROGRESS NOTES
Pulmonary/Sleep Follow Up Note   Edmund Larios 73 y o  female MRN: [id number]  1/10/2022      Assessment and Plan:    71-year-old female past medical history hypertension, SVT, obesity presents for follow-up of mild  LOLI  Initial AHI 5 6  Tolerating CPAP well with 100% compliance, average use 7 hours, residual AHI 0 0  Sleep study showed PLM 60 2  Patient states that that was only during the sleep study and at home she is not uncomfortable and has minimal leg movements  She states her symptoms have greatly improved  1  LOLI (obstructive sleep apnea)  Assessment & Plan:  -Diagnostic sleep study 09/20/2021 showed mild obstructive sleep apnea with AHI 5 6, PLM 52 3   -CPAP titration showed ideal pressure at 8 with PLM 60 2   -Magnesium, potassium, TSH ordered and were normal   -No iron panel was performed   -Compliance data shows 100% use with average usage 7 hours   -Pressure setting 8-15, median pressure of 10, max 14  -Residual AHI is 0 0   -Patient states her symptoms have vastly improved   -Tolerating CPAP well  -Follow up in 1 year      2  Primary hypertension  Assessment & Plan:  Stable       3  SVT (supraventricular tachycardia) (HCC)  Assessment & Plan:  -Initially patient presented to sleep medicine due to SVT seen on routine colonoscopy   -Patient states palpitations have significantly decreased since starting CPAP and also increasing dose of metoprolol             No follow-ups on file  History of Present Illness   HPI:  Edmund Larios is a 73 y o  female Past medical history obesity, hypertension, SVT presents for follow up for mild LOLI  Patient had diagnostic sleep study on 09/20/2021 showing mild obstructive sleep apnea with 5 6 HI with PLM 52 3, CPAP titration was done subsequently  Initially she complained of daytime sleepiness, fatigue, snoring  She had difficulty with sleep initiation and sleep maintenance with insomnia for many years    Since she started using CPAP she feels her symptoms have improved greatly  She has less daytime sleepiness, less fatigue, is more alert  She states that although her sleep study showed increased limb movements she is not uncomfortable at home  She was uncomfortable during the sleep study but has no issues with like movements at home  Since starting CPAP and increasing her her dose of metoprolol she has also felt less palpitations  Review of Systems   Constitutional: Negative  Eyes: Negative  Respiratory: Negative  Cardiovascular: Positive for palpitations  Decreased since starting CPAP   Gastrointestinal: Negative  Endocrine: Negative  Genitourinary: Negative  Musculoskeletal: Positive for arthralgias  Allergic/Immunologic: Negative  Neurological: Negative  Psychiatric/Behavioral: Negative          Historical Information   Past Medical History:   Diagnosis Date    Cellulitis of left elbow 10/30/2016    Epistaxis     Medial meniscus tear 11/11/2014    Morbid obesity with BMI of 40 0-44 9, adult ([facility] ) 43/48/2224    Umbilical hernia      Past Surgical History:   Procedure Laterality Date    KNEE ARTHROSCOPY W/ MENISCAL REPAIR  07/26/2015    with medial meniscus repair    OOPHORECTOMY Bilateral 2002    TOTAL ABDOMINAL HYSTERECTOMY  2002     Family History   Problem Relation Age of Onset    No Known Problems Mother     Thyroid disease Family     Diabetes Family         Diabetes Mellitus    Thyroid cancer Daughter 40    Hodgkin's lymphoma Daughter 32    No Known Problems Maternal Grandmother     No Known Problems Maternal Grandfather     No Known Problems Paternal Grandmother     No Known Problems Paternal Grandfather     No Known Problems Paternal Aunt     No Known Problems Paternal Aunt          Meds/Allergies     Current Outpatient Medications:     aspirin (ECOTRIN LOW STRENGTH) 81 mg EC tablet, Take 1 tablet (81 mg total) by mouth daily, Disp: 90 tablet, Rfl: 0    calcium-vitamin D (OSCAL) 250-125 MG-UNIT per tablet, Take 1 tablet by mouth daily  , Disp: , Rfl:     Cholecalciferol (Vitamin D3) 50 MCG (2000 UT) capsule, Take 2,000 Units by mouth daily  , Disp: , Rfl:     KRILL OIL PO, 1200 mg QD, Disp: , Rfl:     lisinopril (ZESTRIL) 40 mg tablet, Take 1 tablet (40 mg total) by mouth daily, Disp: 90 tablet, Rfl: 3    lovastatin (MEVACOR) 20 mg tablet, Take 1 tablet (20 mg total) by mouth daily at bedtime, Disp: 90 tablet, Rfl: 3    metoprolol tartrate (LOPRESSOR) 50 mg tablet, Take 1 tablet (50 mg total) by mouth every 12 (twelve) hours, Disp: 180 tablet, Rfl: 3    Multiple Vitamins-Minerals (MULTIVITAMIN ADULT PO), , Disp: , Rfl:   Allergies   Allergen Reactions    Lipitor [Atorvastatin]     Morphine      Reaction Date: [id number];     Morphine And Related     Zithromax [Azithromycin]      Reaction Date: [id number];        Vitals: Blood pressure 134/82, pulse 62, temperature 99 7 °F (37 6 °C), height 5' 6 5" (1 689 m), weight 121 kg (265 lb 12 8 oz), SpO2 97 %  Body mass index is 42 26 kg/m²  Oxygen Therapy  SpO2: 97 % (ra)      Physical Exam  General:  Awake alert and oriented x 3, conversant without conversational dyspnea, NAD, normal affect  HEENT:   Sclera noninjected, nonicteric OU, Nares patent,  no craniofacial abnormalities, Mucous membranes, moist, no oral lesions, normal dentition, Mallampati class 3-4  NECK:  Trachea midline, no accessory muscle use, no stridor,  JVP not elevated  CARDIAC: Reg, single s1/S2, no m/r/g  PULM: CTA bilaterally no wheezing, rhonchi or rales  EXT: No cyanosis, no clubbing, no edema, normal capillary refill      Labs: I have personally reviewed pertinent lab results    Lab Results   Component Value Date    WBC 9 00 06/07/2021    HGB 15 5 (H) 06/07/2021    HCT 46 5 (H) 06/07/2021    MCV 96 06/07/2021     06/07/2021     Lab Results   Component Value Date    GLUCOSE 103 10/14/2015    CALCIUM 8 8 06/15/2021     10/14/2015    K 4 4 06/15/2021    CO2 28 06/15/2021    CL 108 06/15/2021    BUN 11 06/15/2021    CREATININE 0 59 (L) 06/15/2021     No results found for: IGE  Lab Results   Component Value Date    ALT 29 06/15/2021    AST 24 06/15/2021    ALKPHOS 64 06/15/2021    BILITOT 0 68 10/14/2015         Imaging and other studies: I have personally reviewed pertinent reports  No orders to display         Sleep studies: I have personally reviewed pertinent reports  diagnostic sleep study on 09/20/2021 showed mild obstructive sleep apnea with AHI 5 6 and PLM 52 3     CPAP titration showed ideal pressure of 8 with PLM of 60 2    Compliance report:I have personally reviewed pertinent reports  Type of CPAP:  Auto CPAP                                   Percent usage: 100                                   Average time used: 7hrs                                                                     Residual AHI: 0 0      EKG, Pathology, and Other Studies: I have personally reviewed pertinent reports  Pat Becker MD  Internal medicine PGY 3       Portions of the record may have been created with voice recognition software  Occasional wrong word or "sound a like" substitutions may have occurred due to the inherent limitations of voice recognition software  Read the chart carefully and recognize, using context, where substitutions have occurred

## 2022-01-24 ENCOUNTER — TELEPHONE (OUTPATIENT)
Dept: INTERNAL MEDICINE CLINIC | Facility: CLINIC | Age: 72
End: 2022-01-24

## 2022-01-24 PROBLEM — L98.9 SKIN LESIONS: Status: ACTIVE | Noted: 2022-01-24

## 2022-01-24 NOTE — TELEPHONE ENCOUNTER
Patient contacted she has multiple skin lesions on her face which should be evaluated by Dermatology    Last office note addended to reflect our discussion regarding this

## 2022-01-24 NOTE — TELEPHONE ENCOUNTER
Patient called, she states that she scheduled an appointment with Dermatology regarding her lesions  They're requesting that you document that this is something you discussed with the patient during her last appointment  I asked patient if they were referring to a doctor to doctor referral and she said no  They would like you to document it in a note for insurance purposes

## 2022-01-31 DIAGNOSIS — E78.5 HYPERLIPIDEMIA, UNSPECIFIED HYPERLIPIDEMIA TYPE: ICD-10-CM

## 2022-01-31 RX ORDER — LOVASTATIN 20 MG/1
20 TABLET ORAL
Qty: 90 TABLET | Refills: 3 | Status: SHIPPED | OUTPATIENT
Start: 2022-01-31

## 2022-04-21 ENCOUNTER — RA CDI HCC (OUTPATIENT)
Dept: OTHER | Facility: HOSPITAL | Age: 72
End: 2022-04-21

## 2022-04-25 ENCOUNTER — APPOINTMENT (OUTPATIENT)
Dept: LAB | Facility: CLINIC | Age: 72
End: 2022-04-25
Payer: COMMERCIAL

## 2022-04-25 DIAGNOSIS — R73.02 GLUCOSE INTOLERANCE (IMPAIRED GLUCOSE TOLERANCE): ICD-10-CM

## 2022-04-25 LAB
ALBUMIN SERPL BCP-MCNC: 3.4 G/DL (ref 3.5–5)
ALP SERPL-CCNC: 71 U/L (ref 46–116)
ALT SERPL W P-5'-P-CCNC: 28 U/L (ref 12–78)
ANION GAP SERPL CALCULATED.3IONS-SCNC: 6 MMOL/L (ref 4–13)
AST SERPL W P-5'-P-CCNC: 21 U/L (ref 5–45)
BILIRUB SERPL-MCNC: 0.66 MG/DL (ref 0.2–1)
BUN SERPL-MCNC: 16 MG/DL (ref 5–25)
CALCIUM ALBUM COR SERPL-MCNC: 9.8 MG/DL (ref 8.3–10.1)
CALCIUM SERPL-MCNC: 9.3 MG/DL (ref 8.3–10.1)
CHLORIDE SERPL-SCNC: 107 MMOL/L (ref 100–108)
CO2 SERPL-SCNC: 27 MMOL/L (ref 21–32)
CREAT SERPL-MCNC: 0.72 MG/DL (ref 0.6–1.3)
EST. AVERAGE GLUCOSE BLD GHB EST-MCNC: 117 MG/DL
GFR SERPL CREATININE-BSD FRML MDRD: 85 ML/MIN/1.73SQ M
GLUCOSE P FAST SERPL-MCNC: 124 MG/DL (ref 65–99)
HBA1C MFR BLD: 5.7 %
POTASSIUM SERPL-SCNC: 4.7 MMOL/L (ref 3.5–5.3)
PROT SERPL-MCNC: 7.2 G/DL (ref 6.4–8.2)
SODIUM SERPL-SCNC: 140 MMOL/L (ref 136–145)

## 2022-04-25 PROCEDURE — 83036 HEMOGLOBIN GLYCOSYLATED A1C: CPT

## 2022-04-25 PROCEDURE — 80053 COMPREHEN METABOLIC PANEL: CPT

## 2022-04-25 PROCEDURE — 36415 COLL VENOUS BLD VENIPUNCTURE: CPT

## 2022-04-28 ENCOUNTER — OFFICE VISIT (OUTPATIENT)
Dept: INTERNAL MEDICINE CLINIC | Facility: CLINIC | Age: 72
End: 2022-04-28
Payer: COMMERCIAL

## 2022-04-28 VITALS
OXYGEN SATURATION: 97 % | WEIGHT: 252.4 LBS | TEMPERATURE: 98.9 F | SYSTOLIC BLOOD PRESSURE: 124 MMHG | HEART RATE: 62 BPM | BODY MASS INDEX: 39.62 KG/M2 | HEIGHT: 67 IN | DIASTOLIC BLOOD PRESSURE: 80 MMHG

## 2022-04-28 DIAGNOSIS — I10 PRIMARY HYPERTENSION: ICD-10-CM

## 2022-04-28 DIAGNOSIS — M54.31 SCIATICA OF RIGHT SIDE: ICD-10-CM

## 2022-04-28 DIAGNOSIS — I10 HYPERTENSION, UNSPECIFIED TYPE: ICD-10-CM

## 2022-04-28 DIAGNOSIS — G47.33 OSA (OBSTRUCTIVE SLEEP APNEA): ICD-10-CM

## 2022-04-28 DIAGNOSIS — E78.00 HYPERCHOLESTEROLEMIA: ICD-10-CM

## 2022-04-28 DIAGNOSIS — R73.02 GLUCOSE INTOLERANCE (IMPAIRED GLUCOSE TOLERANCE): Primary | ICD-10-CM

## 2022-04-28 DIAGNOSIS — Z13.0 SCREENING FOR DEFICIENCY ANEMIA: ICD-10-CM

## 2022-04-28 DIAGNOSIS — Z12.11 SCREENING FOR COLON CANCER: ICD-10-CM

## 2022-04-28 DIAGNOSIS — I47.1 SVT (SUPRAVENTRICULAR TACHYCARDIA) (HCC): ICD-10-CM

## 2022-04-28 DIAGNOSIS — E78.2 MIXED HYPERLIPIDEMIA: ICD-10-CM

## 2022-04-28 PROCEDURE — 3074F SYST BP LT 130 MM HG: CPT | Performed by: INTERNAL MEDICINE

## 2022-04-28 PROCEDURE — 1036F TOBACCO NON-USER: CPT | Performed by: INTERNAL MEDICINE

## 2022-04-28 PROCEDURE — 99214 OFFICE O/P EST MOD 30 MIN: CPT | Performed by: INTERNAL MEDICINE

## 2022-04-28 PROCEDURE — 1160F RVW MEDS BY RX/DR IN RCRD: CPT | Performed by: INTERNAL MEDICINE

## 2022-04-28 PROCEDURE — 3079F DIAST BP 80-89 MM HG: CPT | Performed by: INTERNAL MEDICINE

## 2022-04-28 PROCEDURE — 3008F BODY MASS INDEX DOCD: CPT | Performed by: INTERNAL MEDICINE

## 2022-04-28 RX ORDER — METAXALONE 800 MG/1
800 TABLET ORAL 3 TIMES DAILY
Qty: 30 TABLET | Refills: 1 | Status: SHIPPED | OUTPATIENT
Start: 2022-04-28

## 2022-04-28 RX ORDER — NYSTATIN 100000 [USP'U]/G
POWDER TOPICAL
COMMUNITY
Start: 2022-02-02

## 2022-04-28 RX ORDER — ACETAMINOPHEN AND CODEINE PHOSPHATE 300; 30 MG/1; MG/1
TABLET ORAL
COMMUNITY
Start: 2022-03-14

## 2022-04-28 RX ORDER — CHLORHEXIDINE GLUCONATE 0.12 MG/ML
RINSE ORAL
COMMUNITY
Start: 2022-03-14

## 2022-04-28 NOTE — ASSESSMENT & PLAN NOTE
This 24-year-old female patient has significantly increased her physical activity over the past several months and made efforts to reduce her carbohydrate consumption  She has been successful at losing approximately 12 lb  Subsequently she shows improvement in her hemoglobin A1c with reading down to 5 7%  We also see a fasting blood sugar of 124 on this visit blood work  I have encouraged the patient to continue with these lifestyle adjustments and continue follow-up in 4 months for her next blood work

## 2022-04-28 NOTE — ASSESSMENT & PLAN NOTE
Patient remains completely compliant with CPAP treatment for her obstructive sleep apnea  She finds tremendous benefit from the treatment indicating she is more alert and focused during the day

## 2022-04-28 NOTE — ASSESSMENT & PLAN NOTE
Blood pressure assessment today confirms good control reading is 124/80 I recommend that she continue her current blood pressure medication lisinopril 40 mg daily

## 2022-04-28 NOTE — ASSESSMENT & PLAN NOTE
Patient reports no significant palpitations symptoms  She has been physically active without any chest pain or palpitations  Recommend the continuation of her current beta-blocker therapy metoprolol tartrate 50 mg every 12 hours

## 2022-04-28 NOTE — PROGRESS NOTES
Assessment/Plan:    Glucose intolerance (impaired glucose tolerance)  This 24-year-old female patient has significantly increased her physical activity over the past several months and made efforts to reduce her carbohydrate consumption  She has been successful at losing approximately 12 lb  Subsequently she shows improvement in her hemoglobin A1c with reading down to 5 7%  We also see a fasting blood sugar of 124 on this visit blood work  I have encouraged the patient to continue with these lifestyle adjustments and continue follow-up in 4 months for her next blood work  LOLI (obstructive sleep apnea)  Patient remains completely compliant with CPAP treatment for her obstructive sleep apnea  She finds tremendous benefit from the treatment indicating she is more alert and focused during the day  Hypertension  Blood pressure assessment today confirms good control reading is 124/80 I recommend that she continue her current blood pressure medication lisinopril 40 mg daily    SVT (supraventricular tachycardia) ([facility] [address] )  Patient reports no significant palpitations symptoms  She has been physically active without any chest pain or palpitations  Recommend the continuation of her current beta-blocker therapy metoprolol tartrate 50 mg every 12 hours  Mixed hyperlipidemia  Recommend continuation of efforts to reduce weight maintain a low-cholesterol diet and continue lovastatin at 20 mg daily  Follow-up lipid profile has been requested for the patient's next visit with us       Diagnoses and all orders for this visit:    Primary hypertension  -     Comprehensive metabolic panel; Future  -     UA w Reflex to Microscopic w Reflex to Culture -Lab Collect; Future    Sciatica of right side  -     metaxalone (SKELAXIN) 800 mg tablet; Take 1 tablet (800 mg total) by mouth 3 (three) times a day    Mixed hyperlipidemia  -     Lipid panel;  Future    Hypercholesterolemia    Hypertension, unspecified type    Screening for deficiency anemia  -     CBC and differential; Future    Glucose intolerance (impaired glucose tolerance)  -     Comprehensive metabolic panel; Future  -     Hemoglobin A1C; Future    Screening for colon cancer  -     Occult Blood, Fecal Immunochemical; Future    Other orders  -     acetaminophen-codeine (TYLENOL #3) 300-30 mg per tablet  -     chlorhexidine (PERIDEX) 0 12 % solution  -     nystatin powder        Subjective:      Patient ID: Shar Chen is a 73 y o  female  This 72-year-old female patient presents today for a routine follow-up visit  She has been doing very well has been physically active cleaning up her yd this spring time  She has embarked on a restricted diet avoiding excessive carbohydrates and has been successful at reducing her weight by approximately 12 lb since beginning of the year  She feels much better on the modified diet and enjoys being active  She has had no significant palpitations since her last visit with us  She indicates no chest pain with physical activity and no shortness of breath  During today's visit I have reviewed with the patient her fasting blood sugar and hemoglobin A1c value  The following portions of the patient's history were reviewed and updated as appropriate:   She  has a past medical history of Cellulitis of left elbow (10/30/2016), Epistaxis, Medial meniscus tear (11/11/2014), Morbid obesity with BMI of 40 0-44 9, adult ([facility] [address] ) (07/51/4093), and Umbilical hernia    She   Patient Active Problem List    Diagnosis Date Noted    Skin lesions 01/24/2022    LOLI (obstructive sleep apnea)     Mixed hyperlipidemia 08/26/2021    1st degree AV block 06/14/2021    RBBB 06/14/2021    SVT [facility]supraventricular tachycardia) ([facility]) 06/07/2021    Glucose intolerance (impaired glucose tolerance) 07/24/2020    Obesity (BMI 30-39 9) 11/15/2017    Hypertension 09/12/2014     She  has a past surgical history that includes Knee arthroscopy w/ meniscal repair (07/26/2015); Oophorectomy (Bilateral, 2002); and Total abdominal hysterectomy (2002)  Her family history includes Diabetes in her family; Hodgkin's lymphoma (age of onset: 32) in her daughter; No Known Problems in her maternal grandfather, maternal grandmother, mother, paternal aunt, paternal aunt, paternal grandfather, and paternal grandmother; Thyroid cancer (age of onset: 40) in her daughter; Thyroid disease in her family  She  reports that she has never smoked  She has never used smokeless tobacco  She reports that she does not drink alcohol and does not use drugs  Current Outpatient Medications   Medication Sig Dispense Refill    acetaminophen-codeine (TYLENOL #3) 300-30 mg per tablet       aspirin (ECOTRIN LOW STRENGTH) 81 mg EC tablet Take 1 tablet (81 mg total) by mouth daily 90 tablet 0    calcium-vitamin D (OSCAL) 250-125 MG-UNIT per tablet Take 1 tablet by mouth daily   chlorhexidine (PERIDEX) 0 12 % solution       Cholecalciferol (Vitamin D3) 50 MCG (2000 UT) capsule Take 2,000 Units by mouth daily        KRILL OIL PO 1200 mg QD      lisinopril (ZESTRIL) 40 mg tablet Take 1 tablet (40 mg total) by mouth daily 90 tablet 3    lovastatin (MEVACOR) 20 mg tablet Take 1 tablet (20 mg total) by mouth daily at bedtime 90 tablet 3    metoprolol tartrate (LOPRESSOR) 50 mg tablet Take 1 tablet (50 mg total) by mouth every 12 (twelve) hours 180 tablet 3    Multiple Vitamins-Minerals (MULTIVITAMIN ADULT PO)       nystatin powder       metaxalone (SKELAXIN) 800 mg tablet Take 1 tablet (800 mg total) by mouth 3 (three) times a day 30 tablet 1     No current facility-administered medications for this visit       Review of Systems   Musculoskeletal: Positive for arthralgias and myalgias  All other systems reviewed and are negative          Objective:      /80   Pulse 62   Temp 98 9 °F (37 2 °C)   Ht 5' 6 5" (1 689 m)   Wt 114 kg (252 lb 6 4 oz)   SpO2 97%   BMI 40 13 kg/m² Physical Exam  Vitals reviewed  Constitutional:       General: She is not in acute distress  Appearance: Normal appearance  She is well-developed  She is not ill-appearing  HENT:      Right Ear: Hearing and external ear normal       Left Ear: Hearing and external ear normal       Nose: Nose normal  No mucosal edema  Mouth/Throat:      Pharynx: Uvula midline  Eyes:      General: Lids are normal       Conjunctiva/sclera: Conjunctivae normal       Pupils: Pupils are equal, round, and reactive to light  Neck:      Thyroid: No thyromegaly  Vascular: No carotid bruit or JVD  Cardiovascular:      Rate and Rhythm: Normal rate and regular rhythm  Heart sounds: Normal heart sounds  No murmur heard  Pulmonary:      Effort: Pulmonary effort is normal  No respiratory distress  Breath sounds: Normal breath sounds  No wheezing or rhonchi  Abdominal:      General: Bowel sounds are normal       Palpations: Abdomen is soft  Musculoskeletal:         General: Normal range of motion  Lymphadenopathy:      Cervical: No cervical adenopathy  Skin:     General: Skin is warm and dry  Neurological:      Mental Status: She is alert and oriented to person, place, and time  Deep Tendon Reflexes: Reflexes are normal and symmetric  Psychiatric:         Speech: Speech normal          Behavior: Behavior normal  Behavior is cooperative  Thought Content:  Thought content normal          Judgment: Judgment normal

## 2022-04-28 NOTE — ASSESSMENT & PLAN NOTE
Recommend continuation of efforts to reduce weight maintain a low-cholesterol diet and continue lovastatin at 20 mg daily    Follow-up lipid profile has been requested for the patient's next visit with us

## 2022-08-23 ENCOUNTER — RA CDI HCC (OUTPATIENT)
Dept: OTHER | Facility: HOSPITAL | Age: 72
End: 2022-08-23

## 2022-08-23 NOTE — PROGRESS NOTES
[facility]  coding opportunities       Chart reviewed, no opportunity found:   [address]        Patients Insurance     Medicare Insurance: [employer]

## 2022-08-25 ENCOUNTER — APPOINTMENT (OUTPATIENT)
Dept: LAB | Facility: CLINIC | Age: 72
End: 2022-08-25
Payer: COMMERCIAL

## 2022-08-25 DIAGNOSIS — E78.2 MIXED HYPERLIPIDEMIA: ICD-10-CM

## 2022-08-25 DIAGNOSIS — R73.02 GLUCOSE INTOLERANCE (IMPAIRED GLUCOSE TOLERANCE): ICD-10-CM

## 2022-08-25 DIAGNOSIS — Z13.0 SCREENING FOR DEFICIENCY ANEMIA: ICD-10-CM

## 2022-08-25 DIAGNOSIS — Z12.11 SCREENING FOR COLON CANCER: ICD-10-CM

## 2022-08-25 DIAGNOSIS — Z11.59 NEED FOR HEPATITIS C SCREENING TEST: ICD-10-CM

## 2022-08-25 DIAGNOSIS — I10 PRIMARY HYPERTENSION: ICD-10-CM

## 2022-08-25 LAB
ALBUMIN SERPL BCP-MCNC: 3.3 G/DL (ref 3.5–5)
ALP SERPL-CCNC: 70 U/L (ref 46–116)
ALT SERPL W P-5'-P-CCNC: 20 U/L (ref 12–78)
ANION GAP SERPL CALCULATED.3IONS-SCNC: 1 MMOL/L (ref 4–13)
AST SERPL W P-5'-P-CCNC: 16 U/L (ref 5–45)
BASOPHILS # BLD AUTO: 0.03 THOUSANDS/ΜL (ref 0–0.1)
BASOPHILS NFR BLD AUTO: 1 % (ref 0–1)
BILIRUB SERPL-MCNC: 0.87 MG/DL (ref 0.2–1)
BILIRUB UR QL STRIP: NEGATIVE
BUN SERPL-MCNC: 16 MG/DL (ref 5–25)
CALCIUM ALBUM COR SERPL-MCNC: 9.3 MG/DL (ref 8.3–10.1)
CALCIUM SERPL-MCNC: 8.7 MG/DL (ref 8.3–10.1)
CHLORIDE SERPL-SCNC: 108 MMOL/L (ref 96–108)
CHOLEST SERPL-MCNC: 186 MG/DL
CLARITY UR: CLEAR
CO2 SERPL-SCNC: 30 MMOL/L (ref 21–32)
COLOR UR: NORMAL
CREAT SERPL-MCNC: 0.72 MG/DL (ref 0.6–1.3)
EOSINOPHIL # BLD AUTO: 0.13 THOUSAND/ΜL (ref 0–0.61)
EOSINOPHIL NFR BLD AUTO: 2 % (ref 0–6)
ERYTHROCYTE [DISTWIDTH] IN BLOOD BY AUTOMATED COUNT: 13.2 % (ref 11.6–15.1)
EST. AVERAGE GLUCOSE BLD GHB EST-MCNC: 117 MG/DL
GFR SERPL CREATININE-BSD FRML MDRD: 85 ML/MIN/1.73SQ M
GLUCOSE P FAST SERPL-MCNC: 114 MG/DL (ref 65–99)
GLUCOSE UR STRIP-MCNC: NEGATIVE MG/DL
HBA1C MFR BLD: 5.7 %
HCT VFR BLD AUTO: 46 % (ref 34.8–46.1)
HCV AB SER QL: NORMAL
HDLC SERPL-MCNC: 44 MG/DL
HEMOCCULT STL QL IA: NEGATIVE
HGB BLD-MCNC: 14.8 G/DL (ref 11.5–15.4)
HGB UR QL STRIP.AUTO: NEGATIVE
IMM GRANULOCYTES # BLD AUTO: 0.02 THOUSAND/UL (ref 0–0.2)
IMM GRANULOCYTES NFR BLD AUTO: 0 % (ref 0–2)
KETONES UR STRIP-MCNC: NEGATIVE MG/DL
LDLC SERPL CALC-MCNC: 104 MG/DL (ref 0–100)
LEUKOCYTE ESTERASE UR QL STRIP: NEGATIVE
LYMPHOCYTES # BLD AUTO: 2.61 THOUSANDS/ΜL (ref 0.6–4.47)
LYMPHOCYTES NFR BLD AUTO: 40 % (ref 14–44)
MCH RBC QN AUTO: 31.5 PG (ref 26.8–34.3)
MCHC RBC AUTO-ENTMCNC: 32.2 G/DL (ref 31.4–37.4)
MCV RBC AUTO: 98 FL (ref 82–98)
MONOCYTES # BLD AUTO: 0.54 THOUSAND/ΜL (ref 0.17–1.22)
MONOCYTES NFR BLD AUTO: 8 % (ref 4–12)
NEUTROPHILS # BLD AUTO: 3.19 THOUSANDS/ΜL (ref 1.85–7.62)
NEUTS SEG NFR BLD AUTO: 49 % (ref 43–75)
NITRITE UR QL STRIP: NEGATIVE
NONHDLC SERPL-MCNC: 142 MG/DL
NRBC BLD AUTO-RTO: 0 /100 WBCS
PH UR STRIP.AUTO: 6 [PH]
PLATELET # BLD AUTO: 194 THOUSANDS/UL (ref 149–390)
PMV BLD AUTO: 10.5 FL (ref 8.9–12.7)
POTASSIUM SERPL-SCNC: 4.3 MMOL/L (ref 3.5–5.3)
PROT SERPL-MCNC: 7.5 G/DL (ref 6.4–8.4)
PROT UR STRIP-MCNC: NEGATIVE MG/DL
RBC # BLD AUTO: 4.7 MILLION/UL (ref 3.81–5.12)
SODIUM SERPL-SCNC: 139 MMOL/L (ref 135–147)
SP GR UR STRIP.AUTO: 1.02 (ref 1–1.03)
TRIGL SERPL-MCNC: 189 MG/DL
UROBILINOGEN UR STRIP-ACNC: <2 MG/DL
WBC # BLD AUTO: 6.52 THOUSAND/UL (ref 4.31–10.16)

## 2022-08-25 PROCEDURE — 81003 URINALYSIS AUTO W/O SCOPE: CPT

## 2022-08-25 PROCEDURE — 80053 COMPREHEN METABOLIC PANEL: CPT

## 2022-08-25 PROCEDURE — 36415 COLL VENOUS BLD VENIPUNCTURE: CPT

## 2022-08-25 PROCEDURE — 86803 HEPATITIS C AB TEST: CPT

## 2022-08-25 PROCEDURE — G0328 FECAL BLOOD SCRN IMMUNOASSAY: HCPCS

## 2022-08-25 PROCEDURE — 85025 COMPLETE CBC W/AUTO DIFF WBC: CPT

## 2022-08-25 PROCEDURE — 83036 HEMOGLOBIN GLYCOSYLATED A1C: CPT

## 2022-08-25 PROCEDURE — 80061 LIPID PANEL: CPT

## 2022-08-29 ENCOUNTER — OFFICE VISIT (OUTPATIENT)
Dept: INTERNAL MEDICINE CLINIC | Facility: CLINIC | Age: 72
End: 2022-08-29
Payer: COMMERCIAL

## 2022-08-29 VITALS
BODY MASS INDEX: 36.7 KG/M2 | OXYGEN SATURATION: 98 % | SYSTOLIC BLOOD PRESSURE: 122 MMHG | WEIGHT: 233.8 LBS | HEIGHT: 67 IN | TEMPERATURE: 98.6 F | HEART RATE: 60 BPM | DIASTOLIC BLOOD PRESSURE: 72 MMHG

## 2022-08-29 DIAGNOSIS — L98.9 SKIN LESIONS: ICD-10-CM

## 2022-08-29 DIAGNOSIS — I10 PRIMARY HYPERTENSION: Primary | ICD-10-CM

## 2022-08-29 DIAGNOSIS — E66.9 OBESITY (BMI 30-39.9): ICD-10-CM

## 2022-08-29 DIAGNOSIS — G47.33 OSA (OBSTRUCTIVE SLEEP APNEA): ICD-10-CM

## 2022-08-29 DIAGNOSIS — R73.02 GLUCOSE INTOLERANCE (IMPAIRED GLUCOSE TOLERANCE): ICD-10-CM

## 2022-08-29 DIAGNOSIS — I10 ESSENTIAL HYPERTENSION: ICD-10-CM

## 2022-08-29 DIAGNOSIS — E78.2 MIXED HYPERLIPIDEMIA: ICD-10-CM

## 2022-08-29 DIAGNOSIS — Z00.00 HEALTHCARE MAINTENANCE: ICD-10-CM

## 2022-08-29 PROCEDURE — 1170F FXNL STATUS ASSESSED: CPT | Performed by: INTERNAL MEDICINE

## 2022-08-29 PROCEDURE — 3078F DIAST BP <80 MM HG: CPT | Performed by: INTERNAL MEDICINE

## 2022-08-29 PROCEDURE — 3725F SCREEN DEPRESSION PERFORMED: CPT | Performed by: INTERNAL MEDICINE

## 2022-08-29 PROCEDURE — 99215 OFFICE O/P EST HI 40 MIN: CPT | Performed by: INTERNAL MEDICINE

## 2022-08-29 PROCEDURE — 1125F AMNT PAIN NOTED PAIN PRSNT: CPT | Performed by: INTERNAL MEDICINE

## 2022-08-29 PROCEDURE — 3074F SYST BP LT 130 MM HG: CPT | Performed by: INTERNAL MEDICINE

## 2022-08-29 PROCEDURE — 1160F RVW MEDS BY RX/DR IN RCRD: CPT | Performed by: INTERNAL MEDICINE

## 2022-08-29 PROCEDURE — 3288F FALL RISK ASSESSMENT DOCD: CPT | Performed by: INTERNAL MEDICINE

## 2022-08-29 PROCEDURE — G0439 PPPS, SUBSEQ VISIT: HCPCS | Performed by: INTERNAL MEDICINE

## 2022-08-29 RX ORDER — LISINOPRIL 40 MG/1
40 TABLET ORAL DAILY
Qty: 90 TABLET | Refills: 3 | Status: SHIPPED | OUTPATIENT
Start: 2022-08-29

## 2022-08-29 NOTE — ASSESSMENT & PLAN NOTE
Lipid profile reviewed with the patient recommend continuation of low-cholesterol diet along with lovastatin 20 mg daily next testing scheduled for 4 months

## 2022-08-29 NOTE — ASSESSMENT & PLAN NOTE
I have reviewed the patient's most recent blood work including her hemoglobin A1c and fasting blood sugar which is a reading of 114  Recommend she continue with her efforts to reduce body weight through exercise in the form of swimming as well as calorie consumption reduction by reducing carbohydrates  Follow-up blood testing for blood sugar surveillance has been requested in 4 months

## 2022-08-29 NOTE — ASSESSMENT & PLAN NOTE
Patient remains compliant with the use of her CPAP machine for treatment of her sleep apnea  She indicates she continues to get good results from her treatment

## 2022-08-29 NOTE — ASSESSMENT & PLAN NOTE
Blood pressure management reviewed today blood pressure control remains in good range recommend continuation of lisinopril at 40 mg daily and metoprolol tartrate at 50 mg every 12 hours    Metabolic profile and kidney function were reviewed during today's visit

## 2022-08-29 NOTE — PATIENT INSTRUCTIONS
Medicare Preventive Visit Patient Instructions  Thank you for completing your Welcome to Medicare Visit or Medicare Annual Wellness Visit today  Your next wellness visit will be due in one year (8/30/2023)  The screening/preventive services that you may require over the next 5-10 years are detailed below  Some tests may not apply to you based off risk factors and/or age  Screening tests ordered at today's visit but not completed yet may show as past due  Also, please note that scanned in results may not display below  Preventive Screenings:  Service Recommendations Previous Testing/Comments   Colorectal Cancer Screening  * Colonoscopy    * Fecal Occult Blood Test (FOBT)/Fecal Immunochemical Test (FIT)  * Fecal DNA/Cologuard Test  * Flexible Sigmoidoscopy Age: 39-70 years old   Colonoscopy: every 10 years (may be performed more frequently if at higher risk)  OR  FOBT/FIT: every 1 year  OR  Cologuard: every 3 years  OR  Sigmoidoscopy: every 5 years  Screening may be recommended earlier than age 39 if at higher risk for colorectal cancer  Also, an individualized decision between you and your healthcare provider will decide whether screening between the ages of 74-80 would be appropriate  Colonoscopy: 01/24/2019  FOBT/FIT: 08/25/2022  Cologuard: Not on file  Sigmoidoscopy: Not on file    Screening Current     Breast Cancer Screening Age: 36 years old  Frequency: every 1-2 years  Not required if history of left and right mastectomy Mammogram: 12/02/2021    Screening Current   Cervical Cancer Screening Between the ages of 21-29, pap smear recommended once every 3 years  Between the ages of 33-67, can perform pap smear with HPV co-testing every 5 years     Recommendations may differ for women with a history of total hysterectomy, cervical cancer, or abnormal pap smears in past  Pap Smear: Not on file    Screening Not Indicated   Hepatitis C Screening Once for adults born between 1945 and 1965  More frequently in patients at high risk for Hepatitis C Hep C Antibody: 08/25/2022    Screening Current   Diabetes Screening 1-2 times per year if you're at risk for diabetes or have pre-diabetes Fasting glucose: 114 mg/dL (8/25/2022)  A1C: 5 7 % (8/25/2022)  Screening Current   Cholesterol Screening Once every 5 years if you don't have a lipid disorder  May order more often based on risk factors  Lipid panel: 08/25/2022    Screening Not Indicated  History Lipid Disorder     Other Preventive Screenings Covered by Medicare:  1  Abdominal Aortic Aneurysm (AAA) Screening: covered once if your at risk  You're considered to be at risk if you have a family history of AAA  2  Lung Cancer Screening: covers low dose CT scan once per year if you meet all of the following conditions: (1) Age 50-69; (2) No signs or symptoms of lung cancer; (3) Current smoker or have quit smoking within the last 15 years; (4) You have a tobacco smoking history of at least 20 pack years (packs per day multiplied by number of years you smoked); (5) You get a written order from a healthcare provider  3  Glaucoma Screening: covered annually if you're considered high risk: (1) You have diabetes OR (2) Family history of glaucoma OR (3)  aged 48 and older OR (3)  American aged 72 and older  3  Osteoporosis Screening: covered every 2 years if you meet one of the following conditions: (1) You're estrogen deficient and at risk for osteoporosis based off medical history and other findings; (2) Have a vertebral abnormality; (3) On glucocorticoid therapy for more than 3 months; (4) Have primary hyperparathyroidism; (5) On osteoporosis medications and need to assess response to drug therapy  · Last bone density test (DXA Scan): Not on file  5  HIV Screening: covered annually if you're between the age of 12-76  Also covered annually if you are younger than 13 and older than 72 with risk factors for HIV infection   For pregnant patients, it is covered up to 3 times per pregnancy  Immunizations:  Immunization Recommendations   Influenza Vaccine Annual influenza vaccination during flu season is recommended for all persons aged >= 6 months who do not have contraindications   Pneumococcal Vaccine   * Pneumococcal conjugate vaccine = PCV13 (Prevnar 13), PCV15 (Vaxneuvance), PCV20 (Prevnar 20)  * Pneumococcal polysaccharide vaccine = PPSV23 (Pneumovax) Adults 25-60 years old: 1-3 doses may be recommended based on certain risk factors  Adults 72 years old: 1-2 doses may be recommended based off what pneumonia vaccine you previously received   Hepatitis B Vaccine 3 dose series if at intermediate or high risk (ex: diabetes, end stage renal disease, liver disease)   Tetanus (Td) Vaccine - COST NOT COVERED BY MEDICARE PART B Following completion of primary series, a booster dose should be given every 10 years to maintain immunity against tetanus  Td may also be given as tetanus wound prophylaxis  Tdap Vaccine - COST NOT COVERED BY MEDICARE PART B Recommended at least once for all adults  For pregnant patients, recommended with each pregnancy  Shingles Vaccine (Shingrix) - COST NOT COVERED BY MEDICARE PART B  2 shot series recommended in those aged 48 and above     Health Maintenance Due:      Topic Date Due    Breast Cancer Screening: Mammogram  12/02/2022    Colorectal Cancer Screening  01/24/2029    Hepatitis C Screening  Completed     Immunizations Due:      Topic Date Due    COVID-19 Vaccine (1) Never done    Pneumococcal Vaccine: 65+ Years (1 - PCV) Never done    Influenza Vaccine (1) 09/01/2022     Advance Directives   What are advance directives? Advance directives are legal documents that state your wishes and plans for medical care  These plans are made ahead of time in case you lose your ability to make decisions for yourself  Advance directives can apply to any medical decision, such as the treatments you want, and if you want to donate organs  What are the types of advance directives? There are many types of advance directives, and each state has rules about how to use them  You may choose a combination of any of the following:  · Living will: This is a written record of the treatment you want  You can also choose which treatments you do not want, which to limit, and which to stop at a certain time  This includes surgery, medicine, IV fluid, and tube feedings  · Durable power of  for healthcare [facility]): This is a written record that states who you want to make healthcare choices for you when you are unable to make them for yourself  This person, called a proxy, is usually a family member or a friend  You may choose more than 1 proxy  · Do not resuscitate (DNR) order:  A DNR order is used in case your heart stops beating or you stop breathing  It is a request not to have certain forms of treatment, such as CPR  A DNR order may be included in other types of advance directives  · Medical directive: This covers the care that you want if you are in a coma, near death, or unable to make decisions for yourself  You can list the treatments you want for each condition  Treatment may include pain medicine, surgery, blood transfusions, dialysis, IV or tube feedings, and a ventilator (breathing machine)  · Values history: This document has questions about your views, beliefs, and how you feel and think about life  This information can help others choose the care that you would choose  Why are advance directives important? An advance directive helps you control your care  Although spoken wishes may be used, it is better to have your wishes written down  Spoken wishes can be misunderstood, or not followed  Treatments may be given even if you do not want them  An advance directive may make it easier for your family to make difficult choices about your care     Weight Management   Why it is important to manage your weight:  Being overweight increases your risk of health conditions such as heart disease, high blood pressure, type 2 diabetes, and certain types of cancer  It can also increase your risk for osteoarthritis, sleep apnea, and other respiratory problems  Aim for a slow, steady weight loss  Even a small amount of weight loss can lower your risk of health problems  How to lose weight safely:  A safe and healthy way to lose weight is to eat fewer calories and get regular exercise  You can lose up about 1 pound a week by decreasing the number of calories you eat by 500 calories each day  Healthy meal plan for weight management:  A healthy meal plan includes a variety of foods, contains fewer calories, and helps you stay healthy  A healthy meal plan includes the following:  · Eat whole-grain foods more often  A healthy meal plan should contain fiber  Fiber is the part of grains, fruits, and vegetables that is not broken down by your body  Whole-grain foods are healthy and provide extra fiber in your diet  Some examples of whole-grain foods are whole-wheat breads and pastas, oatmeal, brown rice, and bulgur  · Eat a variety of vegetables every day  Include dark, leafy greens such as spinach, kale, leslie greens, and mustard greens  Eat yellow and orange vegetables such as carrots, sweet potatoes, and winter squash  · Eat a variety of fruits every day  Choose fresh or canned fruit (canned in its own juice or light syrup) instead of juice  Fruit juice has very little or no fiber  · Eat low-fat dairy foods  Drink fat-free (skim) milk or 1% milk  Eat fat-free yogurt and low-fat cottage cheese  Try low-fat cheeses such as mozzarella and other reduced-fat cheeses  · Choose meat and other protein foods that are low in fat  Choose beans or other legumes such as split peas or lentils  Choose fish, skinless poultry (chicken or turkey), or lean cuts of red meat (beef or pork)  Before you cook meat or poultry, cut off any visible fat     · Use less fat and oil   Try baking foods instead of frying them  Add less fat, such as margarine, sour cream, regular salad dressing and mayonnaise to foods  Eat fewer high-fat foods  Some examples of high-fat foods include french fries, doughnuts, ice cream, and cakes  · Eat fewer sweets  Limit foods and drinks that are high in sugar  This includes candy, cookies, regular soda, and sweetened drinks  Exercise:  Exercise at least 30 minutes per day on most days of the week  Some examples of exercise include walking, biking, dancing, and swimming  You can also fit in more physical activity by taking the stairs instead of the elevator or parking farther away from stores  Ask your healthcare provider about the best exercise plan for you  © Copyright [employer] 2018 Information is for End User's use only and may not be sold, redistributed or otherwise used for commercial purposes   All illustrations and images included in CareNotes® are the copyrighted property of A D A M , Inc  or [address] [place] [address]

## 2022-08-29 NOTE — ASSESSMENT & PLAN NOTE
Patient has done a good job with weight reduction through combination of exercise as well as calorie consumption reduction weight is down 32 lb in the past 6 months recommend continued efforts  Benefits of weight reduction reviewed today

## 2022-08-29 NOTE — PROGRESS NOTES
Assessment and Plan:     Problem List Items Addressed This Visit        Endocrine    Glucose intolerance (impaired glucose tolerance) - Primary     I have reviewed the patient's most recent blood work including her hemoglobin A1c and fasting blood sugar which is a reading of 114  Recommend she continue with her efforts to reduce body weight through exercise in the form of swimming as well as calorie consumption reduction by reducing carbohydrates  Follow-up blood testing for blood sugar surveillance has been requested in 4 months  Relevant Orders    Comprehensive metabolic panel    Hemoglobin A1C       Respiratory    LOLI (obstructive sleep apnea)     Patient remains compliant with the use of her CPAP machine for treatment of her sleep apnea  She indicates she continues to get good results from her treatment  Cardiovascular and Mediastinum    Hypertension     Blood pressure management reviewed today blood pressure control remains in good range recommend continuation of lisinopril at 40 mg daily and metoprolol tartrate at 50 mg every 12 hours  Metabolic profile and kidney function were reviewed during today's visit         Relevant Medications    lisinopril (ZESTRIL) 40 mg tablet       Musculoskeletal and Integument    Skin lesions     A skin lesion noted on the external ear right side advise Dermatology consultation for biopsy  Other    Obesity (BMI 30-39  9)     Patient has done a good job with weight reduction through combination of exercise as well as calorie consumption reduction weight is down 32 lb in the past 6 months recommend continued efforts  Benefits of weight reduction reviewed today           Mixed hyperlipidemia     Lipid profile reviewed with the patient recommend continuation of low-cholesterol diet along with lovastatin 20 mg daily next testing scheduled for 4 months         Relevant Orders    Lipid panel      Other Visit Diagnoses     Essential hypertension Relevant Medications    lisinopril (ZESTRIL) 40 mg tablet    Other Relevant Orders    Comprehensive metabolic panel          Depression Screening and Follow-up Plan: Patient was screened for depression during today's encounter  They screened negative with a PHQ-2 score of 0  Urinary Incontinence Plan of Care: counseling topics discussed: practice Kegel (pelvic floor strengthening) exercises  Preventive health issues were discussed with patient, and age appropriate screening tests were ordered as noted in patient's After Visit Summary  Personalized health advice and appropriate referrals for health education or preventive services given if needed, as noted in patient's After Visit Summary  History of Present Illness:     Patient presents for a Medicare Wellness Visit    This 72-year-old female patient presents today for an annual complete physical examination review of her blood work  She has been successful at losing 32 lb since beginning of the year  She has achieved this through exercise and limiting calorie consumption especially carbohydrate based calories  She indicates that she feels much better has more energy if your aches and pains since losing the weight  She is encouraged to continue with her weight loss plan  Patient has had no chest pain palpitations nor any shortness of breath type symptoms  She has had no recent infection based symptoms either  Patient Care Team:  Khadar Campos MD as PCP - General  Khadar Campos MD as PCP - [address] [place] [address] (RTE)  Khadar Campos MD as PCP - [facility] ([facility])     Review of Systems:     Review of Systems   All other systems reviewed and are negative  Problem List:     Patient Active Problem List   Diagnosis    Hypertension    Obesity (BMI 30-39  9)    Glucose intolerance (impaired glucose tolerance)    SVT (supraventricular tachycardia) (HCC)    1st degree AV block    RBBB    Mixed hyperlipidemia    LOLI (obstructive sleep apnea)    Skin lesions      Past Medical and Surgical History:     Past Medical History:   Diagnosis Date    Cellulitis of left elbow 10/30/2016    Epistaxis     Medial meniscus tear 11/11/2014    Morbid obesity with BMI of 40 0-44 9, [facility] 14/76/8168    Umbilical hernia      Past Surgical History:   Procedure Laterality Date    KNEE ARTHROSCOPY W/ MENISCAL REPAIR  07/26/2015    with medial meniscus repair    OOPHORECTOMY Bilateral 2002    TOTAL ABDOMINAL HYSTERECTOMY  2002      Family History:     Family History   Problem Relation Age of Onset    No Known Problems Mother     Thyroid disease Family     Diabetes Family         Diabetes Mellitus    Thyroid cancer Daughter 40    Hodgkin's lymphoma Daughter 32    No Known Problems Maternal Grandmother     No Known Problems Maternal Grandfather     No Known Problems Paternal Grandmother     No Known Problems Paternal Grandfather     No Known Problems Paternal Aunt     No Known Problems Paternal Aunt       Social History:     Social History     Socioeconomic History    Marital status:      Spouse name: None    Number of children: None    Years of education: None    Highest education level: None   Occupational History    None   Tobacco Use    Smoking status: Never Smoker    Smokeless tobacco: Never Used    Tobacco comment: Denied history of never a smoker per [employer]   Vaping Use    Vaping Use: Never used   Substance and Sexual Activity    Alcohol use: No    Drug use: No    Sexual activity: Not Currently     Partners: Male     Comment:    Other Topics Concern    None   Social History Narrative    Exercise habits    Daily Tea Consumption (1 Cup/Day)     Social Determinants of Health     Financial Resource Strain: Low Risk     Difficulty of Paying Living Expenses: Not hard at all   Food Insecurity: Not on file   Transportation Needs: No Transportation Needs    Lack of Transportation (Medical):  No  Lack of Transportation (Non-Medical): No   Physical Activity: Not on file   Stress: Not on file   Social Connections: Not on file   Intimate Partner Violence: Not on file   Housing Stability: Not on file      Medications and Allergies:     Current Outpatient Medications   Medication Sig Dispense Refill    acetaminophen-codeine (TYLENOL #3) 300-30 mg per tablet       aspirin (ECOTRIN LOW STRENGTH) 81 mg EC tablet Take 1 tablet (81 mg total) by mouth daily 90 tablet 0    calcium-vitamin D (OSCAL) 250-125 MG-UNIT per tablet Take 1 tablet by mouth daily   chlorhexidine (PERIDEX) 0 12 % solution       Cholecalciferol (Vitamin D3) 50 MCG (2000 UT) capsule Take 2,000 Units by mouth daily        KRILL OIL PO 1200 mg QD      lisinopril (ZESTRIL) 40 mg tablet Take 1 tablet (40 mg total) by mouth daily 90 tablet 3    lovastatin (MEVACOR) 20 mg tablet Take 1 tablet (20 mg total) by mouth daily at bedtime 90 tablet 3    metoprolol tartrate (LOPRESSOR) 50 mg tablet Take 1 tablet (50 mg total) by mouth every 12 (twelve) hours 180 tablet 3    Multiple Vitamins-Minerals (MULTIVITAMIN ADULT PO)       nystatin powder        No current facility-administered medications for this visit       Allergies   Allergen Reactions    Lipitor [Atorvastatin]     Morphine      Reaction Date: [id number];     Morphine And Related     Zithromax [Azithromycin]      Reaction Date: [id number];       Immunizations:     Immunization History   Administered Date(s) Administered    Influenza Quadrivalent, 6-35 Months IM 11/16/2016, 10/20/2017      Health Maintenance:         Topic Date Due    Breast Cancer Screening: Mammogram  12/02/2022    Colorectal Cancer Screening  01/24/2029    Hepatitis C Screening  Completed         Topic Date Due    COVID-19 Vaccine (1) Never done    Pneumococcal Vaccine: 65+ Years (1 - PCV) Never done    Influenza Vaccine (1) 09/01/2022      Medicare Screening Tests and Risk Assessments:     Janna Hart is here for her Subsequent Wellness visit  Health Risk Assessment:   Patient rates overall health as very good  Patient feels that their physical health rating is much better  Patient is very satisfied with their life  Eyesight was rated as same  Hearing was rated as same  Patient feels that their emotional and mental health rating is slightly better  Patients states they are never, rarely angry  Patient states they are never, rarely unusually tired/fatigued  Pain experienced in the last 7 days has been none  Patient states that she has experienced no weight loss or gain in last 6 months  Depression Screening:   PHQ-2 Score: 0      Fall Risk Screening: In the past year, patient has experienced: no history of falling in past year      Urinary Incontinence Screening:   Patient has leaked urine accidently in the last six months  Home Safety:  Patient does not have trouble with stairs inside or outside of their home  Patient has working smoke alarms and has working carbon monoxide detector  Home safety hazards include: none  Nutrition:   Current diet is Low Carb  Medications:   Patient is currently taking over-the-counter supplements  OTC medications include: see medication list  Patient is able to manage medications  Activities of Daily Living (ADLs)/Instrumental Activities of Daily Living (IADLs):   Walk and transfer into and out of bed and chair?: Yes  Dress and groom yourself?: Yes    Bathe or shower yourself?: Yes    Feed yourself? Yes  Do your laundry/housekeeping?: Yes  Manage your money, pay your bills and track your expenses?: Yes  Make your own meals?: Yes    Do your own shopping?: Yes    Previous Hospitalizations:   Any hospitalizations or ED visits within the last 12 months?: No      Advance Care Planning:   Living will: Yes    Durable POA for healthcare:  Yes    Advanced directive: Yes      PREVENTIVE SCREENINGS      Cardiovascular Screening:    General: Screening Not Indicated and History Lipid Disorder      Diabetes Screening:     General: Screening Current      Colorectal Cancer Screening:     General: Screening Current      Breast Cancer Screening:     General: Screening Current      Cervical Cancer Screening:    General: Screening Not Indicated      Osteoporosis Screening:    General: Screening Not Indicated      Abdominal Aortic Aneurysm (AAA) Screening:        General: Screening Not Indicated      Lung Cancer Screening:     General: Screening Not Indicated      Hepatitis C Screening:    General: Screening Current    Screening, Brief Intervention, and Referral to Treatment (SBIRT)    Screening    Typical number of drinks in a week: 0    Single Item Drug Screening:  How often have you used an illegal drug (including marijuana) or a prescription medication for non-medical reasons in the past year? never    Single Item Drug Screen Score: 0  Interpretation: Negative screen for possible drug use disorder    No exam data present     Physical Exam:     /72   Pulse 60   Temp 98 6 °F (37 °C)   Ht 5' 6 5" (1 689 m)   Wt 106 kg (233 lb 12 8 oz)   SpO2 98%   BMI 37 17 kg/m²     Physical Exam  Vitals and nursing note reviewed  Constitutional:       General: She is not in acute distress  Appearance: She is well-developed  She is not ill-appearing  HENT:      Head: Normocephalic and atraumatic  Right Ear: Tympanic membrane, ear canal and external ear normal       Left Ear: Tympanic membrane, ear canal and external ear normal       Nose: Nose normal       Mouth/Throat:      Mouth: Mucous membranes are moist       Pharynx: Oropharynx is clear  Eyes:      General:         Right eye: No discharge  Left eye: No discharge  Extraocular Movements: Extraocular movements intact  Conjunctiva/sclera: Conjunctivae normal       Pupils: Pupils are equal, round, and reactive to light  Neck:      Vascular: No carotid bruit     Cardiovascular:      Rate and Rhythm: Normal rate and regular rhythm  Heart sounds: No murmur heard  Comments: Varicosities of both lower extremities  Pulmonary:      Effort: Pulmonary effort is normal  No respiratory distress  Breath sounds: Normal breath sounds  No wheezing, rhonchi or rales  Abdominal:      General: There is no distension  Palpations: Abdomen is soft  There is no mass  Tenderness: There is no abdominal tenderness  There is no guarding  Musculoskeletal:         General: No swelling or tenderness  Cervical back: Neck supple  No rigidity or tenderness  Lymphadenopathy:      Cervical: No cervical adenopathy  Skin:     General: Skin is warm and dry  Coloration: Skin is not jaundiced or pale  Neurological:      General: No focal deficit present  Mental Status: She is alert  Mental status is at baseline  Psychiatric:         Mood and Affect: Mood normal          Behavior: Behavior normal          Thought Content:  Thought content normal          Judgment: Judgment normal           Mike Campbell MD

## 2022-09-02 ENCOUNTER — OFFICE VISIT (OUTPATIENT)
Dept: CARDIOLOGY CLINIC | Facility: CLINIC | Age: 72
End: 2022-09-02
Payer: COMMERCIAL

## 2022-09-02 VITALS
OXYGEN SATURATION: 97 % | DIASTOLIC BLOOD PRESSURE: 72 MMHG | SYSTOLIC BLOOD PRESSURE: 124 MMHG | HEART RATE: 69 BPM | BODY MASS INDEX: 36.58 KG/M2 | HEIGHT: 67 IN | WEIGHT: 233.1 LBS

## 2022-09-02 DIAGNOSIS — I47.1 SVT (SUPRAVENTRICULAR TACHYCARDIA) (HCC): ICD-10-CM

## 2022-09-02 DIAGNOSIS — I10 PRIMARY HYPERTENSION: Primary | ICD-10-CM

## 2022-09-02 DIAGNOSIS — G47.33 OSA (OBSTRUCTIVE SLEEP APNEA): ICD-10-CM

## 2022-09-02 DIAGNOSIS — E78.2 MIXED HYPERLIPIDEMIA: ICD-10-CM

## 2022-09-02 PROCEDURE — 3074F SYST BP LT 130 MM HG: CPT | Performed by: INTERNAL MEDICINE

## 2022-09-02 PROCEDURE — 3078F DIAST BP <80 MM HG: CPT | Performed by: INTERNAL MEDICINE

## 2022-09-02 PROCEDURE — 1160F RVW MEDS BY RX/DR IN RCRD: CPT | Performed by: INTERNAL MEDICINE

## 2022-09-02 PROCEDURE — 99214 OFFICE O/P EST MOD 30 MIN: CPT | Performed by: INTERNAL MEDICINE

## 2022-09-02 NOTE — PROGRESS NOTES
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 with new diagnosis of paroxysmal atrial tachycardia diagnosed when she presents for a colonoscopy  Converted with diltiazem  Echo demonstrated a structurally normal heart, and Zio monitor demonstrated frequent episodes of PAT, the longest lasting 11 beats  Was evaluated for Sleep Apnea - now on CPAP  Has rare palpitations, no chest pain or shortness of breath  Review of Systems   Constitutional: Negative  HENT: Negative  Eyes: Negative  Respiratory: Negative for chest tightness and shortness of breath  Cardiovascular: Negative for chest pain, palpitations and leg swelling  Gastrointestinal: Negative  Endocrine: Negative  Genitourinary: Negative  Musculoskeletal: Negative  Skin: Negative  Allergic/Immunologic: Negative  Neurological: Negative  Hematological: Negative  Psychiatric/Behavioral: Negative  All other systems reviewed and are negative          Past Medical History:   Diagnosis Date    Cellulitis of left elbow 10/30/2016    Epistaxis     Medial meniscus tear 11/11/2014    Morbid obesity with BMI of 40 0-44 9, adult ([facility] ) 21/96/6541    Umbilical hernia      Past Surgical History:   Procedure Laterality Date    KNEE ARTHROSCOPY W/ MENISCAL REPAIR  07/26/2015    with medial meniscus repair    OOPHORECTOMY Bilateral 2002    TOTAL ABDOMINAL HYSTERECTOMY  2002     Social History     Substance and Sexual Activity   Alcohol Use No     Social History     Substance and Sexual Activity   Drug Use No     Social History     Tobacco Use   Smoking Status Never Smoker   Smokeless Tobacco Never Used   Tobacco Comment    Denied history of never a smoker per [employer]     Family History   Problem Relation Age of Onset    No Known Problems Mother     Thyroid disease Family     Diabetes Family         Diabetes Mellitus    Thyroid cancer Daughter 40    Hodgkin's lymphoma Daughter 32    No Known Problems Maternal Grandmother     No Known Problems Maternal Grandfather     No Known Problems Paternal Grandmother     No Known Problems Paternal Grandfather     No Known Problems Paternal Aunt     No Known Problems Paternal Aunt        Allergies: Allergies   Allergen Reactions    Lipitor [Atorvastatin]     Morphine      Reaction Date: [id number];     Morphine And Related     Zithromax [Azithromycin]      Reaction Date: [id number];        Medications:     Current Outpatient Medications:     acetaminophen-codeine (TYLENOL #3) 300-30 mg per tablet, , Disp: , Rfl:     aspirin (ECOTRIN LOW STRENGTH) 81 mg EC tablet, Take 1 tablet (81 mg total) by mouth daily, Disp: 90 tablet, Rfl: 0    calcium-vitamin D (OSCAL) 250-125 MG-UNIT per tablet, Take 1 tablet by mouth daily  , Disp: , Rfl:     chlorhexidine (PERIDEX) 0 12 % solution, , Disp: , Rfl:     Cholecalciferol (Vitamin D3) 50 MCG (2000 UT) capsule, Take 2,000 Units by mouth daily  , Disp: , Rfl:     KRILL OIL PO, 1200 mg QD, Disp: , Rfl:     lisinopril (ZESTRIL) 40 mg tablet, Take 1 tablet (40 mg total) by mouth daily, Disp: 90 tablet, Rfl: 3    lovastatin (MEVACOR) 20 mg tablet, Take 1 tablet (20 mg total) by mouth daily at bedtime, Disp: 90 tablet, Rfl: 3    metoprolol tartrate (LOPRESSOR) 50 mg tablet, Take 1 tablet (50 mg total) by mouth every 12 (twelve) hours, Disp: 180 tablet, Rfl: 3    Multiple Vitamins-Minerals (MULTIVITAMIN ADULT PO), , Disp: , Rfl:     nystatin powder, , Disp: , Rfl:       Wt Readings from Last 3 Encounters:   09/02/22 106 kg (233 lb 1 6 oz)   08/29/22 106 kg (233 lb 12 8 oz) 04/28/22 114 kg (252 lb 6 4 oz)     Temp Readings from Last 3 Encounters:   08/29/22 98 6 °F (37 °C)   04/28/22 98 9 °F (37 2 °C)   01/10/22 99 7 °F (37 6 °C)     BP Readings from Last 3 Encounters:   09/02/22 124/72   08/29/22 122/72   04/28/22 124/80     Pulse Readings from Last 3 Encounters:   09/02/22 69   08/29/22 60   04/28/22 62         Physical Exam  HENT:      Head: Atraumatic  Mouth/Throat:      Mouth: Mucous membranes are moist    Eyes:      Extraocular Movements: Extraocular movements intact  Cardiovascular:      Rate and Rhythm: Normal rate and regular rhythm  Heart sounds: Normal heart sounds  Pulmonary:      Effort: Pulmonary effort is normal       Breath sounds: Normal breath sounds  Abdominal:      General: Abdomen is flat  Musculoskeletal:         General: Normal range of motion  Cervical back: Normal range of motion  Skin:     General: Skin is warm  Neurological:      General: No focal deficit present  Mental Status: She is alert and oriented to person, place, and time     Psychiatric:         Mood and Affect: Mood normal          Behavior: Behavior normal            Laboratory Studies:  CMP:  Lab Results   Component Value Date     10/14/2015    K 4 3 08/25/2022     08/25/2022    CO2 30 08/25/2022    ANIONGAP 6 10/14/2015    BUN 16 08/25/2022    CREATININE 0 72 08/25/2022    GLUCOSE 103 10/14/2015    AST 16 08/25/2022    ALT 20 08/25/2022    BILITOT 0 68 10/14/2015    EGFR 85 08/25/2022       Lipid Profile:   Lab Results   Component Value Date    CHOL 176 07/21/2015     Lab Results   Component Value Date    HDL 44 (L) 08/25/2022     Lab Results   Component Value Date    LDLCALC 104 (H) 08/25/2022     Lab Results   Component Value Date    TRIG 189 (H) 08/25/2022       Cardiac testing:   EKG reviewed personally:   Results for orders placed during the hospital encounter of 07/07/21    Echo complete with contrast if indicated    Narrative  [facility] [address] [place] [address]    Transthoracic Echocardiogram  2D, M-mode, Doppler, and Color Doppler    Study date:  2021    Patient: Ino Mckinnon  MR number: [id number]  Account number: [de-identified]  : 1950  Age: 78 years  Gender: Female  Status: Outpatient  Location: [facility]  Height: 67 in  Weight: 254 5 lb  BP: 132/ 78 mmHg    Indications: Supraventricular Tachycardia    Diagnoses: I47 1 - Supraventricular tachycardia    Sonographer:  Gabriela Bedoya RDCS  Primary Physician:  Yobany Clancy MD  Referring Physician:  Ronald Smith:  [facility]  Interpreting Physician:  Issac Merlin, MD    SUMMARY    LEFT VENTRICLE:  Systolic function was normal  Ejection fraction was estimated to be 60 %  There were no regional wall motion abnormalities  Wall thickness was at the upper limits of normal     MITRAL VALVE:  There was trace regurgitation  TRICUSPID VALVE:  There was mild regurgitation  PULMONIC VALVE:  There was trace regurgitation  AORTA:  There was mild dilatation of the ascending aorta  4 0 cm    PROCEDURE: The study was performed in the [facility]  This was a routine study  The transthoracic approach was used  The study included complete 2D imaging, M-mode, complete spectral Doppler, and color Doppler  The heart rate was 78 bpm, at the start of the study  Images were obtained from the parasternal, apical, subcostal, and suprasternal notch acoustic windows  Echocardiographic views were limited due to lung interference  Image quality was  adequate  LEFT VENTRICLE: Size was normal  Systolic function was normal  Ejection fraction was estimated to be 60 %  There were no regional wall motion abnormalities  Wall thickness was at the upper limits of normal  No evidence of apical thrombus    DOPPLER: Left ventricular diastolic function parameters were normal     RIGHT VENTRICLE: The size was normal  Systolic function was normal  Wall thickness was normal     LEFT ATRIUM: Size was normal     RIGHT ATRIUM: Size was normal     MITRAL VALVE: Valve structure was normal  There was normal leaflet separation  DOPPLER: The transmitral velocity was within the normal range  There was no evidence for stenosis  There was trace regurgitation  AORTIC VALVE: The valve was trileaflet  Leaflets exhibited normal thickness and normal cuspal separation  DOPPLER: Transaortic velocity was within the normal range  There was no evidence for stenosis  There was no significant  regurgitation  TRICUSPID VALVE: The valve structure was normal  There was normal leaflet separation  DOPPLER: The transtricuspid velocity was within the normal range  There was no evidence for stenosis  There was mild regurgitation  PULMONIC VALVE: Leaflets exhibited normal thickness, no calcification, and normal cuspal separation  DOPPLER: The transpulmonic velocity was within the normal range  There was trace regurgitation  PERICARDIUM: There was no pericardial effusion  The pericardium was normal in appearance  AORTA: The root exhibited normal size  There was mild dilatation of the ascending aorta  4 0 cm    SYSTEMIC VEINS: IVC: The inferior vena cava was normal in size      SYSTEM MEASUREMENT TABLES    2D  %FS: 35 32 %  Ao Diam: 3 28 cm  Ao asc: 3 98 cm  EDV(Teich): 106 34 ml  EF(Teich): 64 62 %  ESV(Teich): 37 62 ml  IVSd: 1 15 cm  LA Area: 12 19 cm2  LA Diam: 3 65 cm  LVEDV MOD A4C: 70 62 ml  LVEF MOD A4C: 67 79 %  LVESV MOD A4C: 22 75 ml  LVIDd: 4 78 cm  LVIDs: 3 09 cm  LVLd A4C: 7 14 cm  LVLs A4C: 5 9 cm  LVOT Diam: 2 cm  LVPWd: 1 02 cm  RA Area: 14 94 cm2  RVIDd: 3 26 cm  SV MOD A4C: 47 88 ml  SV(Teich): 68 72 ml    MM  TAPSE: 1 91 cm    PW  E' Sept: 0 1 m/s  E/E' Sept: 6 47  MV A G[place]ito: 0 87 m/s  MV Dec Avery: 3 44 m/s2  MV DecT: 189 73 ms  MV E G[place]: 0 65 m/s  MV E/A Ratio: 0 75  MV PHT: 55 02 ms  MVA By PHT: 4 cm2    [address] Accredited Echocardiography [facility]    Prepared and electronically signed by    Issac Merlin, MD  Signed 07-Jul-2021 15:00:59    No results found for this or any previous visit  No results found for this or any previous visit  No results found for this or any previous visit

## 2022-11-14 DIAGNOSIS — I47.1 SVT (SUPRAVENTRICULAR TACHYCARDIA) (HCC): ICD-10-CM

## 2022-11-14 RX ORDER — METOPROLOL TARTRATE 50 MG/1
50 TABLET, FILM COATED ORAL EVERY 12 HOURS SCHEDULED
Qty: 180 TABLET | Refills: 3 | Status: SHIPPED | OUTPATIENT
Start: 2022-11-14

## 2022-12-22 ENCOUNTER — RA CDI HCC (OUTPATIENT)
Dept: OTHER | Facility: HOSPITAL | Age: 72
End: 2022-12-22

## 2022-12-22 NOTE — PROGRESS NOTES
[facility]  coding opportunities          Chart Reviewed number of suggestions sent to Provider: 1     Patients Insurance     Medicare Insurance: [address] [place] [address]

## 2022-12-29 ENCOUNTER — APPOINTMENT (OUTPATIENT)
Dept: LAB | Facility: CLINIC | Age: 72
End: 2022-12-29

## 2022-12-29 DIAGNOSIS — I10 ESSENTIAL HYPERTENSION: ICD-10-CM

## 2022-12-29 DIAGNOSIS — R73.02 GLUCOSE INTOLERANCE (IMPAIRED GLUCOSE TOLERANCE): ICD-10-CM

## 2022-12-29 DIAGNOSIS — E78.2 MIXED HYPERLIPIDEMIA: ICD-10-CM

## 2022-12-29 LAB
ALBUMIN SERPL BCP-MCNC: 3.6 G/DL (ref 3.5–5)
ALP SERPL-CCNC: 71 U/L (ref 46–116)
ALT SERPL W P-5'-P-CCNC: 22 U/L (ref 12–78)
ANION GAP SERPL CALCULATED.3IONS-SCNC: 3 MMOL/L (ref 4–13)
AST SERPL W P-5'-P-CCNC: 15 U/L (ref 5–45)
BILIRUB SERPL-MCNC: 0.93 MG/DL (ref 0.2–1)
BUN SERPL-MCNC: 15 MG/DL (ref 5–25)
CALCIUM SERPL-MCNC: 9.1 MG/DL (ref 8.3–10.1)
CHLORIDE SERPL-SCNC: 105 MMOL/L (ref 96–108)
CHOLEST SERPL-MCNC: 194 MG/DL
CO2 SERPL-SCNC: 31 MMOL/L (ref 21–32)
CREAT SERPL-MCNC: 0.74 MG/DL (ref 0.6–1.3)
EST. AVERAGE GLUCOSE BLD GHB EST-MCNC: 111 MG/DL
GFR SERPL CREATININE-BSD FRML MDRD: 82 ML/MIN/1.73SQ M
GLUCOSE P FAST SERPL-MCNC: 113 MG/DL (ref 65–99)
HBA1C MFR BLD: 5.5 %
HDLC SERPL-MCNC: 48 MG/DL
LDLC SERPL CALC-MCNC: 106 MG/DL (ref 0–100)
NONHDLC SERPL-MCNC: 146 MG/DL
POTASSIUM SERPL-SCNC: 3.9 MMOL/L (ref 3.5–5.3)
PROT SERPL-MCNC: 7.5 G/DL (ref 6.4–8.4)
SODIUM SERPL-SCNC: 139 MMOL/L (ref 135–147)
TRIGL SERPL-MCNC: 201 MG/DL

## 2023-01-03 ENCOUNTER — OFFICE VISIT (OUTPATIENT)
Dept: INTERNAL MEDICINE CLINIC | Facility: CLINIC | Age: 73
End: 2023-01-03

## 2023-01-03 VITALS
BODY MASS INDEX: 36.51 KG/M2 | TEMPERATURE: 98.1 F | HEIGHT: 67 IN | DIASTOLIC BLOOD PRESSURE: 74 MMHG | SYSTOLIC BLOOD PRESSURE: 126 MMHG | WEIGHT: 232.6 LBS | OXYGEN SATURATION: 98 % | HEART RATE: 49 BPM

## 2023-01-03 DIAGNOSIS — E78.2 MIXED HYPERLIPIDEMIA: ICD-10-CM

## 2023-01-03 DIAGNOSIS — G47.33 OSA (OBSTRUCTIVE SLEEP APNEA): ICD-10-CM

## 2023-01-03 DIAGNOSIS — E78.5 HYPERLIPIDEMIA, UNSPECIFIED HYPERLIPIDEMIA TYPE: ICD-10-CM

## 2023-01-03 DIAGNOSIS — I10 PRIMARY HYPERTENSION: Primary | ICD-10-CM

## 2023-01-03 DIAGNOSIS — E66.9 OBESITY (BMI 30-39.9): ICD-10-CM

## 2023-01-03 DIAGNOSIS — R73.02 GLUCOSE INTOLERANCE (IMPAIRED GLUCOSE TOLERANCE): ICD-10-CM

## 2023-01-03 RX ORDER — LOVASTATIN 20 MG/1
20 TABLET ORAL
Qty: 90 TABLET | Refills: 3 | Status: SHIPPED | OUTPATIENT
Start: 2023-01-03

## 2023-01-03 NOTE — ASSESSMENT & PLAN NOTE
The patient's lipid profile was carefully reviewed with her today her triglyceride and cholesterol values remain slightly above target values  I do not believe it is necessary at this time to increase the dose of lovastatin we will continue at 20 mg daily and have asked her to continue to work with dietary adjustments to reduce consumption of concentrated sugars and fats    Next testing will be made year

## 2023-01-03 NOTE — PROGRESS NOTES
Name: Dominic Mckeon      : 1950      MRN: [id number]  Encounter Provider: Mariam Burns MD  Encounter Date: 1/3/2023   Encounter department: [address]     1  Glucose intolerance (impaired glucose tolerance)  Assessment & Plan:  Viewed the patient's blood work for this visit shows decreasing fasting blood sugar reading as well as improving hemoglobin A1c  She is lost approximately 30 pounds over the past year and she is encouraged to continue her efforts through regular exercise and calorie consumption reduction  She is reminded to avoid high concentrated sugars and excessive carbohydrates  Follow-up comprehensive metabolic profile and hemoglobin A1c are requested for 4 months  Orders:  -     Comprehensive metabolic panel; Future; Expected date: 2023  -     Hemoglobin A1C; Future; Expected date: 2023    2  Hyperlipidemia, unspecified hyperlipidemia type  -     lovastatin (MEVACOR) 20 mg tablet; Take 1 tablet (20 mg total) by mouth daily at bedtime    3  LOLI (obstructive sleep apnea)  Assessment & Plan: With the patient's weight loss her CPAP mask does not seem to fit appropriately and she has air leaks on the perimeter  She has an upcoming visit with [facility] sleep medicine to evaluate for a possible new mask  Inadequately treated sleep apnea may be related to her increase in palpitation symptoms  4  Primary hypertension  Assessment & Plan:  Hypertension was reviewed carefully during today's visit we find blood pressure to be adequately controlled and I recommend the continuation of lisinopril at 40 mg daily and metoprolol tartrate at 50 mg every 12 hours  A follow-up comprehensive metabolic profile has been requested for the patient's next visit and her comprehensive metabolic profile for today's visit was carefully reviewed including review of kidney function and electrolyte status        5  Mixed hyperlipidemia  Assessment & Plan:  The patient's lipid profile was carefully reviewed with her today her triglyceride and cholesterol values remain slightly above target values  I do not believe it is necessary at this time to increase the dose of lovastatin we will continue at 20 mg daily and have asked her to continue to work with dietary adjustments to reduce consumption of concentrated sugars and fats  Next testing will be made year      6  Obesity (BMI 30-39  9)  Assessment & Plan:  Patient has demonstrated approximately 30 pound weight loss over the past year she is encouraged to continue with her regular exercise in the form of swimming as well as care and consumption of concentrated sugars and excessive carbohydrates      BMI Counseling: Body mass index is 36 98 kg/m²  The BMI is above normal  Nutrition recommendations include decreasing portion sizes, consuming healthier snacks, limiting drinks that contain sugar and moderation in carbohydrate intake  Exercise recommendations include exercising 3-5 times per week  No pharmacotherapy was ordered  Rationale for BMI follow-up plan is due to patient being overweight or obese  Subjective      This 79-year-old female patient presents today for review of her routine blood work pertaining to blood sugar control and cholesterol control as well as a review of her hypertension  She voices no complaints on today's visit  She has been successful at losing approximately 30 pounds over the past year  She continues to exercise by swimming on a weekly basis  She denies any cardiac symptoms other than occasional palpitations  He has had no chest pain or shortness of breath  She is under treatment for CPAP but indicates that she believes that her current mask is no longer sealing well due to her weight loss  She has an upcoming evaluation by sleep medicine services at [facility]    Is possible that the palpitations which are brief and self-limiting could be related to inadequate treatment of her sleep apnea  She is on metoprolol which has been successful at limiting the duration of any palpitation symptoms  Patient denies any recent infection symptoms  Review of Systems   Cardiovascular: Positive for palpitations  All other systems reviewed and are negative  Current Outpatient Medications on File Prior to Visit   Medication Sig   • aspirin (ECOTRIN LOW STRENGTH) 81 mg EC tablet Take 1 tablet (81 mg total) by mouth daily   • calcium-vitamin D (OSCAL) 250-125 MG-UNIT per tablet Take 1 tablet by mouth daily  • Cholecalciferol (Vitamin D3) 50 MCG (2000 UT) capsule Take 2,000 Units by mouth daily     • KRILL OIL PO 1200 mg QD   • lisinopril (ZESTRIL) 40 mg tablet Take 1 tablet (40 mg total) by mouth daily   • metoprolol tartrate (LOPRESSOR) 50 mg tablet Take 1 tablet (50 mg total) by mouth every 12 (twelve) hours   • Multiple Vitamins-Minerals (MULTIVITAMIN ADULT PO)    • nystatin powder    • [DISCONTINUED] lovastatin (MEVACOR) 20 mg tablet Take 1 tablet (20 mg total) by mouth daily at bedtime   • [DISCONTINUED] acetaminophen-codeine (TYLENOL #3) 300-30 mg per tablet    • [DISCONTINUED] chlorhexidine (PERIDEX) 0 12 % solution        Objective     /74   Pulse (!) 49   Temp 98 1 °F (36 7 °C)   Ht 5' 6 5" (1 689 m)   Wt 106 kg (232 lb 9 6 oz)   SpO2 98%   BMI 36 98 kg/m²     Physical Exam  Vitals reviewed  Constitutional:       General: She is not in acute distress  Appearance: Normal appearance  She is well-developed  She is not ill-appearing  HENT:      Head: Normocephalic  Right Ear: Hearing and external ear normal       Left Ear: Hearing and external ear normal       Nose: Nose normal  No mucosal edema  Mouth/Throat:      Pharynx: Uvula midline  Eyes:      General: Lids are normal       Conjunctiva/sclera: Conjunctivae normal       Pupils: Pupils are equal, round, and reactive to light  Neck:      Thyroid: No thyromegaly  Vascular: No carotid bruit or JVD  Cardiovascular:      Rate and Rhythm: Normal rate and regular rhythm  Heart sounds: Normal heart sounds  No murmur heard  Pulmonary:      Effort: Pulmonary effort is normal  No respiratory distress  Breath sounds: Normal breath sounds  No wheezing, rhonchi or rales  Abdominal:      General: Bowel sounds are normal       Palpations: Abdomen is soft  Musculoskeletal:         General: Normal range of motion  Lymphadenopathy:      Cervical: No cervical adenopathy  Skin:     General: Skin is warm and dry  Neurological:      Mental Status: She is alert and oriented to person, place, and time  Mental status is at baseline  Deep Tendon Reflexes: Reflexes are normal and symmetric  Psychiatric:         Speech: Speech normal          Behavior: Behavior normal  Behavior is cooperative  Thought Content:  Thought content normal          Judgment: Judgment normal        Elvi Escobar MD

## 2023-01-03 NOTE — ASSESSMENT & PLAN NOTE
Viewed the patient's blood work for this visit shows decreasing fasting blood sugar reading as well as improving hemoglobin A1c  She is lost approximately 30 pounds over the past year and she is encouraged to continue her efforts through regular exercise and calorie consumption reduction  She is reminded to avoid high concentrated sugars and excessive carbohydrates  Follow-up comprehensive metabolic profile and hemoglobin A1c are requested for 4 months

## 2023-01-03 NOTE — ASSESSMENT & PLAN NOTE
Patient has demonstrated approximately 30 pound weight loss over the past year she is encouraged to continue with her regular exercise in the form of swimming as well as care and consumption of concentrated sugars and excessive carbohydrates

## 2023-01-03 NOTE — ASSESSMENT & PLAN NOTE
Hypertension was reviewed carefully during today's visit we find blood pressure to be adequately controlled and I recommend the continuation of lisinopril at 40 mg daily and metoprolol tartrate at 50 mg every 12 hours  A follow-up comprehensive metabolic profile has been requested for the patient's next visit and her comprehensive metabolic profile for today's visit was carefully reviewed including review of kidney function and electrolyte status

## 2023-01-11 ENCOUNTER — OFFICE VISIT (OUTPATIENT)
Dept: SLEEP CENTER | Facility: CLINIC | Age: 73
End: 2023-01-11

## 2023-01-11 VITALS
BODY MASS INDEX: 37.45 KG/M2 | SYSTOLIC BLOOD PRESSURE: 122 MMHG | WEIGHT: 233 LBS | DIASTOLIC BLOOD PRESSURE: 84 MMHG | HEART RATE: 52 BPM | HEIGHT: 66 IN

## 2023-01-11 DIAGNOSIS — G47.33 OSA (OBSTRUCTIVE SLEEP APNEA): Primary | ICD-10-CM

## 2023-01-11 DIAGNOSIS — G47.61 PERIODIC LIMB MOVEMENT DISORDER: ICD-10-CM

## 2023-01-11 NOTE — PATIENT INSTRUCTIONS
1   Continue use of CPAP equipment nightly - use any time you are laying down to rest, watch TV, etc to increase use and in case you fall asleep to prevent falling asleep without it  2  If air is too hot, turn down the tubing heating setting  3  If excessive dry mouth in the morning, turn up humidifier setting and be sure to only use distilled water in your humidifier  4   Change your filter once a month and wash the non-disposable filter  5  Continue to clean your equipment, as discussed, using mild soap and water  You can use unscented baby wipe on the mask  6   Contact the [facility] with any questions or concerns prior to your next visit, as needed  7  Schedule visit for follow-up in 1 year  You should see your sleep physician at least once a year  Nursing Support:  When: Monday through Friday 7A-5PM except holidays  Where: Our direct line is [phone]  If you are having a true emergency please call 911  In the event that the line is busy or it is after hours please leave a voice message and we will return your call  Please speak clearly, leaving your full name, birth date, best number to reach you and the reason for your call  Medication refills: We will need the name of the medication, the dosage, the ordering provider, whether you get a 30 or 90 day refill, and the pharmacy name and address  Medications will be ordered by the provider only  Nurses cannot call in prescriptions  Please allow 7 days for medication refills  Physician requested updates: If your provider requested that you call with an update after starting medication, please be ready to provide us the medication and dosage, what time you take your medication, the time you attempt to fall asleep, time you fall asleep, when you wake up, and what time you get out of bed  Sleep Study Results: We will contact you with sleep study results and/or next steps after the physician has reviewed your testing

## 2023-01-11 NOTE — ASSESSMENT & PLAN NOTE
She has mild obstructive sleep apnea with on auto CPAP 8-15 cm H2O   [place] was 5 6  She had excessive daytime sleepiness and daily fatigue when not treated by CPAP  CPAP is benefiting her from a symptomatic standpoint and medically necessary due to her history of hypertension  She is doing well on CPAP  With she has excellent compliance  Her mask might be too big for her now that she has lost some weight  I referred her for mask fitting with our [facility] health representative in the office today and will likely require a smaller mask  Compliance data reviewed today 12/10/2022 - 1/8/2023   100% use more than 4 hours  Average use is 7 hours and 36 minutes  Pressure set at 8-15 cm H2O   EPR is at 2  Median pressure is 10 3   95th percentile pressure 12 4  Median leak is 3 3 L/min  Residual AHI 0 3  Follow-up in 1 year    She can call us back if she has any issues in the interim

## 2023-01-11 NOTE — ASSESSMENT & PLAN NOTE
She used to have symptoms of restless leg but this has mostly resolved  She has no issues with sleep initiation  She had a periodic limb movement index of 60 during the diagnostic sleep study  However she is relatively asymptomatic and treatment is not indicated at this time  She has no excessive daytime sleepiness    I do not see an iron panel in the chart but her recent hemoglobin and MCV was normal

## 2023-01-11 NOTE — PROGRESS NOTES
Sleep Medicine Outpatient Follow Up Note   Ashtyn Burton 81 y o  female MRN: [id number]  1/11/2023      Reason for Consultation:    Chief Complaint   Patient presents with   • Sleep Apnea   • Snoring         Assessment/Plan:    1  LOLI (obstructive sleep apnea)  Assessment & Plan:  She has mild obstructive sleep apnea with on auto CPAP 8-15 cm H2O   [place] was 5 6  She had excessive daytime sleepiness and daily fatigue when not treated by CPAP  CPAP is benefiting her from a symptomatic standpoint and medically necessary due to her history of hypertension  She is doing well on CPAP  With she has excellent compliance  Her mask might be too big for her now that she has lost some weight  I referred her for mask fitting with our [employer] representative in the office today and will likely require a smaller mask  Compliance data reviewed today 12/10/2022 - 1/8/2023   100% use more than 4 hours  Average use is 7 hours and 36 minutes  Pressure set at 8-15 cm H2O   EPR is at 2  Median pressure is 10 3   95th percentile pressure 12 4  Median leak is 3 3 L/min  Residual AHI 0 3  Follow-up in 1 year  She can call us back if she has any issues in the interim      2  Periodic limb movement disorder  Assessment & Plan:  She used to have symptoms of restless leg but this has mostly resolved  She has no issues with sleep initiation  She had a periodic limb movement index of 60 during the diagnostic sleep study  However she is relatively asymptomatic and treatment is not indicated at this time  She has no excessive daytime sleepiness  I do not see an iron panel in the chart but her recent hemoglobin and MCV was normal           Health Maintenance  Immunization History   Administered Date(s) Administered   • Influenza Quadrivalent, 6-35 Months IM 11/16/2016, 10/20/2017        Return in about 1 year (around 1/11/2024)      History of Present Illness   HPI:  Ashtyn Burton is a 81 y o  female who has a past medical history of supraventricular tachycardia, mild obstructive sleep apnea, periodic limb movements stable on CPAP who is presenting for follow-up  Goes to bed at 10-11pm and now gets up to use the bathroom around 3:57 AM and then have a hard time falling back asleep after going to the bathroom  She keep the CPAP on after awakening in case she falls back asleep  She will lie in bed for a few hours without feeling the urge to go back to sleep  When she was sleeping initially with CPAP she was sleeping 6-7 hours  She is still much less tired than prior to CPAP  She doesn't feel the need for napping  She is losing weight with a low carb diet and exercise  Mask leak is increasing and she uses a strip underneath the mask  She uses a full face mask  When she worked she worked 3-11pm   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  She has no symptoms of restless leg syndrome, parasomnias, or cataplexy  She does not note any significant noticeable periodic limb movements during sleep  She does not get awakened prematurely by irregular movements  She is able to go through her day without any significant fatigue  [place]: 1 today    She denies any chest pain, shortness of breath, diarrhea, vomiting, lightheadedness, dizziness, abnormal bleeding or bruising         Historical Information   Past Medical History:   Diagnosis Date   • Cellulitis of left elbow 10/30/2016   • Epistaxis    • Medial meniscus tear 11/11/2014   • Morbid obesity with BMI of 40 0-44 9, [facility] ) 11/03/[facility]   • Umbilical hernia      Past Surgical History:   Procedure Laterality Date   • KNEE ARTHROSCOPY W/ MENISCAL REPAIR  07/26/2015    with medial meniscus repair   • OOPHORECTOMY Bilateral 2002   • TOTAL ABDOMINAL HYSTERECTOMY  2002     Family History   Problem Relation Age of Onset   • No Known Problems Mother    • Thyroid disease Family    • Diabetes Family         Diabetes Mellitus   • Thyroid cancer Daughter 40   • Hodgkin's lymphoma Daughter 32   • No Known Problems Maternal Grandmother    • No Known Problems Maternal Grandfather    • No Known Problems Paternal Grandmother    • No Known Problems Paternal Grandfather    • No Known Problems Paternal Aunt    • No Known Problems Paternal Aunt          Meds/Allergies     Current Outpatient Medications:   •  aspirin (ECOTRIN LOW STRENGTH) 81 mg EC tablet, Take 1 tablet (81 mg total) by mouth daily, Disp: 90 tablet, Rfl: 0  •  calcium-vitamin D (OSCAL) 250-125 MG-UNIT per tablet, Take 1 tablet by mouth daily  , Disp: , Rfl:   •  Cholecalciferol (Vitamin D3) 50 MCG (2000 UT) capsule, Take 2,000 Units by mouth daily  , Disp: , Rfl:   •  KRILL OIL PO, 1200 mg QD, Disp: , Rfl:   •  lisinopril (ZESTRIL) 40 mg tablet, Take 1 tablet (40 mg total) by mouth daily, Disp: 90 tablet, Rfl: 3  •  lovastatin (MEVACOR) 20 mg tablet, Take 1 tablet (20 mg total) by mouth daily at bedtime, Disp: 90 tablet, Rfl: 3  •  metoprolol tartrate (LOPRESSOR) 50 mg tablet, Take 1 tablet (50 mg total) by mouth every 12 (twelve) hours, Disp: 180 tablet, Rfl: 3  •  Multiple Vitamins-Minerals (MULTIVITAMIN ADULT PO), , Disp: , Rfl:   •  nystatin powder, , Disp: , Rfl:   Allergies   Allergen Reactions   • Lipitor [Atorvastatin]    • Morphine      Reaction Date: [id number];    • Morphine And Related    • Zithromax [Azithromycin]      Reaction Date: [id number];        Vitals: Blood pressure 122/84, pulse (!) 52, height 5' 6" (1 676 m), weight 106 kg (233 lb)  Body mass index is 37 61 kg/m²  Physical Exam  Vitals and nursing note reviewed  Constitutional:       General: She is not in acute distress  Appearance: She is well-developed  She is not ill-appearing, toxic-appearing or diaphoretic  HENT:      Head: Normocephalic and atraumatic        Right Ear: External ear normal       Left Ear: External ear normal       Nose: Nose normal       Mouth/Throat:      Mouth: Mucous membranes are moist  Pharynx: No oropharyngeal exudate  Comments: Mallampati 3  No uvular, tonsillar hypertrophy  No macroglossia  Eyes:      Extraocular Movements: Extraocular movements intact  Conjunctiva/sclera: Conjunctivae normal    Cardiovascular:      Rate and Rhythm: Normal rate and regular rhythm  Heart sounds: No murmur heard  Pulmonary:      Effort: Pulmonary effort is normal  No respiratory distress  Breath sounds: Normal breath sounds  No wheezing or rales  Abdominal:      General: Abdomen is flat  There is no distension  Palpations: Abdomen is soft  Musculoskeletal:         General: No swelling  Normal range of motion  Cervical back: Normal range of motion and neck supple  No rigidity  Right lower leg: No edema  Left lower leg: No edema  Lymphadenopathy:      Cervical: No cervical adenopathy  Skin:     General: Skin is warm and dry  Coloration: Skin is not jaundiced or pale  Findings: No bruising  Neurological:      General: No focal deficit present  Mental Status: She is alert and oriented to person, place, and time  Mental status is at baseline  Psychiatric:         Mood and Affect: Mood normal          Behavior: Behavior normal          Thought Content: Thought content normal              Labs: I have personally reviewed pertinent lab results      ABG: No results found for: PHART, WDM7SRU, PO2ART, MTH3SEO, C3CHKDSY, BEART, SOURCE,   BNP: No results found for: BNP,   CBC:  Lab Results   Component Value Date    WBC 6 52 08/25/2022    HGB 14 8 08/25/2022    HCT 46 0 08/25/2022    MCV 98 08/25/2022     08/25/2022    EOSPCT 2 08/25/2022    EOSABS 0 13 08/25/2022    NEUTOPHILPCT 49 08/25/2022    LYMPHOPCT 40 08/25/2022   ,   CMP:   Lab Results   Component Value Date    SODIUM 139 12/29/2022    K 3 9 12/29/2022     12/29/2022    CO2 31 12/29/2022    ANIONGAP 6 10/14/2015    BUN 15 12/29/2022    CREATININE 0 74 12/29/2022    GLUCOSE 103 10/14/2015    CALCIUM 9 1 12/29/2022    AST 15 12/29/2022    ALT 22 12/29/2022    ALKPHOS 71 12/29/2022    PROT 7 4 10/14/2015    BILITOT 0 68 10/14/2015    EGFR 82 12/29/2022   ,   PT/INR:   Lab Results   Component Value Date    INR 0 95 10/24/2014   ,   Ferrtin: No components found for: FERRTIN,  Magensium: No results found for: MAGNESIUM,    Sleep Study: Personally reviewed the data and interpretation for the sleep studies  9/21/2021   SLEEP:   Patient slept for 237 5 minutes out of 407 1 minutes in bed representing a sleep efficiency of 58 3%  Sleep architecture showed a sleep latency of 17 0 minutes and a REM sleep latency of 137 5 minutes  There was 9 3% Stage N1 noted  There was 74 1% Stage N2 noted  There was 5 1% Stage N3 noted  There was 11 6% REM sleep noted  The patient had 65 arousals for an average of 16 4 arousals per hour of sleep  RESPIRATORY:   There were a total of 22 respiratory events made up of 0 obstructive apneas, 0 mixed apneas, 0 central apneas, and 22 hypopneas resulting in an apnea/hypopnea index (AHI) of 5 6 events per hour of sleep  The AHI in the supine position was N/A  The AHI during REM sleep was 48 0  OXIMETRY:   The baseline oxygen saturation with the patient awake was 95 3%  The mean oxygen saturation throughout the study was 94 4%  The amount of sleep time below 90% was 1 5 minutes  The lowest oxygen saturation was 86 0%  BODY POSITION:   The patient slept 0 0% of the evening in the supine position, 90 3% on left side, 9 7% on right side, and 0 0% in the prone position  LEG MOVEMENT:   There were 207 PLMs; PLM index of 52 3; 28 PLMs associated with arousal; PLM w/arousal index of 7 1  IMPRESSION:   The patient slept for a total of 237 minutes representing sleep efficiency 58%, sleep architecture showed slightly reduced stage R  The patient had increased number of sleep disordered breathing events with an average apnea-hypopnea index of 5 6 events per hour   Thus, the patient meets the criteria for diagnosis of mild obstructive sleep apnea  In addition, the patient had increased number of periodic limb movements with a PLM index of 52 3 events per hour  An in-lab CPAP titration study would be recommended  Checking a CBC, BUN, creatinine, TSH, iron studies, ferritin, vitamin B12, vitamin-D and magnesium level would be recommended to rule out underlying metabolic disorders associated with periodic limb movements  Clinical correlation suggested  10/2/2021 - CPAP titration     SLEEP:   Patient slept for 350 0 minutes out of 393 8 minutes in bed representing a sleep efficiency of 88 9%  Sleep architecture showed a sleep latency of 7 9 minutes and a REM sleep latency of 85  5minutes  There was 16 4% Stage N1 noted  There was 35 4% Stage N2 noted  There was 19 3% Stage N3 noted  There was 28 9% Stage REM noted  The patient had 60 arousals for an average of 10 3arousals per hour of sleep  TREATMENT:   Positive airway pressure was initiated at 5cm H2O pressure and titrated up to 8cm H2O pressure using the [employer] AirFit F10 Full face, Small interface   OXIMETRY:   The baseline oxygen saturation with the patient awake was 96 5%  The mean oxygen saturation throughout the study was 95 0%  The amount of sleep time below 90% was 0 0 minutes  The lowest oxygen saturation was 90 0%  BODY POSITION:   The patient slept 0 0% of the evening in the supine position, 100 0% on left side, 0 0% on right side, and 0 0% in the prone position  LEG MOVEMENT:   There were 351 PLMs; PLM index of 60 2; 18 PLMs associated with arousal; PLM w/arousal index of 3 1  IMPRESSION:   Mrs Hendrickson underwent titration of CPAP  She demonstrated a decreased sleep latency but normal REM latency  This is likely due to previously untreated LOLI but hypersomnia/narcolepsy can not be ruled out  Sleep staging was normal  She underwent titration of CPAP up to 8 cc of H2O pressure   She was doing well at that pressure but still had a few respiratory events  Therefore, I recommend a trial of auto-titrating CPAP 8-15 cc of H2O pressure with heated humidification  Compliance should be evaluated in 1-2 months  In addition, she had severe limb movement (PLM index - 60 2)  I would check iron and magnesium levels  If daytime sleepiness persists, I would consider a trial of Neurontin, Lyrica, Mirapex or Requip  Compliance data:  12/10/2022 - 1/8/2023   100% use more than 4 hours  Average use is 7 hours and 36 minutes  Pressure set at 8-15 cm H2O   EPR is at 2  Median pressure is 10 3   95th percentile pressure 12 4  Median leak is 3 3 L/min  Residual AHI 0 3  Transthoracic Echo:  LEFT VENTRICLE:  Systolic function was normal  Ejection fraction was estimated to be 60 %  There were no regional wall motion abnormalities  Wall thickness was at the upper limits of normal   MITRAL VALVE:  There was trace regurgitation  TRICUSPID VALVE:  There was mild regurgitation  PULMONIC VALVE:  There was trace regurgitation  AORTA:  There was mild dilatation of the ascending aorta  4 0 cm      Eliz Devi MD  Pulmonary, Critical Care and Sleep Medicine  [facility]     Portions of the record may have been created with voice recognition software  Occasional wrong word or "sound a like" substitutions may have occurred due to the inherent limitations of voice recognition software  Please read the chart carefully and recognize, using context, where substitutions have occurred

## 2023-03-23 ENCOUNTER — VBI (OUTPATIENT)
Dept: ADMINISTRATIVE | Facility: OTHER | Age: 73
End: 2023-03-23

## 2023-04-06 ENCOUNTER — RA CDI HCC (OUTPATIENT)
Dept: OTHER | Facility: HOSPITAL | Age: 73
End: 2023-04-06

## 2023-04-06 ENCOUNTER — APPOINTMENT (OUTPATIENT)
Dept: LAB | Facility: CLINIC | Age: 73
End: 2023-04-06

## 2023-04-06 DIAGNOSIS — R73.02 GLUCOSE INTOLERANCE (IMPAIRED GLUCOSE TOLERANCE): ICD-10-CM

## 2023-04-06 LAB
ALBUMIN SERPL BCP-MCNC: 3.4 G/DL (ref 3.5–5)
ALP SERPL-CCNC: 63 U/L (ref 46–116)
ALT SERPL W P-5'-P-CCNC: 20 U/L (ref 12–78)
ANION GAP SERPL CALCULATED.3IONS-SCNC: 5 MMOL/L (ref 4–13)
AST SERPL W P-5'-P-CCNC: 15 U/L (ref 5–45)
BILIRUB SERPL-MCNC: 0.66 MG/DL (ref 0.2–1)
BUN SERPL-MCNC: 16 MG/DL (ref 5–25)
CALCIUM ALBUM COR SERPL-MCNC: 9.2 MG/DL (ref 8.3–10.1)
CALCIUM SERPL-MCNC: 8.7 MG/DL (ref 8.3–10.1)
CHLORIDE SERPL-SCNC: 107 MMOL/L (ref 96–108)
CO2 SERPL-SCNC: 26 MMOL/L (ref 21–32)
CREAT SERPL-MCNC: 0.66 MG/DL (ref 0.6–1.3)
EST. AVERAGE GLUCOSE BLD GHB EST-MCNC: 111 MG/DL
GFR SERPL CREATININE-BSD FRML MDRD: 89 ML/MIN/1.73SQ M
GLUCOSE P FAST SERPL-MCNC: 102 MG/DL (ref 65–99)
HBA1C MFR BLD: 5.5 %
POTASSIUM SERPL-SCNC: 4.2 MMOL/L (ref 3.5–5.3)
PROT SERPL-MCNC: 7 G/DL (ref 6.4–8.4)
SODIUM SERPL-SCNC: 138 MMOL/L (ref 135–147)

## 2023-04-07 NOTE — PROGRESS NOTES
[facility]  coding opportunities     E66 [id number]     Chart Reviewed number of suggestions sent to Provider: 1     Patients Insurance     Medicare Insurance: [address] [place] [address]

## 2023-04-10 PROBLEM — G89.29 CHRONIC PAIN OF LEFT KNEE: Status: ACTIVE | Noted: 2023-04-10

## 2023-04-10 PROBLEM — M25.562 CHRONIC PAIN OF LEFT KNEE: Status: ACTIVE | Noted: 2023-04-10

## 2023-05-17 ENCOUNTER — HOSPITAL ENCOUNTER (OUTPATIENT)
Dept: MAMMOGRAPHY | Facility: MEDICAL CENTER | Age: 73
Discharge: HOME | End: 2023-05-17

## 2023-05-17 VITALS — WEIGHT: 241 LBS | HEIGHT: 66 IN | BODY MASS INDEX: 38.73 KG/M2

## 2023-05-17 DIAGNOSIS — Z12.31 ENCOUNTER FOR SCREENING MAMMOGRAM FOR MALIGNANT NEOPLASM OF BREAST: ICD-10-CM

## 2023-05-18 NOTE — ASSESSMENT & PLAN NOTE
AUTHORIZATION FOR RELEASE OF   CONFIDENTIAL INFORMATION    Dear [facility],    We are seeing Mark Patino, date of birth 1953, in the clinic at [facility]. Oskar Mcfadden MD is the patient's PCP. Mark Patino has an outstanding lab/procedure at the time we reviewed his chart. In order to help keep his health information updated, he has authorized us to request the following medical record(s):                                                ( X )  EYE EXAM - most recent                 Please fax records to [facility], Raymond Baez, MD, [phone]     If you have any questions, please contact [facility] at [phone].           Patient Name: Mark Patino  : 1953  Patient Phone #: [phone]      With the patient's weight loss her CPAP mask does not seem to fit appropriately and she has air leaks on the perimeter  She has an upcoming visit with [facility]'s sleep medicine to evaluate for a possible new mask  Inadequately treated sleep apnea may be related to her increase in palpitation symptoms

## 2023-06-05 DIAGNOSIS — L98.9 SKIN LESIONS: Primary | ICD-10-CM

## 2023-06-05 RX ORDER — NYSTATIN 100000 [USP'U]/G
POWDER TOPICAL 2 TIMES DAILY
Qty: 30 G | Refills: 0 | Status: SHIPPED | OUTPATIENT
Start: 2023-06-05 | End: 2023-06-09 | Stop reason: SDUPTHER

## 2023-06-06 ENCOUNTER — OFFICE VISIT (OUTPATIENT)
Dept: OBGYN CLINIC | Facility: HOSPITAL | Age: 73
End: 2023-06-06
Payer: COMMERCIAL

## 2023-06-06 VITALS
BODY MASS INDEX: 38.09 KG/M2 | SYSTOLIC BLOOD PRESSURE: 133 MMHG | WEIGHT: 237 LBS | DIASTOLIC BLOOD PRESSURE: 75 MMHG | HEIGHT: 66 IN | HEART RATE: 66 BPM

## 2023-06-06 DIAGNOSIS — M23.92 INTERNAL DERANGEMENT OF LEFT KNEE: Primary | ICD-10-CM

## 2023-06-06 PROCEDURE — 99203 OFFICE O/P NEW LOW 30 MIN: CPT | Performed by: ORTHOPAEDIC SURGERY

## 2023-06-06 NOTE — PROGRESS NOTES
"Patient Name:  Joesph Buckner  MRN:  [id number]    Assessment & Plan     Left knee pain  1  Patient notes persistent left knee pain which has been ongoing for the past few months     She is very active with aerobics and swimming  She has been performing home exercises for the knee as well as modifying her activities to avoid pain  She has been taking over-the-counter anti-inflammatories as well as utilizing Aspercreme all without improvement  She notes mechanical symptoms such as catching and locking as well as giving way  At this time there is concern for lateral meniscus tear  An MRI of the left knee will be obtained for further evaluation  2  Follow-up after MRI  Chief Complaint     Left knee pain    History of the Present Illness     Joesph Buckner is a 81 y o  female who reports to the office today for evaluation of her left knee  She notes an onset of pain a few months ago  She denies any specific injury or trauma  Patient is very active and swims regularly and also performs aerobic exercises  She notes primarily lateral based knee pain  Pain is worse with prolonged standing and walking as well as twisting and pivoting and walking up and down steps  She notes subjective weakness and giving way as well as catching and locking  She has been utilizing over-the-counter anti-inflammatories and Aspercreme with limited improvement  She has been performing home exercises with limited improvement as well  She denies any numbness and tingling  No fevers or chills  Physical Exam     /75   Pulse 66   Ht 5' 6\" (1 676 m)   Wt 108 kg (237 lb)   BMI 38 25 kg/m²     Left knee: Skin intact  Slight varus deformity evident  No erythema ecchymosis or swelling  Trace effusion  There is tenderness over the anterolateral joint line  No tenderness medial joint line  Full range of motion with pain at terminal flexion laterally  No tenderness over the distal IT band  No tenderness patellar tendon    " Stable to varus and valgus stress without pain  Stable Lachman test   Negative posterior drawer test   Positive Christianson's test laterally  Extensor mechanism is intact  Sensation is intact distally  Eyes: Anicteric sclerae  ENT: Trachea midline  Lungs: Normal respiratory effort  CV: Capillary refill is less than 2 seconds  Skin: Intact without erythema  Lymph: No palpable lymphadenopathy  Neuro: Sensation is grossly intact to light touch  Psych: Mood and affect are appropriate  Data Review     I have personally reviewed pertinent films in PACS, and my interpretation follows:    Strays left knee 4/10/2023: No acute osseous abnormality  No fracture or dislocation  Minimal degenerative changes  Past Medical History:   Diagnosis Date   • Cellulitis of left elbow 10/30/2016   • Epistaxis    • Medial meniscus tear 11/11/2014   • Morbid obesity with BMI of 40 0-44 9, [facility] ) 08/03/8646   • Umbilical hernia        Past Surgical History:   Procedure Laterality Date   • KNEE ARTHROSCOPY W/ MENISCAL REPAIR  07/26/2015    with medial meniscus repair   • OOPHORECTOMY Bilateral 2002   • TOTAL ABDOMINAL HYSTERECTOMY  2002       Allergies   Allergen Reactions   • Lipitor [Atorvastatin] GI Intolerance   • Morphine Nausea Only and GI Intolerance     Reaction Date: [id number];    • Zithromax [Azithromycin] Diarrhea     Reaction Date: [id number];        Current Outpatient Medications on File Prior to Visit   Medication Sig Dispense Refill   • aspirin (ECOTRIN LOW STRENGTH) 81 mg EC tablet Take 1 tablet (81 mg total) by mouth daily 90 tablet 0   • calcium-vitamin D (OSCAL) 250-125 MG-UNIT per tablet Take 1 tablet by mouth daily       • Cholecalciferol (Vitamin D3) 50 MCG (2000 UT) capsule Take 2,000 Units by mouth daily       • KRILL OIL PO 1200 mg QD     • lisinopril (ZESTRIL) 40 mg tablet Take 1 tablet (40 mg total) by mouth daily 90 tablet 3   • lovastatin (MEVACOR) 20 mg tablet Take 1 tablet (20 mg total) by mouth daily at bedtime 90 tablet 3   • metoprolol tartrate (LOPRESSOR) 50 mg tablet Take 1 tablet (50 mg total) by mouth every 12 (twelve) hours 180 tablet 3   • Multiple Vitamins-Minerals (MULTIVITAMIN ADULT PO)      • nystatin powder Apply topically 2 (two) times a day 30 g 0     No current facility-administered medications on file prior to visit  Social History     Tobacco Use   • Smoking status: Never   • Smokeless tobacco: Never   • Tobacco comments:     Denied history of never a smoker per [employer]   Vaping Use   • Vaping Use: Never used   Substance Use Topics   • Alcohol use: No   • Drug use: No       Family History   Problem Relation Age of Onset   • No Known Problems Mother    • Thyroid cancer Daughter 40   • Hodgkin's lymphoma Daughter 32   • No Known Problems Maternal Grandmother    • No Known Problems Maternal Grandfather    • No Known Problems Paternal Grandmother    • No Known Problems Paternal Grandfather    • No Known Problems Paternal Aunt    • No Known Problems Paternal Aunt    • Thyroid disease Family    • Diabetes Family         Diabetes Mellitus   • Breast cancer Neg Hx        Review of Systems     As stated in the HPI  All other systems reviewed and are negative

## 2023-06-09 ENCOUNTER — TELEPHONE (OUTPATIENT)
Dept: INTERNAL MEDICINE CLINIC | Facility: CLINIC | Age: 73
End: 2023-06-09

## 2023-06-09 DIAGNOSIS — L98.9 SKIN LESIONS: ICD-10-CM

## 2023-06-09 RX ORDER — NYSTATIN 100000 [USP'U]/G
POWDER TOPICAL 2 TIMES DAILY
Qty: 180 G | Refills: 2 | Status: SHIPPED | OUTPATIENT
Start: 2023-06-09

## 2023-06-09 NOTE — TELEPHONE ENCOUNTER
Hi, this is the [facility] order pharmacy calling for a prescription that we received for a patient  Patients first name is Jarenmarshall Acon last name is Mayors ME, I'm donte Kirby YOB: 1950  Calling about the nice stop power powder that we received  We did receive a prescription for that but our pack sizes are 15 grams and we are asking for a 90 day supply  We would need 180 grams  Callback number [phone]   Thank you

## 2023-06-19 ENCOUNTER — HOSPITAL ENCOUNTER (OUTPATIENT)
Facility: MEDICAL CENTER | Age: 73
Discharge: HOME | End: 2023-06-19
Payer: COMMERCIAL

## 2023-06-19 DIAGNOSIS — M23.92 INTERNAL DERANGEMENT OF LEFT KNEE: ICD-10-CM

## 2023-06-19 PROCEDURE — G1004 CDSM NDSC: HCPCS

## 2023-06-19 PROCEDURE — 73721 MRI JNT OF LWR EXTRE W/O DYE: CPT

## 2023-06-27 ENCOUNTER — OFFICE VISIT (OUTPATIENT)
Dept: OBGYN CLINIC | Facility: HOSPITAL | Age: 73
End: 2023-06-27
Payer: COMMERCIAL

## 2023-06-27 VITALS
HEIGHT: 66 IN | SYSTOLIC BLOOD PRESSURE: 146 MMHG | DIASTOLIC BLOOD PRESSURE: 76 MMHG | BODY MASS INDEX: 38.38 KG/M2 | WEIGHT: 238.8 LBS | HEART RATE: 58 BPM

## 2023-06-27 DIAGNOSIS — S83.242A OTHER TEAR OF MEDIAL MENISCUS, CURRENT INJURY, LEFT KNEE, INITIAL ENCOUNTER: Primary | ICD-10-CM

## 2023-06-27 DIAGNOSIS — M17.12 PRIMARY OSTEOARTHRITIS OF LEFT KNEE: ICD-10-CM

## 2023-06-27 PROCEDURE — 20610 DRAIN/INJ JOINT/BURSA W/O US: CPT | Performed by: ORTHOPAEDIC SURGERY

## 2023-06-27 PROCEDURE — 99214 OFFICE O/P EST MOD 30 MIN: CPT | Performed by: ORTHOPAEDIC SURGERY

## 2023-06-27 RX ORDER — BETAMETHASONE SODIUM PHOSPHATE AND BETAMETHASONE ACETATE 3; 3 MG/ML; MG/ML
12 INJECTION, SUSPENSION INTRA-ARTICULAR; INTRALESIONAL; INTRAMUSCULAR; SOFT TISSUE
Status: COMPLETED | OUTPATIENT
Start: 2023-06-27 | End: 2023-06-27

## 2023-06-27 RX ORDER — BUPIVACAINE HYDROCHLORIDE 2.5 MG/ML
2 INJECTION, SOLUTION INFILTRATION; PERINEURAL
Status: COMPLETED | OUTPATIENT
Start: 2023-06-27 | End: 2023-06-27

## 2023-06-27 RX ORDER — LIDOCAINE HYDROCHLORIDE 10 MG/ML
2 INJECTION, SOLUTION INFILTRATION; PERINEURAL
Status: COMPLETED | OUTPATIENT
Start: 2023-06-27 | End: 2023-06-27

## 2023-06-27 RX ADMIN — BUPIVACAINE HYDROCHLORIDE 2 ML: 2.5 INJECTION, SOLUTION INFILTRATION; PERINEURAL at 11:00

## 2023-06-27 RX ADMIN — BETAMETHASONE SODIUM PHOSPHATE AND BETAMETHASONE ACETATE 12 MG: 3; 3 INJECTION, SUSPENSION INTRA-ARTICULAR; INTRALESIONAL; INTRAMUSCULAR; SOFT TISSUE at 11:00

## 2023-06-27 RX ADMIN — LIDOCAINE HYDROCHLORIDE 2 ML: 10 INJECTION, SOLUTION INFILTRATION; PERINEURAL at 11:00

## 2023-06-27 NOTE — PROGRESS NOTES
Assessment:  1  Other tear of medial meniscus, current injury, left knee, initial encounter  Ambulatory referral to Physical Therapy      2  Primary osteoarthritis of left knee  Ambulatory referral to Physical Therapy          Plan:  Medial meniscus tear of left knee  Surgical option were discussed  The patient was provided with left knee steroid injection  The patient tolerated the procedure well  She should initiate physical therapy  The patient should follow up in 6 weeks  To do next visit:  Return in about 6 weeks (around 8/8/2023)  The above stated was discussed in layman's terms and the patient expressed understanding  All questions were answered to the patient's satisfaction  Scribe Attestation    I,:  Win Ochoa am acting as a scribe while in the presence of the attending physician :       I,:  Jyotsna Talbot MD personally performed the services described in this documentation    as scribed in my presence :             Subjective:   Sarmad Waller is a 81 y o  female who presents for follow up of left knee with MRI review  Today she complains of left lateral > medial and generalized knee pain  Walking and bending aggravates while rest alleviates          Review of systems negative unless otherwise specified in HPI    Past Medical History:   Diagnosis Date   • Cellulitis of left elbow 10/30/2016   • Epistaxis    • Medial meniscus tear 11/11/2014   • Morbid obesity with BMI of 40 0-44 9, adult ([facility] [address] ) 52/01/3052   • Umbilical hernia        Past Surgical History:   Procedure Laterality Date   • KNEE ARTHROSCOPY W/ MENISCAL REPAIR  07/26/2015    with medial meniscus repair   • OOPHORECTOMY Bilateral 2002   • TOTAL ABDOMINAL HYSTERECTOMY  2002       Family History   Problem Relation Age of Onset   • No Known Problems Mother    • Thyroid cancer Daughter 40   • Hodgkin's lymphoma Daughter 32   • No Known Problems Maternal Grandmother    • No Known Problems Maternal Grandfather    • No Known Problems Paternal Grandmother    • No Known Problems Paternal Grandfather    • No Known Problems Paternal Aunt    • No Known Problems Paternal Aunt    • Thyroid disease Family    • Diabetes Family         Diabetes Mellitus   • Breast cancer Neg Hx        Social History     Occupational History   • Not on file   Tobacco Use   • Smoking status: Never   • Smokeless tobacco: Never   • Tobacco comments:     Denied history of never a smoker per [employer]   Vaping Use   • Vaping Use: Never used   Substance and Sexual Activity   • Alcohol use: No   • Drug use: No   • Sexual activity: Not Currently     Partners: Male     Comment:          Current Outpatient Medications:   •  aspirin (ECOTRIN LOW STRENGTH) 81 mg EC tablet, Take 1 tablet (81 mg total) by mouth daily, Disp: 90 tablet, Rfl: 0  •  calcium-vitamin D (OSCAL) 250-125 MG-UNIT per tablet, Take 1 tablet by mouth daily  , Disp: , Rfl:   •  Cholecalciferol (Vitamin D3) 50 MCG (2000 UT) capsule, Take 2,000 Units by mouth daily  , Disp: , Rfl:   •  KRILL OIL PO, 1200 mg QD, Disp: , Rfl:   •  lisinopril (ZESTRIL) 40 mg tablet, Take 1 tablet (40 mg total) by mouth daily, Disp: 90 tablet, Rfl: 3  •  lovastatin (MEVACOR) 20 mg tablet, Take 1 tablet (20 mg total) by mouth daily at bedtime, Disp: 90 tablet, Rfl: 3  •  metoprolol tartrate (LOPRESSOR) 50 mg tablet, Take 1 tablet (50 mg total) by mouth every 12 (twelve) hours, Disp: 180 tablet, Rfl: 3  •  Multiple Vitamins-Minerals (MULTIVITAMIN ADULT PO), , Disp: , Rfl:   •  nystatin powder, Apply topically 2 (two) times a day, Disp: 180 g, Rfl: 2    Allergies   Allergen Reactions   • Lipitor [Atorvastatin] GI Intolerance   • Morphine Nausea Only and GI Intolerance     Reaction Date: [id number];    • Zithromax [Azithromycin] Diarrhea     Reaction Date: [id number];             Vitals:    06/27/23 1044   BP: 146/76   Pulse: 58       Objective:  Physical exam  · General: Awake, Alert, Oriented  · Eyes: Pupils equal, round "and reactive to light  · Heart: regular rate and rhythm  · Lungs: No audible wheezing  · Abdomen: soft                    Ortho Exam  Left knee:  No erythema or ecchymosis  No effusion or swelling  Normal strength  Good ROM   Positive Medial McMurrays  Positive Apleys  Calf compartments soft and supple  Sensation intact  Toes are warm sensate and mobile        Diagnostics, reviewed and taken today if performed as documented: The attending physician has personally reviewed the pertinent films in PACS and interpretation is as follows:  Left knee MRI:  Medial meniscus posterior root tear with associated extrusion  Procedures, if performed today:    Large joint arthrocentesis: L knee  Universal Protocol:  Consent: Verbal consent obtained  Risks and benefits: risks, benefits and alternatives were discussed  Consent given by: patient  Time out: Immediately prior to procedure a \"time out\" was called to verify the correct patient, procedure, equipment, support staff and site/side marked as required  Timeout called at: 6/27/2023 11:54 AM   Patient understanding: patient states understanding of the procedure being performed  Site marked: the operative site was marked  Patient identity confirmed: verbally with patient    Supporting Documentation  Indications: pain   Procedure Details  Location: knee - L knee  Preparation: Patient was prepped and draped in the usual sterile fashion  Needle size: 22 G  Ultrasound guidance: no  Approach: anterolateral  Medications administered: 12 mg betamethasone acetate-betamethasone sodium phosphate 6 (3-3) mg/mL; 2 mL bupivacaine 0 25 %; 2 mL lidocaine 1 %    Patient tolerance: patient tolerated the procedure well with no immediate complications  Dressing:  Sterile dressing applied              Portions of the record may have been created with voice recognition software    Occasional wrong word or \"sound a like\" substitutions may have occurred due to the inherent limitations of voice " recognition software  Read the chart carefully and recognize, using context, where substitutions have occurred

## 2023-07-05 ENCOUNTER — EVALUATION (OUTPATIENT)
Dept: PHYSICAL THERAPY | Facility: CLINIC | Age: 73
End: 2023-07-05
Payer: COMMERCIAL

## 2023-07-05 DIAGNOSIS — M17.12 PRIMARY OSTEOARTHRITIS OF LEFT KNEE: ICD-10-CM

## 2023-07-05 DIAGNOSIS — S83.242A OTHER TEAR OF MEDIAL MENISCUS, CURRENT INJURY, LEFT KNEE, INITIAL ENCOUNTER: ICD-10-CM

## 2023-07-05 PROCEDURE — 97161 PT EVAL LOW COMPLEX 20 MIN: CPT

## 2023-07-05 PROCEDURE — 97110 THERAPEUTIC EXERCISES: CPT

## 2023-07-05 NOTE — PROGRESS NOTES
PT Evaluation     Today's date: 2023  Patient name: Galindo Perez  : 1950  MRN: [id number]  Referring provider: Archie Thomas  Dx:   Encounter Diagnosis     ICD-10-CM    1. Other tear of medial meniscus, current injury, left knee, initial encounter  S83.242A Ambulatory referral to Physical Therapy      2. Primary osteoarthritis of left knee  M17.12 Ambulatory referral to Physical Therapy                     Assessment  Assessment details: Pt is a 81 y.o. female who presents to OP PT for IE following referral for L medial meniscus tear and L knee OA. Pt c/o L knee pain and R toe pain (unrelated), although L knee pain has greatly improved since cortisone injection last week. Upon formal assessment pt demonstrates, knee and hip ROM WNL, although decreased flexibility in B HS and quads noted. Mild B hip abductor and extensor strength also noted. Pt w/ requests to focus on further developing aquatic exercise routine. Is currently engaging in about hour long self guided program multiple times per week. Provided w/ stretches pt can complete on dry-land and educated on new exercises to incorporate into program as well as how to progress current LE exercises. Provided w/ resistance bands for LE strength progression. Handout of new exercises and updates provided. No further questions at this time. 1 additional visit scheduled in 2 weeks to assess program progression; further therapy recommendations to be made at this time. Pt agreeable to POC. Understanding of Dx/Px/POC: good   Prognosis: good    Goals  LTGs (to be achieved within 6-8 weeks):   1. Pt will be I with HEP at d/c to promote PT carry-over. 2. Pt will meet FOTO predicted score. 3. Pt will improve B HS and quad flexibility to increase ease w/ functional mobility. 4. Pt will improve B hip strength to 5/5 or greater to increase ease w/ functional mobility.   5. Pt will be able to complete active knee flexion >90 degrees w/o HS cramping. Plan  Planned modality interventions: unattended electrical stimulation, ultrasound, traction, thermotherapy: hydrocollator packs and cryotherapy  Planned therapy interventions: manual therapy, neuromuscular re-education, therapeutic activities, therapeutic exercise, patient education and gait training  Frequency: 2x week  Duration in weeks: 8  Treatment plan discussed with: patient        Subjective Evaluation    History of Present Illness  Mechanism of injury: Pt c/o L knee pain. Symptoms have been present approximally 1 year ago (had been off and on but is now constant and worsening). YUNIER: unknown. MRI completed 6/19/23 and revealed medial meniscus posterior root tear with associated extrusion and mild tricompartmental osteoarthritis. Pt was given steroid injection 6/27/23, told to try PT and return to orthopedic specialist in 6 weeks. Pain has greatly improved since injection. Denies popping, clicking, catching, and knee instability. Also has fracture in R toe. Aggravating factors include: walking, stairs  Alleviating factors include: cortisone injection  Would like to get exercises that she can do in water when she goes to [place] to swim. Water depths range 3 1/2 ft - 4 1/2 feet. Has access to pool noodles, webbed gloves, and water dumbbells. Objective     Palpation   Left   Hypertonic in the distal biceps femoris, distal semimembranosus and distal semitendinosus. Muscle spasm in the distal biceps femoris, distal semimembranosus and distal semitendinosus. Right   Hypertonic in the distal biceps femoris, distal semimembranosus and distal semitendinosus. Left Knee Comments  Left distal semimembranosus: spam w/ any active knee flexion past 90 degrees. Left distal biceps femoris: spam w/ any active knee flexion past 90 degrees. Left distal semitendinosus: spam w/ any active knee flexion past 90 degrees.      Active Range of Motion   Left Knee   Normal active range of motion    Right Knee   Normal active range of motion    Mobility   Patellar Mobility:   Left Knee   WFL: medial, lateral, superior and inferior. Strength/Myotome Testing     Left Hip   Planes of Motion   Flexion: 4+  Extension: 4+  Abduction: 4    Right Hip   Planes of Motion   Flexion: 4+  Extension: 4+  Abduction: 4    Left Knee   Flexion: 5  Extension: 5    Right Knee   Flexion: 5  Extension: 5    Additional Strength Details  HS cramping w/ any active knee flexion from 90 degrees on bilaterally     Ambulation     Observational Gait   Gait: antalgic   Decreased right stance time and left step length.    Left foot contact pattern: foot flat  Right foot contact pattern: foot flat    Additional Observational Gait Details  Gait deviations appear to be more related to R 3rd toe fracture             Precautions: HTN, SVT, 1st degree AV block, R 3rd toe fracture    DOES NOT TOLERATE SUPINE >5 min    HEP: prone quad stretch w/ strap, seated HS stretch, updated current pool program to include resistance w/ all LE exercises     Manuals 7/5            STM to R HS NV *                                                  Neuro Re-Ed                                                    Ther Ex             Pt education POC, HEP, how to progress current pool program            Bike             Prone quad stretch w/ strap * 3x30" B            Seated HS stretch * 3x30" B            Hip abduction strengthening              Hip extension strengthening              Gastroc stretch  NV *                        Ther Activity                                       Gait Training                                       Modalities

## 2023-07-21 ENCOUNTER — APPOINTMENT (OUTPATIENT)
Dept: PHYSICAL THERAPY | Facility: CLINIC | Age: 73
End: 2023-07-21
Payer: COMMERCIAL

## 2023-08-08 ENCOUNTER — OFFICE VISIT (OUTPATIENT)
Dept: OBGYN CLINIC | Facility: HOSPITAL | Age: 73
End: 2023-08-08
Payer: COMMERCIAL

## 2023-08-08 VITALS
BODY MASS INDEX: 38.25 KG/M2 | WEIGHT: 238 LBS | HEART RATE: 56 BPM | SYSTOLIC BLOOD PRESSURE: 138 MMHG | HEIGHT: 66 IN | DIASTOLIC BLOOD PRESSURE: 78 MMHG

## 2023-08-08 DIAGNOSIS — G89.29 CHRONIC PAIN OF LEFT KNEE: Primary | ICD-10-CM

## 2023-08-08 DIAGNOSIS — M25.562 CHRONIC PAIN OF LEFT KNEE: Primary | ICD-10-CM

## 2023-08-08 PROCEDURE — 99213 OFFICE O/P EST LOW 20 MIN: CPT | Performed by: ORTHOPAEDIC SURGERY

## 2023-08-08 NOTE — PROGRESS NOTES
81 y.o.female presents for reevaluation. She has undergone injection of corticosteroid 6 weeks ago, since then has returned to high-level activity including not limited to swimming for exercise, doing yard work, and ascending and descending stairs. She has no recurrence of medial sided left knee pain nor does she have recurrence of mechanical symptoms    Review of Systems  Review of systems negative unless otherwise specified in HPI    Past Medical History  Past Medical History:   Diagnosis Date   • Cellulitis of left elbow 10/30/2016   • Epistaxis    • Medial meniscus tear 11/11/2014   • Morbid obesity with BMI of 40.0-44.9, adult ([address]) 33/26/2378   • Umbilical hernia        Past Surgical History  Past Surgical History:   Procedure Laterality Date   • KNEE ARTHROSCOPY W/ MENISCAL REPAIR  07/26/2015    with medial meniscus repair   • OOPHORECTOMY Bilateral 2002   • TOTAL ABDOMINAL HYSTERECTOMY  2002       Current Medications  Current Outpatient Medications on File Prior to Visit   Medication Sig Dispense Refill   • aspirin (ECOTRIN LOW STRENGTH) 81 mg EC tablet Take 1 tablet (81 mg total) by mouth daily 90 tablet 0   • calcium-vitamin D (OSCAL) 250-125 MG-UNIT per tablet Take 1 tablet by mouth daily. • Cholecalciferol (Vitamin D3) 50 MCG (2000 UT) capsule Take 2,000 Units by mouth daily       • KRILL OIL PO 1200 mg QD     • lisinopril (ZESTRIL) 40 mg tablet Take 1 tablet (40 mg total) by mouth daily 90 tablet 3   • lovastatin (MEVACOR) 20 mg tablet Take 1 tablet (20 mg total) by mouth daily at bedtime 90 tablet 3   • metoprolol tartrate (LOPRESSOR) 50 mg tablet Take 1 tablet (50 mg total) by mouth every 12 (twelve) hours 180 tablet 3   • Multiple Vitamins-Minerals (MULTIVITAMIN ADULT PO)      • nystatin powder Apply topically 2 (two) times a day 180 g 2     No current facility-administered medications on file prior to visit.        Recent Labs [facility])  0   Lab Value Date/Time    HCT 46.0 08/25/2022 0845    HCT 42.5 10/14/2015 1044    HGB 14.8 08/25/2022 0845    HGB 14.4 10/14/2015 1044    WBC 6.52 08/25/2022 0845    WBC 7.13 10/14/2015 1044    INR 0.95 10/24/2014 0945    GLUCOSE 103 10/14/2015 1044    HGBA1C 5.5 04/06/2023 0835    HGBA1C 5.6 07/21/2015 0858         Physical exam  · General: Awake, Alert, Oriented  · Eyes: Pupils equal, round and reactive to light  · Heart: regular rate and rhythm  · Lungs: No audible wheezing  · Abdomen: soft  Examination finds an intact soft tissue envelope of the left knee. Left knee is in varus. There is no bony enlargement nor is or tenderness on the medial joint line. She has good motion. She has no instability. There is no neuro vas compromise to the left lower extremity    Imaging  No new imaging studies are performed    Procedure  None indicated performed    Assessment/Plan:   81 y. o.female who had clinical resolution of the medial sided knee pain and dysfunction following injection of corticosteroid. All diagnoses were discussed with the patient. Treatment option clued risk, benefits, return discussed in detail. Observation alone is indicated for a clinically silent medial meniscus tear of the left knee.   Office follow-up in as-needed basis is my recommendation

## 2023-08-25 ENCOUNTER — RA CDI HCC (OUTPATIENT)
Dept: OTHER | Facility: HOSPITAL | Age: 73
End: 2023-08-25

## 2023-08-26 NOTE — PROGRESS NOTES
[address] [facility] coding opportunities     E66.01     Chart Reviewed number of suggestions sent to Provider: 1     GR    Patients Insurance     Medicare Insurance: [facility]

## 2023-08-29 ENCOUNTER — APPOINTMENT (OUTPATIENT)
Dept: LAB | Facility: CLINIC | Age: 73
End: 2023-08-29
Payer: COMMERCIAL

## 2023-08-29 DIAGNOSIS — R39.9 UTI SYMPTOMS: ICD-10-CM

## 2023-08-29 DIAGNOSIS — Z13.0 SCREENING FOR DEFICIENCY ANEMIA: ICD-10-CM

## 2023-08-29 DIAGNOSIS — Z12.11 SCREENING FOR COLON CANCER: ICD-10-CM

## 2023-08-29 DIAGNOSIS — E78.5 HYPERLIPIDEMIA, UNSPECIFIED HYPERLIPIDEMIA TYPE: ICD-10-CM

## 2023-08-29 LAB
BACTERIA UR QL AUTO: ABNORMAL /HPF
BASOPHILS # BLD AUTO: 0.04 THOUSANDS/ÂΜL (ref 0–0.1)
BASOPHILS NFR BLD AUTO: 1 % (ref 0–1)
BILIRUB UR QL STRIP: NEGATIVE
CHOLEST SERPL-MCNC: 180 MG/DL
CLARITY UR: CLEAR
COLOR UR: ABNORMAL
EOSINOPHIL # BLD AUTO: 0.08 THOUSAND/ÂΜL (ref 0–0.61)
EOSINOPHIL NFR BLD AUTO: 1 % (ref 0–6)
ERYTHROCYTE [DISTWIDTH] IN BLOOD BY AUTOMATED COUNT: 13.2 % (ref 11.6–15.1)
GLUCOSE UR STRIP-MCNC: NEGATIVE MG/DL
HCT VFR BLD AUTO: 42.5 % (ref 34.8–46.1)
HDLC SERPL-MCNC: 47 MG/DL
HEMOCCULT STL QL IA: NEGATIVE
HGB BLD-MCNC: 13.8 G/DL (ref 11.5–15.4)
HGB UR QL STRIP.AUTO: NEGATIVE
IMM GRANULOCYTES # BLD AUTO: 0.01 THOUSAND/UL (ref 0–0.2)
IMM GRANULOCYTES NFR BLD AUTO: 0 % (ref 0–2)
KETONES UR STRIP-MCNC: NEGATIVE MG/DL
LDLC SERPL CALC-MCNC: 86 MG/DL (ref 0–100)
LEUKOCYTE ESTERASE UR QL STRIP: ABNORMAL
LYMPHOCYTES # BLD AUTO: 2.4 THOUSANDS/ÂΜL (ref 0.6–4.47)
LYMPHOCYTES NFR BLD AUTO: 38 % (ref 14–44)
MCH RBC QN AUTO: 31.7 PG (ref 26.8–34.3)
MCHC RBC AUTO-ENTMCNC: 32.5 G/DL (ref 31.4–37.4)
MCV RBC AUTO: 98 FL (ref 82–98)
MONOCYTES # BLD AUTO: 0.46 THOUSAND/ÂΜL (ref 0.17–1.22)
MONOCYTES NFR BLD AUTO: 7 % (ref 4–12)
NEUTROPHILS # BLD AUTO: 3.3 THOUSANDS/ÂΜL (ref 1.85–7.62)
NEUTS SEG NFR BLD AUTO: 53 % (ref 43–75)
NITRITE UR QL STRIP: NEGATIVE
NON-SQ EPI CELLS URNS QL MICRO: ABNORMAL /HPF
NONHDLC SERPL-MCNC: 133 MG/DL
NRBC BLD AUTO-RTO: 0 /100 WBCS
PH UR STRIP.AUTO: 7 [PH]
PLATELET # BLD AUTO: 192 THOUSANDS/UL (ref 149–390)
PMV BLD AUTO: 10.4 FL (ref 8.9–12.7)
PROT UR STRIP-MCNC: NEGATIVE MG/DL
RBC # BLD AUTO: 4.35 MILLION/UL (ref 3.81–5.12)
RBC #/AREA URNS AUTO: ABNORMAL /HPF
SP GR UR STRIP.AUTO: 1.01 (ref 1–1.03)
TRIGL SERPL-MCNC: 236 MG/DL
UROBILINOGEN UR STRIP-ACNC: <2 MG/DL
WBC # BLD AUTO: 6.29 THOUSAND/UL (ref 4.31–10.16)
WBC #/AREA URNS AUTO: ABNORMAL /HPF

## 2023-08-29 PROCEDURE — 36415 COLL VENOUS BLD VENIPUNCTURE: CPT

## 2023-08-29 PROCEDURE — 80061 LIPID PANEL: CPT

## 2023-08-29 PROCEDURE — 81001 URINALYSIS AUTO W/SCOPE: CPT

## 2023-08-29 PROCEDURE — 85025 COMPLETE CBC W/AUTO DIFF WBC: CPT

## 2023-08-29 PROCEDURE — G0328 FECAL BLOOD SCRN IMMUNOASSAY: HCPCS

## 2023-08-31 ENCOUNTER — OFFICE VISIT (OUTPATIENT)
Dept: INTERNAL MEDICINE CLINIC | Facility: CLINIC | Age: 73
End: 2023-08-31
Payer: COMMERCIAL

## 2023-08-31 VITALS
HEART RATE: 72 BPM | WEIGHT: 236.2 LBS | HEIGHT: 63 IN | SYSTOLIC BLOOD PRESSURE: 122 MMHG | OXYGEN SATURATION: 97 % | DIASTOLIC BLOOD PRESSURE: 72 MMHG | TEMPERATURE: 98.2 F | BODY MASS INDEX: 41.85 KG/M2

## 2023-08-31 DIAGNOSIS — G89.29 CHRONIC PAIN OF LEFT KNEE: ICD-10-CM

## 2023-08-31 DIAGNOSIS — R73.02 GLUCOSE INTOLERANCE (IMPAIRED GLUCOSE TOLERANCE): Primary | ICD-10-CM

## 2023-08-31 DIAGNOSIS — E66.01 MORBID OBESITY WITH BMI OF 40.0-44.9, ADULT (HCC): ICD-10-CM

## 2023-08-31 DIAGNOSIS — Z00.00 HEALTH MAINTENANCE EXAMINATION: ICD-10-CM

## 2023-08-31 DIAGNOSIS — I10 ESSENTIAL HYPERTENSION: ICD-10-CM

## 2023-08-31 DIAGNOSIS — G47.33 OSA (OBSTRUCTIVE SLEEP APNEA): ICD-10-CM

## 2023-08-31 DIAGNOSIS — I10 PRIMARY HYPERTENSION: ICD-10-CM

## 2023-08-31 DIAGNOSIS — R94.31 LEFT AXIS DEVIATION: ICD-10-CM

## 2023-08-31 DIAGNOSIS — I44.0 1ST DEGREE AV BLOCK: ICD-10-CM

## 2023-08-31 DIAGNOSIS — M25.562 CHRONIC PAIN OF LEFT KNEE: ICD-10-CM

## 2023-08-31 PROBLEM — I45.10 RBBB: Status: RESOLVED | Noted: 2021-06-14 | Resolved: 2023-08-31

## 2023-08-31 PROBLEM — L98.9 SKIN LESIONS: Status: RESOLVED | Noted: 2022-01-24 | Resolved: 2023-08-31

## 2023-08-31 PROCEDURE — 99215 OFFICE O/P EST HI 40 MIN: CPT | Performed by: INTERNAL MEDICINE

## 2023-08-31 PROCEDURE — 93000 ELECTROCARDIOGRAM COMPLETE: CPT | Performed by: INTERNAL MEDICINE

## 2023-08-31 PROCEDURE — G0439 PPPS, SUBSEQ VISIT: HCPCS | Performed by: INTERNAL MEDICINE

## 2023-08-31 RX ORDER — LISINOPRIL 40 MG/1
40 TABLET ORAL DAILY
Qty: 90 TABLET | Refills: 3 | Status: SHIPPED | OUTPATIENT
Start: 2023-08-31

## 2023-08-31 NOTE — ASSESSMENT & PLAN NOTE
Recommend continued use of CPAP machine for management of obstructive sleep apnea yearly follow-up with sleep medicine advised

## 2023-08-31 NOTE — ASSESSMENT & PLAN NOTE
Lipid profile reviewed with the patient during today's visit recommend continued caution and consumption of saturated fats as well as complex carbohydrates and concentrated sugars.   Continue Krill oil as well as lovastatin at 20 mg daily

## 2023-08-31 NOTE — ASSESSMENT & PLAN NOTE
Patient has been seen and evaluated by [facility] orthopedics regarding her chronic left knee pain. She did receive a cortisone injection which has been beneficial at reducing the level of her pain. She is exercising 2 times a week in the pool to minimize stress on the knee. Also working on efforts to reduce body weight.   Follow-up with orthopedics as necessary for future cortisone injections

## 2023-08-31 NOTE — ASSESSMENT & PLAN NOTE
Previous blood work has indicated mild elevation in fasting blood sugar readings. Unfortunately comprehensive metabolic panel file was not performed with the patient's blood work for this visit a request for a comprehensive metabolic profile has been provided to the patient and will be reviewed with the patient upon completion. She is reminded to continue to be cautious with consumption of concentrated sugars as well as excessive carbohydrates.   She is exercising twice a week and trying to maintain a healthy balanced diet

## 2023-08-31 NOTE — ASSESSMENT & PLAN NOTE
Blood pressure assessed today confirms good control of hypertension recommend continued continuation of lisinopril at 40 mg daily and metoprolol tartrate at 50 mg every 12 hours.

## 2023-08-31 NOTE — ASSESSMENT & PLAN NOTE
Body mass index today's visit is 41.84. Patient is exercising twice a week and I have also recommended that she continue to be cautious with calorie consumption especially carbohydrates and concentrated sugars in an effort to reduce body weight.

## 2023-08-31 NOTE — PATIENT INSTRUCTIONS
Medicare Preventive Visit Patient Instructions  Thank you for completing your Welcome to Medicare Visit or Medicare Annual Wellness Visit today. Your next wellness visit will be due in one year (8/31/2024). The screening/preventive services that you may require over the next 5-10 years are detailed below. Some tests may not apply to you based off risk factors and/or age. Screening tests ordered at today's visit but not completed yet may show as past due. Also, please note that scanned in results may not display below. Preventive Screenings:  Service Recommendations Previous Testing/Comments   Colorectal Cancer Screening  * Colonoscopy    * Fecal Occult Blood Test (FOBT)/Fecal Immunochemical Test (FIT)  * Fecal DNA/Cologuard Test  * Flexible Sigmoidoscopy Age: 43-73 years old   Colonoscopy: every 10 years (may be performed more frequently if at higher risk)  OR  FOBT/FIT: every 1 year  OR  Cologuard: every 3 years  OR  Sigmoidoscopy: every 5 years  Screening may be recommended earlier than age 39 if at higher risk for colorectal cancer. Also, an individualized decision between you and your healthcare provider will decide whether screening between the ages of 77-80 would be appropriate. Colonoscopy: 01/24/2019  FOBT/FIT: 08/29/2023  Cologuard: Not on file  Sigmoidoscopy: Not on file          Breast Cancer Screening Age: 36 years old  Frequency: every 1-2 years  Not required if history of left and right mastectomy Mammogram: 05/17/2023        Cervical Cancer Screening Between the ages of 21-29, pap smear recommended once every 3 years. Between the ages of 32-69, can perform pap smear with HPV co-testing every 5 years.    Recommendations may differ for women with a history of total hysterectomy, cervical cancer, or abnormal pap smears in past. Pap Smear: Not on file        Hepatitis C Screening Once for adults born between [address] [place] [address]  More frequently in patients at high risk for Hepatitis C Hep C Antibody: 08/25/2022        Diabetes Screening 1-2 times per year if you're at risk for diabetes or have pre-diabetes Fasting glucose: 102 mg/dL (4/6/2023)  A1C: 5.5 % (4/6/2023)      Cholesterol Screening Once every 5 years if you don't have a lipid disorder. May order more often based on risk factors. Lipid panel: 08/29/2023          Other Preventive Screenings Covered by Medicare:  1. Abdominal Aortic Aneurysm (AAA) Screening: covered once if your at risk. You're considered to be at risk if you have a family history of AAA. 2. Lung Cancer Screening: covers low dose CT scan once per year if you meet all of the following conditions: (1) Age 48-67; (2) No signs or symptoms of lung cancer; (3) Current smoker or have quit smoking within the last 15 years; (4) You have a tobacco smoking history of at least 20 pack years (packs per day multiplied by number of years you smoked); (5) You get a written order from a healthcare provider. 3. Glaucoma Screening: covered annually if you're considered high risk: (1) You have diabetes OR (2) Family history of glaucoma OR (3)  aged 48 and older OR (3)  American aged 72 and older  3. Osteoporosis Screening: covered every 2 years if you meet one of the following conditions: (1) You're estrogen deficient and at risk for osteoporosis based off medical history and other findings; (2) Have a vertebral abnormality; (3) On glucocorticoid therapy for more than 3 months; (4) Have primary hyperparathyroidism; (5) On osteoporosis medications and need to assess response to drug therapy. · Last bone density test (DXA Scan): Not on file. 5. HIV Screening: covered annually if you're between the age of 14-79. Also covered annually if you are younger than 13 and older than 72 with risk factors for HIV infection. For pregnant patients, it is covered up to 3 times per pregnancy.     Immunizations:  Immunization Recommendations   Influenza Vaccine Annual influenza vaccination during flu season is recommended for all persons aged >= 6 months who do not have contraindications   Pneumococcal Vaccine   * Pneumococcal conjugate vaccine = PCV13 (Prevnar 13), PCV15 (Vaxneuvance), PCV20 (Prevnar 20)  * Pneumococcal polysaccharide vaccine = PPSV23 (Pneumovax) Adults 20-63 years old: 1-3 doses may be recommended based on certain risk factors  Adults 72 years old: 1-2 doses may be recommended based off what pneumonia vaccine you previously received   Hepatitis B Vaccine 3 dose series if at intermediate or high risk (ex: diabetes, end stage renal disease, liver disease)   Tetanus (Td) Vaccine - COST NOT COVERED BY MEDICARE PART B Following completion of primary series, a booster dose should be given every 10 years to maintain immunity against tetanus. Td may also be given as tetanus wound prophylaxis. Tdap Vaccine - COST NOT COVERED BY MEDICARE PART B Recommended at least once for all adults. For pregnant patients, recommended with each pregnancy. Shingles Vaccine (Shingrix) - COST NOT COVERED BY MEDICARE PART B  2 shot series recommended in those aged 48 and above     Health Maintenance Due:      Topic Date Due   • Breast Cancer Screening: Mammogram  05/17/2024   • Colorectal Cancer Screening  01/24/2029   • Hepatitis C Screening  Completed     Immunizations Due:      Topic Date Due   • COVID-19 Vaccine (1) Never done   • Pneumococcal Vaccine: 65+ Years (1 - PCV) Never done   • Influenza Vaccine (1) 09/01/2023     Advance Directives   What are advance directives? Advance directives are legal documents that state your wishes and plans for medical care. These plans are made ahead of time in case you lose your ability to make decisions for yourself. Advance directives can apply to any medical decision, such as the treatments you want, and if you want to donate organs. What are the types of advance directives? There are many types of advance directives, and each state has rules about how to use them. You may choose a combination of any of the following:  · Living will: This is a written record of the treatment you want. You can also choose which treatments you do not want, which to limit, and which to stop at a certain time. This includes surgery, medicine, IV fluid, and tube feedings. · Durable power of  for healthcare [facility]): This is a written record that states who you want to make healthcare choices for you when you are unable to make them for yourself. This person, called a proxy, is usually a family member or a friend. You may choose more than 1 proxy. · Do not resuscitate (DNR) order:  A DNR order is used in case your heart stops beating or you stop breathing. It is a request not to have certain forms of treatment, such as CPR. A DNR order may be included in other types of advance directives. · Medical directive: This covers the care that you want if you are in a coma, near death, or unable to make decisions for yourself. You can list the treatments you want for each condition. Treatment may include pain medicine, surgery, blood transfusions, dialysis, IV or tube feedings, and a ventilator (breathing machine). · Values history: This document has questions about your views, beliefs, and how you feel and think about life. This information can help others choose the care that you would choose. Why are advance directives important? An advance directive helps you control your care. Although spoken wishes may be used, it is better to have your wishes written down. Spoken wishes can be misunderstood, or not followed. Treatments may be given even if you do not want them. An advance directive may make it easier for your family to make difficult choices about your care. Urinary Incontinence   Urinary incontinence (UI)  is when you lose control of your bladder. UI develops because your bladder cannot store or empty urine properly.  The 3 most common types of UI are stress incontinence, urge incontinence, or both. Medicines:   · May be given to help strengthen your bladder control. Report any side effects of medication to your healthcare provider. Do pelvic muscle exercises often:  Your pelvic muscles help you stop urinating. Squeeze these muscles tight for 5 seconds, then relax for 5 seconds. Gradually work up to squeezing for 10 seconds. Do 3 sets of 15 repetitions a day, or as directed. This will help strengthen your pelvic muscles and improve bladder control. Train your bladder:  Go to the bathroom at set times, such as every 2 hours, even if you do not feel the urge to go. You can also try to hold your urine when you feel the urge to go. For example, hold your urine for 5 minutes when you feel the urge to go. As that becomes easier, hold your urine for 10 minutes. Self-care:   · Keep a UI record. Write down how often you leak urine and how much you leak. Make a note of what you were doing when you leaked urine. · Drink liquids as directed. You may need to limit the amount of liquid you drink to help control your urine leakage. Do not drink any liquid right before you go to bed. Limit or do not have drinks that contain caffeine or alcohol. · Prevent constipation. Eat a variety of high-fiber foods. Good examples are high-fiber cereals, beans, vegetables, and whole-grain breads. Walking is the best way to trigger your intestines to have a bowel movement. · Exercise regularly and maintain a healthy weight. Weight loss and exercise will decrease pressure on your bladder and help you control your leakage. · Use a catheter as directed  to help empty your bladder. A catheter is a tiny, plastic tube that is put into your bladder to drain your urine. · Go to behavior therapy as directed. Behavior therapy may be used to help you learn to control your urge to urinate.     Weight Management   Why it is important to manage your weight:  Being overweight increases your risk of health conditions such as heart disease, high blood pressure, type 2 diabetes, and certain types of cancer. It can also increase your risk for osteoarthritis, sleep apnea, and other respiratory problems. Aim for a slow, steady weight loss. Even a small amount of weight loss can lower your risk of health problems. How to lose weight safely:  A safe and healthy way to lose weight is to eat fewer calories and get regular exercise. You can lose up about 1 pound a week by decreasing the number of calories you eat by 500 calories each day. Healthy meal plan for weight management:  A healthy meal plan includes a variety of foods, contains fewer calories, and helps you stay healthy. A healthy meal plan includes the following:  · Eat whole-grain foods more often. A healthy meal plan should contain fiber. Fiber is the part of grains, fruits, and vegetables that is not broken down by your body. Whole-grain foods are healthy and provide extra fiber in your diet. Some examples of whole-grain foods are whole-wheat breads and pastas, oatmeal, brown rice, and bulgur. · Eat a variety of vegetables every day. Include dark, leafy greens such as spinach, kale, leslie greens, and mustard greens. Eat yellow and orange vegetables such as carrots, sweet potatoes, and winter squash. · Eat a variety of fruits every day. Choose fresh or canned fruit (canned in its own juice or light syrup) instead of juice. Fruit juice has very little or no fiber. · Eat low-fat dairy foods. Drink fat-free (skim) milk or 1% milk. Eat fat-free yogurt and low-fat cottage cheese. Try low-fat cheeses such as mozzarella and other reduced-fat cheeses. · Choose meat and other protein foods that are low in fat. Choose beans or other legumes such as split peas or lentils. Choose fish, skinless poultry (chicken or turkey), or lean cuts of red meat (beef or pork). Before you cook meat or poultry, cut off any visible fat. · Use less fat and oil.   Try baking foods instead of frying them. Add less fat, such as margarine, sour cream, regular salad dressing and mayonnaise to foods. Eat fewer high-fat foods. Some examples of high-fat foods include french fries, doughnuts, ice cream, and cakes. · Eat fewer sweets. Limit foods and drinks that are high in sugar. This includes candy, cookies, regular soda, and sweetened drinks. Exercise:  Exercise at least 30 minutes per day on most days of the week. Some examples of exercise include walking, biking, dancing, and swimming. You can also fit in more physical activity by taking the stairs instead of the elevator or parking farther away from stores. Ask your healthcare provider about the best exercise plan for you. © Copyright [employer] 2018 Information is for End User's use only and may not be sold, redistributed or otherwise used for commercial purposes.  All illustrations and images included in CareNotes® are the copyrighted property of [employer] or [address] [place] [address]

## 2023-08-31 NOTE — ASSESSMENT & PLAN NOTE
Electrocardiogram performed today shows persistent first-degree AV block with a left axis deviation. No ectopy is appreciated patient remains asymptomatic with no chest pains or palpitations recommend continued surveillance.

## 2023-08-31 NOTE — PROGRESS NOTES
Assessment and Plan:     Problem List Items Addressed This Visit        Endocrine    Glucose intolerance (impaired glucose tolerance) - Primary     Previous blood work has indicated mild elevation in fasting blood sugar readings. Unfortunately comprehensive metabolic panel file was not performed with the patient's blood work for this visit a request for a comprehensive metabolic profile has been provided to the patient and will be reviewed with the patient upon completion. She is reminded to continue to be cautious with consumption of concentrated sugars as well as excessive carbohydrates. She is exercising twice a week and trying to maintain a healthy balanced diet            Respiratory    LOLI (obstructive sleep apnea)     Recommend continued use of CPAP machine for management of obstructive sleep apnea yearly follow-up with sleep medicine advised            Cardiovascular and Mediastinum    Hypertension     Blood pressure assessed today confirms good control of hypertension recommend continued continuation of lisinopril at 40 mg daily and metoprolol tartrate at 50 mg every 12 hours. Relevant Medications    lisinopril (ZESTRIL) 40 mg tablet    Other Relevant Orders    Comprehensive metabolic panel    1st degree AV block     Electrocardiogram performed today shows persistent first-degree AV block with a left axis deviation. No ectopy is appreciated patient remains asymptomatic with no chest pains or palpitations recommend continued surveillance. Other    Morbid obesity with BMI of 40.0-44.9, adult ([address])     Body mass index today's visit is 41.84. Patient is exercising twice a week and I have also recommended that she continue to be cautious with calorie consumption especially carbohydrates and concentrated sugars in an effort to reduce body weight. Chronic pain of left knee     Patient has been seen and evaluated by [facility] orthopedics regarding her chronic left knee pain.   She did receive a cortisone injection which has been beneficial at reducing the level of her pain. She is exercising 2 times a week in the pool to minimize stress on the knee. Also working on efforts to reduce body weight. Follow-up with orthopedics as necessary for future cortisone injections        Other Visit Diagnoses     Health maintenance examination        Left axis deviation        Relevant Orders    POCT ECG (Completed)    Essential hypertension        Relevant Medications    lisinopril (ZESTRIL) 40 mg tablet          Depression Screening and Follow-up Plan: Patient was screened for depression during today's encounter. They screened negative with a PHQ-2 score of 0. Urinary Incontinence Plan of Care: counseling topics discussed: practice Kegel (pelvic floor strengthening) exercises. Preventive health issues were discussed with patient, and age appropriate screening tests were ordered as noted in patient's After Visit Summary. Personalized health advice and appropriate referrals for health education or preventive services given if needed, as noted in patient's After Visit Summary. History of Present Illness:     Patient presents for a Medicare Wellness Visit    This 44-year-old female patient presents today for a annual physical examination and review of her most recent blood work. She reports no cardiac symptoms of chest pain palpitations or shortness of breath and has had no recent infection symptoms. She does note that she occasionally has some mild bladder incontinence or leakage with cough or sneeze. We did review Kegel exercises as a way to try to reduce the incidence of this incontinence.      Patient Care Team:  Meme Alexis MD as PCP - General  Meme Alexis MD as PCP - Jeni FRED Rees (RTE)  Meme Alexis MD as PCP - [facility]CP-[facility] ([facility])     Review of Systems:     Review of Systems   Genitourinary:        Occasional urine incontinence   All other systems reviewed and are negative. Problem List:     Patient Active Problem List   Diagnosis   • Hypertension   • Morbid obesity with BMI of 40.0-44.9, adult ([facility])   • Glucose intolerance (impaired glucose tolerance)   • SVT (supraventricular tachycardia) ([facility])   • 1st degree AV block   • Mixed hyperlipidemia   • LOLI (obstructive sleep apnea)   • Periodic limb movement disorder   • Chronic pain of left knee      Past Medical and Surgical History:     Past Medical History:   Diagnosis Date   • Cellulitis of left elbow 10/30/2016   • Epistaxis    • Medial meniscus tear 11/11/2014   • Morbid obesity with BMI of 40.0-44.9, adult ([facility] [address]) 70/54/2441   • Umbilical hernia      Past Surgical History:   Procedure Laterality Date   • KNEE ARTHROSCOPY W/ MENISCAL REPAIR  07/26/2015    with medial meniscus repair   • OOPHORECTOMY Bilateral 2002   • TOTAL ABDOMINAL HYSTERECTOMY  2002      Family History:     Family History   Problem Relation Age of Onset   • No Known Problems Mother    • Thyroid cancer Daughter 40   • Hodgkin's lymphoma Daughter 32   • No Known Problems Maternal Grandmother    • No Known Problems Maternal Grandfather    • No Known Problems Paternal Grandmother    • No Known Problems Paternal Grandfather    • No Known Problems Paternal Aunt    • No Known Problems Paternal Aunt    • Thyroid disease Family    • Diabetes Family         Diabetes Mellitus   • Breast cancer Neg Hx       Social History:     Social History     Socioeconomic History   • Marital status:       Spouse name: None   • Number of children: None   • Years of education: None   • Highest education level: None   Occupational History   • None   Tobacco Use   • Smoking status: Never   • Smokeless tobacco: Never   • Tobacco comments:     Denied history of never a smoker per [employer]   Vaping Use   • Vaping Use: Never used   Substance and Sexual Activity   • Alcohol use: No   • Drug use: No   • Sexual activity: Not Currently     Partners: Male     Comment:    Other Topics Concern   • None   Social History Narrative    Exercise habits    Daily Tea Consumption (1 Cup/Day)     Social Determinants of Health     Financial Resource Strain: Low Risk  (8/29/2023)    Overall Financial Resource Strain (CARDIA)    • Difficulty of Paying Living Expenses: Not hard at all   Food Insecurity: Not on file   Transportation Needs: No Transportation Needs (8/29/2023)    PRAPARE - Transportation    • Lack of Transportation (Medical): No    • Lack of Transportation (Non-Medical): No   Physical Activity: Not on file   Stress: Not on file   Social Connections: Not on file   Intimate Partner Violence: Not on file   Housing Stability: Not on file      Medications and Allergies:     Current Outpatient Medications   Medication Sig Dispense Refill   • aspirin (ECOTRIN LOW STRENGTH) 81 mg EC tablet Take 1 tablet (81 mg total) by mouth daily 90 tablet 0   • calcium-vitamin D (OSCAL) 250-125 MG-UNIT per tablet Take 1 tablet by mouth daily. • Cholecalciferol (Vitamin D3) 50 MCG (2000 UT) capsule Take 2,000 Units by mouth daily       • KRILL OIL PO 1200 mg QD     • lisinopril (ZESTRIL) 40 mg tablet Take 1 tablet (40 mg total) by mouth daily 90 tablet 3   • lovastatin (MEVACOR) 20 mg tablet Take 1 tablet (20 mg total) by mouth daily at bedtime 90 tablet 3   • metoprolol tartrate (LOPRESSOR) 50 mg tablet Take 1 tablet (50 mg total) by mouth every 12 (twelve) hours 180 tablet 3   • Multiple Vitamins-Minerals (MULTIVITAMIN ADULT PO)      • nystatin powder Apply topically 2 (two) times a day (Patient taking differently: Apply topically as needed) 180 g 2     No current facility-administered medications for this visit.      Allergies   Allergen Reactions   • Lipitor [Atorvastatin] GI Intolerance   • Morphine Nausea Only and GI Intolerance     Reaction Date: [id number];    • Zithromax [Azithromycin] Diarrhea     Reaction Date: [id number];       Immunizations:     Immunization History Administered Date(s) Administered   • Influenza Quadrivalent, 6-35 Months IM 11/16/2016, 10/20/2017      Health Maintenance:         Topic Date Due   • Breast Cancer Screening: Mammogram  05/17/2024   • Colorectal Cancer Screening  01/24/2029   • Hepatitis C Screening  Completed         Topic Date Due   • COVID-19 Vaccine (1) Never done   • Pneumococcal Vaccine: 65+ Years (1 - PCV) Never done   • Influenza Vaccine (1) 09/01/2023      Medicare Screening Tests and Risk Assessments:     Thais Grey is here for her Subsequent Wellness visit. Last Medicare Wellness visit information reviewed, patient interviewed, no change since last AWV. Health Risk Assessment:   Patient rates overall health as very good. Patient feels that their physical health rating is much better. Patient is very satisfied with their life. Eyesight was rated as slightly worse. Hearing was rated as same. Patient feels that their emotional and mental health rating is slightly better. Patients states they are never, rarely angry. Patient states they are sometimes unusually tired/fatigued. Pain experienced in the last 7 days has been some. Patient's pain rating has been 2/10. Patient states that she has experienced no weight loss or gain in last 6 months. Depression Screening:   PHQ-2 Score: 0      Fall Risk Screening: In the past year, patient has experienced: no history of falling in past year      Urinary Incontinence Screening:   Patient has leaked urine accidently in the last six months. Home Safety:  Patient has trouble with stairs inside or outside of their home. Patient has working smoke alarms and has working carbon monoxide detector. Home safety hazards include: none. Nutrition:   Current diet is Low Carb and Limited junk food. Medications:   Patient is currently taking over-the-counter supplements. OTC medications include: See list. Patient is able to manage medications.      Activities of Daily Living (ADLs)/Instrumental Activities of Daily Living (IADLs):   Walk and transfer into and out of bed and chair?: Yes  Dress and groom yourself?: Yes    Bathe or shower yourself?: Yes    Feed yourself? Yes  Do your laundry/housekeeping?: Yes  Manage your money, pay your bills and track your expenses?: Yes  Make your own meals?: Yes    Do your own shopping?: Yes    [address] [facility] [employer]- my insurance    Previous Hospitalizations:   Any hospitalizations or ED visits within the last 12 months?: No      Advance Care Planning:   Living will: Yes    Durable POA for healthcare: Yes    Advanced directive: Yes      PREVENTIVE SCREENINGS      Cardiovascular Screening:    General: Screening Not Indicated and History Lipid Disorder      Diabetes Screening:     General: Screening Current      Colorectal Cancer Screening:     General: Screening Current      Breast Cancer Screening:     General: Screening Current      Cervical Cancer Screening:    General: Screening Not Indicated      Osteoporosis Screening:    General: Screening Not Indicated      Abdominal Aortic Aneurysm (AAA) Screening:        General: Screening Not Indicated      Lung Cancer Screening:     General: Screening Not Indicated      Hepatitis C Screening:    General: Screening Current    Screening, Brief Intervention, and Referral to Treatment (SBIRT)    Screening  Typical number of drinks in a day: 0  Typical number of drinks in a week: 0  Interpretation: Low risk drinking behavior.     AUDIT-C Screenin) How often did you have a drink containing alcohol in the past year? never  2) How many drinks did you have on a typical day when you were drinking in the past year? 0  3) How often did you have 6 or more drinks on one occasion in the past year? never    AUDIT-C Score: 0  Interpretation: Score 0-2 (female): Negative screen for alcohol misuse    Single Item Drug Screening:  How often have you used an illegal drug (including marijuana) or a prescription medication for non-medical reasons in the past year? never    Single Item Drug Screen Score: 0  Interpretation: Negative screen for possible drug use disorder    No results found. Physical Exam:     /72   Pulse 72   Temp 98.2 °F (36.8 °C)   Ht 5' 3" (1.6 m)   Wt 107 kg (236 lb 3.2 oz)   SpO2 97%   BMI 41.84 kg/m²     Physical Exam  Vitals and nursing note reviewed. Constitutional:       General: She is not in acute distress. Appearance: She is well-developed. She is not ill-appearing. HENT:      Head: Normocephalic and atraumatic. Right Ear: Tympanic membrane, ear canal and external ear normal.      Left Ear: Tympanic membrane, ear canal and external ear normal.      Nose: Nose normal.      Mouth/Throat:      Mouth: Mucous membranes are moist.      Pharynx: Oropharynx is clear. Eyes:      General:         Right eye: No discharge. Left eye: No discharge. Extraocular Movements: Extraocular movements intact. Conjunctiva/sclera: Conjunctivae normal.      Pupils: Pupils are equal, round, and reactive to light. Neck:      Vascular: No carotid bruit. Cardiovascular:      Rate and Rhythm: Normal rate and regular rhythm. Heart sounds: No murmur heard. Pulmonary:      Effort: Pulmonary effort is normal. No respiratory distress. Breath sounds: Normal breath sounds. No wheezing, rhonchi or rales. Abdominal:      General: Abdomen is flat. Bowel sounds are normal. There is no distension. Palpations: Abdomen is soft. There is no mass. Tenderness: There is no abdominal tenderness. There is no guarding. Musculoskeletal:         General: No swelling. Cervical back: Normal range of motion and neck supple. No rigidity or tenderness. Right lower leg: No edema. Left lower leg: No edema. Lymphadenopathy:      Cervical: No cervical adenopathy. Skin:     General: Skin is warm and dry.       Capillary Refill: Capillary refill takes less than 2 seconds. Coloration: Skin is not jaundiced or pale. Neurological:      General: No focal deficit present. Mental Status: She is alert. Mental status is at baseline. Psychiatric:         Mood and Affect: Mood normal.         Behavior: Behavior normal.         Thought Content:  Thought content normal.         Judgment: Judgment normal.          Iam Louise MD

## 2023-09-06 ENCOUNTER — APPOINTMENT (OUTPATIENT)
Dept: LAB | Facility: CLINIC | Age: 73
End: 2023-09-06
Payer: COMMERCIAL

## 2023-09-06 DIAGNOSIS — I10 PRIMARY HYPERTENSION: ICD-10-CM

## 2023-09-06 LAB
ALBUMIN SERPL BCP-MCNC: 3.9 G/DL (ref 3.5–5)
ALP SERPL-CCNC: 58 U/L (ref 34–104)
ALT SERPL W P-5'-P-CCNC: 12 U/L (ref 7–52)
ANION GAP SERPL CALCULATED.3IONS-SCNC: 12 MMOL/L
AST SERPL W P-5'-P-CCNC: 16 U/L (ref 13–39)
BILIRUB SERPL-MCNC: 0.89 MG/DL (ref 0.2–1)
BUN SERPL-MCNC: 13 MG/DL (ref 5–25)
CALCIUM SERPL-MCNC: 10.1 MG/DL (ref 8.4–10.2)
CHLORIDE SERPL-SCNC: 103 MMOL/L (ref 96–108)
CO2 SERPL-SCNC: 27 MMOL/L (ref 21–32)
CREAT SERPL-MCNC: 0.59 MG/DL (ref 0.6–1.3)
GFR SERPL CREATININE-BSD FRML MDRD: 93 ML/MIN/1.73SQ M
GLUCOSE P FAST SERPL-MCNC: 104 MG/DL (ref 65–99)
POTASSIUM SERPL-SCNC: 4 MMOL/L (ref 3.5–5.3)
PROT SERPL-MCNC: 6.6 G/DL (ref 6.4–8.4)
SODIUM SERPL-SCNC: 142 MMOL/L (ref 135–147)

## 2023-09-06 PROCEDURE — 36415 COLL VENOUS BLD VENIPUNCTURE: CPT

## 2023-09-06 PROCEDURE — 80053 COMPREHEN METABOLIC PANEL: CPT

## 2023-09-13 ENCOUNTER — CONSULT (OUTPATIENT)
Dept: SURGERY | Facility: CLINIC | Age: 73
End: 2023-09-13
Payer: COMMERCIAL

## 2023-09-13 VITALS
OXYGEN SATURATION: 98 % | HEIGHT: 63 IN | TEMPERATURE: 98.7 F | BODY MASS INDEX: 41.82 KG/M2 | DIASTOLIC BLOOD PRESSURE: 74 MMHG | SYSTOLIC BLOOD PRESSURE: 128 MMHG | HEART RATE: 63 BPM | WEIGHT: 236 LBS

## 2023-09-13 DIAGNOSIS — R19.00 RIGHT FLANK MASS: Primary | ICD-10-CM

## 2023-09-13 PROCEDURE — 99204 OFFICE O/P NEW MOD 45 MIN: CPT | Performed by: SPECIALIST

## 2023-09-13 NOTE — LETTER
September 13, 2023     Lilliam Lares MD  [facility]  2nd Floor, [address]    Patient: Mart Mantilla   YOB: 1950   Date of Visit: 9/13/2023       Dear Daisy Eason,    Thank you for referring Roxy Cantor to me for evaluation. Below are the relevant portions of my H&P. If you have questions, please do not hesitate to call me. I look forward to following Jono Fountain along with you. 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 She had a long and distinguished career. Her daughter Ami Pereira is a patient of mine and she was kind enough to refer her mother to us. Jono Fountain presents today with a mass of the right flank. She said she has had it for over 15 years and has been essentially asymptomatic. Recently she has lost some weight and now it appears more pronounced and is also painful. She says it hurts most of the time but is worse at night. She also says that it feels firmer than before. There is no erythema or drainage noted. Past Medical History:   Past Medical History:   Diagnosis Date   • Cellulitis of left elbow 10/30/2016   • Epistaxis    • Medial meniscus tear 11/11/2014   • Morbid obesity with BMI of 40.0-44.9, adult ([facility]) 74/58/7721   • Umbilical hernia          Past Surgical History:    Past Surgical History:   Procedure Laterality Date   • KNEE ARTHROSCOPY W/ MENISCAL REPAIR  07/26/2015    with medial meniscus repair   • OOPHORECTOMY Bilateral 2002   • TOTAL ABDOMINAL HYSTERECTOMY  2002         Allergies:     Allergies   Allergen Reactions   • Lipitor [Atorvastatin] GI Intolerance   • Morphine Nausea Only and GI Intolerance     Reaction Date: [id number];    • Zithromax [Azithromycin] Diarrhea     Reaction Date: [id number];          Medications:    Current Outpatient Medications:   •  aspirin (ECOTRIN LOW STRENGTH) 81 mg EC tablet, Take 1 tablet (81 mg total) by mouth daily, Disp: 90 tablet, Rfl: 0  •  calcium-vitamin D (OSCAL) 250-125 MG-UNIT per tablet, Take 1 tablet by mouth daily. , Disp: , Rfl:   •  Cholecalciferol (Vitamin D3) 50 MCG (2000 UT) capsule, Take 2,000 Units by mouth daily  , Disp: , Rfl:   •  KRILL OIL PO, 1200 mg QD, Disp: , Rfl:   •  lisinopril (ZESTRIL) 40 mg tablet, Take 1 tablet (40 mg total) by mouth daily, Disp: 90 tablet, Rfl: 3  •  lovastatin (MEVACOR) 20 mg tablet, Take 1 tablet (20 mg total) by mouth daily at bedtime, Disp: 90 tablet, Rfl: 3  •  metoprolol tartrate (LOPRESSOR) 50 mg tablet, Take 1 tablet (50 mg total) by mouth every 12 (twelve) hours, Disp: 180 tablet, Rfl: 3  •  Multiple Vitamins-Minerals (MULTIVITAMIN ADULT PO), , Disp: , Rfl:   •  nystatin powder, Apply topically 2 (two) times a day (Patient taking differently: Apply topically as needed), Disp: 180 g, Rfl: 2      Social History:  Social History     Social History     Substance and Sexual Activity   Alcohol Use No     Social History     Substance and Sexual Activity   Drug Use No     Social History     Tobacco Use   Smoking Status Never   Smokeless Tobacco Never   Tobacco Comments    Denied history of never a smoker per [employer]         Family History:    Family History   Problem Relation Age of Onset   • No Known Problems Mother    • Thyroid cancer Daughter 40   • Hodgkin's lymphoma Daughter 32   • No Known Problems Maternal Grandmother    • No Known Problems Maternal Grandfather    • No Known Problems Paternal Grandmother    • No Known Problems Paternal Grandfather    • No Known Problems Paternal Aunt    • No Known Problems Paternal Aunt    • Thyroid disease Family    • Diabetes Family         Diabetes Mellitus   • Breast cancer Neg Hx          Review of Systems:    As per the HPI. Weight loss as per the HPI.   No weight gain fever chills night sweats chest pain nausea vomiting diarrhea constipation shortness of breath headaches blurry vision double vision sore throat chronic cough etc.    Vitals:  Vitals:    09/13/23 1039   BP: 128/74   Pulse: 63   Temp: 98.7 °F (37.1 °C)   SpO2: 98%       Physical Exam:  Middle-aged white female 5 feet 3 inches 2 and 36 pounds. She is awake alert no distress. Vital signs as above    Skin warm dry  Head normocephalic and atraumatic  Eyes PERRLA EOM intact  Ears and nose within normal limits  Throat gag reflex intact  Neck no masses thyromegaly lymphadenopathy palpable. Back there is a large mass in the right flank about the size of the clenched fist.  It is soft slightly tender to palpation. There is no erythema or drainage. There is no CVA or spinal tenderness. Lungs clear to A&P  Cor regular rate and rhythm no murmurs carotid bruits  Abdomen: There is a visible umbilical hernia and a lower midline incision. There is no evidence of incisional hernia. The umbilical hernia is reducible and nontender to palpation. No other abdominal masses or hernias are noted. Extremities some arthritic changes are noted but negative CCE  Neurologically ANO x3 cranial nerves II to XII intact      Lab Results: I have personally reviewed pertinent reports. See below. Imaging: I have personally reviewed pertinent reports. EKG, Pathology, and Other Studies: I have personally reviewed pertinent reports. No visits with results within 1 Day(s) from this visit.    Latest known visit with results is:   Appointment on 09/06/2023   Component Date Value   • Sodium 09/06/2023 142    • Potassium 09/06/2023 4.0    • Chloride 09/06/2023 103    • CO2 09/06/2023 27    • ANION GAP 09/06/2023 12    • BUN 09/06/2023 13    • Creatinine 09/06/2023 0.59 (L)    • Glucose, Fasting 09/06/2023 104 (H)    • Calcium 09/06/2023 10.1    • AST 09/06/2023 16    • ALT 09/06/2023 12    • Alkaline Phosphatase 09/06/2023 58    • Total Protein 09/06/2023 6.6    • Albumin 09/06/2023 3.9    • Total Bilirubin 09/06/2023 0.89    • eGFR 09/06/2023 93          Impression:  Mass right flank consistent with lipoma. Plan:  Remove this in the operating room under local anesthesia with IV sedation at the earliest possible date.

## 2023-09-13 NOTE — H&P
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 She had a long and distinguished career. Her daughter Zoran Olsen is a patient of mine and she was kind enough to refer her mother to us. Joshua Rocha presents today with a mass of the right flank. She said she has had it for over 15 years and has been essentially asymptomatic. Recently she has lost some weight and now it appears more pronounced and is also painful. She says it hurts most of the time but is worse at night. She also says that it feels firmer than before. There is no erythema or drainage noted. Past Medical History:   Past Medical History:   Diagnosis Date   • Cellulitis of left elbow 10/30/2016   • Epistaxis    • Medial meniscus tear 11/11/2014   • Morbid obesity with BMI of 40.0-44.9, adult ([address]) 17/12/1593   • Umbilical hernia          Past Surgical History:    Past Surgical History:   Procedure Laterality Date   • KNEE ARTHROSCOPY W/ MENISCAL REPAIR  07/26/2015    with medial meniscus repair   • OOPHORECTOMY Bilateral 2002   • TOTAL ABDOMINAL HYSTERECTOMY  2002         Allergies: Allergies   Allergen Reactions   • Lipitor [Atorvastatin] GI Intolerance   • Morphine Nausea Only and GI Intolerance     Reaction Date: [id number];    • Zithromax [Azithromycin] Diarrhea     Reaction Date: [id number];          Medications:    Current Outpatient Medications:   •  aspirin (ECOTRIN LOW STRENGTH) 81 mg EC tablet, Take 1 tablet (81 mg total) by mouth daily, Disp: 90 tablet, Rfl: 0  •  calcium-vitamin D (OSCAL) 250-125 MG-UNIT per tablet, Take 1 tablet by mouth daily. , Disp: , Rfl:   •  Cholecalciferol (Vitamin D3) 50 MCG (2000 UT) capsule, Take 2,000 Units by mouth daily  , Disp: , Rfl:   •  KRILL OIL PO, 1200 mg QD, Disp: , Rfl:   •  lisinopril (ZESTRIL) 40 mg tablet, Take 1 tablet (40 mg total) by mouth daily, Disp: 90 tablet, Rfl: 3  •  lovastatin (MEVACOR) 20 mg tablet, Take 1 tablet (20 mg total) by mouth daily at bedtime, Disp: 90 tablet, Rfl: 3  •  metoprolol tartrate (LOPRESSOR) 50 mg tablet, Take 1 tablet (50 mg total) by mouth every 12 (twelve) hours, Disp: 180 tablet, Rfl: 3  •  Multiple Vitamins-Minerals (MULTIVITAMIN ADULT PO), , Disp: , Rfl:   •  nystatin powder, Apply topically 2 (two) times a day (Patient taking differently: Apply topically as needed), Disp: 180 g, Rfl: 2      Social History:  Social History     Social History     Substance and Sexual Activity   Alcohol Use No     Social History     Substance and Sexual Activity   Drug Use No     Social History     Tobacco Use   Smoking Status Never   Smokeless Tobacco Never   Tobacco Comments    Denied history of never a smoker per [employer]         Family History:    Family History   Problem Relation Age of Onset   • No Known Problems Mother    • Thyroid cancer Daughter 40   • Hodgkin's lymphoma Daughter 32   • No Known Problems Maternal Grandmother    • No Known Problems Maternal Grandfather    • No Known Problems Paternal Grandmother    • No Known Problems Paternal Grandfather    • No Known Problems Paternal Aunt    • No Known Problems Paternal Aunt    • Thyroid disease Family    • Diabetes Family         Diabetes Mellitus   • Breast cancer Neg Hx          Review of Systems:    As per the HPI. Weight loss as per the HPI. No weight gain fever chills night sweats chest pain nausea vomiting diarrhea constipation shortness of breath headaches blurry vision double vision sore throat chronic cough etc.    Vitals:  Vitals:    09/13/23 1039   BP: 128/74   Pulse: 63   Temp: 98.7 °F (37.1 °C)   SpO2: 98%       Physical Exam:  Middle-aged white female 5 feet 3 inches 2 and 36 pounds. She is awake alert no distress.     Vital signs as above    Skin warm dry  Head normocephalic and atraumatic  Eyes PERRLA EOM intact  Ears and nose within normal limits  Throat gag reflex intact  Neck no masses thyromegaly lymphadenopathy palpable. Back there is a large mass in the right flank about the size of the clenched fist.  It is soft slightly tender to palpation. There is no erythema or drainage. There is no CVA or spinal tenderness. Lungs clear to A&P  Cor regular rate and rhythm no murmurs carotid bruits  Abdomen: There is a visible umbilical hernia and a lower midline incision. There is no evidence of incisional hernia. The umbilical hernia is reducible and nontender to palpation. No other abdominal masses or hernias are noted. Extremities some arthritic changes are noted but negative CCE  Neurologically ANO x3 cranial nerves II to XII intact      Lab Results: I have personally reviewed pertinent reports. See below. Imaging: I have personally reviewed pertinent reports. EKG, Pathology, and Other Studies: I have personally reviewed pertinent reports. No visits with results within 1 Day(s) from this visit. Latest known visit with results is:   Appointment on 09/06/2023   Component Date Value   • Sodium 09/06/2023 142    • Potassium 09/06/2023 4.0    • Chloride 09/06/2023 103    • CO2 09/06/2023 27    • ANION GAP 09/06/2023 12    • BUN 09/06/2023 13    • Creatinine 09/06/2023 0.59 (L)    • Glucose, Fasting 09/06/2023 104 (H)    • Calcium 09/06/2023 10.1    • AST 09/06/2023 16    • ALT 09/06/2023 12    • Alkaline Phosphatase 09/06/2023 58    • Total Protein 09/06/2023 6.6    • Albumin 09/06/2023 3.9    • Total Bilirubin 09/06/2023 0.89    • eGFR 09/06/2023 93          Impression:  Mass right flank consistent with lipoma. Plan:  Remove this in the operating room under local anesthesia with IV sedation at the earliest possible date.

## 2023-09-13 NOTE — H&P (VIEW-ONLY)
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 Her daughter Giin Garcia is a patient of mine and she was kind enough to refer her mother to us. Candance Brothers presents today with a mass of the right flank. She said she has had it for over 15 years and has been essentially asymptomatic. Recently she has lost some weight and now it appears more pronounced and is also painful. She says it hurts most of the time but is worse at night. She also says that it feels firmer than before. There is no erythema or drainage noted. Past Medical History:   Past Medical History:   Diagnosis Date   • Cellulitis of left elbow 10/30/2016   • Epistaxis    • Medial meniscus tear 11/11/2014   • Morbid obesity with BMI of 40.0-44.9, adult ([address]) 24/91/8870   • Umbilical hernia          Past Surgical History:    Past Surgical History:   Procedure Laterality Date   • KNEE ARTHROSCOPY W/ MENISCAL REPAIR  07/26/2015    with medial meniscus repair   • OOPHORECTOMY Bilateral 2002   • TOTAL ABDOMINAL HYSTERECTOMY  2002         Allergies: Allergies   Allergen Reactions   • Lipitor [Atorvastatin] GI Intolerance   • Morphine Nausea Only and GI Intolerance     Reaction Date: [id number];    • Zithromax [Azithromycin] Diarrhea     Reaction Date: [id number];          Medications:    Current Outpatient Medications:   •  aspirin (ECOTRIN LOW STRENGTH) 81 mg EC tablet, Take 1 tablet (81 mg total) by mouth daily, Disp: 90 tablet, Rfl: 0  •  calcium-vitamin D (OSCAL) 250-125 MG-UNIT per tablet, Take 1 tablet by mouth daily. , Disp: , Rfl:   •  Cholecalciferol (Vitamin D3) 50 MCG (2000 UT) capsule, Take 2,000 Units by mouth daily  , Disp: , Rfl:   •  KRILL OIL PO, 1200 mg QD, Disp: , Rfl:   •  lisinopril (ZESTRIL) 40 mg tablet, Take 1 tablet (40 mg total) by mouth daily, Disp: 90 tablet, Rfl: 3  •  lovastatin (MEVACOR) 20 mg tablet, Take 1 tablet (20 mg total) by mouth daily at bedtime, Disp: 90 tablet, Rfl: 3  •  metoprolol tartrate (LOPRESSOR) 50 mg tablet, Take 1 tablet (50 mg total) by mouth every 12 (twelve) hours, Disp: 180 tablet, Rfl: 3  •  Multiple Vitamins-Minerals (MULTIVITAMIN ADULT PO), , Disp: , Rfl:   •  nystatin powder, Apply topically 2 (two) times a day (Patient taking differently: Apply topically as needed), Disp: 180 g, Rfl: 2      Social History:  Social History     Social History     Substance and Sexual Activity   Alcohol Use No     Social History     Substance and Sexual Activity   Drug Use No     Social History     Tobacco Use   Smoking Status Never   Smokeless Tobacco Never   Tobacco Comments    Denied history of never a smoker per [employer]         Family History:    Family History   Problem Relation Age of Onset   • No Known Problems Mother    • Thyroid cancer Daughter 40   • Hodgkin's lymphoma Daughter 32   • No Known Problems Maternal Grandmother    • No Known Problems Maternal Grandfather    • No Known Problems Paternal Grandmother    • No Known Problems Paternal Grandfather    • No Known Problems Paternal Aunt    • No Known Problems Paternal Aunt    • Thyroid disease Family    • Diabetes Family         Diabetes Mellitus   • Breast cancer Neg Hx          Review of Systems:    As per the HPI. Weight loss as per the HPI. No weight gain fever chills night sweats chest pain nausea vomiting diarrhea constipation shortness of breath headaches blurry vision double vision sore throat chronic cough etc.    Vitals:  Vitals:    09/13/23 1039   BP: 128/74   Pulse: 63   Temp: 98.7 °F (37.1 °C)   SpO2: 98%       Physical Exam:  Middle-aged white female 5 feet 3 inches 2 and 36 pounds. She is awake alert no distress.     Vital signs as above    Skin warm dry  Head normocephalic and atraumatic  Eyes PERRLA EOM intact  Ears and nose within normal limits  Throat gag reflex intact  Neck no masses thyromegaly lymphadenopathy palpable. Back there is a large mass in the right flank about the size of the clenched fist.  It is soft slightly tender to palpation. There is no erythema or drainage. There is no CVA or spinal tenderness. Lungs clear to A&P  Cor regular rate and rhythm no murmurs carotid bruits  Abdomen: There is a visible umbilical hernia and a lower midline incision. There is no evidence of incisional hernia. The umbilical hernia is reducible and nontender to palpation. No other abdominal masses or hernias are noted. Extremities some arthritic changes are noted but negative CCE  Neurologically ANO x3 cranial nerves II to XII intact      Lab Results: I have personally reviewed pertinent reports. See below. Imaging: I have personally reviewed pertinent reports. EKG, Pathology, and Other Studies: I have personally reviewed pertinent reports. No visits with results within 1 Day(s) from this visit. Latest known visit with results is:   Appointment on 09/06/2023   Component Date Value   • Sodium 09/06/2023 142    • Potassium 09/06/2023 4.0    • Chloride 09/06/2023 103    • CO2 09/06/2023 27    • ANION GAP 09/06/2023 12    • BUN 09/06/2023 13    • Creatinine 09/06/2023 0.59 (L)    • Glucose, Fasting 09/06/2023 104 (H)    • Calcium 09/06/2023 10.1    • AST 09/06/2023 16    • ALT 09/06/2023 12    • Alkaline Phosphatase 09/06/2023 58    • Total Protein 09/06/2023 6.6    • Albumin 09/06/2023 3.9    • Total Bilirubin 09/06/2023 0.89    • eGFR 09/06/2023 93          Impression:  Mass right flank consistent with lipoma. Plan:  Remove this in the operating room under local anesthesia with IV sedation at the earliest possible date.

## 2023-09-20 ENCOUNTER — TELEPHONE (OUTPATIENT)
Dept: INTERNAL MEDICINE CLINIC | Facility: CLINIC | Age: 73
End: 2023-09-20

## 2023-09-20 NOTE — TELEPHONE ENCOUNTER
Hi Dr. Dior Keene from [facility] called and is asking for the patient's tracing of an EKG that was done on 08/31. Channing Herron can be reached at [phone]. Thank you.

## 2023-09-25 ENCOUNTER — TELEPHONE (OUTPATIENT)
Dept: INTERNAL MEDICINE CLINIC | Facility: CLINIC | Age: 73
End: 2023-09-25

## 2023-09-25 NOTE — PRE-PROCEDURE INSTRUCTIONS
Pre-Surgery Instructions:   Medication Instructions   • aspirin (ECOTRIN LOW STRENGTH) 81 mg EC tablet Hold day of surgery. no hold per surgeon per pt   • calcium-vitamin D (OSCAL) 250-125 MG-UNIT per tablet Hold day of surgery. • Cholecalciferol (Vitamin D3) 50 MCG (2000 UT) capsule Hold day of surgery. • KRILL OIL PO Stop taking 7 days prior to surgery. • lisinopril (ZESTRIL) 40 mg tablet Hold day of surgery. • lovastatin (MEVACOR) 20 mg tablet Take night before surgery   • metoprolol tartrate (LOPRESSOR) 50 mg tablet Take day of surgery. • Multiple Vitamins-Minerals (MULTIVITAMIN ADULT PO) Stop taking 7 days prior to surgery. Medication instructions for day surgery reviewed. Please use only a sip of water to take your instructed medications. Avoid all over the counter vitamins, supplements and NSAIDS for one week prior to surgery per anesthesia guidelines. Tylenol is ok to take as needed. You will receive a call one business day prior to surgery with an arrival time and hospital directions. If your surgery is scheduled on a Monday, the hospital will be calling you on the Friday prior to your surgery. If you have not heard from anyone by 8pm, please call the hospital supervisor through the hospital  at [phone]. Matthew Mchugh [phone]). Do not eat or drink anything after midnight the night before your surgery, including candy, mints, lifesavers, or chewing gum. Do not drink alcohol 24hrs before your surgery. Try not to smoke at least 24hrs before your surgery. Follow the pre surgery showering instructions as listed in the [facility] Surgical Experience Booklet” or otherwise provided by your surgeon's office. Do not shave the surgical area 24 hours before surgery. Do not apply any lotions, creams, including makeup, cologne, deodorant, or perfumes after showering on the day of your surgery. No contact lenses, eye make-up, or artificial eyelashes.  Remove nail polish, including gel polish, and any artificial, gel, or acrylic nails if possible. Remove all jewelry including rings and body piercing jewelry. Wear causal clothing that is easy to take on and off. Consider your type of surgery. Keep any valuables, jewelry, piercings at home. Please bring any specially ordered equipment (sling, braces) if indicated. Arrange for a responsible person to drive you to and from the hospital on the day of your surgery. Visitor Guidelines discussed. Call the surgeon's office with any new illnesses, exposures, or additional questions prior to surgery. Please reference your [facility] Surgical Experience Booklet” for additional information to prepare for your upcoming surgery.

## 2023-09-25 NOTE — TELEPHONE ENCOUNTER
Hi Dr. Corrine Martinez from [facility] Pre admission testing called in regards to patient's EKG tracing. She is asking if the tracing could be scanned into the patient's chart. Beryl Ireland can be reached at: [phone]. Thank you.

## 2023-10-02 ENCOUNTER — ANESTHESIA (OUTPATIENT)
Dept: PERIOP | Facility: HOSPITAL | Age: 73
End: 2023-10-02
Payer: COMMERCIAL

## 2023-10-02 ENCOUNTER — ANESTHESIA EVENT (OUTPATIENT)
Dept: PERIOP | Facility: HOSPITAL | Age: 73
End: 2023-10-02
Payer: COMMERCIAL

## 2023-10-02 ENCOUNTER — HOSPITAL ENCOUNTER (OUTPATIENT)
Facility: HOSPITAL | Age: 73
Setting detail: OUTPATIENT SURGERY
Discharge: HOME | End: 2023-10-02
Attending: SPECIALIST | Admitting: SPECIALIST
Payer: COMMERCIAL

## 2023-10-02 VITALS
SYSTOLIC BLOOD PRESSURE: 141 MMHG | OXYGEN SATURATION: 96 % | WEIGHT: 236 LBS | RESPIRATION RATE: 14 BRPM | TEMPERATURE: 96.8 F | HEIGHT: 63 IN | BODY MASS INDEX: 41.82 KG/M2 | HEART RATE: 58 BPM | DIASTOLIC BLOOD PRESSURE: 82 MMHG

## 2023-10-02 DIAGNOSIS — R19.00 RIGHT FLANK MASS: ICD-10-CM

## 2023-10-02 PROCEDURE — 88304 TISSUE EXAM BY PATHOLOGIST: CPT | Performed by: PATHOLOGY

## 2023-10-02 PROCEDURE — 22901 EXC ABDL TUM DEEP 5 CM/>: CPT | Performed by: SPECIALIST

## 2023-10-02 RX ORDER — CEFAZOLIN SODIUM 2 G/50ML
SOLUTION INTRAVENOUS AS NEEDED
Status: DISCONTINUED | OUTPATIENT
Start: 2023-10-02 | End: 2023-10-02

## 2023-10-02 RX ORDER — PROPOFOL 10 MG/ML
INJECTION, EMULSION INTRAVENOUS CONTINUOUS PRN
Status: DISCONTINUED | OUTPATIENT
Start: 2023-10-02 | End: 2023-10-02

## 2023-10-02 RX ORDER — HYDROMORPHONE HCL/PF 1 MG/ML
0.4 SYRINGE (ML) INJECTION
Status: DISCONTINUED | OUTPATIENT
Start: 2023-10-02 | End: 2023-10-02 | Stop reason: HOSPADM

## 2023-10-02 RX ORDER — PROPOFOL 10 MG/ML
INJECTION, EMULSION INTRAVENOUS AS NEEDED
Status: DISCONTINUED | OUTPATIENT
Start: 2023-10-02 | End: 2023-10-02

## 2023-10-02 RX ORDER — MAGNESIUM HYDROXIDE 1200 MG/15ML
LIQUID ORAL AS NEEDED
Status: DISCONTINUED | OUTPATIENT
Start: 2023-10-02 | End: 2023-10-02 | Stop reason: HOSPADM

## 2023-10-02 RX ORDER — ONDANSETRON 2 MG/ML
INJECTION INTRAMUSCULAR; INTRAVENOUS AS NEEDED
Status: DISCONTINUED | OUTPATIENT
Start: 2023-10-02 | End: 2023-10-02

## 2023-10-02 RX ORDER — IBUPROFEN 600 MG/1
600 TABLET ORAL EVERY 6 HOURS PRN
Status: DISCONTINUED | OUTPATIENT
Start: 2023-10-02 | End: 2023-10-02 | Stop reason: HOSPADM

## 2023-10-02 RX ORDER — ONDANSETRON 2 MG/ML
4 INJECTION INTRAMUSCULAR; INTRAVENOUS ONCE AS NEEDED
Status: DISCONTINUED | OUTPATIENT
Start: 2023-10-02 | End: 2023-10-02 | Stop reason: HOSPADM

## 2023-10-02 RX ORDER — BUPIVACAINE HYDROCHLORIDE 5 MG/ML
INJECTION, SOLUTION EPIDURAL; INTRACAUDAL AS NEEDED
Status: DISCONTINUED | OUTPATIENT
Start: 2023-10-02 | End: 2023-10-02 | Stop reason: HOSPADM

## 2023-10-02 RX ORDER — OXYCODONE HYDROCHLORIDE AND ACETAMINOPHEN 5; 325 MG/1; MG/1
2 TABLET ORAL EVERY 4 HOURS PRN
Status: DISCONTINUED | OUTPATIENT
Start: 2023-10-02 | End: 2023-10-02 | Stop reason: HOSPADM

## 2023-10-02 RX ORDER — SODIUM CHLORIDE, SODIUM LACTATE, POTASSIUM CHLORIDE, CALCIUM CHLORIDE 600; 310; 30; 20 MG/100ML; MG/100ML; MG/100ML; MG/100ML
100 INJECTION, SOLUTION INTRAVENOUS CONTINUOUS
Status: DISCONTINUED | OUTPATIENT
Start: 2023-10-02 | End: 2023-10-02 | Stop reason: HOSPADM

## 2023-10-02 RX ORDER — LIDOCAINE HYDROCHLORIDE 10 MG/ML
INJECTION, SOLUTION EPIDURAL; INFILTRATION; INTRACAUDAL; PERINEURAL AS NEEDED
Status: DISCONTINUED | OUTPATIENT
Start: 2023-10-02 | End: 2023-10-02 | Stop reason: HOSPADM

## 2023-10-02 RX ORDER — FENTANYL CITRATE/PF 50 MCG/ML
50 SYRINGE (ML) INJECTION
Status: DISCONTINUED | OUTPATIENT
Start: 2023-10-02 | End: 2023-10-02 | Stop reason: HOSPADM

## 2023-10-02 RX ORDER — ONDANSETRON 2 MG/ML
4 INJECTION INTRAMUSCULAR; INTRAVENOUS EVERY 8 HOURS PRN
Status: DISCONTINUED | OUTPATIENT
Start: 2023-10-02 | End: 2023-10-02 | Stop reason: HOSPADM

## 2023-10-02 RX ORDER — LIDOCAINE HYDROCHLORIDE 10 MG/ML
INJECTION, SOLUTION EPIDURAL; INFILTRATION; INTRACAUDAL; PERINEURAL AS NEEDED
Status: DISCONTINUED | OUTPATIENT
Start: 2023-10-02 | End: 2023-10-02

## 2023-10-02 RX ORDER — FENTANYL CITRATE 50 UG/ML
INJECTION, SOLUTION INTRAMUSCULAR; INTRAVENOUS AS NEEDED
Status: DISCONTINUED | OUTPATIENT
Start: 2023-10-02 | End: 2023-10-02

## 2023-10-02 RX ADMIN — ONDANSETRON 2 MG: 2 INJECTION INTRAMUSCULAR; INTRAVENOUS at 07:29

## 2023-10-02 RX ADMIN — CEFAZOLIN SODIUM 2000 MG: 2 SOLUTION INTRAVENOUS at 07:29

## 2023-10-02 RX ADMIN — SODIUM CHLORIDE, SODIUM LACTATE, POTASSIUM CHLORIDE, AND CALCIUM CHLORIDE 100 ML/HR: .6; .31; .03; .02 INJECTION, SOLUTION INTRAVENOUS at 06:55

## 2023-10-02 RX ADMIN — FENTANYL CITRATE 50 MCG: 50 INJECTION, SOLUTION INTRAMUSCULAR; INTRAVENOUS at 08:55

## 2023-10-02 RX ADMIN — PROPOFOL 120 MCG/KG/MIN: 10 INJECTION, EMULSION INTRAVENOUS at 07:37

## 2023-10-02 RX ADMIN — PROPOFOL 100 MG: 10 INJECTION, EMULSION INTRAVENOUS at 07:37

## 2023-10-02 RX ADMIN — LIDOCAINE HYDROCHLORIDE 50 MG: 10 INJECTION, SOLUTION EPIDURAL; INFILTRATION; INTRACAUDAL at 07:37

## 2023-10-02 RX ADMIN — FENTANYL CITRATE 50 MCG: 50 INJECTION, SOLUTION INTRAMUSCULAR; INTRAVENOUS at 07:37

## 2023-10-02 RX ADMIN — DEXMEDETOMIDINE 8 MCG: 100 INJECTION, SOLUTION, CONCENTRATE INTRAVENOUS at 07:44

## 2023-10-02 NOTE — OP NOTE
OPERATIVE REPORT  PATIENT NAME: Juan Joaquin    :  1950  MRN: [id number]  Pt Location:  OR ROOM 07    SURGERY DATE: 10/2/2023    Surgeon(s) and Role:     * Dominic Barlow MD - Primary        Aga Mcgrath MD-family practice intern extraordinaire    Preop Diagnosis:  Right flank mass R19.00    Post-Op Diagnosis Codes:     * Right flank mass [R19.00]        Lipoma 10 cm x 5.5 cm x 2.75 cm    Procedure(s):  EXCISION RIGHT FLANK MASS    Specimen(s):  ID Type Source Tests Collected by Time Destination   1 : LIPOMA OF RIGHT FLANK Tissue Soft Tissue, Lipoma TISSUE EXAM Dominic Barlow MD 10/2/2023  8:04 AM        Estimated Blood Loss:   Minimal    Drains:  * No LDAs found *    Anesthesia Type:   IV Sedation with Anesthesia    Operative Indications:  Right flank mass R19.00      Operative Findings:  Multilobulated mass infiltrating the muscle. Complications:   None    Procedure and Technique:  Patient brought the operating room placed Table in a left lateral decubitus position. The obvious mass in the right flank was prepped and draped in usual sterile fashion. Marking pen was used to make a line of incision directly over the mass. It was infiltrated with 1% lidocaine. A 15 scalpel blade was used to make an incision. Was carried out subtenons tissue using the Bovie. Bleeders were cauterized. The capsule of the lipoma was immediately identified and it was opened. It was extended proximally and distally using the Metzenbaum scissors. The mass was quite large and the incision had to be extended proximally and then distally. The mass was mobilized using the Metzenbaum scissors under the capsule. [employer] retractors were utilized to facilitate this. Eventually the superior aspect was delivered into the wound. The mass was multilobulated and was noted to be infiltrating the muscle. A few bleeders were encountered and these were controlled using electrocautery.   Care was taken not to leave any residual lipoma behind as it was removed in toto and sent to pathology first logical diagnosis. The operative site was inspected for bleeding any residual bleeding was controlled electrocautery. After adequate hemostasis was obtained the area was infiltrated with half percent Marcaine. The wound was closed in layers using 3-0 Vicryl interrupted fashion. Skin was closed with 4-0 Monocryl in a running subcuticular fashion. Benzoin and Steri-Strips were applied. The mass measured 10 x 5-1/2 x 2.75 cm. This made blood loss was minimal and the patient tolerated the procedure well was delivered to the recovery room in stable condition. Alejandro ALCALA was present for the entire procedure.     Patient Disposition:  PACU         SIGNATURE: Chilo Santiago MD  DATE: October 2, 2023  TIME: 11:26 AM

## 2023-10-02 NOTE — ANESTHESIA POSTPROCEDURE EVALUATION
Post-Op Assessment Note    CV Status:  Stable  Pain Score: 0    Pain management: adequate     Mental Status:  Alert and awake   Hydration Status:  Euvolemic   PONV Controlled:  Controlled   Airway Patency:  Patent      Post Op Vitals Reviewed: Yes      Staff: CRNA         No notable events documented.     BP   90/56   Temp 97.4   Pulse 67   Resp 16   SpO2 94

## 2023-10-02 NOTE — ANESTHESIA PREPROCEDURE EVALUATION
Medical History    History Comments   Cellulitis of left elbow    Epistaxis    Medial meniscus tear    Umbilical hernia    Morbid obesity with BMI of 40.0-44.9, adult (HCC)    Sleep apnea    CPAP (continuous positive airway pressure) dependence    SVT (supraventricular tachycardia)      Procedure:  EXCISION RIGHT FLANK MASS (Back)    Relevant Problems   ANESTHESIA (within normal limits)      CARDIO   (+) 1st degree AV block   (+) Hypertension   (+) Mixed hyperlipidemia   (+) SVT (supraventricular tachycardia)      PULMONARY   (+) LOLI (obstructive sleep apnea)        Physical Exam    Airway    Mallampati score: III  TM Distance: <3 FB  Neck ROM: full     Dental       Cardiovascular  Rate: normal,     Pulmonary  Pulmonary exam normal     Other Findings  Per pt denies anything remaining that is loose or removeable      Anesthesia Plan  ASA Score- 3     Anesthesia Type- IV sedation with anesthesia with ASA Monitors. Additional Monitors:   Airway Plan:     Comment: Per patient, appropriately NPO, denies active CP/SOB/wheezing/symptoms related to heartburn/nausea/vomiting  . Plan Factors-Exercise tolerance (METS): >4 METS. Chart reviewed. Patient summary reviewed. Patient is not a current smoker. Induction- intravenous. Postoperative Plan-     Informed Consent- Anesthetic plan and risks discussed with patient. I personally reviewed this patient with the CRNA. Discussed and agreed on the Anesthesia Plan with the CRNA. Carlos Speaker

## 2023-10-05 ENCOUNTER — TELEPHONE (OUTPATIENT)
Dept: INTERNAL MEDICINE CLINIC | Facility: CLINIC | Age: 73
End: 2023-10-05

## 2023-10-05 DIAGNOSIS — I10 PRIMARY HYPERTENSION: ICD-10-CM

## 2023-10-05 DIAGNOSIS — I15.9 SECONDARY HYPERTENSION: Primary | ICD-10-CM

## 2023-10-05 PROCEDURE — 88304 TISSUE EXAM BY PATHOLOGIST: CPT | Performed by: PATHOLOGY

## 2023-10-05 RX ORDER — AMLODIPINE BESYLATE 5 MG/1
5 TABLET ORAL DAILY
Qty: 30 TABLET | Refills: 4 | Status: SHIPPED | OUTPATIENT
Start: 2023-10-05

## 2023-10-05 NOTE — TELEPHONE ENCOUNTER
Pt had a lipoma removed from her back on Tuesday. At the surgeons office her bp was high. And now her daughter just took it again and its 160/100. She has been taking her bp meds. Did you want to increase med dose? She feels fine, no symptoms of HTN.

## 2023-10-05 NOTE — PROGRESS NOTES
Patient called had surgery for removal of soft mass since that time blood pressure has been elevated. 140s over 80s to 90s. Will add 5 mg of amlodipine to her metoprolol tartrate and lisinopril medication follow-up if blood pressure does not improve.

## 2023-10-05 NOTE — TELEPHONE ENCOUNTER
All has been returned to the patient please see my note on her chart regarding her blood pressure medication.

## 2023-10-18 ENCOUNTER — OFFICE VISIT (OUTPATIENT)
Dept: SURGERY | Facility: CLINIC | Age: 73
End: 2023-10-18

## 2023-10-18 VITALS
BODY MASS INDEX: 41.82 KG/M2 | OXYGEN SATURATION: 96 % | WEIGHT: 236 LBS | HEART RATE: 59 BPM | HEIGHT: 63 IN | TEMPERATURE: 98 F

## 2023-10-18 DIAGNOSIS — R19.00 RIGHT FLANK MASS: Primary | ICD-10-CM

## 2023-10-18 PROCEDURE — 99024 POSTOP FOLLOW-UP VISIT: CPT | Performed by: SPECIALIST

## 2023-10-18 NOTE — PROGRESS NOTES
Eunice Ritter presents today for follow-up visit status post excision of a large lipoma from her back. She says she feels great. She said the Steri-Strips just fell off. No discomfort or drainage from the area. Physical exam: Middle-aged white female awake alert no distress. Back there is a healing incision in the back with some normal postoperative edema. No fluid noted. No evidence of infection. Excellent cosmetic result. Impression: Doing well status post excision of a large painful lipoma from her back. Plan: At this point Eunice Ritter is discharged in the office and may return her as needed. She wants to know if she can swim and the answer is absolutely. It was a pleasure to meet her.

## 2023-10-30 ENCOUNTER — TELEPHONE (OUTPATIENT)
Dept: INTERNAL MEDICINE CLINIC | Facility: CLINIC | Age: 73
End: 2023-10-30

## 2023-10-30 NOTE — TELEPHONE ENCOUNTER
Jose Vela,   Patient called this morning and stated that she normally receives a rebate of $40 every year for her AWV through her medicare. Patient stated that she called her insurance and her insurance stated that her last AWV was coded wrong. Patient is wondering if you could double check the diagnosis code or change it if necessary. If there is nothing to be changed, I can call patient and give her the number for billing. Patient can be reached at: [phone]. Thank you.

## 2023-10-31 NOTE — TELEPHONE ENCOUNTER
The visit was billed as a tool visit both a annual wellness visit as well as a routine visit. The AWV visit was a follow-up annual code.   Unsure why the insurance company cannot see that I do not see anything we can change at our end

## 2023-11-16 ENCOUNTER — TELEPHONE (OUTPATIENT)
Dept: INTERNAL MEDICINE CLINIC | Facility: CLINIC | Age: 73
End: 2023-11-16

## 2023-11-16 DIAGNOSIS — I10 PRIMARY HYPERTENSION: ICD-10-CM

## 2023-11-16 RX ORDER — AMLODIPINE BESYLATE 10 MG/1
10 TABLET ORAL DAILY
Qty: 90 TABLET | Refills: 2 | Status: SHIPPED | OUTPATIENT
Start: 2023-11-16

## 2023-11-16 NOTE — TELEPHONE ENCOUNTER
Jose Carrera,  Patient called and stated that she is taking the amlodipine but does not feel like it is lowering her blood pressure. She took her blood pressure yesterday and it was 150/84 and would like to discuss further in detail. Patient can be reached at: [phone]. Thank you.

## 2023-11-16 NOTE — TELEPHONE ENCOUNTER
Call has been returned to the patient please see my note on chart regarding our intervention for her hypertension.

## 2023-11-16 NOTE — PROGRESS NOTES
Call from the patient blood pressures initially went down on amlodipine but are now back up into the 238 range systolic-recommended increasing the amlodipine from 5 mg a day to 10 mg daily. She will continue to monitor blood pressure at home. New prescription has been sent to her pharmacy for the 10 mg dose.

## 2023-11-27 DIAGNOSIS — I47.10 SVT (SUPRAVENTRICULAR TACHYCARDIA): ICD-10-CM

## 2023-11-27 RX ORDER — METOPROLOL TARTRATE 50 MG/1
50 TABLET, FILM COATED ORAL EVERY 12 HOURS SCHEDULED
Qty: 180 TABLET | Refills: 3 | Status: SHIPPED | OUTPATIENT
Start: 2023-11-27

## 2023-12-07 ENCOUNTER — OFFICE VISIT (OUTPATIENT)
Dept: CARDIOLOGY CLINIC | Facility: CLINIC | Age: 73
End: 2023-12-07
Payer: COMMERCIAL

## 2023-12-07 VITALS
WEIGHT: 238.4 LBS | SYSTOLIC BLOOD PRESSURE: 126 MMHG | OXYGEN SATURATION: 96 % | HEART RATE: 66 BPM | HEIGHT: 63 IN | DIASTOLIC BLOOD PRESSURE: 76 MMHG | BODY MASS INDEX: 42.24 KG/M2

## 2023-12-07 DIAGNOSIS — I47.10 SVT (SUPRAVENTRICULAR TACHYCARDIA): ICD-10-CM

## 2023-12-07 DIAGNOSIS — E78.2 MIXED HYPERLIPIDEMIA: ICD-10-CM

## 2023-12-07 DIAGNOSIS — I10 PRIMARY HYPERTENSION: Primary | ICD-10-CM

## 2023-12-07 PROCEDURE — 99214 OFFICE O/P EST MOD 30 MIN: CPT | Performed by: INTERNAL MEDICINE

## 2023-12-07 NOTE — PROGRESS NOTES
Cardiology   Gailar Myla Hendrickson 72 y.o. female MRN: [id number]        Impression:  1. Paroxysmal atrial tachycardia - on metoprolol. Benign. Short episodes, but no sustained episodes on metoprolol. 2. Sleep Apnea - on CPAP. 3. Dyslipidemia - on statin. 4. Hypertension - controlled. Recommendations:  1. Continue current medications. 2. Follow up 12 months. HPI: Vidya Pepe is a 72y.o. year old female with paroxysmal atrial tachycardia. Converted with diltiazem. Echo demonstrated a structurally normal heart, and Zio monitor demonstrated frequent episodes of PAT, the longest lasting 11 beats. Was evaluated for Sleep Apnea - now on CPAP. Has rare palpitations, no chest pain or shortness of breath. Review of Systems   Constitutional: Negative. HENT: Negative. Eyes: Negative. Respiratory:  Negative for chest tightness and shortness of breath. Cardiovascular:  Positive for palpitations. Negative for chest pain and leg swelling. Gastrointestinal: Negative. Endocrine: Negative. Genitourinary: Negative. Musculoskeletal: Negative. Skin: Negative. Allergic/Immunologic: Negative. Neurological: Negative. Hematological: Negative. Psychiatric/Behavioral: Negative. All other systems reviewed and are negative.         Past Medical History:   Diagnosis Date    Cellulitis of left elbow 10/30/2016    CPAP (continuous positive airway pressure) dependence     Epistaxis     Medial meniscus tear 11/11/2014    Morbid obesity with BMI of 40.0-44.9, adult ([address]) 12/28/2018    Sleep apnea     SVT (supraventricular tachycardia) 3283    Umbilical hernia      Past Surgical History:   Procedure Laterality Date    KNEE ARTHROSCOPY W/ MENISCAL REPAIR  07/26/2015    with medial meniscus repair    OOPHORECTOMY Bilateral 2002    SD BIOPSY SOFT TISSUE BACK/FLANK DEEP N/A 10/2/2023    Procedure: EXCISION RIGHT FLANK MASS;  Surgeon: Brody Damon MD;  Location:  MAIN OR;  Service: General    TOTAL ABDOMINAL HYSTERECTOMY  2002     Social History     Substance and Sexual Activity   Alcohol Use No     Social History     Substance and Sexual Activity   Drug Use No     Social History     Tobacco Use   Smoking Status Never   Smokeless Tobacco Never   Tobacco Comments    Denied history of never a smoker per [employer]     Family History   Problem Relation Age of Onset    No Known Problems Mother     Thyroid cancer Daughter 40    Hodgkin's lymphoma Daughter 32    No Known Problems Maternal Grandmother     No Known Problems Maternal Grandfather     No Known Problems Paternal Grandmother     No Known Problems Paternal Grandfather     No Known Problems Paternal Aunt     No Known Problems Paternal Aunt     Thyroid disease Family     Diabetes Family         Diabetes Mellitus    Breast cancer Neg Hx        Allergies: Allergies   Allergen Reactions    Lipitor [Atorvastatin] GI Intolerance    Morphine Nausea Only and GI Intolerance     Reaction Date: [id number];     Zithromax [Azithromycin] Diarrhea     Reaction Date: [id number];        Medications:     Current Outpatient Medications:     amLODIPine (NORVASC) 10 mg tablet, Take 1 tablet (10 mg total) by mouth daily, Disp: 90 tablet, Rfl: 2    aspirin (ECOTRIN LOW STRENGTH) 81 mg EC tablet, Take 1 tablet (81 mg total) by mouth daily, Disp: 90 tablet, Rfl: 0    calcium-vitamin D (OSCAL) 250-125 MG-UNIT per tablet, Take 1 tablet by mouth daily. , Disp: , Rfl:     Cholecalciferol (Vitamin D3) 50 MCG (2000 UT) capsule, Take 2,000 Units by mouth daily  , Disp: , Rfl:     KRILL OIL PO, 1200 mg QD, Disp: , Rfl:     lisinopril (ZESTRIL) 40 mg tablet, Take 1 tablet (40 mg total) by mouth daily, Disp: 90 tablet, Rfl: 3    lovastatin (MEVACOR) 20 mg tablet, Take 1 tablet (20 mg total) by mouth daily at bedtime, Disp: 90 tablet, Rfl: 3    metoprolol tartrate (LOPRESSOR) 50 mg tablet, Take 1 tablet (50 mg total) by mouth every 12 (twelve) hours, Disp: 180 tablet, Rfl: 3 Multiple Vitamins-Minerals (MULTIVITAMIN ADULT PO), , Disp: , Rfl:     nystatin powder, Apply topically 2 (two) times a day (Patient taking differently: Apply topically as needed), Disp: 180 g, Rfl: 2      Wt Readings from Last 3 Encounters:   12/07/23 108 kg (238 lb 6.4 oz)   10/18/23 107 kg (236 lb)   10/02/23 107 kg (236 lb)     Temp Readings from Last 3 Encounters:   10/18/23 98 °F (36.7 °C) (Tympanic)   10/02/23 (!) 96.8 °F (36 °C) (Temporal)   09/13/23 98.7 °F (37.1 °C) (Tympanic)     BP Readings from Last 3 Encounters:   12/07/23 126/76   10/02/23 141/82   09/13/23 128/74     Pulse Readings from Last 3 Encounters:   12/07/23 66   10/18/23 59   10/02/23 58         Physical Exam  HENT:      Head: Atraumatic. Mouth/Throat:      Mouth: Mucous membranes are moist.   Eyes:      Extraocular Movements: Extraocular movements intact. Cardiovascular:      Rate and Rhythm: Normal rate and regular rhythm. Heart sounds: Normal heart sounds. Pulmonary:      Effort: Pulmonary effort is normal.      Breath sounds: Normal breath sounds. Abdominal:      General: Abdomen is flat. Musculoskeletal:         General: Normal range of motion. Cervical back: Normal range of motion. Skin:     General: Skin is warm. Neurological:      General: No focal deficit present. Mental Status: She is alert and oriented to person, place, and time.    Psychiatric:         Mood and Affect: Mood normal.         Behavior: Behavior normal.           Laboratory Studies:  CMP:  Lab Results   Component Value Date     10/14/2015    K 4.0 09/06/2023     09/06/2023    CO2 27 09/06/2023    ANIONGAP 6 10/14/2015    BUN 13 09/06/2023    CREATININE 0.59 (L) 09/06/2023    GLUCOSE 103 10/14/2015    AST 16 09/06/2023    ALT 12 09/06/2023    BILITOT 0.68 10/14/2015    EGFR 93 09/06/2023       Lipid Profile:   Lab Results   Component Value Date    CHOL 176 07/21/2015     Lab Results   Component Value Date    HDL 47 (L) 2023     Lab Results   Component Value Date    LDLCALC 86 2023     Lab Results   Component Value Date    TRIG 236 (H) 2023       Cardiac testing:   EKG reviewed personally:   Results for orders placed during the hospital encounter of 21    Echo complete with contrast if indicated    Narrative  [address], [address] [facility]    Transthoracic Echocardiogram  2D, M-mode, Doppler, and Color Doppler    Study date:  2021    Patient: Kareen Gomez  MR number: [id number]  Account number: [de-identified]  : 1950  Age: 78 years  Gender: Female  Status: Outpatient  Location: [facility]  Height: 67 in  Weight: 254.5 lb  BP: 132/ 78 mmHg    Indications: Supraventricular Tachycardia    Diagnoses: I47.1 - Supraventricular tachycardia    Sonographer:  Denice Babin MARIE  Primary Physician:  Ananth Saleem MD  Referring Physician:  Jessica Polo:  [facility]  Interpreting Physician:  Sera Sahu MD    SUMMARY    LEFT VENTRICLE:  Systolic function was normal. Ejection fraction was estimated to be 60 %. There were no regional wall motion abnormalities. Wall thickness was at the upper limits of normal.    MITRAL VALVE:  There was trace regurgitation. TRICUSPID VALVE:  There was mild regurgitation. PULMONIC VALVE:  There was trace regurgitation. AORTA:  There was mild dilatation of the ascending aorta. 4.0 cm    PROCEDURE: The study was performed in the [address] [place] [address]. This was a routine study. The transthoracic approach was used. The study included complete 2D imaging, M-mode, complete spectral Doppler, and color Doppler. The heart rate was 78 bpm, at the start of the study. Images were obtained from the parasternal, apical, subcostal, and suprasternal notch acoustic windows. Echocardiographic views were limited due to lung interference.  Image quality was  adequate. LEFT VENTRICLE: Size was normal. Systolic function was normal. Ejection fraction was estimated to be 60 %. There were no regional wall motion abnormalities. Wall thickness was at the upper limits of normal. No evidence of apical thrombus. DOPPLER: Left ventricular diastolic function parameters were normal.    RIGHT VENTRICLE: The size was normal. Systolic function was normal. Wall thickness was normal.    LEFT ATRIUM: Size was normal.    RIGHT ATRIUM: Size was normal.    MITRAL VALVE: Valve structure was normal. There was normal leaflet separation. DOPPLER: The transmitral velocity was within the normal range. There was no evidence for stenosis. There was trace regurgitation. AORTIC VALVE: The valve was trileaflet. Leaflets exhibited normal thickness and normal cuspal separation. DOPPLER: Transaortic velocity was within the normal range. There was no evidence for stenosis. There was no significant  regurgitation. TRICUSPID VALVE: The valve structure was normal. There was normal leaflet separation. DOPPLER: The transtricuspid velocity was within the normal range. There was no evidence for stenosis. There was mild regurgitation. PULMONIC VALVE: Leaflets exhibited normal thickness, no calcification, and normal cuspal separation. DOPPLER: The transpulmonic velocity was within the normal range. There was trace regurgitation. PERICARDIUM: There was no pericardial effusion. The pericardium was normal in appearance. AORTA: The root exhibited normal size. There was mild dilatation of the ascending aorta. 4.0 cm    SYSTEMIC VEINS: IVC: The inferior vena cava was normal in size.     SYSTEM MEASUREMENT TABLES    2D  %FS: 35.32 %  Ao Diam: 3.28 cm  Ao asc: 3.98 cm  EDV(Teich): 106.34 ml  EF(Teich): 64.62 %  ESV(Teich): 37.62 ml  IVSd: 1.15 cm  LA Area: 12.19 cm2  LA Diam: 3.65 cm  LVEDV MOD A4C: 70.62 ml  LVEF MOD A4C: 67.79 %  LVESV MOD A4C: 22.75 ml  LVIDd: 4.78 cm  LVIDs: 3.09 cm  LVLd A4C: 7.14 cm  LVLs A4C: 5.9 cm  LVOT Diam: 2 cm  LVPWd: 1.02 cm  RA Area: 14.94 cm2  RVIDd: 3.26 cm  SV MOD A4C: 47.88 ml  SV(Teich): 68.72 ml    MM  TAPSE: 1.91 cm    PW  E' Sept: 0.1 m/s  E/E' Sept: 6.47  MV A [place]: 0.87 m/s  MV Dec [place]: 3.44 m/s2  MV DecT: 189.73 ms  MV E [place]: 0.65 m/s  MV E/A Ratio: 0.75  MV PHT: 55.02 ms  MVA By PHT: 4 cm2    [facility] Accredited Echocardiography Laboratory    Prepared and electronically signed by    Eyad Herrera MD  Signed 07-Jul-2021 15:00:59    No results found for this or any previous visit. No results found for this or any previous visit. No results found for this or any previous visit.

## 2023-12-12 ENCOUNTER — OFFICE VISIT (OUTPATIENT)
Dept: SLEEP CENTER | Facility: CLINIC | Age: 73
End: 2023-12-12
Payer: COMMERCIAL

## 2023-12-12 VITALS
SYSTOLIC BLOOD PRESSURE: 120 MMHG | HEART RATE: 70 BPM | OXYGEN SATURATION: 98 % | RESPIRATION RATE: 18 BRPM | HEIGHT: 63 IN | DIASTOLIC BLOOD PRESSURE: 70 MMHG | WEIGHT: 238 LBS | BODY MASS INDEX: 42.17 KG/M2

## 2023-12-12 DIAGNOSIS — E66.01 MORBID OBESITY WITH BMI OF 40.0-44.9, ADULT (HCC): ICD-10-CM

## 2023-12-12 DIAGNOSIS — G47.33 OSA (OBSTRUCTIVE SLEEP APNEA): ICD-10-CM

## 2023-12-12 DIAGNOSIS — G47.61 PERIODIC LIMB MOVEMENT DISORDER: Primary | ICD-10-CM

## 2023-12-12 PROCEDURE — 99214 OFFICE O/P EST MOD 30 MIN: CPT | Performed by: INTERNAL MEDICINE

## 2023-12-12 RX ORDER — GABAPENTIN 100 MG/1
100 CAPSULE ORAL
Qty: 90 CAPSULE | Refills: 0 | Status: SHIPPED | OUTPATIENT
Start: 2023-12-12

## 2023-12-12 NOTE — ASSESSMENT & PLAN NOTE
Mild symptomatic LOLI with AHI 5.6 events per hour  Reviewed the compliance with 100% using the machine averaging 7 to 8 hours of night sleep with minimal residual AHI, she is complaining of sometimes sleep interruption in the middle night and difficulty falling back asleep she is not quite sure why the test she would read and usually fall asleep back    DME supplies ordered

## 2023-12-12 NOTE — PROGRESS NOTES
Sleep Medicine Outpatient Follow Up Note   Stephen De Oliveira 72 y.o. female MRN: [id number]  12/12/2023      Reason for Consultation:    Chief Complaint   Patient presents with    Follow-up         Assessment/Plan:    1. Periodic limb movement disorder  -     gabapentin (Neurontin) 100 mg capsule; Take 1 capsule (100 mg total) by mouth daily at bedtime    2. LOLI (obstructive sleep apnea)  Assessment & Plan:  Mild symptomatic LOLI with AHI 5.6 events per hour  Reviewed the compliance with 100% using the machine averaging 7 to 8 hours of night sleep with minimal residual AHI, she is complaining of sometimes sleep interruption in the middle night and difficulty falling back asleep she is not quite sure why the test she would read and usually fall asleep back    DME supplies ordered     Orders:  -     PAP DME Resupply/Reorder    3. Morbid obesity with BMI of 40.0-44.9, adult [facility])  Assessment & Plan:  BMI 42.16 noted             Health Maintenance  Immunization History   Administered Date(s) Administered    Influenza Quadrivalent, 6-35 Months IM 11/16/2016, 10/20/2017        Return in about 6 months (around 6/12/2024). History of Present Illness   HPI:  Stephen De Oliveira is a 72 y.o. female who has a past medical history of supraventricular tachycardia, mild obstructive sleep apnea, periodic limb movements stable on CPAP who is presenting for follow-up. He has been reporting average 6 hours of sleep, have 1 awakening to use the bathroom would sometimes having difficulty falling back asleep. She keep the CPAP on after awakening in case she falls back asleep. She will lie in bed for a few hours without feeling the urge to go back to sleep. When she was sleeping initially with CPAP she was sleeping 6-7 hours. She is still much less tired than prior to CPAP. She doesn't feel the need for napping. She is losing weight with a low carb diet and exercise.   Mask leak is increasing and she uses a strip underneath the mask.  She uses a full face mask. When she worked she worked 3-11pm.  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 She has no symptoms of restless leg syndrome, parasomnias, or cataplexy. She does not note any significant noticeable periodic limb movements during sleep. She does not get awakened prematurely by irregular movements. She is able to go through her day without any significant fatigue. Sitting and reading: Would never doze  Watching TV: Would never doze  Sitting, inactive in a public place (e.g. a theatre or a meeting):  Would never doze  As a passenger in a car for an hour without a break: Would never doze  Lying down to rest in the afternoon when circumstances permit: Would never doze  Sitting and talking to someone: Would never doze  Sitting quietly after a lunch without alcohol: Would never doze  In a car, while stopped for a few minutes in traffic: Would never doze  Total score: 0         Historical Information   Past Medical History:   Diagnosis Date    Cellulitis of left elbow 10/30/2016    CPAP (continuous positive airway pressure) dependence     Epistaxis     Medial meniscus tear 11/11/2014    Morbid obesity with BMI of 40.0-44.9, adult ([address]) 12/28/2018    Sleep apnea     SVT (supraventricular tachycardia) 9588    Umbilical hernia      Past Surgical History:   Procedure Laterality Date    KNEE ARTHROSCOPY W/ MENISCAL REPAIR  07/26/2015    with medial meniscus repair    OOPHORECTOMY Bilateral 2002    MD BIOPSY SOFT TISSUE BACK/FLANK DEEP N/A 10/2/2023    Procedure: EXCISION RIGHT FLANK MASS;  Surgeon: Vijaya Negrete MD;  [facility] MAIN OR;  Service: General    TOTAL ABDOMINAL HYSTERECTOMY  2002     Family History   Problem Relation Age of Onset    No Known Problems Mother     Thyroid cancer Daughter 40    Hodgkin's lymphoma Daughter 32    No Known Problems Maternal Grandmother     No Known Problems Maternal Grandfather     No Known Problems Paternal Grandmother     No Known Problems Paternal Grandfather     No Known Problems Paternal Aunt     No Known Problems Paternal Aunt     Thyroid disease Family     Diabetes Family         Diabetes Mellitus    Breast cancer Neg Hx          Meds/Allergies     Current Outpatient Medications:     amLODIPine (NORVASC) 10 mg tablet, Take 1 tablet (10 mg total) by mouth daily, Disp: 90 tablet, Rfl: 2    aspirin (ECOTRIN LOW STRENGTH) 81 mg EC tablet, Take 1 tablet (81 mg total) by mouth daily, Disp: 90 tablet, Rfl: 0    calcium-vitamin D (OSCAL) 250-125 MG-UNIT per tablet, Take 1 tablet by mouth daily. , Disp: , Rfl:     Cholecalciferol (Vitamin D3) 50 MCG (2000 UT) capsule, Take 2,000 Units by mouth daily  , Disp: , Rfl:     gabapentin (Neurontin) 100 mg capsule, Take 1 capsule (100 mg total) by mouth daily at bedtime, Disp: 90 capsule, Rfl: 0    KRILL OIL PO, 1200 mg QD, Disp: , Rfl:     lisinopril (ZESTRIL) 40 mg tablet, Take 1 tablet (40 mg total) by mouth daily, Disp: 90 tablet, Rfl: 3    lovastatin (MEVACOR) 20 mg tablet, Take 1 tablet (20 mg total) by mouth daily at bedtime, Disp: 90 tablet, Rfl: 3    metoprolol tartrate (LOPRESSOR) 50 mg tablet, Take 1 tablet (50 mg total) by mouth every 12 (twelve) hours, Disp: 180 tablet, Rfl: 3    Multiple Vitamins-Minerals (MULTIVITAMIN ADULT PO), , Disp: , Rfl:     nystatin powder, Apply topically 2 (two) times a day, Disp: 180 g, Rfl: 2  Allergies   Allergen Reactions    Lipitor [Atorvastatin] GI Intolerance    Morphine Nausea Only and GI Intolerance     Reaction Date: [id number];     Zithromax [Azithromycin] Diarrhea     Reaction Date: [id number];        Vitals: Blood pressure 120/70, pulse 70, resp. rate 18, height 5' 3" (1.6 m), weight 108 kg (238 lb), SpO2 98 %. Body mass index is 42.16 kg/m². Oxygen Therapy  SpO2: 98 %    Physical Exam  Vitals and nursing note reviewed. Constitutional:       General: She is not in acute distress. Appearance: She is well-developed.  She is not ill-appearing, toxic-appearing or diaphoretic. HENT:      Head: Normocephalic and atraumatic. Right Ear: External ear normal.      Left Ear: External ear normal.      Nose: Nose normal.      Mouth/Throat:      Mouth: Mucous membranes are moist.      Pharynx: No oropharyngeal exudate. Comments: Mallampati 3. No uvular, tonsillar hypertrophy. No macroglossia  Eyes:      Conjunctiva/sclera: Conjunctivae normal.   Cardiovascular:      Rate and Rhythm: Normal rate and regular rhythm. Heart sounds: No murmur heard. Pulmonary:      Effort: Pulmonary effort is normal. No respiratory distress. Breath sounds: Normal breath sounds. No wheezing or rales. Abdominal:      General: Abdomen is flat. There is no distension. Musculoskeletal:         General: No swelling. Normal range of motion. Cervical back: Normal range of motion and neck supple. No rigidity. Right lower leg: No edema. Left lower leg: No edema. Lymphadenopathy:      Cervical: No cervical adenopathy. Skin:     General: Skin is warm and dry. Coloration: Skin is not jaundiced or pale. Findings: No bruising. Neurological:      General: No focal deficit present. Mental Status: She is alert and oriented to person, place, and time. Mental status is at baseline. Psychiatric:         Mood and Affect: Mood normal.         Behavior: Behavior normal.         Thought Content: Thought content normal.             Labs: I have personally reviewed pertinent lab results.     ABG: No results found for: "PHART", "XVJ0KSM", "PO2ART", "GMI3RZM", "R4CIJULP", "BEART", "SOURCE",   BNP: No results found for: "BNP",   CBC:  Lab Results   Component Value Date    WBC 6.29 08/29/2023    HGB 13.8 08/29/2023    HCT 42.5 08/29/2023    MCV 98 08/29/2023     08/29/2023    EOSPCT 1 08/29/2023    EOSABS 0.08 08/29/2023    NEUTOPHILPCT 53 08/29/2023    LYMPHOPCT 38 08/29/2023   ,   CMP:   Lab Results   Component Value Date SODIUM 142 09/06/2023    K 4.0 09/06/2023     09/06/2023    CO2 27 09/06/2023    ANIONGAP 6 10/14/2015    BUN 13 09/06/2023    CREATININE 0.59 (L) 09/06/2023    GLUCOSE 103 10/14/2015    CALCIUM 10.1 09/06/2023    AST 16 09/06/2023    ALT 12 09/06/2023    ALKPHOS 58 09/06/2023    PROT 7.4 10/14/2015    BILITOT 0.68 10/14/2015    EGFR 93 09/06/2023   ,   PT/INR:   Lab Results   Component Value Date    INR 0.95 10/24/2014   ,   Ferrtin: No components found for: "FERRTIN",  Magensium: No results found for: "MAGNESIUM",    Sleep Study: Personally reviewed the data and interpretation for the sleep studies. 9/21/2021   SLEEP:   Patient slept for 237.5 minutes out of 407.1 minutes in bed representing a sleep efficiency of 58.3%. Sleep architecture showed a sleep latency of 17.0 minutes and a REM sleep latency of 137.5 minutes. There was 9.3% Stage N1 noted. There was 74.1% Stage N2 noted. There was 5.1% Stage N3 noted. There was 11.6% REM sleep noted. The patient had 65 arousals for an average of 16.4 arousals per hour of sleep. RESPIRATORY:   There were a total of 22 respiratory events made up of 0 obstructive apneas, 0 mixed apneas, 0 central apneas, and 22 hypopneas resulting in an apnea/hypopnea index (AHI) of 5.6 events per hour of sleep. The AHI in the supine position was N/A. The AHI during REM sleep was 48.0. OXIMETRY:   The baseline oxygen saturation with the patient awake was 95.3%. The mean oxygen saturation throughout the study was 94.4%. The amount of sleep time below 90% was 1.5 minutes. The lowest oxygen saturation was 86.0%. BODY POSITION:   The patient slept 0.0% of the evening in the supine position, 90.3% on left side, 9.7% on right side, and 0.0% in the prone position. LEG MOVEMENT:   There were 207 PLMs; PLM index of 52.3; 28 PLMs associated with arousal; PLM w/arousal index of 7.1.      IMPRESSION:   The patient slept for a total of 237 minutes representing sleep efficiency 58%, sleep architecture showed slightly reduced stage R. The patient had increased number of sleep disordered breathing events with an average apnea-hypopnea index of 5.6 events per hour. Thus, the patient meets the criteria for diagnosis of mild obstructive sleep apnea. In addition, the patient had increased number of periodic limb movements with a PLM index of 52.3 events per hour. An in-lab CPAP titration study would be recommended. Checking a CBC, BUN, creatinine, TSH, iron studies, ferritin, vitamin B12, vitamin-D and magnesium level would be recommended to rule out underlying metabolic disorders associated with periodic limb movements. Clinical correlation suggested. 10/2/2021 - CPAP titration     SLEEP:   Patient slept for 350.0 minutes out of 393.8 minutes in bed representing a sleep efficiency of 88.9%. Sleep architecture showed a sleep latency of 7.9 minutes and a REM sleep latency of 85. 5minutes. There was 16.4% Stage N1 noted. There was 35.4% Stage N2 noted. There was 19.3% Stage N3 noted. There was 28.9% Stage REM noted. The patient had 60 arousals for an average of 10.3arousals per hour of sleep. TREATMENT:   Positive airway pressure was initiated at 5cm H2O pressure and titrated up to 8cm H2O pressure using the Resmed AirFit F10 Full face, Small interface   OXIMETRY:   The baseline oxygen saturation with the patient awake was 96.5%. The mean oxygen saturation throughout the study was 95.0%. The amount of sleep time below 90% was 0.0 minutes. The lowest oxygen saturation was 90.0%. BODY POSITION:   The patient slept 0.0% of the evening in the supine position, 100.0% on left side, 0.0% on right side, and 0.0% in the prone position. LEG MOVEMENT:   There were 351 PLMs; PLM index of 60.2; 18 PLMs associated with arousal; PLM w/arousal index of 3.1. IMPRESSION:   Mrs. Hendrickson underwent titration of CPAP. She demonstrated a decreased sleep latency but normal REM latency.  This is likely due to previously untreated LOLI but hypersomnia/narcolepsy can not be ruled out. Sleep staging was normal. She underwent titration of CPAP up to 8 cc of H2O pressure. She was doing well at that pressure but still had a few respiratory events. Therefore, I recommend a trial of auto-titrating CPAP 8-15 cc of H2O pressure with heated humidification. Compliance should be evaluated in 1-2 months. In addition, she had severe limb movement (PLM index - 60.2). I would check iron and magnesium levels. If daytime sleepiness persists, I would consider a trial of Neurontin, Lyrica, Mirapex or Requip. Compliance data:  11/7/23 - 12/6/2023.  100% use more than 4 hours. Average use is 7 hours and 34 minutes. Pressure set at 8-15 cm H2O.  EPR is at 2. Median pressure is 13.3.  95th percentile pressure 0.7. Residual AHI 0.7      Transthoracic Echo:  LEFT VENTRICLE:  Systolic function was normal. Ejection fraction was estimated to be 60 %. There were no regional wall motion abnormalities. Wall thickness was at the upper limits of normal.  MITRAL VALVE:  There was trace regurgitation. TRICUSPID VALVE:  There was mild regurgitation. PULMONIC VALVE:  There was trace regurgitation. AORTA:  There was mild dilatation of the ascending aorta. 4.0 cm      Mart Pierson MD    Portions of the record may have been created with voice recognition software. Occasional wrong word or "sound a like" substitutions may have occurred due to the inherent limitations of voice recognition software. Please read the chart carefully and recognize, using context, where substitutions have occurred.

## 2023-12-12 NOTE — PATIENT INSTRUCTIONS
Gabapentin (By mouth)   Gabapentin (matias-a-PEN-tin)  Treats seizures and pain caused by shingles. Brand Name(s): FusePaq Fanatrex, Neurontin   There may be other brand names for this medicine. When This Medicine Should Not Be Used: This medicine is not right for everyone. Do not use it if you had an allergic reaction to gabapentin. How to Use This Medicine:   Capsule, Liquid, Tablet  Take your medicine as directed. Your dose may need to be changed several times to find what works best for you. If you have epilepsy, do not allow more than 12 hours to pass between doses. Capsule: Swallow the capsule whole with plenty of water. Do not open, crush, or chew it. Gralise® tablet: Swallow the tablet whole . Do not crush, break, or chew it. Neurontin® tablet: If you break a tablet into 2 pieces, use the second half as your next dose. Do not use the half-tablet if the whole tablet has been cut or broken after 28 days. Oral liquid: Measure the oral liquid medicine with a marked measuring spoon, oral syringe, or medicine cup. This medicine should come with a Medication Guide. Ask your pharmacist for a copy if you do not have one. Missed dose: Take a dose as soon as you remember. If it is almost time for your next dose, wait until then and take a regular dose. Do not take extra medicine to make up for a missed dose. Store the medicine in a closed container at room temperature, away from heat, moisture, and direct light. Store the Neurontin® oral liquid in the refrigerator. Do not freeze. Drugs and Foods to Avoid:   Ask your doctor or pharmacist before using any other medicine, including over-the-counter medicines, vitamins, and herbal products. Some medicines can affect how gabapentin works. Tell your doctor if you also using hydrocodone or morphine. If you take an antacid, wait at least 2 hours before you take gabapentin. Do not drink alcohol while you are using this medicine.   Tell your doctor if you use anything else that makes you sleepy. Some examples are allergy medicine, narcotic pain medicine, and alcohol. Tell your doctor if you are also using lorazepam, oxycodone, or zolpidem. Warnings While Using This Medicine:   Tell your doctor if you are pregnant or breastfeeding, or if you have kidney problems (including patients receiving dialysis) or lung problems. Tell your doctor if you have a history of depression or mental health problems. This medicine may cause the following problems:  Drug reaction with eosinophilia and systemic symptoms (DRESS) or multiorgan hypersensitivity, which may damage the liver, kidney, blood, heart, or muscles  Changes in mood or behavior, including suicidal thoughts or behavior  Respiratory depression (serious breathing problem that can be life-threatening), when used with narcotic pain medicines  Do not stop using this medicine suddenly. Your doctor will need to slowly decrease your dose before you stop it completely. This medicine may make you dizzy or drowsy. Do not drive or do anything else that could be dangerous until you know how this medicine affects you. Tell any doctor or dentist who treats you that you are using this medicine. This medicine may affect certain medical test results. Your doctor will check your progress and the effects of this medicine at regular visits. Keep all appointments. Keep all medicine out of the reach of children. Never share your medicine with anyone. Possible Side Effects While Using This Medicine:   Call your doctor right away if you notice any of these side effects:   Allergic reaction: Itching or hives, swelling in your face or hands, swelling or tingling in your mouth or throat, chest tightness, trouble breathing  Behavior problems, aggression, restlessness, trouble concentrating, moodiness (especially in children)  Blistering, peeling, red skin rash  Blue lips, fingernails, or skin, chest pain, fast heartbeat, trouble breathing  Change in how much or how often you urinate, bloody or cloudy urine  Dark urine or pale stools, nausea, vomiting, loss of appetite, stomach pain, yellow skin or eyes  Fever, chills, cough, sore throat, body aches  Problems with coordination, shakiness, unsteadiness, unusual eye movement  Rapid weight gain, swelling in your hands, ankles, or feet  Rash, swollen or tender glands in the neck, armpit, or groin  Unusual moods or behaviors, thoughts of hurting yourself, feeling depressed  If you notice these less serious side effects, talk with your doctor:   Dizziness, drowsiness, sleepiness, tiredness  If you notice other side effects that you think are caused by this medicine, tell your doctor. Call your doctor for medical advice about side effects. You may report side effects to FDA at 8-326-FDA-9940  © Copyright Jessy Duarte 2023 Information is for End User's use only and may not be sold, redistributed or otherwise used for commercial purposes. The above information is an  only. It is not intended as medical advice for individual conditions or treatments. Talk to your doctor, nurse or pharmacist before following any medical regimen to see if it is safe and effective for you.

## 2023-12-14 ENCOUNTER — TELEPHONE (OUTPATIENT)
Dept: SLEEP CENTER | Facility: CLINIC | Age: 73
End: 2023-12-14

## 2023-12-14 NOTE — TELEPHONE ENCOUNTER
Rx for PAP resupply sent to [address] [place] [address] via [address] [facility].

## 2024-02-01 DIAGNOSIS — E78.5 HYPERLIPIDEMIA, UNSPECIFIED HYPERLIPIDEMIA TYPE: ICD-10-CM

## 2024-02-01 RX ORDER — LOVASTATIN 20 MG/1
20 TABLET ORAL
Qty: 90 TABLET | Refills: 3 | Status: SHIPPED | OUTPATIENT
Start: 2024-02-01

## 2024-03-28 DIAGNOSIS — M54.31 SCIATICA OF RIGHT SIDE: Primary | ICD-10-CM

## 2024-03-28 RX ORDER — METAXALONE 800 MG/1
800 TABLET ORAL 3 TIMES DAILY
COMMUNITY
End: 2024-03-28 | Stop reason: SDUPTHER

## 2024-03-28 RX ORDER — METAXALONE 800 MG/1
800 TABLET ORAL 3 TIMES DAILY
Qty: 60 TABLET | Refills: 1 | Status: SHIPPED | OUTPATIENT
Start: 2024-03-28 | End: 2024-03-29 | Stop reason: SDUPTHER

## 2024-03-29 DIAGNOSIS — M54.31 SCIATICA OF RIGHT SIDE: ICD-10-CM

## 2024-03-29 RX ORDER — METAXALONE 800 MG/1
800 TABLET ORAL 3 TIMES DAILY
Qty: 270 TABLET | Refills: 1 | Status: SHIPPED | OUTPATIENT
Start: 2024-03-29

## 2024-05-03 ENCOUNTER — HOSPITAL ENCOUNTER (EMERGENCY)
Facility: HOSPITAL | Age: 74
Discharge: HOME | End: 2024-05-03
Attending: STUDENT IN AN ORGANIZED HEALTH CARE EDUCATION/TRAINING PROGRAM | Admitting: STUDENT IN AN ORGANIZED HEALTH CARE EDUCATION/TRAINING PROGRAM
Payer: COMMERCIAL

## 2024-05-03 ENCOUNTER — APPOINTMENT (EMERGENCY)
Dept: RADIOLOGY | Facility: HOSPITAL | Age: 74
End: 2024-05-03
Payer: COMMERCIAL

## 2024-05-03 VITALS
RESPIRATION RATE: 16 BRPM | DIASTOLIC BLOOD PRESSURE: 59 MMHG | HEART RATE: 58 BPM | TEMPERATURE: 99.2 F | OXYGEN SATURATION: 96 % | SYSTOLIC BLOOD PRESSURE: 121 MMHG

## 2024-05-03 DIAGNOSIS — R42 VERTIGO: Primary | ICD-10-CM

## 2024-05-03 DIAGNOSIS — R60.0 LOWER EXTREMITY EDEMA: ICD-10-CM

## 2024-05-03 LAB
ALBUMIN SERPL BCP-MCNC: 4.1 G/DL (ref 3.5–5)
ALP SERPL-CCNC: 61 U/L (ref 34–104)
ALT SERPL W P-5'-P-CCNC: 12 U/L (ref 7–52)
ANION GAP SERPL CALCULATED.3IONS-SCNC: 8 MMOL/L (ref 4–13)
AST SERPL W P-5'-P-CCNC: 15 U/L (ref 13–39)
BASOPHILS # BLD AUTO: 0.02 THOUSANDS/ÂΜL (ref 0–0.1)
BASOPHILS NFR BLD AUTO: 0 % (ref 0–1)
BILIRUB SERPL-MCNC: 0.68 MG/DL (ref 0.2–1)
BUN SERPL-MCNC: 17 MG/DL (ref 5–25)
CALCIUM SERPL-MCNC: 9 MG/DL (ref 8.4–10.2)
CARDIAC TROPONIN I PNL SERPL HS: 4 NG/L
CHLORIDE SERPL-SCNC: 103 MMOL/L (ref 96–108)
CO2 SERPL-SCNC: 28 MMOL/L (ref 21–32)
CREAT SERPL-MCNC: 0.63 MG/DL (ref 0.6–1.3)
EOSINOPHIL # BLD AUTO: 0.03 THOUSAND/ÂΜL (ref 0–0.61)
EOSINOPHIL NFR BLD AUTO: 0 % (ref 0–6)
ERYTHROCYTE [DISTWIDTH] IN BLOOD BY AUTOMATED COUNT: 12.9 % (ref 11.6–15.1)
GFR SERPL CREATININE-BSD FRML MDRD: 90 ML/MIN/1.73SQ M
GLUCOSE SERPL-MCNC: 115 MG/DL (ref 65–140)
HCT VFR BLD AUTO: 42.6 % (ref 34.8–46.1)
HGB BLD-MCNC: 14.1 G/DL (ref 11.5–15.4)
IMM GRANULOCYTES # BLD AUTO: 0.03 THOUSAND/UL (ref 0–0.2)
IMM GRANULOCYTES NFR BLD AUTO: 0 % (ref 0–2)
LYMPHOCYTES # BLD AUTO: 1.59 THOUSANDS/ÂΜL (ref 0.6–4.47)
LYMPHOCYTES NFR BLD AUTO: 20 % (ref 14–44)
MCH RBC QN AUTO: 31.4 PG (ref 26.8–34.3)
MCHC RBC AUTO-ENTMCNC: 33.1 G/DL (ref 31.4–37.4)
MCV RBC AUTO: 95 FL (ref 82–98)
MONOCYTES # BLD AUTO: 0.55 THOUSAND/ÂΜL (ref 0.17–1.22)
MONOCYTES NFR BLD AUTO: 7 % (ref 4–12)
NEUTROPHILS # BLD AUTO: 5.76 THOUSANDS/ÂΜL (ref 1.85–7.62)
NEUTS SEG NFR BLD AUTO: 73 % (ref 43–75)
NRBC BLD AUTO-RTO: 0 /100 WBCS
PLATELET # BLD AUTO: 204 THOUSANDS/UL (ref 149–390)
PMV BLD AUTO: 9.9 FL (ref 8.9–12.7)
POTASSIUM SERPL-SCNC: 3.9 MMOL/L (ref 3.5–5.3)
PROT SERPL-MCNC: 7.4 G/DL (ref 6.4–8.4)
RBC # BLD AUTO: 4.49 MILLION/UL (ref 3.81–5.12)
SODIUM SERPL-SCNC: 139 MMOL/L (ref 135–147)
WBC # BLD AUTO: 7.98 THOUSAND/UL (ref 4.31–10.16)

## 2024-05-03 PROCEDURE — 99284 EMERGENCY DEPT VISIT MOD MDM: CPT

## 2024-05-03 PROCEDURE — 93005 ELECTROCARDIOGRAM TRACING: CPT

## 2024-05-03 PROCEDURE — 85025 COMPLETE CBC W/AUTO DIFF WBC: CPT | Performed by: STUDENT IN AN ORGANIZED HEALTH CARE EDUCATION/TRAINING PROGRAM

## 2024-05-03 PROCEDURE — 71045 X-RAY EXAM CHEST 1 VIEW: CPT

## 2024-05-03 PROCEDURE — 36415 COLL VENOUS BLD VENIPUNCTURE: CPT

## 2024-05-03 PROCEDURE — 99285 EMERGENCY DEPT VISIT HI MDM: CPT | Performed by: STUDENT IN AN ORGANIZED HEALTH CARE EDUCATION/TRAINING PROGRAM

## 2024-05-03 PROCEDURE — 84484 ASSAY OF TROPONIN QUANT: CPT | Performed by: STUDENT IN AN ORGANIZED HEALTH CARE EDUCATION/TRAINING PROGRAM

## 2024-05-03 PROCEDURE — 80053 COMPREHEN METABOLIC PANEL: CPT | Performed by: STUDENT IN AN ORGANIZED HEALTH CARE EDUCATION/TRAINING PROGRAM

## 2024-05-03 RX ORDER — MECLIZINE HYDROCHLORIDE 25 MG/1
25 TABLET ORAL 3 TIMES DAILY PRN
Qty: 30 TABLET | Refills: 0 | Status: SHIPPED | OUTPATIENT
Start: 2024-05-03

## 2024-05-03 RX ORDER — MECLIZINE HYDROCHLORIDE 25 MG/1
25 TABLET ORAL ONCE
Status: COMPLETED | OUTPATIENT
Start: 2024-05-03 | End: 2024-05-03

## 2024-05-03 RX ADMIN — MECLIZINE HYDROCHLORIDE 25 MG: 25 TABLET ORAL at 12:40

## 2024-05-03 NOTE — DISCHARGE INSTRUCTIONS
"You were seen for vertigo and leg swelling. We found no emergent causes for your symptoms. You likely have peripheral vertigo. Try the prescribed medication as needed. Follow up with physical therapy if needed. Search youtube for \"Epley maneuver\" and try this at home to help with your vertigo. Your leg swelling is likely from your amlodipine. Elevate your legs and use compression stockings if needed.  "

## 2024-05-03 NOTE — ED ATTENDING ATTESTATION
Final Diagnoses:     1. Vertigo    2. Lower extremity edema           IAnton DO, saw and evaluated the patient. All available labs and X-rays were ordered by me or the resident / non-physician and have been reviewed by myself. I discussed the patient with the resident / non-physician and agree with the resident's / non-physician practitioner's findings and plan as documented in the resident's / non-physician practicitioner's note, except where noted.   At this point, I agree with the current assessment done in the ED.   I was present during key portions of all procedures performed unless otherwise stated.     Nursing Triage:     Chief Complaint   Patient presents with    Dizziness     Dizziness with movement; denies headaches, no blurry vision. No hx of vertigo. Pt also c/o leg swelling; unsure if it is due to increase in medication. Denies pain or redness.        HPI:   This is a 73 y.o. female presenting for evaluation of dizziness.  Started a couple of days ago.  Positional.  At rest she feels fine.  No other modifying factors.  No associated symptoms.  Denies any prior history of vertigo.  Denies any numbness, tingling, weakness, vision changes, headaches.  No other complaints or concerns    Patient also endorses bilateral leg swelling.  Started after increasing her amlodipine.  No shortness of breath.    ASSESSMENT + PLAN:   Presentation most with a peripheral cause of dizziness, likely vertigo. Differential diagnoses includes: BPPV versus vestibular neuritis. No red flag features for central vertigo to include gradual onset, vertical/bidirectional or nonfatigable nystagmus, focal neurologic findings on exam (including inability to ambulate). Presentation not consistent with an acute CNS infection, vertebral basilar artery insufficiency, cerebellar hemorrhage or infarction, intracranial mass or bleed, multiple sclerosis, trauma, complex migraine headache. Other acute, emergent causes of vertigo are  unlikely given at this time. No indication for head imaging at this time.    Leg swelling likely secondary to medication.  Presentation not consistent with CHF, DVT.    Plan: meclizine, supportive care, labs, serial reassessment. If symptoms controlled and able to ambulate without difficulty plan for d/c with PT/vestibular therapy.                Physical:   Pertinent: Lateral nystagmus, fatigable.  Negative hints    General: VS reviewed  Appears in NAD  awake, alert.   Well-nourished, well-developed. Appears stated age.   Speaking normally in full sentences.   Head: Normocephalic, atraumatic  Eyes: EOM-I. No diplopia.   No hyphema.   No subconjunctival hemorrhages.  Symmetrical lids.   ENT: Atraumatic external nose and ears.    MMM  No malocclusion. No stridor. Normal phonation. No drooling. Normal swallowing.   Neck: No JVD.  CV: No pallor noted  Lungs:   No tachypnea  No respiratory distress  Abd: soft nt nd no rebound/guarding  MSK:   FROM spontaneously  Skin: Dry, intact.   Neuro: Awake, alert, GCS15, CN II-XII grossly intact.   Motor grossly intact.  Psychiatric/Behavioral: interacting normally; appropriate mood/affect.    Exam: deferred    Vitals:    05/03/24 1118 05/03/24 1200   BP: 122/76 121/59   BP Location:  Right arm   Pulse: 66 58   Resp: 18 16   Temp: 99.2 °F (37.3 °C)    SpO2: 98% 96%     - There are no obvious limitations to social determinants of care.   - Nursing note reviewed.   - Vitals reviewed.   - Orders placed by myself and/or advanced practitioner / resident.    - Previous chart was reviewed  - No language barrier.   - History obtained from patient.    - There are no limitations to the history obtained:       Past Medical:    has a past medical history of Cellulitis of left elbow (10/30/2016), CPAP (continuous positive airway pressure) dependence, Epistaxis, Medial meniscus tear (11/11/2014), Morbid obesity with BMI of 40.0-44.9, adult (HCC) (12/28/2018), Sleep apnea, SVT  (supraventricular tachycardia) (), and Umbilical hernia.    Past Surgical:    has a past surgical history that includes Knee arthroscopy w/ meniscal repair (2015); Oophorectomy (Bilateral, ); Total abdominal hysterectomy (); and pr biopsy soft tissue back/flank deep (N/A, 10/2/2023).    Social:     Social History     Substance and Sexual Activity   Alcohol Use No     Social History     Tobacco Use   Smoking Status Never   Smokeless Tobacco Never   Tobacco Comments    Denied history of never a smoker per [employer]     Social History     Substance and Sexual Activity   Drug Use No       Echo:   Results for orders placed during the hospital encounter of 21    Echo complete with contrast if indicated    [place] [address]    Transthoracic Echocardiogram  2D, M-mode, Doppler, and Color Doppler    Study date:  2021    Patient: CLEMENCIA WOODRUFF  MR number: [id number]  Account number: [id number]  : 1950  Age: 70 years  Gender: Female  Status: Outpatient  Location: [facility]  Height: 67 in  Weight: 254.5 lb  BP: 132/ 78 mmHg    Indications: Supraventricular Tachycardia    Diagnoses: I47.1 - Supraventricular tachycardia    Sonographer:  Betito Ritter RDCS  Primary Physician:  Ricardo Lee MD  Referring Physician:  ANABELLE GRAY  Group:  [facility]  Interpreting Physician:  Jose Briseno MD    SUMMARY    LEFT VENTRICLE:  Systolic function was normal. Ejection fraction was estimated to be 60 %.  There were no regional wall motion abnormalities.  Wall thickness was at the upper limits of normal.    MITRAL VALVE:  There was trace regurgitation.    TRICUSPID VALVE:  There was mild regurgitation.    PULMONIC VALVE:  There was trace regurgitation.    AORTA:  There was mild dilatation of the ascending aorta. 4.0 cm    PROCEDURE: The study was performed in the [facility]. This was a routine study. The transthoracic approach was used. The study included complete 2D imaging, M-mode, complete spectral Doppler, and color Doppler.  The heart rate was 78 bpm, at the start of the study. Images were obtained from the parasternal, apical, subcostal, and suprasternal notch acoustic windows. Echocardiographic views were limited due to lung interference. Image quality was  adequate.    LEFT VENTRICLE: Size was normal. Systolic function was normal. Ejection fraction was estimated to be 60 %. There were no regional wall motion abnormalities. Wall thickness was at the upper limits of normal. No evidence of apical thrombus.  DOPPLER: Left ventricular diastolic function parameters were normal.    RIGHT VENTRICLE: The size was normal. Systolic function was normal. Wall thickness was normal.    LEFT ATRIUM: Size was normal.    RIGHT ATRIUM: Size was normal.    MITRAL VALVE: Valve structure was normal. There was normal leaflet separation. DOPPLER: The transmitral velocity was within the normal range. There was no evidence for stenosis. There was trace regurgitation.    AORTIC VALVE: The valve was trileaflet. Leaflets exhibited normal thickness and normal cuspal separation. DOPPLER: Transaortic velocity was within the normal range. There was no evidence for stenosis. There was no significant  regurgitation.    TRICUSPID VALVE: The valve structure was normal. There was normal leaflet separation. DOPPLER: The transtricuspid velocity was within the normal range. There was no evidence for stenosis. There was mild regurgitation.    PULMONIC VALVE: Leaflets exhibited normal thickness, no calcification, and normal cuspal separation. DOPPLER: The transpulmonic velocity was within the normal range. There was trace regurgitation.    PERICARDIUM: There was no pericardial effusion. The pericardium was normal in appearance.    AORTA: The root exhibited normal size. There was mild  "dilatation of the ascending aorta. 4.0 cm    SYSTEMIC VEINS: IVC: The inferior vena cava was normal in size.    SYSTEM MEASUREMENT TABLES    2D  %FS: 35.32 %  Ao Diam: 3.28 cm  Ao asc: 3.98 cm  EDV(Teich): 106.34 ml  EF(Teich): 64.62 %  ESV(Teich): 37.62 ml  IVSd: 1.15 cm  LA Area: 12.19 cm2  LA Diam: 3.65 cm  LVEDV MOD A4C: 70.62 ml  LVEF MOD A4C: 67.79 %  LVESV MOD A4C: 22.75 ml  LVIDd: 4.78 cm  LVIDs: 3.09 cm  LVLd A4C: 7.14 cm  LVLs A4C: 5.9 cm  LVOT Diam: 2 cm  LVPWd: 1.02 cm  RA Area: 14.94 cm2  RVIDd: 3.26 cm  SV MOD A4C: 47.88 ml  SV(Teich): 68.72 ml    MM  TAPSE: 1.91 cm    PW  E' Sept: 0.1 m/s  E/E' Sept: 6.47  MV A [place]: 0.87 m/s  MV Dec [place]: 3.44 m/s2  MV DecT: 189.73 ms  MV E [place]: 0.65 m/s  MV E/A Ratio: 0.75  MV PHT: 55.02 ms  MVA By PHT: 4 cm2    [facility]    Prepared and electronically signed by    Jose Briseno MD  Signed 07-Jul-2021 15:00:59    No results found for this or any previous visit.      Cath:    No results found for this or any previous visit.      Code Status: No Order  Advance Directive and Living Will:      Power of :    POLST:    Medications   meclizine (ANTIVERT) tablet 25 mg (25 mg Oral Given 5/3/24 1240)     XR chest 1 view portable   ED Interpretation   Chest radiograph personally interpreted by me as no acute cardiopulmonary disease.          Orders Placed This Encounter   Procedures    XR chest 1 view portable    CBC and differential    Comprehensive metabolic panel    HS Troponin 0hr (reflex protocol)    Opp draw    Ambulatory Referral to Physical Therapy    Continuous cardiac monitoring    Continuous pulse oximetry    ECG 12 lead    ECG 12 lead     Labs Reviewed   HS TROPONIN I 0HR - Normal       Result Value Ref Range Status    hs TnI 0hr 4  \"Refer to ACS Flowchart\"- see link ng/L Final    Comment:                                              Initial (time 0) result  If >=50 ng/L, Myocardial injury " suggested ;  Type of myocardial injury and treatment strategy  to be determined.  If 5-49 ng/L, a delta result at 2 hours and or 4 hours will be needed to further evaluate.  If <4 ng/L, and chest pain has been >3 hours since onset, patient may qualify for discharge based on the HEART score in the ED.  If <5 ng/L and <3hours since onset of chest pain, a delta result at 2 hours will be needed to further evaluate.    HS Troponin 99th Percentile URL of a Health Population=12 ng/L with a 95% Confidence Interval of 8-18 ng/L.    Second Troponin (time 2 hours)  If calculated delta >= 20 ng/L,  Myocardial injury suggested ; Type of myocardial injury and treatment strategy to be determined.  If 5-49 ng/L and the calculated delta is 5-19 ng/L, consult medical service for evaluation.  Continue evaluation for ischemia on ecg and other possible etiology and repeat hs troponin at 4 hours.  If delta is <5 ng/L at 2 hours, consider discharge based on risk stratification via the HEART score (if in ED), or HIMA risk score in IP/Observation.    HS Troponin 99th Percentile URL of a Health Population=12 ng/L with a 95% Confidence Interval of 8-18 ng/L.   CBC AND DIFFERENTIAL    WBC 7.98  4.31 - 10.16 Thousand/uL Final    RBC 4.49  3.81 - 5.12 Million/uL Final    Hemoglobin 14.1  11.5 - 15.4 g/dL Final    Hematocrit 42.6  34.8 - 46.1 % Final    MCV 95  82 - 98 fL Final    MCH 31.4  26.8 - 34.3 pg Final    MCHC 33.1  31.4 - 37.4 g/dL Final    RDW 12.9  11.6 - 15.1 % Final    MPV 9.9  8.9 - 12.7 fL Final    Platelets 204  149 - 390 Thousands/uL Final    nRBC 0  /100 WBCs Final    Segmented % 73  43 - 75 % Final    Immature Grans % 0  0 - 2 % Final    Lymphocytes % 20  14 - 44 % Final    Monocytes % 7  4 - 12 % Final    Eosinophils Relative 0  0 - 6 % Final    Basophils Relative 0  0 - 1 % Final    Absolute Neutrophils 5.76  1.85 - 7.62 Thousands/µL Final    Absolute Immature Grans 0.03  0.00 - 0.20 Thousand/uL Final    Absolute Lymphocytes  1.59  0.60 - 4.47 Thousands/µL Final    Absolute Monocytes 0.55  0.17 - 1.22 Thousand/µL Final    Eosinophils Absolute 0.03  0.00 - 0.61 Thousand/µL Final    Basophils Absolute 0.02  0.00 - 0.10 Thousands/µL Final   COMPREHENSIVE METABOLIC PANEL    Sodium 139  135 - 147 mmol/L Final    Potassium 3.9  3.5 - 5.3 mmol/L Final    Chloride 103  96 - 108 mmol/L Final    CO2 28  21 - 32 mmol/L Final    ANION GAP 8  4 - 13 mmol/L Final    BUN 17  5 - 25 mg/dL Final    Creatinine 0.63  0.60 - 1.30 mg/dL Final    Comment: Standardized to IDMS reference method    Glucose 115  65 - 140 mg/dL Final    Comment: If the patient is fasting, the ADA then defines impaired fasting glucose as > 100 mg/dL and diabetes as > or equal to 123 mg/dL.    Calcium 9.0  8.4 - 10.2 mg/dL Final    AST 15  13 - 39 U/L Final    ALT 12  7 - 52 U/L Final    Comment: Specimen collection should occur prior to Sulfasalazine administration due to the potential for falsely depressed results.     Alkaline Phosphatase 61  34 - 104 U/L Final    Total Protein 7.4  6.4 - 8.4 g/dL Final    Albumin 4.1  3.5 - 5.0 g/dL Final    Total Bilirubin 0.68  0.20 - 1.00 mg/dL Final    Comment: Use of this assay is not recommended for patients undergoing treatment with eltrombopag due to the potential for falsely elevated results.  N-acetyl-p-benzoquinone imine (metabolite of Acetaminophen) will generate erroneously low results in samples for patients that have taken an overdose of Acetaminophen.    eGFR 90  ml/min/1.73sq m Final    Narrative:     National Kidney Disease Foundation guidelines for Chronic Kidney Disease (CKD):     Stage 1 with normal or high GFR (GFR > 90 mL/min/1.73 square meters)    Stage 2 Mild CKD (GFR = 60-89 mL/min/1.73 square meters)    Stage 3A Moderate CKD (GFR = 45-59 mL/min/1.73 square meters)    Stage 3B Moderate CKD (GFR = 30-44 mL/min/1.73 square meters)    Stage 4 Severe CKD (GFR = 15-29 mL/min/1.73 square meters)    Stage 5 End Stage CKD (GFR  <15 mL/min/1.73 square meters)  Note: GFR calculation is accurate only with a steady state creatinine   RAINBOW DRAW    Narrative:     The following orders were created for panel order [place] draw.  Procedure                               Abnormality         Status                     ---------                               -----------         ------                     Light Blue Top on hold[[id number]]                           Final result                 Please view results for these tests on the individual orders.   LIGHT BLUE TOP     Time reflects when diagnosis was documented in both MDM as applicable and the Disposition within this note       Time User Action Codes Description Comment    5/3/2024  1:38 PM Eamon Newman [R42] Vertigo     5/3/2024  1:43 PM Eamon Newman [R60.0] Lower extremity edema           ED Disposition       ED Disposition   Discharge    Condition   Stable    Date/Time   Fri May 3, 2024  1:38 PM    Comment   Sofy Hendrickson discharge to home/self care.                   Follow-up Information       Follow up With Specialties Details Why Contact Info Additional Information    [facility]  Call   [address] [facility] [address]  [phone] [facility], [address], [place], [address]   [phone]          Discharge Medication List as of 5/3/2024  1:44 PM        START taking these medications    Details   meclizine (ANTIVERT) 25 mg tablet Take 1 tablet (25 mg total) by mouth 3 (three) times a day as needed for dizziness, Starting Fri 5/3/2024, Normal           CONTINUE these medications which have NOT CHANGED    Details   amLODIPine (NORVASC) 10 mg tablet Take 1 tablet (10 mg total) by mouth daily, Starting Thu 11/16/2023, Normal      aspirin (ECOTRIN LOW STRENGTH) 81 mg EC tablet Take 1 tablet (81 mg total) by mouth daily, Starting Mon 6/7/2021, Normal      calcium-vitamin D (OSCAL) 250-125  MG-UNIT per tablet Take 1 tablet by mouth daily., Historical Med      Cholecalciferol (Vitamin D3) 50 MCG ( UT) capsule Take 2,000 Units by mouth daily  , Historical Med      KRILL OIL PO 1200 mg QD, Historical Med      lisinopril (ZESTRIL) 40 mg tablet Take 1 tablet (40 mg total) by mouth daily, Starting Thu 2023, Normal      lovastatin (MEVACOR) 20 mg tablet Take 1 tablet (20 mg total) by mouth daily at bedtime, Starting Thu 2024, Normal      metoprolol tartrate (LOPRESSOR) 50 mg tablet Take 1 tablet (50 mg total) by mouth every 12 (twelve) hours, Starting Mon 2023, Normal      Multiple Vitamins-Minerals (MULTIVITAMIN ADULT PO) Historical Med      nystatin powder Apply topically 2 (two) times a day, Starting 2023, Normal      gabapentin (Neurontin) 100 mg capsule Take 1 capsule (100 mg total) by mouth daily at bedtime, Starting Tu2023, Normal      metaxalone (SKELAXIN) 800 mg tablet Take 1 tablet (800 mg total) by mouth 3 (three) times a day, Starting Fri 3/29/2024, Normal             Prior to Admission Medications   Prescriptions Last Dose Informant Patient Reported? Taking?   Cholecalciferol (Vitamin D3) 50 MCG ( UT) capsule  Self Yes Yes   Sig: Take 2,000 Units by mouth daily     KRILL OIL PO  Self Yes Yes   Si mg QD   Multiple Vitamins-Minerals (MULTIVITAMIN ADULT PO)  Self Yes Yes   amLODIPine (NORVASC) 10 mg tablet  Self No Yes   Sig: Take 1 tablet (10 mg total) by mouth daily   aspirin (ECOTRIN LOW STRENGTH) 81 mg EC tablet  Self No Yes   Sig: Take 1 tablet (81 mg total) by mouth daily   calcium-vitamin D (OSCAL) 250-125 MG-UNIT per tablet  Self Yes Yes   Sig: Take 1 tablet by mouth daily.   gabapentin (Neurontin) 100 mg capsule Not Taking  No No   Sig: Take 1 capsule (100 mg total) by mouth daily at bedtime   Patient not taking: Reported on 5/3/2024   lisinopril (ZESTRIL) 40 mg tablet  Self No Yes   Sig: Take 1 tablet (40 mg total) by mouth daily   lovastatin  "(MEVACOR) 20 mg tablet   No Yes   Sig: Take 1 tablet (20 mg total) by mouth daily at bedtime   metaxalone (SKELAXIN) 800 mg tablet Not Taking  No No   Sig: Take 1 tablet (800 mg total) by mouth 3 (three) times a day   Patient not taking: Reported on 5/3/2024   metoprolol tartrate (LOPRESSOR) 50 mg tablet  Self No Yes   Sig: Take 1 tablet (50 mg total) by mouth every 12 (twelve) hours   nystatin powder  Self No Yes   Sig: Apply topically 2 (two) times a day      Facility-Administered Medications: None                        Portions of the record may have been created with voice recognition software. Occasional wrong word or \"sound a like\" substitutions may have occurred due to the inherent limitations of voice recognition software. Read the chart carefully and recognize, using context, where substitutions have occurred.    Electronically signed by:  Anton Gonzalez    "

## 2024-05-03 NOTE — ED PROVIDER NOTES
History  Chief Complaint   Patient presents with    Dizziness     Dizziness with movement; denies headaches, no blurry vision. No hx of vertigo. Pt also c/o leg swelling; unsure if it is due to increase in medication. Denies pain or redness.      73-year-old woman presents with vertigo.  Patient reports rolling over in bed 2 mornings ago when she developed acute onset vertigo.  The episode lasted for a few moments then resolved.  She has had occasional episodes on and off since usually upon waking and standing up quickly.  She continues to have similar vertiginous sensation.  She is not having any other neurologic symptoms.  She did have some nausea but has not vomited.  She is not having any vision changes.  She denies a history of vertigo before.  She is not having any difficulty ambulating.  She is also having some edema of her lower legs.  This has been progressively worsening over the last couple months.  She was started on amlodipine and dose was increased to 10 mg a few months ago.  She is not having any shortness of breath or chest pain.  She does endorse a history of varicose veins that have been worsening.        Prior to Admission Medications   Prescriptions Last Dose Informant Patient Reported? Taking?   Cholecalciferol (Vitamin D3) 50 MCG ( UT) capsule  Self Yes Yes   Sig: Take 2,000 Units by mouth daily     KRILL OIL PO  Self Yes Yes   Si mg QD   Multiple Vitamins-Minerals (MULTIVITAMIN ADULT PO)  Self Yes Yes   amLODIPine (NORVASC) 10 mg tablet  Self No Yes   Sig: Take 1 tablet (10 mg total) by mouth daily   aspirin (ECOTRIN LOW STRENGTH) 81 mg EC tablet  Self No Yes   Sig: Take 1 tablet (81 mg total) by mouth daily   calcium-vitamin D (OSCAL) 250-125 MG-UNIT per tablet  Self Yes Yes   Sig: Take 1 tablet by mouth daily.   gabapentin (Neurontin) 100 mg capsule Not Taking  No No   Sig: Take 1 capsule (100 mg total) by mouth daily at bedtime   Patient not taking: Reported on 5/3/2024    lisinopril (ZESTRIL) 40 mg tablet  Self No Yes   Sig: Take 1 tablet (40 mg total) by mouth daily   lovastatin (MEVACOR) 20 mg tablet   No Yes   Sig: Take 1 tablet (20 mg total) by mouth daily at bedtime   metaxalone (SKELAXIN) 800 mg tablet Not Taking  No No   Sig: Take 1 tablet (800 mg total) by mouth 3 (three) times a day   Patient not taking: Reported on 5/3/2024   metoprolol tartrate (LOPRESSOR) 50 mg tablet  Self No Yes   Sig: Take 1 tablet (50 mg total) by mouth every 12 (twelve) hours   nystatin powder  Self No Yes   Sig: Apply topically 2 (two) times a day      Facility-Administered Medications: None       Past Medical History:   Diagnosis Date    Cellulitis of left elbow 10/30/2016    CPAP (continuous positive airway pressure) dependence     Epistaxis     Medial meniscus tear 11/11/2014    Morbid obesity with BMI of 40.0-44.9, adult (HCC) 12/28/2018    Sleep apnea     SVT (supraventricular tachycardia) 2020    Umbilical hernia        Past Surgical History:   Procedure Laterality Date    KNEE ARTHROSCOPY W/ MENISCAL REPAIR  07/26/2015    with medial meniscus repair    OOPHORECTOMY Bilateral 2002    [place] BIOPSY SOFT TISSUE BACK/FLANK DEEP N/A 10/2/2023    Procedure: EXCISION RIGHT FLANK MASS;  Surgeon: Ryan Singh MD;  Location:  MAIN OR;  Service: General    TOTAL ABDOMINAL HYSTERECTOMY  2002       Family History   Problem Relation Age of Onset    No Known Problems Mother     Thyroid cancer Daughter 37    Hodgkin's lymphoma Daughter 27    No Known Problems Maternal Grandmother     No Known Problems Maternal Grandfather     No Known Problems Paternal Grandmother     No Known Problems Paternal Grandfather     No Known Problems Paternal Aunt     No Known Problems Paternal Aunt     Thyroid disease Family     Diabetes Family         Diabetes Mellitus    Breast cancer Neg Hx      I have reviewed and agree with the history as documented.    E-Cigarette/Vaping    E-Cigarette Use Never User       E-Cigarette/Vaping Substances    Nicotine No     THC No     CBD No     Flavoring No     Other No      Social History     Tobacco Use    Smoking status: Never    Smokeless tobacco: Never    Tobacco comments:     Denied history of never a smoker per [employer]   Vaping Use    Vaping status: Never Used   Substance Use Topics    Alcohol use: No    Drug use: No        Review of Systems    Physical Exam  ED Triage Vitals   Temperature Pulse Respirations Blood Pressure SpO2   05/03/24 1118 05/03/24 1118 05/03/24 1118 05/03/24 1118 05/03/24 1118   99.2 °F (37.3 °C) 66 18 122/76 98 %      Temp src Heart Rate Source Patient Position - Orthostatic VS BP Location FiO2 (%)   -- 05/03/24 1118 05/03/24 1200 05/03/24 1200 --    Monitor Lying Right arm       Pain Score       --                    Orthostatic Vital Signs  Vitals:    05/03/24 1118 05/03/24 1200   BP: 122/76 121/59   Pulse: 66 58   Patient Position - Orthostatic VS:  Lying       Physical Exam  Vitals and nursing note reviewed.   Constitutional:       Appearance: Normal appearance.   HENT:      Head: Normocephalic and atraumatic.      Right Ear: External ear normal.      Left Ear: External ear normal.      Nose: Nose normal. No congestion.   Eyes:      Extraocular Movements: Extraocular movements intact.      Pupils: Pupils are equal, round, and reactive to light.      Comments: There is no nystagmus bilaterally   Cardiovascular:      Rate and Rhythm: Normal rate.   Pulmonary:      Effort: Pulmonary effort is normal. No respiratory distress.      Breath sounds: Normal breath sounds.   Abdominal:      Palpations: Abdomen is soft.      Tenderness: There is no abdominal tenderness. There is no guarding or rebound.   Musculoskeletal:         General: Normal range of motion.      Cervical back: Normal range of motion and neck supple.      Right lower leg: Edema present.      Left lower leg: Edema present.      Comments: Trace pitting edema bilaterally up to the knees.    Skin:     General: Skin is warm and dry.   Neurological:      General: No focal deficit present.      Mental Status: She is alert and oriented to person, place, and time. Mental status is at baseline.      Cranial Nerves: No cranial nerve deficit.      Sensory: No sensory deficit.      Motor: No weakness.      Gait: Gait normal.      Comments: Patient able to ambulate without deficits of gait.  Upon Eva-Hallpike maneuver, patient with severe vertigo.   Psychiatric:         Mood and Affect: Mood normal.         Behavior: Behavior normal.         ED Medications  Medications   meclizine (ANTIVERT) tablet 25 mg (25 mg Oral Given 5/3/24 1240)       Diagnostic Studies  Results Reviewed       Procedure Component Value Units Date/Time    [place] draw [[id number]] Collected: 05/03/24 1146    Lab Status: Final result Specimen: Blood from Arm, Left Updated: 05/03/24 1301    Narrative:      The following orders were created for panel order [place] draw.  Procedure                               Abnormality         Status                     ---------                               -----------         ------                     Light Blue Top on hold[[id number]]                           Final result                 Please view results for these tests on the individual orders.    HS Troponin 0hr (reflex protocol) [[id number]]  (Normal) Collected: 05/03/24 1146    Lab Status: Final result Specimen: Blood from Arm, Left Updated: 05/03/24 1223     hs TnI 0hr 4 ng/L     Comprehensive metabolic panel [[id number]] Collected: 05/03/24 1146    Lab Status: Final result Specimen: Blood from Arm, Left Updated: 05/03/24 1215     Sodium 139 mmol/L      Potassium 3.9 mmol/L      Chloride 103 mmol/L      CO2 28 mmol/L      ANION GAP 8 mmol/L      BUN 17 mg/dL      Creatinine 0.63 mg/dL      Glucose 115 mg/dL      Calcium 9.0 mg/dL      AST 15 U/L      ALT 12 U/L      Alkaline Phosphatase 61 U/L      Total Protein 7.4 g/dL      Albumin 4.1 g/dL       Total Bilirubin 0.68 mg/dL      eGFR 90 ml/min/1.73sq m     Narrative:      National Kidney Disease Foundation guidelines for Chronic Kidney Disease (CKD):     Stage 1 with normal or high GFR (GFR > 90 mL/min/1.73 square meters)    Stage 2 Mild CKD (GFR = 60-89 mL/min/1.73 square meters)    Stage 3A Moderate CKD (GFR = 45-59 mL/min/1.73 square meters)    Stage 3B Moderate CKD (GFR = 30-44 mL/min/1.73 square meters)    Stage 4 Severe CKD (GFR = 15-29 mL/min/1.73 square meters)    Stage 5 End Stage CKD (GFR <15 mL/min/1.73 square meters)  Note: GFR calculation is accurate only with a steady state creatinine    CBC and differential [[id number]] Collected: 05/03/24 1146    Lab Status: Final result Specimen: Blood from Arm, Left Updated: 05/03/24 1157     WBC 7.98 Thousand/uL      RBC 4.49 Million/uL      Hemoglobin 14.1 g/dL      Hematocrit 42.6 %      MCV 95 fL      MCH 31.4 pg      MCHC 33.1 g/dL      RDW 12.9 %      MPV 9.9 fL      Platelets 204 Thousands/uL      nRBC 0 /100 WBCs      Segmented % 73 %      Immature Grans % 0 %      Lymphocytes % 20 %      Monocytes % 7 %      Eosinophils Relative 0 %      Basophils Relative 0 %      Absolute Neutrophils 5.76 Thousands/µL      Absolute Immature Grans 0.03 Thousand/uL      Absolute Lymphocytes 1.59 Thousands/µL      Absolute Monocytes 0.55 Thousand/µL      Eosinophils Absolute 0.03 Thousand/µL      Basophils Absolute 0.02 Thousands/µL                    XR chest 1 view portable   ED Interpretation by Eamon Newman MD (05/03 1321)   Chest radiograph personally interpreted by me as no acute cardiopulmonary disease.              Procedures  Procedures      ED Course  ED Course as of 05/03/24 1636   Fri May 03, 2024   1226 hs TnI 0hr: 4   1227 This ECG was interpreted by me. The ECG demonstrates sinus rhythm, type 1 AV block, normal axis, normal QRS, non specific acute ST-T changes                               SBIRT 20yo+      Flowsheet Row Most Recent Value  "  Initial Alcohol Screen: US AUDIT-C     1. How often do you have a drink containing alcohol? 0 Filed at: 05/03/2024 1118   2. How many drinks containing alcohol do you have on a typical day you are drinking?  0 Filed at: 05/03/2024 1118   3a. Male UNDER 65: How often do you have five or more drinks on one occasion? 0 Filed at: 05/03/2024 1118   3b. FEMALE Any Age, or MALE 65+: How often do you have 4 or more drinks on one occassion? 0 Filed at: 05/03/2024 1118   Audit-C Score 0 Filed at: 05/03/2024 1118   NOEL: How many times in the past year have you...    Used an illegal drug or used a prescription medication for non-medical reasons? Never Filed at: 05/03/2024 1118                  Medical Decision Making  Presents with vertigo and lower extremity edema.  Patient is well-appearing here without focal neurologic deficit to necessitate stroke workup.  Patient with trace bilateral pitting edema.  Will check labs and chest radiograph to rule out CHF.  I will treat her vertigo with meclizine as this seems peripheral.    Patient reported much treatment of symptoms with meclizine.  Labs, ECG, and chest radiograph without evidence of congestive heart failure.  Patient's lower extreme edema is likely secondary to amlodipine.  I recommend elevation of the legs and compression stockings if needed.  Patient had resolution of her vertigo prior to discharge and ambulated out of the department without deficit. Patient in agreement with the medical plan and all questions were answered.  The patient verbalized understanding of my return precautions.    Previous ED, hospitalization, and outpatient documentation was reviewed.  History was obtained from the patient.    Portions or all of this note were generated using voice recognition software.  Occasional wrong word or \"sound a like\" substitutions may have occurred due to the inherent limitations of voice recognition software.  Please interpret any errors within the intended " context of the whole sentence or idea.      Amount and/or Complexity of Data Reviewed  Labs: ordered. Decision-making details documented in ED Course.  Radiology: ordered and independent interpretation performed.          Disposition  Final diagnoses:   Vertigo   Lower extremity edema     Time reflects when diagnosis was documented in both MDM as applicable and the Disposition within this note       Time User Action Codes Description Comment    5/3/2024  1:38 PM Eamon Newman [R42] Vertigo     5/3/2024  1:43 PM Eamon Newman [R60.0] Lower extremity edema           ED Disposition       ED Disposition   Discharge    Condition   Stable    Date/Time   Fri May 3, 2024 1338    Comment   Sofy Hendrickson discharge to home/self care.                   Follow-up Information       Follow up With Specialties Details Why Contact Info Additional Information    [facility]  Call   [address] [facility] [address]  [phone] [facility], [address] [place] [address], [address]   [phone]            Discharge Medication List as of 5/3/2024  1:44 PM        START taking these medications    Details   meclizine (ANTIVERT) 25 mg tablet Take 1 tablet (25 mg total) by mouth 3 (three) times a day as needed for dizziness, Starting Fri 5/3/2024, Normal           CONTINUE these medications which have NOT CHANGED    Details   amLODIPine (NORVASC) 10 mg tablet Take 1 tablet (10 mg total) by mouth daily, Starting Thu 11/16/2023, Normal      aspirin (ECOTRIN LOW STRENGTH) 81 mg EC tablet Take 1 tablet (81 mg total) by mouth daily, Starting Mon 6/7/2021, Normal      calcium-vitamin D (OSCAL) 250-125 MG-UNIT per tablet Take 1 tablet by mouth daily., Historical Med      Cholecalciferol (Vitamin D3) 50 MCG (2000 UT) capsule Take 2,000 Units by mouth daily  , Historical Med      KRILL OIL PO 1200 mg QD, Historical Med      lisinopril (ZESTRIL) 40 mg tablet Take 1  tablet (40 mg total) by mouth daily, Starting Thu 8/31/2023, Normal      lovastatin (MEVACOR) 20 mg tablet Take 1 tablet (20 mg total) by mouth daily at bedtime, Starting Thu 2/1/2024, Normal      metoprolol tartrate (LOPRESSOR) 50 mg tablet Take 1 tablet (50 mg total) by mouth every 12 (twelve) hours, Starting Mon 11/27/2023, Normal      Multiple Vitamins-Minerals (MULTIVITAMIN ADULT PO) Historical Med      nystatin powder Apply topically 2 (two) times a day, Starting Fri 6/9/2023, Normal      gabapentin (Neurontin) 100 mg capsule Take 1 capsule (100 mg total) by mouth daily at bedtime, Starting Tue 12/12/2023, Normal      metaxalone (SKELAXIN) 800 mg tablet Take 1 tablet (800 mg total) by mouth 3 (three) times a day, Starting Fri 3/29/2024, Normal               PDMP Review         Value Time User    PDMP Reviewed  Yes 8/17/2020  2:33 PM Ricardo Lee MD             ED Provider  Attending physically available and evaluated Sofy Hendrickson. I managed the patient along with the ED Attending.    Electronically Signed by           Eamon Newman MD  05/03/24 9969

## 2024-05-03 NOTE — ED NOTES
Patient ambulated without assistance, reports feeling less dizzy than this morning. Reports still feels dizzy when turning onto right side, but not while laying flat.     Nafisa Mcclellan RN  05/03/24 9768

## 2024-05-04 LAB
ATRIAL RATE: 58 BPM
P AXIS: 108 DEGREES
PR INTERVAL: 210 MS
QRS AXIS: -40 DEGREES
QRSD INTERVAL: 86 MS
QT INTERVAL: 422 MS
QTC INTERVAL: 414 MS
T WAVE AXIS: -3 DEGREES
VENTRICULAR RATE: 58 BPM

## 2024-05-04 PROCEDURE — 93010 ELECTROCARDIOGRAM REPORT: CPT | Performed by: INTERNAL MEDICINE

## 2024-05-06 ENCOUNTER — TELEPHONE (OUTPATIENT)
Age: 74
End: 2024-05-06

## 2024-05-06 DIAGNOSIS — I10 PRIMARY HYPERTENSION: ICD-10-CM

## 2024-05-06 RX ORDER — AMLODIPINE BESYLATE 5 MG/1
5 TABLET ORAL DAILY
Qty: 30 TABLET | Refills: 2 | Status: SHIPPED | OUTPATIENT
Start: 2024-05-06

## 2024-05-06 NOTE — TELEPHONE ENCOUNTER
Patient was seen in ER on 05/03 for dizziness and swelling . She had follow up questions from ER . According to patient they believe she needs a medication change. Wanted a virtual due to her busy schedule. Not sure if appropriate for virtual . States still has some swelling but dizziness is gone. Was going to schedule an ER follow up patient  does not have time to come into the office. She would like to know if Dr. Lee can call her so she can ask him a few questions about ED and meds Amlodipine.  Please reach out to patient.

## 2024-05-06 NOTE — PROGRESS NOTES
Blood pressure running in good range does have some fluid retention in the lower extremities will decrease amlodipine from 10 mg to 5 mg daily patient will check blood pressure at home and in 2 weeks let me know how her blood pressure is on the reduced dose.  She continues on lisinopril 40 mg daily and metoprolol to tartrate at 50 mg every 12 hours.    Recent episode of dizziness/vertigo was in the ER symptoms sound significantly suggestive of labyrinth irritation.  She is doing much better has no residual symptoms took several doses of meclizine.

## 2024-05-06 NOTE — TELEPHONE ENCOUNTER
Call placed to the patient regarding swelling please see note on chart regarding our therapeutic intervention.

## 2024-05-13 LAB

## 2024-05-14 ENCOUNTER — EVALUATION (OUTPATIENT)
Dept: PHYSICAL THERAPY | Facility: CLINIC | Age: 74
End: 2024-05-14
Payer: COMMERCIAL

## 2024-05-14 DIAGNOSIS — R42 VERTIGO: ICD-10-CM

## 2024-05-14 PROCEDURE — 97162 PT EVAL MOD COMPLEX 30 MIN: CPT | Performed by: PHYSICAL THERAPIST

## 2024-05-14 PROCEDURE — 97112 NEUROMUSCULAR REEDUCATION: CPT | Performed by: PHYSICAL THERAPIST

## 2024-05-14 NOTE — PROGRESS NOTES
PT Evaluation     Today's date: 2024  Patient name: Sofy Hendrickson  : 1950  MRN: [id number]  Referring provider: Anton Gonzalez DO  Dx:   Encounter Diagnosis     ICD-10-CM    1. Vertigo  R42 Ambulatory Referral to Physical Therapy                     Assessment  Assessment details: 73 year old female patient reports to PT with dizziness. No red flags noted and patient denies numbness/tingling. Patient presents with R BPPV as patient had positive Eva-Hallpike to the right. Epley maneuver completed once. Patient educated how to complete Epley maneuver at home and to call if symptoms persist.  Impairments: impaired balance  Understanding of Dx/Px/POC: good   Prognosis: good    Goals  STG  1. Patient will be independent with completion of HEP throughout therapy  2. Patient will turn her head to the right without increase in dizziness in 1 week.  LTG  1. Patient will look down without increase in symptoms in 1.5 weeks.  2. Patient will roll over in bed and sit up without increase in symptoms in 1.5 weeks.           Subjective Evaluation    History of Present Illness  Mechanism of injury: Patient reports with dizziness. Patient notes last Wednesday she rolled over in bed and felt some dizziness. She also had some dizziness the next day looking down and rotating her head and then had dizziness again rolling over and sitting up in bed. Patient has been taking her time getting up so she isn't aggravating her symptoms.     Patient denies headaches, numbness/tingling, difficulty driving, watching tv.         Objective     Concurrent Complaints  Negative for headaches, nausea/motion sickness, tinnitus, visual change, hearing loss, memory loss, aural fullness, poor concentration and peripheral neuropathy  Neuro Exam:     Dizziness  Positive for disequilibrium, vertigo and oscillopsia.   Negative for motion sickness, light-headedness, rocking or swaying, diplopia and floating or swimming.     Exacerbating  factors  Positive for rolling in bed.   Negative for bending over, looking up, walking, turning head, supine to/from sitting, optokinetic movement and walking in busy environment.     Symptoms   Duration: seconds  Frequency: very few  Intensity at best: 0/10  Intensity at worst: 6/10    Headaches   Patient reports headaches: No.     Oculomotor exam   Oculomotor ROM: WNL  Resting nystagmus: not present   Gaze holding nystagmus: not present left  and not present right  Smooth pursuits: within normal limits  Vertical saccades: normal  Horizontal saccades: normal  Convergence: normal    Positional testing   [place]-Hallpike   Left posterior canal: WNL  Right posterior canal: symptomatic, torsional and upbeating  Roll test   Left horizontal canal: WNL  Right horizontal canal: WNL             Precautions: none      Manuals 5/14            Epley 1x R                                                    Neuro Re-Ed             Dizziness education r                                                                                          Ther Ex                                                                                                                     Ther Activity                                       Gait Training                                       Modalities

## 2024-05-22 ENCOUNTER — TELEPHONE (OUTPATIENT)
Age: 74
End: 2024-05-22

## 2024-05-22 DIAGNOSIS — Z12.31 ENCOUNTER FOR SCREENING MAMMOGRAM FOR MALIGNANT NEOPLASM OF BREAST: Primary | ICD-10-CM

## 2024-05-22 NOTE — PROGRESS NOTES
Call from the patient regarding follow-up on half dose of amlodipine now taking 5 mg down from 10.  Still has fairly significant peripheral edema and has to get up a couple times at night to pass her water as the fluid from the low lower legs comes back in the circulation when she is supine.  Plan is to stop the amlodipine and watch blood pressure off of the medication she will continue on lisinopril 40 mg daily and metoprolol tartrate 50 mg every 12 hours.  Could consider adding Catapres if necessary if blood pressure climbs.

## 2024-05-22 NOTE — TELEPHONE ENCOUNTER
Call returned to the patient please see my note on chart regarding her intervention with medication for blood pressure

## 2024-05-22 NOTE — TELEPHONE ENCOUNTER
Patient calling giving update on Rx: Amlodipine.    Patient still experiencing swelling in legs and BP has remained stable.    Patient requesting script for mammogram.    Please review and advise.  Thank You.

## 2024-06-01 ENCOUNTER — HOSPITAL ENCOUNTER (INPATIENT)
Facility: HOSPITAL | Age: 74
LOS: 2 days | Discharge: HOME | DRG: 069 | End: 2024-06-03
Attending: EMERGENCY MEDICINE | Admitting: PSYCHIATRY & NEUROLOGY
Payer: COMMERCIAL

## 2024-06-01 ENCOUNTER — APPOINTMENT (OUTPATIENT)
Dept: RADIOLOGY | Facility: HOSPITAL | Age: 74
DRG: 069 | End: 2024-06-01
Payer: COMMERCIAL

## 2024-06-01 ENCOUNTER — APPOINTMENT (EMERGENCY)
Dept: RADIOLOGY | Facility: HOSPITAL | Age: 74
DRG: 069 | End: 2024-06-01
Payer: COMMERCIAL

## 2024-06-01 DIAGNOSIS — R53.1 WEAKNESS: ICD-10-CM

## 2024-06-01 DIAGNOSIS — G45.9 TIA (TRANSIENT ISCHEMIC ATTACK): ICD-10-CM

## 2024-06-01 DIAGNOSIS — R20.2 PARESTHESIA: ICD-10-CM

## 2024-06-01 DIAGNOSIS — R29.90 STROKE-LIKE SYMPTOM: Primary | ICD-10-CM

## 2024-06-01 DIAGNOSIS — I47.10 SVT (SUPRAVENTRICULAR TACHYCARDIA): ICD-10-CM

## 2024-06-01 LAB
2HR DELTA HS TROPONIN: 0 NG/L
4HR DELTA HS TROPONIN: 4 NG/L
ANION GAP SERPL CALCULATED.3IONS-SCNC: 9 MMOL/L (ref 4–13)
APTT PPP: 23 SECONDS (ref 23–37)
ATRIAL RATE: 73 BPM
BUN SERPL-MCNC: 15 MG/DL (ref 5–25)
CALCIUM SERPL-MCNC: 9 MG/DL (ref 8.4–10.2)
CARDIAC TROPONIN I PNL SERPL HS: 5 NG/L
CARDIAC TROPONIN I PNL SERPL HS: 5 NG/L
CARDIAC TROPONIN I PNL SERPL HS: 9 NG/L
CHLORIDE SERPL-SCNC: 103 MMOL/L (ref 96–108)
CO2 SERPL-SCNC: 27 MMOL/L (ref 21–32)
CREAT SERPL-MCNC: 0.62 MG/DL (ref 0.6–1.3)
ERYTHROCYTE [DISTWIDTH] IN BLOOD BY AUTOMATED COUNT: 13.2 % (ref 11.6–15.1)
FLUAV RNA RESP QL NAA+PROBE: NEGATIVE
FLUBV RNA RESP QL NAA+PROBE: NEGATIVE
GFR SERPL CREATININE-BSD FRML MDRD: 91 ML/MIN/1.73SQ M
GLUCOSE SERPL-MCNC: 110 MG/DL (ref 65–140)
GLUCOSE SERPL-MCNC: 122 MG/DL (ref 65–140)
HCT VFR BLD AUTO: 44 % (ref 34.8–46.1)
HGB BLD-MCNC: 14.6 G/DL (ref 11.5–15.4)
INR PPP: 0.96 (ref 0.84–1.19)
MCH RBC QN AUTO: 31.8 PG (ref 26.8–34.3)
MCHC RBC AUTO-ENTMCNC: 33.2 G/DL (ref 31.4–37.4)
MCV RBC AUTO: 96 FL (ref 82–98)
P AXIS: 95 DEGREES
PLATELET # BLD AUTO: 201 THOUSANDS/UL (ref 149–390)
PMV BLD AUTO: 10.2 FL (ref 8.9–12.7)
POTASSIUM SERPL-SCNC: 3.9 MMOL/L (ref 3.5–5.3)
PR INTERVAL: 216 MS
PROTHROMBIN TIME: 12.7 SECONDS (ref 11.6–14.5)
QRS AXIS: -40 DEGREES
QRSD INTERVAL: 88 MS
QT INTERVAL: 420 MS
QTC INTERVAL: 456 MS
RBC # BLD AUTO: 4.59 MILLION/UL (ref 3.81–5.12)
RSV RNA RESP QL NAA+PROBE: NEGATIVE
SARS-COV-2 RNA RESP QL NAA+PROBE: NEGATIVE
SODIUM SERPL-SCNC: 139 MMOL/L (ref 135–147)
T WAVE AXIS: -12 DEGREES
VENTRICULAR RATE: 71 BPM
WBC # BLD AUTO: 6.92 THOUSAND/UL (ref 4.31–10.16)

## 2024-06-01 PROCEDURE — 93010 ELECTROCARDIOGRAM REPORT: CPT | Performed by: INTERNAL MEDICINE

## 2024-06-01 PROCEDURE — 84484 ASSAY OF TROPONIN QUANT: CPT | Performed by: EMERGENCY MEDICINE

## 2024-06-01 PROCEDURE — 93005 ELECTROCARDIOGRAM TRACING: CPT

## 2024-06-01 PROCEDURE — 94002 VENT MGMT INPAT INIT DAY: CPT

## 2024-06-01 PROCEDURE — 0241U HB NFCT DS VIR RESP RNA 4 TRGT: CPT | Performed by: EMERGENCY MEDICINE

## 2024-06-01 PROCEDURE — 99223 1ST HOSP IP/OBS HIGH 75: CPT | Performed by: PSYCHIATRY & NEUROLOGY

## 2024-06-01 PROCEDURE — 70498 CT ANGIOGRAPHY NECK: CPT

## 2024-06-01 PROCEDURE — 85610 PROTHROMBIN TIME: CPT | Performed by: EMERGENCY MEDICINE

## 2024-06-01 PROCEDURE — 80048 BASIC METABOLIC PNL TOTAL CA: CPT | Performed by: EMERGENCY MEDICINE

## 2024-06-01 PROCEDURE — 70551 MRI BRAIN STEM W/O DYE: CPT

## 2024-06-01 PROCEDURE — 36415 COLL VENOUS BLD VENIPUNCTURE: CPT | Performed by: EMERGENCY MEDICINE

## 2024-06-01 PROCEDURE — 85027 COMPLETE CBC AUTOMATED: CPT | Performed by: EMERGENCY MEDICINE

## 2024-06-01 PROCEDURE — 82948 REAGENT STRIP/BLOOD GLUCOSE: CPT

## 2024-06-01 PROCEDURE — 99285 EMERGENCY DEPT VISIT HI MDM: CPT

## 2024-06-01 PROCEDURE — 99285 EMERGENCY DEPT VISIT HI MDM: CPT | Performed by: EMERGENCY MEDICINE

## 2024-06-01 PROCEDURE — 70496 CT ANGIOGRAPHY HEAD: CPT

## 2024-06-01 PROCEDURE — 94760 N-INVAS EAR/PLS OXIMETRY 1: CPT

## 2024-06-01 PROCEDURE — 85730 THROMBOPLASTIN TIME PARTIAL: CPT | Performed by: EMERGENCY MEDICINE

## 2024-06-01 RX ORDER — NYSTATIN 100000 [USP'U]/G
POWDER TOPICAL 2 TIMES DAILY PRN
Status: DISCONTINUED | OUTPATIENT
Start: 2024-06-01 | End: 2024-06-03 | Stop reason: HOSPADM

## 2024-06-01 RX ORDER — ENOXAPARIN SODIUM 100 MG/ML
40 INJECTION SUBCUTANEOUS DAILY
Status: DISCONTINUED | OUTPATIENT
Start: 2024-06-01 | End: 2024-06-03 | Stop reason: HOSPADM

## 2024-06-01 RX ORDER — LOVASTATIN 20 MG/1
20 TABLET ORAL
Status: DISCONTINUED | OUTPATIENT
Start: 2024-06-01 | End: 2024-06-03 | Stop reason: HOSPADM

## 2024-06-01 RX ORDER — LANOLIN ALCOHOL/MO/W.PET/CERES
0.5 CREAM (GRAM) TOPICAL
Status: DISCONTINUED | OUTPATIENT
Start: 2024-06-02 | End: 2024-06-03 | Stop reason: HOSPADM

## 2024-06-01 RX ORDER — ASPIRIN 81 MG/1
81 TABLET, CHEWABLE ORAL ONCE
Status: COMPLETED | OUTPATIENT
Start: 2024-06-01 | End: 2024-06-01

## 2024-06-01 RX ORDER — CLOPIDOGREL BISULFATE 75 MG/1
75 TABLET ORAL DAILY
Status: DISCONTINUED | OUTPATIENT
Start: 2024-06-02 | End: 2024-06-03 | Stop reason: HOSPADM

## 2024-06-01 RX ORDER — CLOPIDOGREL BISULFATE 75 MG/1
300 TABLET ORAL ONCE
Status: COMPLETED | OUTPATIENT
Start: 2024-06-01 | End: 2024-06-01

## 2024-06-01 RX ADMIN — CLOPIDOGREL BISULFATE 300 MG: 75 TABLET ORAL at 11:09

## 2024-06-01 RX ADMIN — ASPIRIN 81 MG CHEWABLE TABLET 81 MG: 81 TABLET CHEWABLE at 11:09

## 2024-06-01 RX ADMIN — Medication 2000 UNITS: at 12:04

## 2024-06-01 RX ADMIN — Medication 1 TABLET: at 12:04

## 2024-06-01 RX ADMIN — IOHEXOL 85 ML: 350 INJECTION, SOLUTION INTRAVENOUS at 10:45

## 2024-06-01 NOTE — ASSESSMENT & PLAN NOTE
Resume home lisinopril and metoprolol.  Patient will monitor her blood pressure at home on a daily basis and report any consistent readings greater than 130/80 to her cardiology team.

## 2024-06-01 NOTE — SPEECH THERAPY NOTE
Speech/Language Pathology Progress Note    Patient Name: Sofy Hendrickson  Today's Date: 6/1/2024     Consult received and chart reviewed. Per chart review and discussion with RN, pt passed RN dysphagia assessment and is tolerating regular diet with thin liquids with no s/s of aspiration. Speech/Language deficits also denied. Formal speech/swallow evaluation does not appear to be indicated at this time. If new concerns arise, please reconsult. Thank you.

## 2024-06-01 NOTE — ASSESSMENT & PLAN NOTE
73-year-old female with hypertension, dyslipidemia, PAT, BPPV, LOLI on CPAP, periodic limb movement disorder, presents with left face and left upper extremity numbness and transient left lower extremity heaviness.  NIH stroke scale on presentation 1 for diminished pin appreciation left V2 distribution and dorsum of left index finger.  Symptoms completely resolved with the time patient was moved to the floor.  ABCD2 score 4.    MRI brain negative for acute infarct.  Did demonstrate punctate chronic small vessel cerebrovascular ischemic disease and chronic cerebellar lacunar infarcts.    CT head demonstrated no acute changes.  CTA head/neck demonstrated no flow consequential stenosis or LVO.  Patient is on aspirin 81 mg daily at baseline.  Blood pressure 216/109 on presentation.  LDL 76.  A1c 5.6.  Cardiology fellow reviewed the patient's telemetry and determined she was having runs of PAT and not likely PAF.  TTE, 6/3: EF 65%, normal systolic function, G1DD, LA normal size, no major valvular dysfunction, IVC normal    Presenting symptoms in keeping with TIA.    Plan:  -Continue 81 mg aspirin daily.  Loaded with Plavix 300 mg x 1 and continued on Plavix 75 mg daily in addition to aspirin this admission.  Plan to continue DAPT x 3 weeks and then on or around 6/22/2024, patient will discontinue aspirin and continue Plavix 75 mg daily indefinitely.  -Patient reports intolerance to atorvastatin.  Can continue home lovastatin given near goal LDL.  -Goal normotension.  Will restart antihypertensives and have patient monitor blood pressure at home on a daily basis and report to her cardiology team any consistent readings above 130/80.  -Office will call to schedule outpatient follow-up.

## 2024-06-01 NOTE — H&P
NEUROLOGY RESIDENCY - ADMISSION H&P NOTE     Name: Sofy Hendrickson   Age & Sex: 73 y.o. female   MRN: [id number]  Unit/Bed#: ED 17   Encounter: [id number]    ASSESSMENT & PLAN     Stroke-like symptoms  Assessment & Plan  73-year-old female with hypertension, dyslipidemia, PAT, BPPV, LOLI on CPAP, periodic limb movement disorder, presents with left face and left upper extremity numbness and transient left lower extremity heaviness.  NIH stroke scale 1 for diminished pin appreciation left V2 distribution and dorsum of left index finger.    CT head demonstrated no acute changes.  CTA head/neck demonstrated no flow consequential stenosis or LVO.  Patient is on aspirin 81 mg daily at baseline.  Blood pressure 216/109 on presentation.    Will admit for TIA/stroke workup.    Plan:  -Admit to stroke pathway.  -MRI brain without contrast.  -2D echocardiogram with bubble study.  -Check hemoglobin A1c, lipid panel, TSH.  -Continue 81 mg aspirin daily.  Load Plavix 300 mg x 1 and continue Plavix 75 mg daily in addition to aspirin.  -Patient reports intolerance to atorvastatin.  Will continue her home lovastatin for now.  -Telemetry.  -PT/OT/speech therapy.  -Allow permissive hypertension, up to 200 systolic.  -Frequent neuro checks.      LOLI (obstructive sleep apnea)  Assessment & Plan  Continue CPAP at bedtime.    Mixed hyperlipidemia  Assessment & Plan  Continue lovastatin (reported GI intolerance to atorvastatin).  May transition to pravastatin pending lipid panel.    Glucose intolerance (impaired glucose tolerance)  Assessment & Plan  History of glucose intolerance with most recent A1c normal at 5.5.  Will recheck A1c.    Hypertension  Assessment & Plan  Permissive hypertension for now.  Will likely resume antihypertensives on 6/2.        Reason for Consult / Principal Problem: Left lower extremity heaviness, left face and left upper extremity numbness  Hx and PE limited by: n/a  Patient last known well: 0800  Stroke alert  "called: 1017  Neurology time of arrival: 1018    Recommendations for outpatient neurological follow up have yet to be determined.     Pending for discharge: MRI brain, stroke workup    VTE Prophylaxis: Enoxaparin (Lovenox)  / sequential compression device   Code Status: DNR Level 3 (patient has advanced directive and discussed directly with patient)    Anticipated Length of Stay:  Patient will be admitted on an Inpatient basis with an anticipated length of stay of   2 midnights.     Justification for Hospital Stay: Stroke workup including MRI brain    CHIEF COMPLAINT     Chief Complaint   Patient presents with    Dizziness     Per EMS, pt reports having sudden onset dizziness while watering her plants around 0800; states she had left face and arm tingling as well as heaviness in left lower leg that has since resolved        HISTORY OF PRESENT ILLNESS     Sofy Hendrickson is a 73 y.o. right-handed female  with hypertension, dyslipidemia, PAT, BPPV, LOLI on CPAP, periodic limb movement disorder, presents with left face and left upper extremity numbness and left lower extremity heaviness.    Patient states she awoke in her normal state of health this morning around 0800 when outside to do some yard work, when she suddenly felt off balance which she attributed to heaviness in her left lower extremity.  She also noticed numbness in the left V2 distribution and left upper extremity, mostly distally.  She also felt \"dizzy\", which she described as room spinning, but this was short-lived.  She denied any headache, visual change, speech disturbance, facial droop.    In the ED, she noted that the only residual symptoms were some subtle numbness of the left face and left index finger.  CT head demonstrated no acute changes.  CTA head/neck demonstrated no flow consequential stenosis or LVO.  Blood pressure 216/109 on presentation.  Patient is on aspirin 81 mg daily at baseline.  Patient has no personal history of TIA, stroke, " cardiac disease but states she has a strong family history of cardiac disease, which is why she takes the aspirin.    NIH stroke scale 1 for diminished pin appreciation left V2 distribution and left index finger.  Neurologic exam otherwise unremarkable.    REVIEW OF SYSTEMS     Review of Systems   Constitutional: Negative.    HENT: Negative.     Eyes: Negative.    Respiratory: Negative.     Cardiovascular: Negative.    Gastrointestinal: Negative.    Endocrine: Negative.    Genitourinary: Negative.    Musculoskeletal: Negative.    Skin:  Negative for rash.   Allergic/Immunologic: Negative.    Neurological:  Positive for dizziness, weakness and numbness.        As above.   Hematological: Negative.    Psychiatric/Behavioral: Negative.         PAST MEDICAL HISTORY     Past Medical History:   Diagnosis Date    Cellulitis of left elbow 10/30/2016    CPAP (continuous positive airway pressure) dependence     Epistaxis     Medial meniscus tear 11/11/2014    Morbid obesity with BMI of 40.0-44.9, adult (HCC) 12/28/2018    Sleep apnea     SVT (supraventricular tachycardia) 2020    Umbilical hernia        Allergies:   Allergies   Allergen Reactions    Amlodipine Other (See Comments)     Swelling in legs    Lipitor [Atorvastatin] GI Intolerance    Morphine Nausea Only and GI Intolerance     Reaction Date: 09May2011;     Zithromax [Azithromycin] Diarrhea     Reaction Date: 09May2011;        PAST SURGICAL HISTORY     Past Surgical History:   Procedure Laterality Date    KNEE ARTHROSCOPY W/ MENISCAL REPAIR  07/26/2015    with medial meniscus repair    OOPHORECTOMY Bilateral 2002    [place] BIOPSY SOFT TISSUE BACK/FLANK DEEP N/A 10/2/2023    Procedure: EXCISION RIGHT FLANK MASS;  Surgeon: Ryan Singh MD;  Location:  MAIN OR;  Service: General    TOTAL ABDOMINAL HYSTERECTOMY  2002       SOCIAL & FAMILY HISTORY     Social History     Substance and Sexual Activity   Alcohol Use No       Social History     Substance and Sexual Activity    Drug Use No     Social History     Tobacco Use   Smoking Status Never   Smokeless Tobacco Never   Tobacco Comments    Denied history of never a smoker per [employer]       Marital Status:    Occupation: Retired Nurse  Patient Pre-hospital Living Situation: Independent  Patient Pre-hospital Level of Mobility: Independent  Patient Pre-hospital Diet Restrictions: none    Family History:  Family History   Problem Relation Age of Onset    No Known Problems Mother     Thyroid cancer Daughter 37    Hodgkin's lymphoma Daughter 27    No Known Problems Maternal Grandmother     No Known Problems Maternal Grandfather     No Known Problems Paternal Grandmother     No Known Problems Paternal Grandfather     No Known Problems Paternal Aunt     No Known Problems Paternal Aunt     Thyroid disease Family     Diabetes Family         Diabetes Mellitus    Breast cancer Neg Hx            OBJECTIVE     Vitals:    24 1030 24 1045 24 1100 24 1115   BP: 164/74 169/75 155/81 (!) 171/84   BP Location:       Pulse: 64 72 72 72   Resp: 18 20 22 21   Temp:       TempSrc:       SpO2: 98% 98% 98% 95%   Weight:       Height:            Temperature:   Temp (24hrs), Av.8 °F (36.6 °C), Min:97.8 °F (36.6 °C), Max:97.8 °F (36.6 °C)    Temperature: 97.8 °F (36.6 °C)    Intake & Output:  I/O       None            Weights:   IBW (Ideal Body Weight): 59.3 kg    Body mass index is 38.74 kg/m².  Weight (last 2 days)       Date/Time Weight    24 0947 109 (240)            Physical Exam   General:  Patient is well-developed, obese BMI 38.74, and in no acute distress.  HEENT:  Head normocephalic.  Eyes anicteric.    Cardiovascular:  With regular rhythm.  Lungs:  Normal effort. Nonlabored breathing.  Extremities:  With no significant edema.    Skin: No rashes.    Neurologic Exam   Neurologic:  Patient is alert, pleasantly interactive, and appropriately conversational.  No obvious symbolic language difficulty or dysarthria,  and the patient is fully oriented.     Gait appears steady and symmetric.    Cranial Nerves:   II: Visual fields full to confrontation. Pupils equal, round, reactive to light with normal accomodation.  Cannot visualize optic fundi.  III,IV,VI: extraocular movements intact with no nystagmus.   V: Diminished pin appreciation left V2 distribution but otherwise full.  VII: Face is symmetric with no weakness noted.   IX/X: Uvula midline. Soft palate elevation symmetric.   XII: Tongue midline with no atrophy or fasciculations with appropriate movement.     Coordination:  Accurate with finger-to-nose and heel-to-shin maneuvers bilaterally.    Motor testing with no upper or lower extremity drift.    Sensory testing with diminished pin appreciation dorsum of left index finger but otherwise full throughout the 4 extremities.    NIHSS:  1a.Level of Consciousness: 0 = Alert   1b. LOC Questions: 0 = Answers both correctly   1c. LOC Commands: 0 = Obeys both correctly   2. Best Gaze: 0 = Normal   3. Visual: 0 = No visual field loss   4. Facial Palsy: 0=Normal symmetric movement   5a. Motor Right Arm: 0=No drift, limb holds 90 (or 45) degrees for full 10 seconds   5b. Motor Left Arm: 0=No drift, limb holds 90 (or 45) degrees for full 10 seconds   6a. Motor Right Le=No drift, limb holds 90 (or 45) degrees for full 10 seconds   6b. Motor Left Le=No drift, limb holds 90 (or 45) degrees for full 10 seconds   7. Limb Ataxia:  0=Absent   8. Sensory: 1=Mild to moderate sensory loss; patient feels pinprick is less sharp or is dull on the affected side; there is a loss of superficial pain with pinprick but patient is aware She is being touched   9. Best Language:  0=No aphasia, normal   10. Dysarthria: 0=Normal articulation   11. Extinction and Inattention (formerly Neglect): 0=No abnormality   Total Score: 1      Time NIHSS was completed: 1030     Modified Roxana Score:  0 (No baseline symptoms/disability)    LABORATORY DATA      Labs: I have personally reviewed pertinent reports.   and I have personally reviewed pertinent films in PACS  Results from last 7 days   Lab Units 06/01/24  1012   WBC Thousand/uL 6.92   HEMOGLOBIN g/dL 14.6   HEMATOCRIT % 44.0   PLATELETS Thousands/uL 201      Results from last 7 days   Lab Units 06/01/24  1012   SODIUM mmol/L 139   POTASSIUM mmol/L 3.9   CHLORIDE mmol/L 103   CO2 mmol/L 27   BUN mg/dL 15   CREATININE mg/dL 0.62   CALCIUM mg/dL 9.0              Results from last 7 days   Lab Units 06/01/24  1012   INR  0.96   PTT seconds 23               Micro:  Lab Results   Component Value Date    URINECX >100,000 cfu/ml Escherichia coli 11/11/2016       IMAGING & DIAGNOSTIC TESTING     Radiology Results: I have personally reviewed pertinent reports.   and I have personally reviewed pertinent films in PACS CTH, CTA h/n    CTA stroke alert (head/neck)   Final Result by Dustin Phipps DO (06/01 1057)      Unremarkable cervical and intracranial vasculature with the exception of atherosclerotic change of the carotid bifurcations bilaterally with only minor stenosis. No high-grade stenosis or occlusive disease.            Findings were directly discussed with Abhi Espinoza at 7:40 a.m.                           Workstation performed: [id number]         CT stroke alert brain   Final Result by Dustin Phipps DO (06/01 1050)      No acute intracranial abnormality.      Findings were directly discussed with Abhi Espinoza at approximately 10:40 a.m.      Workstation performed: [id number]         MRI Inpatient Order    (Results Pending)       Other Diagnostic Testing: I have personally reviewed pertinent reports.      ACTIVE MEDICATIONS     Current Facility-Administered Medications   Medication Dose Route Frequency    [START ON 6/2/2024] aspirin (ECOTRIN LOW STRENGTH) EC tablet 81 mg  81 mg Oral Daily    [START ON 6/2/2024] calcium carbonate-vitamin D 500 mg-5 mcg tablet 0.5 tablet  0.5 tablet Oral Daily  With Breakfast    Cholecalciferol (VITAMIN D3) tablet 2,000 Units  2,000 Units Oral Daily    [START ON 6/2/2024] clopidogrel (PLAVIX) tablet 75 mg  75 mg Oral Daily    enoxaparin (LOVENOX) subcutaneous injection 40 mg  40 mg Subcutaneous Daily    lovastatin (MEVACOR) tablet 20 mg  20 mg Oral HS    multivitamin-minerals (CENTRUM) tablet 1 tablet  1 tablet Oral Daily    nystatin (MYCOSTATIN) powder   Topical BID PRN         HOME MEDICATIONS     Prior to Admission medications    Medication Sig Start Date End Date Taking? Authorizing Provider   aspirin (ECOTRIN LOW STRENGTH) 81 mg EC tablet Take 1 tablet (81 mg total) by mouth daily 6/7/21   Jaspreet Reynolds MD   calcium-vitamin D (OSCAL) 250-125 MG-UNIT per tablet Take 1 tablet by mouth daily.    Historical Provider, MD   Cholecalciferol (Vitamin D3) 50 MCG (2000 UT) capsule Take 2,000 Units by mouth daily      Historical Provider, MD   KRILL OIL PO 1200 mg QD    Historical Provider, MD   lisinopril (ZESTRIL) 40 mg tablet Take 1 tablet (40 mg total) by mouth daily 8/31/23   Ricardo Lee MD   lovastatin (MEVACOR) 20 mg tablet Take 1 tablet (20 mg total) by mouth daily at bedtime 2/1/24   Ricardo Lee MD   meclizine (ANTIVERT) 25 mg tablet Take 1 tablet (25 mg total) by mouth 3 (three) times a day as needed for dizziness 5/3/24   Eamon Newman MD   metoprolol tartrate (LOPRESSOR) 50 mg tablet Take 1 tablet (50 mg total) by mouth every 12 (twelve) hours 11/27/23   Vasile Shields MD   Multiple Vitamins-Minerals (MULTIVITAMIN ADULT PO)     Historical Provider, MD   nystatin powder Apply topically 2 (two) times a day 6/9/23   Ricardo Lee MD

## 2024-06-01 NOTE — ED PROVIDER NOTES
History  Chief Complaint   Patient presents with    Dizziness     Per EMS, pt reports having sudden onset dizziness while watering her plants around 0800; states she had left face and arm tingling as well as heaviness in left lower leg that has since resolved     Past medical history notable for paroxysmal A-fib, hypertension, is coming in with chief complaint of paresthesia left-sided face, hand and leg that started this morning.  Patient reports that she was watering her plants, after she came to the living room her symptoms started to come on.  Associated symptoms include dizziness and gait unsteadiness.  Patient denies any changes in her vision, palpitation, chest pain or shortness of breath.        Prior to Admission Medications   Prescriptions Last Dose Informant Patient Reported? Taking?   Cholecalciferol (Vitamin D3) 50 MCG (2000 UT) capsule  Self Yes No   Sig: Take 2,000 Units by mouth daily     KRILL OIL PO  Self Yes No   Si mg QD   Multiple Vitamins-Minerals (MULTIVITAMIN ADULT PO)  Self Yes No   aspirin (ECOTRIN LOW STRENGTH) 81 mg EC tablet  Self No No   Sig: Take 1 tablet (81 mg total) by mouth daily   calcium-vitamin D (OSCAL) 250-125 MG-UNIT per tablet  Self Yes No   Sig: Take 1 tablet by mouth daily.   lisinopril (ZESTRIL) 40 mg tablet  Self No No   Sig: Take 1 tablet (40 mg total) by mouth daily   lovastatin (MEVACOR) 20 mg tablet   No No   Sig: Take 1 tablet (20 mg total) by mouth daily at bedtime   meclizine (ANTIVERT) 25 mg tablet   No No   Sig: Take 1 tablet (25 mg total) by mouth 3 (three) times a day as needed for dizziness   metoprolol tartrate (LOPRESSOR) 50 mg tablet  Self No No   Sig: Take 1 tablet (50 mg total) by mouth every 12 (twelve) hours   nystatin powder  Self No No   Sig: Apply topically 2 (two) times a day      Facility-Administered Medications: None       Past Medical History:   Diagnosis Date    Cellulitis of left elbow 10/30/2016    CPAP (continuous positive airway  pressure) dependence     Epistaxis     Medial meniscus tear 11/11/2014    Morbid obesity with BMI of 40.0-44.9, adult ([facility]) 12/28/2018    Sleep apnea     SVT (supraventricular tachycardia) 2020    Umbilical hernia        Past Surgical History:   Procedure Laterality Date    KNEE ARTHROSCOPY W/ MENISCAL REPAIR  07/26/2015    with medial meniscus repair    OOPHORECTOMY Bilateral 2002    PA BIOPSY SOFT TISSUE BACK/FLANK DEEP N/A 10/2/2023    Procedure: EXCISION RIGHT FLANK MASS;  Surgeon: Ryan Singh MD;  Location:  MAIN OR;  Service: General    TOTAL ABDOMINAL HYSTERECTOMY  2002       Family History   Problem Relation Age of Onset    No Known Problems Mother     Thyroid cancer Daughter 37    Hodgkin's lymphoma Daughter 27    No Known Problems Maternal Grandmother     No Known Problems Maternal Grandfather     No Known Problems Paternal Grandmother     No Known Problems Paternal Grandfather     No Known Problems Paternal Aunt     No Known Problems Paternal Aunt     Thyroid disease Family     Diabetes Family         Diabetes Mellitus    Breast cancer Neg Hx      I have reviewed and agree with the history as documented.    E-Cigarette/Vaping    E-Cigarette Use Never User      E-Cigarette/Vaping Substances    Nicotine No     THC No     CBD No     Flavoring No     Other No      Social History     Tobacco Use    Smoking status: Never    Smokeless tobacco: Never    Tobacco comments:     Denied history of never a smoker per [employer]   Vaping Use    Vaping status: Never Used   Substance Use Topics    Alcohol use: No    Drug use: No        Review of Systems   Constitutional:  Negative for chills and fever.   HENT:  Negative for ear pain and sore throat.    Eyes:  Negative for pain and visual disturbance.   Respiratory:  Negative for cough and shortness of breath.    Cardiovascular:  Negative for chest pain and palpitations.   Gastrointestinal:  Negative for abdominal pain and vomiting.   Genitourinary:  Negative for  dysuria and hematuria.   Musculoskeletal:  Negative for arthralgias and back pain.   Skin:  Negative for color change and rash.   Neurological:  Positive for dizziness and numbness. Negative for tremors, seizures, syncope, speech difficulty, weakness and light-headedness.   All other systems reviewed and are negative.      Physical Exam  ED Triage Vitals [06/01/24 0947]   Temperature Pulse Respirations Blood Pressure SpO2   97.8 °F (36.6 °C) 75 19 (!) 216/109 96 %      Temp Source Heart Rate Source Patient Position - Orthostatic VS BP Location FiO2 (%)   Oral Monitor Sitting Right arm --      Pain Score       No Pain             Orthostatic Vital Signs  Vitals:    06/02/24 1949 06/02/24 2102 06/02/24 2105 06/03/24 0718   BP: 110/67  111/64 139/77   Pulse:  (!) 49 62 70   Patient Position - Orthostatic VS:           Physical Exam  Eyes:      General: No visual field deficit.  Neurological:      Mental Status: She is alert and oriented to person, place, and time.      GCS: GCS eye subscore is 4. GCS verbal subscore is 5. GCS motor subscore is 6.      Cranial Nerves: No dysarthria or facial asymmetry.      Sensory: Sensory deficit (Left-sided face, left-sided hand,Left-sided leg.) present.      Motor: No weakness, tremor, atrophy, abnormal muscle tone, seizure activity or pronator drift.      Coordination: Romberg sign negative. Coordination normal. Finger-Nose-Finger Test and Heel to Shin Test normal. Rapid alternating movements normal.      Gait: Gait normal.      Comments: Vertical nystagmus         ED Medications  Medications   clopidogrel (PLAVIX) tablet 75 mg (75 mg Oral Given 6/3/24 0854)   enoxaparin (LOVENOX) subcutaneous injection 40 mg (40 mg Subcutaneous Given 6/3/24 0854)   aspirin (ECOTRIN LOW STRENGTH) EC tablet 81 mg (81 mg Oral Given 6/3/24 0854)   calcium carbonate-vitamin D 500 mg-5 mcg tablet 0.5 tablet (0.5 tablets Oral Given 6/3/24 0854)   Cholecalciferol (VITAMIN D3) tablet 2,000 Units (2,000  Units Oral Given 6/3/24 0854)   lovastatin (MEVACOR) tablet 20 mg (20 mg Oral Not Given 6/2/24 2106)   multivitamin-minerals (CENTRUM) tablet 1 tablet (1 tablet Oral Given 6/3/24 0854)   nystatin (MYCOSTATIN) powder (has no administration in time range)   lisinopril (ZESTRIL) tablet 40 mg (40 mg Oral Given 6/3/24 0854)   metoprolol tartrate (LOPRESSOR) tablet 50 mg (50 mg Oral Given 6/3/24 0854)   magnesium sulfate 2 g/50 mL IVPB (premix) 2 g (2 g Intravenous New Bag 6/3/24 0854)   iohexol (OMNIPAQUE) 350 MG/ML injection (MULTI-DOSE) 85 mL (85 mL Intravenous Given 6/1/24 1045)   clopidogrel (PLAVIX) tablet 300 mg (300 mg Oral Given 6/1/24 1109)   aspirin chewable tablet 81 mg (81 mg Oral Given 6/1/24 1109)       Diagnostic Studies  Results Reviewed       Procedure Component Value Units Date/Time    Hemoglobin A1c w/EAG Estimation [[id number]] Collected: 06/02/24 0552    Lab Status: Final result Specimen: Blood from Hand, Left Updated: 06/02/24 0744     Hemoglobin A1C 5.6 %       mg/dl     Lipid Panel with Direct LDL reflex [[id number]]  (Abnormal) Collected: 06/02/24 0552    Lab Status: Final result Specimen: Blood from Hand, Left Updated: 06/02/24 0707     Cholesterol 160 mg/dL      Triglycerides 189 mg/dL      HDL, Direct 46 mg/dL      LDL Calculated 76 mg/dL     HS Troponin I 4hr [[id number]]  (Normal) Collected: 06/01/24 1605    Lab Status: Final result Specimen: Blood from Arm, Right Updated: 06/01/24 1651     hs TnI 4hr 9 ng/L      Delta 4hr hsTnI 4 ng/L     HS Troponin I 2hr [[id number]]  (Normal) Collected: 06/01/24 1229    Lab Status: Final result Specimen: Blood from Arm, Left Updated: 06/01/24 1304     hs TnI 2hr 5 ng/L      Delta 2hr hsTnI 0 ng/L     FLU/RSV/COVID - if FLU/RSV clinically relevant [[id number]]  (Normal) Collected: 06/01/24 1012    Lab Status: Final result Specimen: Nares from Nose Updated: 06/01/24 1104     SARS-CoV-2 Negative     INFLUENZA A PCR Negative     INFLUENZA B PCR Negative      RSV PCR Negative    Narrative:      FOR PEDIATRIC PATIENTS - copy/paste COVID Guidelines URL to browser: [email or web address]    SARS-CoV-2 assay is a Nucleic Acid Amplification assay intended for the  qualitative detection of nucleic acid from SARS-CoV-2 in nasopharyngeal  swabs. Results are for the presumptive identification of SARS-CoV-2 RNA.    Positive results are indicative of infection with SARS-CoV-2, the virus  causing COVID-19, but do not rule out bacterial infection or co-infection  with other viruses. Laboratories within the [place] and its  territories are required to report all positive results to the appropriate  public health authorities. Negative results do not preclude SARS-CoV-2  infection and should not be used as the sole basis for treatment or other  patient management decisions. Negative results must be combined with  clinical observations, patient history, and epidemiological information.  This test has not been FDA cleared or approved.    This test has been authorized by FDA under an Emergency Use Authorization  (EUA). This test is only authorized for the duration of time the  declaration that circumstances exist justifying the authorization of the  emergency use of an in vitro diagnostic tests for detection of SARS-CoV-2  virus and/or diagnosis of COVID-19 infection under section 564(b)(1) of  the Act, 21 U.S.C. 360bbb-3(b)(1), unless the authorization is terminated  or revoked sooner. The test has been validated but independent review by FDA  and CLIA is pending.    Test performed using [employer]: This RT-PCR assay targets N2,  a region unique to SARS-CoV-2. A conserved region in the E-gene was chosen  for pan-Sarbecovirus detection which includes SARS-CoV-2.    According to CMS-2020-01-R, this platform meets the definition of high-throughput technology.    HS Troponin 0hr (reflex protocol) [[id number]]  (Normal)  Collected: 06/01/24 1012    Lab Status: Final result Specimen: Blood from Arm, Left Updated: 06/01/24 1050     hs TnI 0hr 5 ng/L     Basic metabolic panel [[id number]] Collected: 06/01/24 1012    Lab Status: Final result Specimen: Blood from Arm, Left Updated: 06/01/24 1043     Sodium 139 mmol/L      Potassium 3.9 mmol/L      Chloride 103 mmol/L      CO2 27 mmol/L      ANION GAP 9 mmol/L      BUN 15 mg/dL      Creatinine 0.62 mg/dL      Glucose 110 mg/dL      Calcium 9.0 mg/dL      eGFR 91 ml/min/1.73sq m     Narrative:      National Kidney Disease Foundation guidelines for Chronic Kidney Disease (CKD):     Stage 1 with normal or high GFR (GFR > 90 mL/min/1.73 square meters)    Stage 2 Mild CKD (GFR = 60-89 mL/min/1.73 square meters)    Stage 3A Moderate CKD (GFR = 45-59 mL/min/1.73 square meters)    Stage 3B Moderate CKD (GFR = 30-44 mL/min/1.73 square meters)    Stage 4 Severe CKD (GFR = 15-29 mL/min/1.73 square meters)    Stage 5 End Stage CKD (GFR <15 mL/min/1.73 square meters)  Note: GFR calculation is accurate only with a steady state creatinine    Protime-INR [[id number]]  (Normal) Collected: 06/01/24 1012    Lab Status: Final result Specimen: Blood from Arm, Left Updated: 06/01/24 1034     Protime 12.7 seconds      INR 0.96    APTT [[id number]]  (Normal) Collected: 06/01/24 1012    Lab Status: Final result Specimen: Blood from Arm, Left Updated: 06/01/24 1034     PTT 23 seconds     CBC and Platelet [[id number]]  (Normal) Collected: 06/01/24 1012    Lab Status: Final result Specimen: Blood from Arm, Left Updated: 06/01/24 1023     WBC 6.92 Thousand/uL      RBC 4.59 Million/uL      Hemoglobin 14.6 g/dL      Hematocrit 44.0 %      MCV 96 fL      MCH 31.8 pg      MCHC 33.2 g/dL      RDW 13.2 %      Platelets 201 Thousands/uL      MPV 10.2 fL     Fingerstick Glucose (POCT) [[id number]]  (Normal) Collected: 06/01/24 1012    Lab Status: Final result Specimen: Blood Updated: 06/01/24 1013     POC Glucose 122 mg/dl                     MRI brain wo contrast   Final Result by Rafy Duenas MD (06/01 1709)      No mass effect, acute intracranial hemorrhage or evidence of recent infarction. Minimal chronic microvascular ischemic change and chronic cerebellar lacunar infarcts.      Workstation performed: [id number]         CTA stroke alert (head/neck)   Final Result by Dustin Phipps DO (06/01 1057)      Unremarkable cervical and intracranial vasculature with the exception of atherosclerotic change of the carotid bifurcations bilaterally with only minor stenosis. No high-grade stenosis or occlusive disease.            Findings were directly discussed with Abhi Espinoza at 7:40 a.m.                           Workstation performed: [id number]         CT stroke alert brain   Final Result by Dustin Phipps DO (06/01 1050)      No acute intracranial abnormality.      Findings were directly discussed with Abhi Espinoza at approximately 10:40 a.m.      Workstation performed: [id number]               Procedures  Procedures      ED Course  ED Course as of 06/03/24 1000   Sat Jun 01, 2024   1105 Patient NIH score is 1.  Stroke is not debilitating.  Will opt for aspirin 81 with a Plavix load.   1106 Neuro to except as primary under the stroke pathway.   1219 Patient given suppository and discharged home.                              SBIRT 22yo+      Flowsheet Row Most Recent Value   Initial Alcohol Screen: US AUDIT-C     1. How often do you have a drink containing alcohol? 0 Filed at: 06/01/2024 0946   2. How many drinks containing alcohol do you have on a typical day you are drinking?  0 Filed at: 06/01/2024 0946   3b. FEMALE Any Age, or MALE 65+: How often do you have 4 or more drinks on one occassion? 0 Filed at: 06/01/2024 0946   Audit-C Score 0 Filed at: 06/01/2024 0946   NOEL: How many times in the past year have you...    Used an illegal drug or used a prescription medication for non-medical reasons? Never Filed at: 06/01/2024 0946                   Medical Decision Making  73-year-old female coming in with paresthesia left-sided, with vertical nystagmus present on physical exam, but in the setting of dizziness concern right now for stroke.  Stroke alert, called, patient is also hypertensive, concerns can also include  hypertensive urgency.  Will call stroke alert outpatient for workup.  And continue to reassess.    Amount and/or Complexity of Data Reviewed  Labs: ordered.  Radiology: ordered.    Risk  OTC drugs.  Prescription drug management.  Decision regarding hospitalization.          Disposition  Final diagnoses:   Weakness   Stroke-like symptom   Paresthesia     Time reflects when diagnosis was documented in both MDM as applicable and the Disposition within this note       Time User Action Codes Description Comment    6/1/2024 10:09 AM Reed Rutledge Add [R53.1] Weakness     6/1/2024 10:18 AM Librado Patterson Modify [R53.1] Weakness     6/1/2024 10:18 AM Librado Patterson Add [R29.90] Stroke-like symptom     6/1/2024 11:06 AM Librado Patterson Add [R20.2] Paresthesia           ED Disposition       ED Disposition   Admit    Condition   Stable    Date/Time   Sat Jun 1, 2024 1108    Comment   Case was discussed with neurology team and the patient's admission status was agreed to be Admission Status: inpatient status to the service of Dr. Mayberry .               Follow-up Information       Follow up With Specialties Details Why Contact Info Additional Information    [facility] Neurology Follow up The office will call to schedule follow-up.  Please call the above this number if you do not hear from us within 7 days postdischarge. [address]  [facility] [id number] [phone] [facility], [address] [place], [place], [address] [phone]            Current Discharge Medication List        CONTINUE these medications which have NOT CHANGED    Details    aspirin (ECOTRIN LOW STRENGTH) 81 mg EC tablet Take 1 tablet (81 mg total) by mouth daily  Qty: 90 tablet, Refills: 0    Associated Diagnoses: SVT (supraventricular tachycardia)      calcium-vitamin D (OSCAL) 250-125 MG-UNIT per tablet Take 1 tablet by mouth daily.      Cholecalciferol (Vitamin D3) 50 MCG (2000 UT) capsule Take 2,000 Units by mouth daily        KRILL OIL PO 1200 mg QD      lisinopril (ZESTRIL) 40 mg tablet Take 1 tablet (40 mg total) by mouth daily  Qty: 90 tablet, Refills: 3    Associated Diagnoses: Essential hypertension      lovastatin (MEVACOR) 20 mg tablet Take 1 tablet (20 mg total) by mouth daily at bedtime  Qty: 90 tablet, Refills: 3    Associated Diagnoses: Hyperlipidemia, unspecified hyperlipidemia type      meclizine (ANTIVERT) 25 mg tablet Take 1 tablet (25 mg total) by mouth 3 (three) times a day as needed for dizziness  Qty: 30 tablet, Refills: 0    Associated Diagnoses: Vertigo      metoprolol tartrate (LOPRESSOR) 50 mg tablet Take 1 tablet (50 mg total) by mouth every 12 (twelve) hours  Qty: 180 tablet, Refills: 3    Associated Diagnoses: SVT (supraventricular tachycardia)      Multiple Vitamins-Minerals (MULTIVITAMIN ADULT PO)       nystatin powder Apply topically 2 (two) times a day  Qty: 180 g, Refills: 2    Associated Diagnoses: Skin lesions           No discharge procedures on file.    PDMP Review         Value Time User    PDMP Reviewed  Yes 8/17/2020  2:33 PM Ricardo Lee MD             ED Provider  Attending physically available and evaluated Sofy Hendrickson. I managed the patient along with the ED Attending.    Electronically Signed by           Librado Patterson DO  06/01/24 5667       Librado Patterson DO  06/03/24 1588

## 2024-06-01 NOTE — ED ATTENDING ATTESTATION
6/1/2024  I, Ailin Pillai MD, saw and evaluated the patient. I have discussed the patient with the resident/non-physician practitioner and agree with the resident's/non-physician practitioner's findings, Plan of Care, and MDM as documented in the resident's/non-physician practitioner's note, except where noted. All available labs and Radiology studies were reviewed.  I was present for key portions of any procedure(s) performed by the resident/non-physician practitioner and I was immediately available to provide assistance.       At this point I agree with the current assessment done in the Emergency Department.  I have conducted an independent evaluation of this patient a history and physical is as follows:  This is a 73-year-old presenting to the emergency department with dizziness, gait dysfunction, and numbness on the left side.  Patient states that at about 930, she was looking upward to water plants, and developed vertigo.  She states that she sat down, but continued to be dizzy.  She states that her gait was unsteady, and that she felt numbness on the left side.  The patient is a former stroke nurse, and was concerned about a stroke.  The patient was in the emergency department 3 weeks ago for dizziness, and was told that she had peripheral vertigo.  The patient did not have any neuroimaging at that time.  The patient states that her symptoms are different than they were then, because then she had the dizziness, but no gait dysfunction, no numbness.  Patient has past medical history of hypertension.  She takes baby aspirin.  Her review of systems is otherwise negative in 12 systems were reviewed.  On exam he is awake and alert.  Her face is symmetric.  Extraocular movements intact.  She has saccades on vestibular ocular testing with left gaze.  Her neck is supple and nontender.  Her tongue is midline.  Her palate is symmetric.  Her heart is regular without murmurs, rubs, or gallops.  Her lungs are  clear with good air movement.  Her abdomen is soft and nontender.  His extremities are intact.  He has normal finger-to-nose.  She has normal and intact strength and sensation.  She is able to raise her arms off the bed and her legs off the bed.MEDICAL DECISION MAKING    Number and Complexity of Problems  Differential diagnosis: Stroke, peripheral vertigo, posterior circulation dissection    Medical Decision Making Data  External documents reviewed: Prior ED visit reviewed  My EKG interpretation: Sinus rhythm, narrow complex, no ischemia or ectopy, normal axis  My CT interpretation: No bleedingMy X-ray interpretation:   My ultrasound interpretation:     CTA stroke alert (head/neck)   Final Result      Unremarkable cervical and intracranial vasculature with the exception of atherosclerotic change of the carotid bifurcations bilaterally with only minor stenosis. No high-grade stenosis or occlusive disease.            Findings were directly discussed with Abhi Espinoza at 7:40 a.m.                           Workstation performed: [id number]         CT stroke alert brain   Final Result      No acute intracranial abnormality.      Findings were directly discussed with Abhi Espinoza at approximately 10:40 a.m.      Workstation performed: [id number]         CT stroke alert brain    (Results Pending)   CTA stroke alert (head/neck)    (Results Pending)   MRI Inpatient Order    (Results Pending)       Labs Reviewed   COVID19, INFLUENZA A/B, RSV PCR, [facility] - Normal       Result Value Ref Range Status    SARS-CoV-2 Negative  Negative Final    Comment:      INFLUENZA A PCR Negative  Negative Final    Comment:      INFLUENZA B PCR Negative  Negative Final    Comment:      RSV PCR Negative  Negative Final    Comment:      Narrative:     FOR PEDIATRIC PATIENTS - copy/paste COVID Guidelines URL to browser: [email or web address]    SARS-CoV-2 assay is a Nucleic Acid Amplification assay  intended for the  qualitative detection of nucleic acid from SARS-CoV-2 in nasopharyngeal  swabs. Results are for the presumptive identification of SARS-CoV-2 RNA.    Positive results are indicative of infection with SARS-CoV-2, the virus  causing COVID-19, but do not rule out bacterial infection or co-infection  with other viruses. Laboratories within the [place] and its  territories are required to report all positive results to the appropriate  public health authorities. Negative results do not preclude SARS-CoV-2  infection and should not be used as the sole basis for treatment or other  patient management decisions. Negative results must be combined with  clinical observations, patient history, and epidemiological information.  This test has not been FDA cleared or approved.    This test has been authorized by FDA under an Emergency Use Authorization  (EUA). This test is only authorized for the duration of time the  declaration that circumstances exist justifying the authorization of the  emergency use of an in vitro diagnostic tests for detection of SARS-CoV-2  virus and/or diagnosis of COVID-19 infection under section 564(b)(1) of  the Act, 21 U.S.C. 360bbb-3(b)(1), unless the authorization is terminated  or revoked sooner. The test has been validated but independent review by FDA  and CLIA is pending.    Test performed using [employer]: This RT-PCR assay targets N2,  a region unique to SARS-CoV-2. A conserved region in the E-gene was chosen  for pan-Sarbecovirus detection which includes SARS-CoV-2.    According to CMS-2020-01-R, this platform meets the definition of high-throughput technology.   CBC AND PLATELET - Normal    WBC 6.92  4.31 - 10.16 Thousand/uL Final    RBC 4.59  3.81 - 5.12 Million/uL Final    Hemoglobin 14.6  11.5 - 15.4 g/dL Final    Hematocrit 44.0  34.8 - 46.1 % Final    MCV 96  82 - 98 fL Final    MCH 31.8  26.8 - 34.3 pg Final    MCHC 33.2  31.4 - 37.4 g/dL Final    RDW 13.2  " 11.6 - 15.1 % Final    Platelets 201  149 - 390 Thousands/uL Final    MPV 10.2  8.9 - 12.7 fL Final   PROTIME-INR - Normal    Protime 12.7  11.6 - 14.5 seconds Final    INR 0.96  0.84 - 1.19 Final   APTT - Normal    PTT 23  23 - 37 seconds Final    Comment: Therapeutic Heparin Range =  60-90 seconds   HS TROPONIN I 0HR - Normal    hs TnI 0hr 5  \"Refer to ACS Flowchart\"- see link ng/L Final    Comment:                                              Initial (time 0) result  If >=50 ng/L, Myocardial injury suggested ;  Type of myocardial injury and treatment strategy  to be determined.  If 5-49 ng/L, a delta result at 2 hours and or 4 hours will be needed to further evaluate.  If <4 ng/L, and chest pain has been >3 hours since onset, patient may qualify for discharge based on the HEART score in the ED.  If <5 ng/L and <3hours since onset of chest pain, a delta result at 2 hours will be needed to further evaluate.    HS Troponin 99th Percentile URL of a Health Population=12 ng/L with a 95% Confidence Interval of 8-18 ng/L.    Second Troponin (time 2 hours)  If calculated delta >= 20 ng/L,  Myocardial injury suggested ; Type of myocardial injury and treatment strategy to be determined.  If 5-49 ng/L and the calculated delta is 5-19 ng/L, consult medical service for evaluation.  Continue evaluation for ischemia on ecg and other possible etiology and repeat hs troponin at 4 hours.  If delta is <5 ng/L at 2 hours, consider discharge based on risk stratification via the HEART score (if in ED), or HIMA risk score in IP/Observation.    HS Troponin 99th Percentile URL of a Health Population=12 ng/L with a 95% Confidence Interval of 8-18 ng/L.   POCT GLUCOSE - Normal    POC Glucose 122  65 - 140 mg/dl Final   COVID19, INFLUENZA A/B, RSV PCR, [facility]   BASIC METABOLIC PANEL    Sodium 139  135 - 147 mmol/L Final    Potassium 3.9  3.5 - 5.3 mmol/L Final    Chloride 103  96 - 108 mmol/L Final    CO2 27  21 - 32 mmol/L Final    ANION " GAP 9  4 - 13 mmol/L Final    BUN 15  5 - 25 mg/dL Final    Creatinine 0.62  0.60 - 1.30 mg/dL Final    Comment: Standardized to IDMS reference method    Glucose 110  65 - 140 mg/dL Final    Comment: If the patient is fasting, the ADA then defines impaired fasting glucose as > 100 mg/dL and diabetes as > or equal to 123 mg/dL.    Calcium 9.0  8.4 - 10.2 mg/dL Final    eGFR 91  ml/min/1.73sq m Final    Narrative:     National Kidney Disease Foundation guidelines for Chronic Kidney Disease (CKD):     Stage 1 with normal or high GFR (GFR > 90 mL/min/1.73 square meters)    Stage 2 Mild CKD (GFR = 60-89 mL/min/1.73 square meters)    Stage 3A Moderate CKD (GFR = 45-59 mL/min/1.73 square meters)    Stage 3B Moderate CKD (GFR = 30-44 mL/min/1.73 square meters)    Stage 4 Severe CKD (GFR = 15-29 mL/min/1.73 square meters)    Stage 5 End Stage CKD (GFR <15 mL/min/1.73 square meters)  Note: GFR calculation is accurate only with a steady state creatinine   BASIC METABOLIC PANEL   CBC AND PLATELET   PROTIME-INR   APTT   HS TROPONIN I 0HR   HS TROPONIN I 2HR       Labs reviewed by me are significant for:     Clinical decision rules/scores are significant for:     Discussed case with:   Considered admission for:     Treatment and Disposition  ED course: Patient seen and examined.  Patient's neurological exam is reassuring insofar as she has saccades on vestibular ocular testing, however that does not explain the patient's gait dysfunction, nor does it explain her sensory symptoms which have since resolved.  Patient made stroke alert, neuro into see patient.  CT CTA reviewed, neuro recommends dual antiplatelet therapy and MRI.  Patient will be admitted to the service for stroke pathway  Shared decision making:   Code status:     ED Course         Critical Care Time  CriticalCare Time    Date/Time: 6/1/2024 11:17 AM    Performed by: Ailin Pillai MD  Authorized by: Ailin Pillai MD    Critical care provider statement:      Critical care time (minutes):  32    Critical care time was exclusive of:  Separately billable procedures and treating other patients and teaching time    Critical care was necessary to treat or prevent imminent or life-threatening deterioration of the following conditions:  CNS failure or compromise    Critical care was time spent personally by me on the following activities:  Blood draw for specimens, obtaining history from patient or surrogate, development of treatment plan with patient or surrogate, discussions with consultants, discussions with primary provider, evaluation of patient's response to treatment, examination of patient, review of old charts, re-evaluation of patient's condition, ordering and review of radiographic studies, ordering and review of laboratory studies and ordering and performing treatments and interventions

## 2024-06-01 NOTE — PROGRESS NOTES
Pastoral Care Progress Note    2024  Patient: Sofy Hendrickson : 1950  Admission Date & Time: 2024 0940  MRN: [id number] [facility]: [id number]                     Chaplaincy Interventions Utilized:      24 1100   Clinical Encounter Type   Visited With Patient   Crisis Visit ED  (stroke Alert)     Stroke alert - No family members - Provided emotional support to staff members.

## 2024-06-02 PROBLEM — G45.9 TIA (TRANSIENT ISCHEMIC ATTACK): Status: ACTIVE | Noted: 2024-06-01

## 2024-06-02 LAB
ANION GAP SERPL CALCULATED.3IONS-SCNC: 11 MMOL/L (ref 4–13)
BUN SERPL-MCNC: 13 MG/DL (ref 5–25)
CALCIUM SERPL-MCNC: 8.4 MG/DL (ref 8.4–10.2)
CHLORIDE SERPL-SCNC: 104 MMOL/L (ref 96–108)
CHOLEST SERPL-MCNC: 160 MG/DL
CO2 SERPL-SCNC: 25 MMOL/L (ref 21–32)
CREAT SERPL-MCNC: 0.52 MG/DL (ref 0.6–1.3)
ERYTHROCYTE [DISTWIDTH] IN BLOOD BY AUTOMATED COUNT: 13.2 % (ref 11.6–15.1)
EST. AVERAGE GLUCOSE BLD GHB EST-MCNC: 114 MG/DL
GFR SERPL CREATININE-BSD FRML MDRD: 96 ML/MIN/1.73SQ M
GLUCOSE SERPL-MCNC: 104 MG/DL (ref 65–140)
HBA1C MFR BLD: 5.6 %
HCT VFR BLD AUTO: 40.4 % (ref 34.8–46.1)
HDLC SERPL-MCNC: 46 MG/DL
HGB BLD-MCNC: 13.3 G/DL (ref 11.5–15.4)
LDLC SERPL CALC-MCNC: 76 MG/DL (ref 0–100)
MAGNESIUM SERPL-MCNC: 1.8 MG/DL (ref 1.9–2.7)
MCH RBC QN AUTO: 31.6 PG (ref 26.8–34.3)
MCHC RBC AUTO-ENTMCNC: 32.9 G/DL (ref 31.4–37.4)
MCV RBC AUTO: 96 FL (ref 82–98)
PHOSPHATE SERPL-MCNC: 3.8 MG/DL (ref 2.3–4.1)
PLATELET # BLD AUTO: 183 THOUSANDS/UL (ref 149–390)
PMV BLD AUTO: 10.4 FL (ref 8.9–12.7)
POTASSIUM SERPL-SCNC: 4.1 MMOL/L (ref 3.5–5.3)
RBC # BLD AUTO: 4.21 MILLION/UL (ref 3.81–5.12)
SODIUM SERPL-SCNC: 140 MMOL/L (ref 135–147)
TRIGL SERPL-MCNC: 189 MG/DL
TSH SERPL DL<=0.05 MIU/L-ACNC: 3.16 UIU/ML (ref 0.45–4.5)
WBC # BLD AUTO: 7.07 THOUSAND/UL (ref 4.31–10.16)

## 2024-06-02 PROCEDURE — 84443 ASSAY THYROID STIM HORMONE: CPT

## 2024-06-02 PROCEDURE — 85027 COMPLETE CBC AUTOMATED: CPT

## 2024-06-02 PROCEDURE — 84100 ASSAY OF PHOSPHORUS: CPT

## 2024-06-02 PROCEDURE — 97165 OT EVAL LOW COMPLEX 30 MIN: CPT

## 2024-06-02 PROCEDURE — 80048 BASIC METABOLIC PNL TOTAL CA: CPT

## 2024-06-02 PROCEDURE — 83036 HEMOGLOBIN GLYCOSYLATED A1C: CPT | Performed by: PHYSICIAN ASSISTANT

## 2024-06-02 PROCEDURE — 83735 ASSAY OF MAGNESIUM: CPT

## 2024-06-02 PROCEDURE — 80061 LIPID PANEL: CPT | Performed by: PHYSICIAN ASSISTANT

## 2024-06-02 PROCEDURE — 99233 SBSQ HOSP IP/OBS HIGH 50: CPT | Performed by: PSYCHIATRY & NEUROLOGY

## 2024-06-02 PROCEDURE — 97161 PT EVAL LOW COMPLEX 20 MIN: CPT

## 2024-06-02 PROCEDURE — 94760 N-INVAS EAR/PLS OXIMETRY 1: CPT

## 2024-06-02 RX ORDER — METOPROLOL TARTRATE 50 MG/1
50 TABLET, FILM COATED ORAL EVERY 12 HOURS SCHEDULED
Status: DISCONTINUED | OUTPATIENT
Start: 2024-06-02 | End: 2024-06-03 | Stop reason: HOSPADM

## 2024-06-02 RX ORDER — LISINOPRIL 20 MG/1
40 TABLET ORAL DAILY
Status: DISCONTINUED | OUTPATIENT
Start: 2024-06-02 | End: 2024-06-03 | Stop reason: HOSPADM

## 2024-06-02 RX ADMIN — ASPIRIN 81 MG: 81 TABLET, COATED ORAL at 08:28

## 2024-06-02 RX ADMIN — Medication 1 TABLET: at 08:29

## 2024-06-02 RX ADMIN — METOPROLOL TARTRATE 50 MG: 50 TABLET, FILM COATED ORAL at 14:13

## 2024-06-02 RX ADMIN — Medication 2000 UNITS: at 08:29

## 2024-06-02 RX ADMIN — LISINOPRIL 40 MG: 20 TABLET ORAL at 14:13

## 2024-06-02 RX ADMIN — CLOPIDOGREL BISULFATE 75 MG: 75 TABLET ORAL at 08:28

## 2024-06-02 RX ADMIN — Medication 0.5 TABLET: at 08:28

## 2024-06-02 NOTE — PLAN OF CARE
Problem: PAIN - ADULT  Goal: Verbalizes/displays adequate comfort level or baseline comfort level  Description: Interventions:  - Encourage patient to monitor pain and request assistance  - Assess pain using appropriate pain scale  - Administer analgesics based on type and severity of pain and evaluate response  - Implement non-pharmacological measures as appropriate and evaluate response  - Consider cultural and social influences on pain and pain management  - Notify physician/advanced practitioner if interventions unsuccessful or patient reports new pain  Outcome: Progressing     Problem: INFECTION - ADULT  Goal: Absence or prevention of progression during hospitalization  Description: INTERVENTIONS:  - Assess and monitor for signs and symptoms of infection  - Monitor lab/diagnostic results  - Monitor all insertion sites, i.e. indwelling lines, tubes, and drains  - Monitor endotracheal if appropriate and nasal secretions for changes in amount and color  - [place] appropriate cooling/warming therapies per order  - Administer medications as ordered  - Instruct and encourage patient and family to use good hand hygiene technique  - Identify and instruct in appropriate isolation precautions for identified infection/condition  Outcome: Progressing  Goal: Absence of fever/infection during neutropenic period  Description: INTERVENTIONS:  - Monitor WBC    Outcome: Progressing     Problem: SAFETY ADULT  Goal: Patient will remain free of falls  Description: INTERVENTIONS:  - Educate patient/family on patient safety including physical limitations  - Instruct patient to call for assistance with activity   - Consult OT/PT to assist with strengthening/mobility   - Keep Call bell within reach  - Keep bed low and locked with side rails adjusted as appropriate  - Keep care items and personal belongings within reach  - Initiate and maintain comfort rounds  - Make Fall Risk Sign visible to staff  - Offer Toileting every  Hours,  in advance of need  - Initiate/Maintain alarm  - Obtain necessary fall risk management equipment:   - Apply yellow socks and bracelet for high fall risk patients  - Consider moving patient to room near nurses station  Outcome: Progressing  Goal: Maintain or return to baseline ADL function  Description: INTERVENTIONS:  -  Assess patient's ability to carry out ADLs; assess patient's baseline for ADL function and identify physical deficits which impact ability to perform ADLs (bathing, care of mouth/teeth, toileting, grooming, dressing, etc.)  - Assess/evaluate cause of self-care deficits   - Assess range of motion  - Assess patient's mobility; develop plan if impaired  - Assess patient's need for assistive devices and provide as appropriate  - Encourage maximum independence but intervene and supervise when necessary  - Involve family in performance of ADLs  - Assess for home care needs following discharge   - Consider OT consult to assist with ADL evaluation and planning for discharge  - Provide patient education as appropriate  Outcome: Progressing  Goal: Maintains/Returns to pre admission functional level  Description: INTERVENTIONS:  - Perform AM-PAC 6 Click Basic Mobility/ Daily Activity assessment daily.  - Set and communicate daily mobility goal to care team and patient/family/caregiver.   - Collaborate with rehabilitation services on mobility goals if consulted  - Perform Range of Motion  times a day.  - Reposition patient every  hours.  - Dangle patient  times a day  - Stand patient  times a day  - Ambulate patient  times a day  - Out of bed to chair  times a day   - Out of bed for meals times a day  - Out of bed for toileting  - Record patient progress and toleration of activity level   Outcome: Progressing     Problem: DISCHARGE PLANNING  Goal: Discharge to home or other facility with appropriate resources  Description: INTERVENTIONS:  - Identify barriers to discharge w/patient and caregiver  - Arrange for  needed discharge resources and transportation as appropriate  - Identify discharge learning needs (meds, wound care, etc.)  - Arrange for interpretive services to assist at discharge as needed  - Refer to Case Management Department for coordinating discharge planning if the patient needs post-hospital services based on physician/advanced practitioner order or complex needs related to functional status, cognitive ability, or social support system  Outcome: Progressing     Problem: Knowledge Deficit  Goal: Patient/family/caregiver demonstrates understanding of disease process, treatment plan, medications, and discharge instructions  Description: Complete learning assessment and assess knowledge base.  Interventions:  - Provide teaching at level of understanding  - Provide teaching via preferred learning methods  Outcome: Progressing     Problem: Neurological Deficit  Goal: Neurological status is stable or improving  Description: Interventions:  - Monitor and assess patient's level of consciousness, motor function, sensory function, and level of assistance needed for ADLs.   - Monitor and report changes from baseline. Collaborate with interdisciplinary team to initiate plan and implement interventions as ordered.   - Provide and maintain a safe environment.  - Consider seizure precautions.  - Consider fall precautions.  - Consider aspiration precautions.  - Consider bleeding precautions.  Outcome: Progressing     Problem: Activity Intolerance/Impaired Mobility  Goal: Mobility/activity is maintained at optimum level for patient  Description: Interventions:  - Assess and monitor patient  barriers to mobility and need for assistive/adaptive devices.  - Assess patient's emotional response to limitations.  - Collaborate with interdisciplinary team and initiate plans and interventions as ordered.  - Encourage independent activity per ability.  - Maintain proper body alignment.  - Perform active/passive rom as  tolerated/ordered.  - Plan activities to conserve energy.  - Turn patient as appropriate  Outcome: Progressing     Problem: Communication Impairment  Goal: Ability to express needs and understand communication  Description: Assess patient's communication skills and ability to understand information.  Patient will demonstrate use of effective communication techniques, alternative methods of communication and understanding even if not able to speak.     - Encourage communication and provide alternate methods of communication as needed.  - Collaborate with case management/ for discharge needs.  - Include patient/family/caregiver in decisions related to communication.  Outcome: Progressing     Problem: Potential for Aspiration  Goal: Non-ventilated patient's risk of aspiration is minimized  Description: Assess and monitor vital signs, respiratory status, and labs (WBC).  Monitor for signs of aspiration (tachypnea, cough, rales, wheezing, cyanosis, fever).    - Assess and monitor patient's ability to swallow.  - Place patient up in chair to eat if possible.  - HOB up at 90 degrees to eat if unable to get patient up into chair.  - Supervise patient during oral intake.   - Instruct patient/ family to take small bites.  - Instruct patient/ family to take small single sips when taking liquids.  - Follow patient-specific strategies generated by speech pathologist.  Outcome: Progressing  Goal: Ventilated patient's risk of aspiration is minimized  Description: Assess and monitor vital signs, respiratory status, airway cuff pressure, and labs (WBC).  Monitor for signs of aspiration (tachypnea, cough, rales, wheezing, cyanosis, fever).    - Elevate head of bed 30 degrees if patient has tube feeding.  - Monitor tube feeding.  Outcome: Progressing     Problem: Nutrition  Goal: Nutrition/Hydration status is improving  Description: Monitor and assess patient's nutrition/hydration status for malnutrition (ex- brittle  hair, bruises, dry skin, pale skin and conjunctiva, muscle wasting, smooth red tongue, and disorientation). Collaborate with interdisciplinary team and initiate plan and interventions as ordered.  Monitor patient's weight and dietary intake as ordered or per policy. Utilize nutrition screening tool and intervene per policy. Determine patient's food preferences and provide high-protein, high-caloric foods as appropriate.     - Assist patient with eating.  - Allow adequate time for meals.  - Encourage patient to take dietary supplement as ordered.  - Collaborate with clinical nutritionist.  - Include patient/family/caregiver in decisions related to nutrition.  Outcome: Progressing

## 2024-06-02 NOTE — PHYSICAL THERAPY NOTE
Physical Therapy Evaluation  Patient Name: Sofy Hendrickson    Today's Date: 6/2/2024     Problem List  Principal Problem:    Stroke-like symptoms  Active Problems:    Hypertension    Glucose intolerance (impaired glucose tolerance)    Mixed hyperlipidemia    LOLI (obstructive sleep apnea)       Past Medical History  Past Medical History:   Diagnosis Date    Cellulitis of left elbow 10/30/2016    CPAP (continuous positive airway pressure) dependence     Epistaxis     Medial meniscus tear 11/11/2014    Morbid obesity with BMI of 40.0-44.9, adult (HCC) 12/28/2018    Sleep apnea     SVT (supraventricular tachycardia) 2020    Umbilical hernia         Past Surgical History  Past Surgical History:   Procedure Laterality Date    KNEE ARTHROSCOPY W/ MENISCAL REPAIR  07/26/2015    with medial meniscus repair    OOPHORECTOMY Bilateral 2002    TX BIOPSY SOFT TISSUE BACK/FLANK DEEP N/A 10/2/2023    Procedure: EXCISION RIGHT FLANK MASS;  Surgeon: Ryan Singh MD;  Location:  MAIN OR;  Service: General    TOTAL ABDOMINAL HYSTERECTOMY  2002 06/02/24 1133   PT Last Visit   PT Visit Date 06/02/24   Note Type   Note type Evaluation   Pain Assessment   Pain Assessment Tool 0-10   Pain Score No Pain   Restrictions/Precautions   Weight Bearing Precautions Per Order No   Home Living   Type of Home House   Home Layout Two level;Able to live on main level with bedroom/bathroom   Bathroom Shower/Tub Walk-in shower   Bathroom Toilet Raised   Additional Comments PTA, pt was independent with ADLs, IADLs, and functional mobility without an AD. Pt resides alone in a handicap assessable home.   Prior Function   Level of [place] Independent with ADLs   Falls in the last 6 months 0   Vocational Retired   Cognition   Overall Cognitive Status WFL   Arousal/Participation Cooperative   Orientation Level Oriented X4   Memory Within functional limits   Comments pleasant and cooperative    LUE Assessment   LUE Assessment WFL   RLE Assessment   RLE Assessment WFL   Transfers   Sit to Stand 7  Independent   Stand to Sit 7  Independent   Ambulation/Elevation   Gait Assistance 7  Independent   Additional items Assist x 1;Verbal cues   Assistive Device None   Distance 75 feet x 2   Balance   Static Sitting Fair +   Dynamic Sitting Fair +   Static Standing Fair +   Dynamic Standing Fair +   Ambulatory Fair +   Activity Tolerance   Activity Tolerance Patient tolerated treatment well   Assessment   Assessment Pt is 73 y.o. female seen for low complexity PT evaluation s/p admission to [facility] on 6/2/24 for stroke like symptoms including left and left upper extremity numbness and dizziness. CT negative for acute changes. Etiology thought to be posterior circulation TIA.  Comorbidities affecting pt's physical performance at time of assessment include: LOLI, mixed hyperlipidemia, and HTN.  PTA, pt was independent with ADLs, IADLs, and functional mobility without an AD. Pt resides alone in a handicap assessable home. Currently, patient reports she feels as if she back to baseline. Pt competed transfers and ambulation independently without AD. Pt is functioning close to prior baseline and no longer requires inpatient PT needs. Please re-consult if medically appropriate. Educated on home safety, energy conservation, and inpatient PT discharge needs. Encouraged ambulation 3-4/x day. No PT needs upon d/c   Plan   Treatment/Interventions   (D/C IP PT)   PT Frequency   (D/C IP PT)   Discharge Recommendation   Rehab Resource Intensity Level, PT No post-acute rehabilitation needs   AM-PAC Basic Mobility Inpatient   Turning in Flat Bed Without Bedrails 4   Lying on Back to Sitting on Edge of Flat Bed Without Bedrails 4   Moving Bed to Chair 4   Standing Up From Chair Using Arms 4   Walk in Room 4   Climb 3-5 Stairs With Railing 4   Basic Mobility Inpatient Raw Score 24   Basic Mobility Standardized Score 57.68   [facility]  Highest Level Of Mobility   JH-HLM Goal 8: Walk 250 feet or more   JH-HLM Achieved 7: Walk 25 feet or more   Barthel Index   Feeding 10   Bathing 5   Grooming Score 5   Dressing Score 10   Bladder Score 10   Bowels Score 10   Toilet Use Score 10   Transfers (Bed/Chair) Score 15   Mobility (Level Surface) Score 15   Stairs Score 10   Barthel Index Score 100   End of Consult   Patient Position at End of Consult Bedside chair;All needs within reach         Kaye Reyes PT, DPT

## 2024-06-02 NOTE — PLAN OF CARE
Problem: PAIN - ADULT  Goal: Verbalizes/displays adequate comfort level or baseline comfort level  Description: Interventions:  - Encourage patient to monitor pain and request assistance  - Assess pain using appropriate pain scale  - Administer analgesics based on type and severity of pain and evaluate response  - Implement non-pharmacological measures as appropriate and evaluate response  - Consider cultural and social influences on pain and pain management  - Notify physician/advanced practitioner if interventions unsuccessful or patient reports new pain  Outcome: Progressing     Problem: INFECTION - ADULT  Goal: Absence or prevention of progression during hospitalization  Description: INTERVENTIONS:  - Assess and monitor for signs and symptoms of infection  - Monitor lab/diagnostic results  - Monitor all insertion sites, i.e. indwelling lines, tubes, and drains  - Monitor endotracheal if appropriate and nasal secretions for changes in amount and color  - [place] appropriate cooling/warming therapies per order  - Administer medications as ordered  - Instruct and encourage patient and family to use good hand hygiene technique  - Identify and instruct in appropriate isolation precautions for identified infection/condition  Outcome: Progressing     Problem: SAFETY ADULT  Goal: Patient will remain free of falls  Description: INTERVENTIONS:  - Educate patient/family on patient safety including physical limitations  - Instruct patient to call for assistance with activity   - Consult OT/PT to assist with strengthening/mobility   - Keep Call bell within reach  - Keep bed low and locked with side rails adjusted as appropriate  - Keep care items and personal belongings within reach  - Initiate and maintain comfort rounds  - Make Fall Risk Sign visible to staff  - Offer Toileting every 2 Hours, in advance of need  - Initiate/Maintain bed alarm    - Apply yellow socks and bracelet for high fall risk patients  - Consider  moving patient to room near nurses station  Outcome: Progressing     Problem: Knowledge Deficit  Goal: Patient/family/caregiver demonstrates understanding of disease process, treatment plan, medications, and discharge instructions  Description: Complete learning assessment and assess knowledge base.  Interventions:  - Provide teaching at level of understanding  - Provide teaching via preferred learning methods  Outcome: Progressing     Problem: Neurological Deficit  Goal: Neurological status is stable or improving  Description: Interventions:  - Monitor and assess patient's level of consciousness, motor function, sensory function, and level of assistance needed for ADLs.   - Monitor and report changes from baseline. Collaborate with interdisciplinary team to initiate plan and implement interventions as ordered.   - Provide and maintain a safe environment.  - Consider seizure precautions.  - Consider fall precautions.  - Consider aspiration precautions.  - Consider bleeding precautions.  Outcome: Progressing     Problem: Communication Impairment  Goal: Ability to express needs and understand communication  Description: Assess patient's communication skills and ability to understand information.  Patient will demonstrate use of effective communication techniques, alternative methods of communication and understanding even if not able to speak.     - Encourage communication and provide alternate methods of communication as needed.  - Collaborate with case management/ for discharge needs.  - Include patient/family/caregiver in decisions related to communication.  Outcome: Progressing     Problem: Potential for Aspiration  Goal: Non-ventilated patient's risk of aspiration is minimized  Description: Assess and monitor vital signs, respiratory status, and labs (WBC).  Monitor for signs of aspiration (tachypnea, cough, rales, wheezing, cyanosis, fever).    - Assess and monitor patient's ability to swallow.  -  Place patient up in chair to eat if possible.  - HOB up at 90 degrees to eat if unable to get patient up into chair.  - Supervise patient during oral intake.   - Instruct patient/ family to take small bites.  - Instruct patient/ family to take small single sips when taking liquids.  - Follow patient-specific strategies generated by speech pathologist.  Outcome: Progressing

## 2024-06-02 NOTE — CASE MANAGEMENT
Case Management Assessment & Discharge Planning Note    Patient name Sofy Hendrickson  Location [facility]-01 MRN [id number]  : 1950 Date 2024       Current Admission Date: 2024  Current Admission Diagnosis:TIA (transient ischemic attack)   Patient Active Problem List    Diagnosis Date Noted Date Diagnosed    TIA (transient ischemic attack) 2024     Chronic pain of left knee 04/10/2023     Periodic limb movement disorder 2023     LOLI (obstructive sleep apnea)      Mixed hyperlipidemia 2021     1st degree AV block 2021     SVT (supraventricular tachycardia) 2021     Glucose intolerance (impaired glucose tolerance) 2020     Morbid obesity with BMI of 40.0-44.9, adult (HCC) 11/15/2017     Hypertension 2014       LOS (days): 1  Geometric Mean LOS (GMLOS) (days):   Days to GMLOS:     OBJECTIVE:    Risk of Unplanned Readmission Score: 9.57         Current admission status: Inpatient  Referral Reason: Stroke    Preferred Pharmacy:   [facility] - [place] Delgado - [address]  [address]  [place] [address]  Phone: [phone] Fax: [phone]    [facility] [place] [place] [facility] [address]  [address]  [place] [address]  Phone: [phone] Fax: [phone]    Primary Care Provider: Ricardo Lee MD    Primary Insurance: [employer]  Secondary Insurance:     ASSESSMENT:  Active Health Care Proxies    There are no active Health Care Proxies on file.                 Readmission Root Cause  30 Day Readmission: No    Patient Information  Admitted from:: Home  Mental Status: Alert  During Assessment patient was accompanied by: Not accompanied during assessment  Assessment information provided by:: Patient  Primary Caregiver: Self  Support Systems: Self, Daughter, Family members  County of Residence: [place]  What city do you live in?: [place]  Home entry access options.  Select all that apply.: Stairs  Number of steps to enter home.: 2 (2 graduated steps on pathway to enter)  Do the steps have railings?: No  Type of Current Residence: [place]  Living Arrangements: Other (Comment) (Patient's daughter and her family reside with patient)  Is patient a ?: No    Activities of Daily Living Prior to Admission  Functional Status: Independent  Completes ADLs independently?: Yes  Ambulates independently?: Yes  Does patient use assisted devices?: No  Does patient currently own DME?: No  Does patient have a history of Outpatient Therapy (PT/OT)?: Yes (1 outpatient evaluation recently)  Does the patient have a history of Short-Term Rehab?: No  Does patient have a history of HHC?: No  Does patient currently have HHC?: No         Patient Information Continued  Income Source: Pension/care home  Does patient have prescription coverage?: Yes  Does patient receive dialysis treatments?: No  Does patient have a history of substance abuse?: No  Does patient have a history of Mental Health Diagnosis?: No         Means of Transportation  Means of Transport to Appts:: Drives Self      Social Determinants of Health (SDOH)      Flowsheet Row Most Recent Value   Housing Stability    In the last 12 months, was there a time when you were not able to pay the mortgage or rent on time? N   In the past 12 months, how many times have you moved where you were living? 1   At any time in the past 12 months, were you homeless or living in a shelter (including now)? N   Transportation Needs    In the past 12 months, has lack of transportation kept you from medical appointments or from getting medications? no   In the past 12 months, has lack of transportation kept you from meetings, work, or from getting things needed for daily living? No   Food Insecurity    Within the past 12 months, you worried that your food would run out before you got the money to buy more. Never true   Within the past 12 months, the food you  bought just didn't last and you didn't have money to get more. Never true   Utilities    In the past 12 months has the electric, gas, oil, or water company threatened to shut off services in your home? No            DISCHARGE DETAILS:    Discharge planning discussed with:: Patient  Freedom of Choice: Yes  Comments - Freedom of Choice: No CM DC needs discussed or identified at this time  CM contacted family/caregiver?: No- see comments (Patient is alert and oriented)  Were Treatment Team discharge recommendations reviewed with patient/caregiver?: Yes  Did patient/caregiver verbalize understanding of patient care needs?: Yes  Were patient/caregiver advised of the risks associated with not following Treatment Team discharge recommendations?: Yes    Contacts  Patient Contacts: Patient  Relationship to Patient:: Other (Comment)  Contact Method: In Person  Reason/Outcome: Discharge Planning, Continuity of Care    Requested Home Health Care         Is the patient interested in HHC at discharge?: No    DME Referral Provided  Referral made for DME?: No    Other Referral/Resources/Interventions Provided:  Interventions: None Indicated         Treatment Team Recommendation: Home  Discharge Destination Plan:: Home  Transport at Discharge : Family                                         CM met with patient.  CM name and role reviewed.  CM assessment completed and charted above.    Patient [place].  Her daughter and daughter's family live with her.    No needs are identified at the time of the initial assessment,  care manager will respond upon request or reassess patient for discharge needs as appropriate.     CM reviewed discharge planning process including the following: identifying caregivers at home, preference for d/c planning needs, [facility] program, availability of treatment team to discuss questions or concerns patient and/or family may have regarding diagnosis, plan of care, old or new  medications and discharge planning.  CM will continue to follow for care coordination and update assessment as necessary.

## 2024-06-02 NOTE — ASSESSMENT & PLAN NOTE
Patient states she experiences recurrent reactivation of right sacral zoster, and currently has a few scabbed over lesions in that area.  She follows with dermatology and is not on suppressive medication.  Currently on isolation precautions.  No further treatment needed during admission.

## 2024-06-02 NOTE — UTILIZATION REVIEW
"Initial Clinical Review    Admission: Date/Time/Statement:   Admission Orders (From admission, onward)       Ordered        06/01/24 1109  INPATIENT ADMISSION  Once                          Orders Placed This Encounter   Procedures    INPATIENT ADMISSION     Standing Status:   Standing     Number of Occurrences:   1     Order Specific Question:   Level of Care     Answer:   Med Surg [16]     Order Specific Question:   Estimated length of stay     Answer:   More than 2 Midnights     Order Specific Question:   Certification     Answer:   I certify that inpatient services are medically necessary for this patient for a duration of greater than two midnights. See H&P and MD Progress Notes for additional information about the patient's course of treatment.     ED Arrival Information       Expected   -    Arrival   6/1/2024 09:40    Acuity   Urgent              Means of arrival   Ambulance    Escorted by   [facility]    Service   Neurology    Admission type   Emergency              Arrival complaint   tingling             Chief Complaint   Patient presents with    Dizziness     Per EMS, pt reports having sudden onset dizziness while watering her plants around 0800; states she had left face and arm tingling as well as heaviness in left lower leg that has since resolved       Initial Presentation: 73 y.o. female with PMHx: HTN,  dyslipidemia, PAT, BPPV, LOLI on CPAP, periodic limb movement disorder who presented on 6/1 to [facility] ED via EMS due to eft face and left upper extremity numbness and left lower extremity heaviness. Pt suddenly felt off balance which she attributed to heaviness in her left lower extremity.  She also noticed numbness in the left V2 distribution and left upper extremity, mostly distally.  She also felt \"dizzy\", which she described as room spinning, but this was short-lived.  She denied any headache, visual change, speech disturbance, facial droop.  In the ED, she noted that " the only residual symptoms were some subtle numbness of the left face and left index finger. CT head demonstrated no acute changes. CTA head/neck demonstrated no flow consequential stenosis or LVO. Blood pressure 216/109 on presentation. Patient is on aspirin 81 mg daily at baseline. Patient has no personal history of TIA, stroke, cardiac disease but states she has a strong family history of cardiac disease, which is why she takes the aspirin.  NIH stroke scale 1 for diminished pin appreciation left V2 distribution and left index finger.  Neurologic exam otherwise unremarkable.  Plan:  Admit Inpatient status to med surg dt stroke like symptoms: Telemetry, Neurology consult, freq neuro checks and VS, baseline NIH stroke scale, order Echo and MRI brain, NPO, permissive HTN, accuchecks w/ SSI, order lipid panel, A1c, TSH, monitor IO and daily wts, PT OT ST eval, CM consult, inc spirometry.    Anticipated Length of Stay/Certification Statement: Patient will be admitted on an Inpatient basis with an anticipated length of stay of   2 midnights.     Justification for Hospital Stay: Stroke workup including MRI brain    Date: 6/2   Day 2:   Patient reports resolution of all symptoms yesterday.  No new symptoms. Patient with reactivation of her recurrent zoster in the right sacral area.  Not painful per patient and she desires no treatment. MRI brain negative for acute infarct. Did demonstrate punctate chronic small vessel cerebrovascular ischemic disease and chronic cerebellar lacunar infarcts. Continue ASA, plavix, DAPT x3 wks, statin, monitor VS and labs.     Date: 6/3  Day 3: Has surpassed a 2nd midnight with active treatments and services. No new complaints or concerns today. Continue above tx plan including, PT OT ST, monitor VS and labs, freq neuro checks.     ED Triage Vitals [06/01/24 0947]   Temperature Pulse Respirations Blood Pressure SpO2   97.8 °F (36.6 °C) 75 19 (!) 216/109 96 %      Temp Source Heart Rate  Source Patient Position - Orthostatic VS BP Location FiO2 (%)   Oral Monitor Sitting Right arm --      Pain Score       No Pain          Wt Readings from Last 1 Encounters:   06/03/24 109 kg (240 lb)     Additional Vital Signs:   Date/Time Temp Pulse Resp BP MAP (mmHg) SpO2 O2 Device O2 Interface Device Patient Position - Orthostatic VS   06/03/24 15:29:06 97.7 °F (36.5 °C) 64 17 121/67 85 92 % -- -- --   06/03/24 07:18:04 98.7 °F (37.1 °C) 70 -- 139/77 98 93 % -- -- --   06/03/24 0345 -- -- -- -- -- 96 % -- Face mask --   06/03/24 0000 -- -- -- -- -- -- None (Room air) -- --   06/02/24 2315 -- -- -- -- -- 96 % -- Face mask --   06/02/24 21:05:22 97.7 °F (36.5 °C) 62 18 111/64 80 94 % -- -- --   06/02/24 2102 -- 49 Abnormal  -- -- -- -- -- -- --   06/02/24 2000 -- -- -- -- -- -- None (Room air) -- --   06/02/24 19:49:30 98.5 °F (36.9 °C) -- 17 110/67 81 -- -- -- --   06/02/24 15:42:42 98.5 °F (36.9 °C) 60 17 114/72 86 94 % None (Room air) -- Lying   06/02/24 08:27:16 99.1 °F (37.3 °C) 92 16 146/92 110 97 % -- -- --   06/02/24 05:56:22 -- 70 -- 139/79 99 97 % -- -- --   06/02/24 0211 -- -- -- 111/63 79 -- -- -- --   06/02/24 00:13:27 98.4 °F (36.9 °C) 83 -- 118/66 83 96 % -- -- --   06/01/24 22:16:32 97.5 °F (36.4 °C) 66 16 113/66 82 95 % -- -- --   06/01/24 2146 -- -- -- -- -- 95 % -- Face mask --   06/01/24 19:30:03 98.7 °F (37.1 °C) -- 17 133/80 98 -- -- -- --   06/01/24 1930 -- -- -- -- -- -- None (Room air) -- --   06/01/24 15:12:06 98.6 °F (37 °C) 77 17 150/83 105 95 % -- -- Lying   06/01/24 13:48:29 98.8 °F (37.1 °C) 90 -- 141/84 103 97 % -- -- --   06/01/24 1347 -- -- -- -- -- -- None (Room air) -- --   06/01/24 1245 -- 78 18 143/85 107 96 % -- -- --   06/01/24 1215 -- 70 21 137/78 -- 96 % -- -- --   06/01/24 1115 -- 72 21 171/84 Abnormal  -- 95 % None (Room air) -- --   06/01/24 1100 -- 72 22 155/81 -- 98 % -- -- --   06/01/24 1045 -- 72 20 169/75 -- 98 % None (Room air) -- --   06/01/24 1030 -- 64 18 164/74  -- 98 % None (Room air) -- --   06/01/24 1019 -- 66 20 164/100 -- 97 % None (Room air) -- --   06/01/24 0956 -- 72 19 181/91 Abnormal  128 98 % -- -- --   06/01/24 0951 -- -- -- -- -- -- None (Room air) -- --   06/01/24 0947 97.8 °F (36.6 °C) 75 19 216/109 Abnormal  149 96 % None (Room air) -- Sitting     Pertinent Labs/Diagnostic Test Results:   6/3 ECHO:      Left Ventricle: Left ventricular cavity size is normal. Wall thickness is normal. The left ventricular ejection fraction is 65%. Systolic function is normal. Wall motion is normal. Diastolic function is mildly abnormal, consistent with grade I (abnormal) relaxation.    Mitral Valve: There is mild regurgitation.    Tricuspid Valve: There is mild regurgitation. The estimated right ventricular systolic pressure is 23.00 mmHg.    Aorta: The aortic root is normal in size. The ascending aorta is mildly dilated. The ascending aorta is 4 cm.     6/1 EKG:  Sinus rhythm with marked sinus arrhythmia with 1st degree A-V block  Left axis deviation  Pulmonary disease pattern  Nonspecific T wave abnormality  Abnormal ECG    MRI brain wo contrast   Final Result by Rafy Duenas MD (06/01 1709)      No mass effect, acute intracranial hemorrhage or evidence of recent infarction. Minimal chronic microvascular ischemic change and chronic cerebellar lacunar infarcts.      Workstation performed: [id number]         CTA stroke alert (head/neck)   Final Result by Dustin Phipps DO (06/01 1057)      Unremarkable cervical and intracranial vasculature with the exception of atherosclerotic change of the carotid bifurcations bilaterally with only minor stenosis. No high-grade stenosis or occlusive disease.            Findings were directly discussed with Abhi Espinoza at 7:40 a.m.                           Workstation performed: [id number]         CT stroke alert brain   Final Result by Dustin Phipps DO (06/01 1050)      No acute intracranial abnormality.      Findings were  directly discussed with Abhi Espinoza at approximately 10:40 a.m.      Workstation performed: [id number]           Results from last 7 days   Lab Units 06/01/24  1012   SARS-COV-2  Negative     Results from last 7 days   Lab Units 06/02/24  0552 06/01/24  1012   WBC Thousand/uL 7.07 6.92   HEMOGLOBIN g/dL 13.3 14.6   HEMATOCRIT % 40.4 44.0   PLATELETS Thousands/uL 183 201         Results from last 7 days   Lab Units 06/02/24  0552 06/01/24  1012   SODIUM mmol/L 140 139   POTASSIUM mmol/L 4.1 3.9   CHLORIDE mmol/L 104 103   CO2 mmol/L 25 27   ANION GAP mmol/L 11 9   BUN mg/dL 13 15   CREATININE mg/dL 0.52* 0.62   EGFR ml/min/1.73sq m 96 91   CALCIUM mg/dL 8.4 9.0   MAGNESIUM mg/dL 1.8*  --    PHOSPHORUS mg/dL 3.8  --          Results from last 7 days   Lab Units 06/01/24  1012   POC GLUCOSE mg/dl 122     Results from last 7 days   Lab Units 06/02/24  0552 06/01/24  1012   GLUCOSE RANDOM mg/dL 104 110         Results from last 7 days   Lab Units 06/02/24  0552   HEMOGLOBIN A1C % 5.6   EAG mg/dl 114     Results from last 7 days   Lab Units 06/01/24  1605 06/01/24  1229 06/01/24  1012   HS TNI 0HR ng/L  --   --  5   HS TNI 2HR ng/L  --  5  --    HSTNI D2 ng/L  --  0  --    HS TNI 4HR ng/L 9  --   --    HSTNI D4 ng/L 4  --   --          Results from last 7 days   Lab Units 06/01/24  1012   PROTIME seconds 12.7   INR  0.96   PTT seconds 23     Results from last 7 days   Lab Units 06/02/24  0552   TSH 3RD GENERATON uIU/mL 3.158     Results from last 7 days   Lab Units 06/01/24  1012   INFLUENZA A PCR  Negative   INFLUENZA B PCR  Negative   RSV PCR  Negative     ED Treatment:   Medication Administration from 06/01/2024 0939 to 06/01/2024 1314         Date/Time Order Dose Route Action     06/01/2024 1045 EDT iohexol (OMNIPAQUE) 350 MG/ML injection (MULTI-DOSE) 85 mL 85 mL Intravenous Given     06/01/2024 1109 EDT clopidogrel (PLAVIX) tablet 300 mg 300 mg Oral Given     06/01/2024 1109 EDT aspirin chewable tablet 81 mg 81 mg Oral  Given     06/01/2024 1204 EDT Cholecalciferol (VITAMIN D3) tablet 2,000 Units 2,000 Units Oral Given     06/01/2024 1204 EDT multivitamin-minerals (CENTRUM) tablet 1 tablet 1 tablet Oral Given          Past Medical History:   Diagnosis Date    Cellulitis of left elbow 10/30/2016    CPAP (continuous positive airway pressure) dependence     Epistaxis     Medial meniscus tear 11/11/2014    Morbid obesity with BMI of 40.0-44.9, adult (HCC) 12/28/2018    Sleep apnea     SVT (supraventricular tachycardia) 2020    Umbilical hernia      Present on Admission:   Glucose intolerance (impaired glucose tolerance)   Hypertension   LOLI (obstructive sleep apnea)   Mixed hyperlipidemia   Zoster without complications      Admitting Diagnosis: Paresthesia [R20.2]  Tingling [R20.2]  Weakness [R53.1]  Stroke-like symptom [R29.90]  Age/Sex: 73 y.o. female  Admission Orders:  Scheduled Medications:  aspirin, 81 mg, Oral, Daily  calcium carbonate-vitamin D, 0.5 tablet, Oral, Daily With Breakfast  Cholecalciferol, 2,000 Units, Oral, Daily  clopidogrel, 75 mg, Oral, Daily  enoxaparin, 40 mg, Subcutaneous, Daily  lisinopril, 40 mg, Oral, Daily  lovastatin, 20 mg, Oral, HS  metoprolol tartrate, 50 mg, Oral, Q12H HALLE  multivitamin-minerals, 1 tablet, Oral, Daily      Continuous IV Infusions:     PRN Meds:  nystatin, , Topical, BID PRN        IP CONSULT TO CASE MANAGEMENT  IP CONSULT TO NUTRITION SERVICES    Network Utilization Review Department  ATTENTION: Please call with any questions or concerns to [phone] and carefully listen to the prompts so that you are directed to the right person. All voicemails are confidential.   For Discharge needs, contact Care Management DC Support Team at [phone] opt. [phone]  Send all requests for admission clinical reviews, approved or denied determinations and any other requests to dedicated fax number below belonging to the campus where the patient is receiving treatment. List of dedicated fax numbers for  the Facilities:  FACILITY NAME UR FAX NUMBER   ADMISSION DENIALS (Administrative/Medical Necessity) [phone]   DISCHARGE SUPPORT TEAM (NETWORK) [phone]   PARENT CHILD HEALTH (Maternity/NICU/Pediatrics) [phone]   [facility] [phone]   [facility] [phone]   [facility] [phone]   [facility] [phone]   [facility] [phone]   [facility] [phone]   [facility] [phone]   [facility] [phone]   [facility] [phone]   [facility] [phone]   [facility] [phone]   [facility] [phone]

## 2024-06-02 NOTE — OCCUPATIONAL THERAPY NOTE
Occupational Therapy Evaluation     Patient Name: Sofy Hendrickson  Today's Date: 6/2/2024  Problem List  Principal Problem:    TIA (transient ischemic attack)  Active Problems:    Hypertension    Glucose intolerance (impaired glucose tolerance)    Zoster without complications    Mixed hyperlipidemia    LOLI (obstructive sleep apnea)    Past Medical History  Past Medical History:   Diagnosis Date    Cellulitis of left elbow 10/30/2016    CPAP (continuous positive airway pressure) dependence     Epistaxis     Medial meniscus tear 11/11/2014    Morbid obesity with BMI of 40.0-44.9, adult (HCC) 12/28/2018    Sleep apnea     SVT (supraventricular tachycardia) 2020    Umbilical hernia      Past Surgical History  Past Surgical History:   Procedure Laterality Date    KNEE ARTHROSCOPY W/ MENISCAL REPAIR  07/26/2015    with medial meniscus repair    OOPHORECTOMY Bilateral 2002    KS BIOPSY SOFT TISSUE BACK/FLANK DEEP N/A 10/2/2023    Procedure: EXCISION RIGHT FLANK MASS;  Surgeon: Ryan Singh MD;  Location:  MAIN OR;  Service: General    TOTAL ABDOMINAL HYSTERECTOMY  2002 06/02/24 1002   OT Last Visit   OT Visit Date 06/02/24   Note Type   Note type Evaluation   Pain Assessment   Pain Assessment Tool 0-10   Pain Score No Pain   Restrictions/Precautions   Weight Bearing Precautions Per Order No   Home Living   Type of Home House   Home Layout One level;Performs ADLs on one level;Able to live on main level with bedroom/bathroom  (1+1 JUAN)   Bathroom Shower/Tub Walk-in shower   Bathroom Toilet Raised   Bathroom Equipment Grab bars in shower;Built-in shower seat;Grab bars around toilet   Home Equipment   (pt denies)   Prior Function   Level of [place] Independent with ADLs;Independent with functional mobility;Needs assistance with functional mobility   Lives With Alone   Receives Help From Family;Friend(s)   IADLs Independent with driving;Independent with meal prep;Independent with medication management   Falls  in the last 6 months 0   Vocational Retired   Lifestyle   Autonomy Pt reports I w/ ADLs, IADLs, and fxnl mobility w/o AD at baseline; + driving.   Reciprocal Relationships Pt lives alone; supportive [employer] community.   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   Intrinsic Gratification She enjoys volunteer at her [employer] and staying active.   General   Family/Caregiver Present No   ADL   Where Assessed Chair   Eating Assistance 7  Independent   Grooming Assistance 7  Independent   UB Bathing Assistance 7  Independent   LB Bathing Assistance 6  Modified Independent   UB Dressing Assistance 7  Independent   LB Dressing Assistance 6  Modified independent   Toileting Assistance  6  Modified independent   Bed Mobility   Supine to Sit Unable to assess   Sit to Supine Unable to assess   Additional Comments Pt seated OOB in chair at end of OT evaluation w/ all needs within reach.   Transfers   Sit to Stand 7  Independent   Stand to Sit 7  Independent   Functional Mobility   Functional Mobility 7  Independent   Additional Comments Pt ambulated household distance w/ I.   Additional items   (no AD)   Balance   Static Sitting Good   Dynamic Sitting Good   Static Standing Fair +   Dynamic Standing Fair +   Ambulatory Fair +   Activity Tolerance   Activity Tolerance Patient tolerated treatment well   Medical Staff Made Aware PTKat, due to pt's medical complexity and multiple comorbidities   Nurse Made Aware RN clearance prior to session   RUE Assessment   RUE Assessment WFL   LUE Assessment   LUE Assessment WFL   Hand Function   Gross Motor Coordination Functional   Fine Motor Coordination Functional   Hand Function Comments Pt reports that all L-sided numbness has resolved; hand strength and sensation tests WFL.   Cognition   Overall Cognitive Status WFL   Arousal/Participation Alert;Responsive;Cooperative   Attention Within functional limits   Orientation Level Oriented X4   Memory  Within functional limits   Following Commands Follows all commands and directions without difficulty   Comments Pt was pleasant, cooperative, and willing to participate in OT evaluation.   Assessment   Assessment Pt is a 73 y.o. female seen for OT evaluation s/p admission to [facility] on 6/1/2024 due to dizziness, gait dysfunction, and L extremity weakness. Pt diagnosed with TIA (transient ischemic attack). Pt has a significant PMH impacting occupational performance including: Cellulitis of left elbow, CPAP (continuous positive airway pressure) dependence, Epistaxis, Medial meniscus tear, Morbid obesity with BMI of 40.0-44.9, adult (HCC), Sleep apnea, SVT (supraventricular tachycardia), and Umbilical hernia. Pt with active OT evaluation and treatment orders and activity orders. PTA, pt living alone in a 1 SH w/ 1+1 JUAN. Pt reports I w/ ADLs, IADLs, and fxnl mobility w/o AD at baseline; + driving. Pt agreeable and willing to participate in OT evaluation. During evaluation, pt was I for eating, grooming, and UB ADLs and mod I for LB ADLs and toileting. Pt was also I for transfers ans fxnl mobility w/o AD. Pt performing ADLs and mobility tasks at/near baseline level of independence. No further acute OT needs identified at this time. Recommend continued active ADL participation and mobilization with hospital staff while in the hospital to increase pt’s endurance and strength upon D/C. From OT standpoint, recommend D/C to home with social support when medically cleared. D/C pt from OT caseload at this time.   Goals   Patient Goals to go home   Plan   OT Frequency Eval only   Discharge Recommendation   Rehab Resource Intensity Level, OT No post-acute rehabilitation needs   AM-PAC Daily Activity Inpatient   Lower Body Dressing 4   Bathing 4   Toileting 4   Upper Body Dressing 4   Grooming 4   Eating 4   Daily Activity Raw Score 24   Daily Activity Standardized Score (Calc for Raw Score >=11) 57.54   AM-PAC  Applied Cognition Inpatient   Following a Speech/Presentation 4   Understanding Ordinary Conversation 4   Taking Medications 4   Remembering Where Things Are Placed or Put Away 4   Remembering List of 4-5 Errands 4   Taking Care of Complicated Tasks 4   Applied Cognition Raw Score 24   Applied Cognition Standardized Score 62.21       The patient's raw score on the -PAC Daily Activity Inpatient Short Form is 24. A raw score of greater than or equal to 19 suggests the patient may benefit from discharge to home. Please refer to the recommendation of the Occupational Therapist for safe discharge planning.    DARNELL Mike, OTR/L

## 2024-06-02 NOTE — PROGRESS NOTES
Progress Note - Neurology   Sofy Hendrickson 73 y.o. female MRN: [id number]  [facility] Encounter: [id number]      Assessment & Plan     * TIA (transient ischemic attack)  Assessment & Plan  73-year-old female with hypertension, dyslipidemia, PAT, BPPV, LOLI on CPAP, periodic limb movement disorder, presents with left face and left upper extremity numbness and transient left lower extremity heaviness.  NIH stroke scale on presentation 1 for diminished pin appreciation left V2 distribution and dorsum of left index finger.  Symptoms completely resolved with the time patient was moved to the floor.  ABCD2 score 4.    MRI brain negative for acute infarct.  Did demonstrate punctate chronic small vessel cerebrovascular ischemic disease and chronic cerebellar lacunar infarcts.    CT head demonstrated no acute changes.  CTA head/neck demonstrated no flow consequential stenosis or LVO.  Patient is on aspirin 81 mg daily at baseline.  Blood pressure 216/109 on presentation.  LDL 76.  A1c 5.6.  Cardiology fellow reviewed the patient's telemetry and determined she was having runs of PAT and not likely PAF.    Presenting symptoms in keeping with TIA.    Plan:  -Continue 81 mg aspirin daily.  Loaded with Plavix 300 mg x 1 and continued on Plavix 75 mg daily in addition to aspirin this admission.  Plan to continue DAPT x 3 weeks and then on or around 6/22/2024, patient will discontinue aspirin and continue Plavix 75 mg daily indefinitely.  -Patient reports intolerance to atorvastatin.  Can continue home lovastatin given near goal LDL.  -Goal normotension.  Will restart antihypertensives and have patient monitor blood pressure at home on a daily basis and report to her cardiology team any consistent readings above 130/80.  -Office will call to schedule outpatient follow-up.      LOLI (obstructive sleep apnea)  Assessment & Plan  Continue CPAP at bedtime.    Mixed hyperlipidemia  Assessment & Plan  Continue lovastatin  "(reported GI intolerance to atorvastatin).  LDL 76.    Zoster without complications  Assessment & Plan  Patient states she experiences recurrent reactivation of right sacral zoster, and currently has a few scabbed over lesions in that area.  She follows with dermatology and is not on suppressive medication.  Currently on isolation precautions.  No further treatment needed during admission.    Glucose intolerance (impaired glucose tolerance)  Assessment & Plan  History of glucose intolerance with most recent A1c normal at 5.6.    Hypertension  Assessment & Plan  Resume home lisinopril and metoprolol.  Patient will monitor her blood pressure at home on a daily basis and report any consistent readings greater than 130/80 to her cardiology team.          Sofy Hendrickson will need follow up in in 6 weeks with neurovascular attending or advance practitioner. She will not require outpatient neurological testing.    Subjective:   Patient reports resolution of all symptoms shortly after leaving the ED yesterday afternoon (prior to MRI being performed).  No new symptoms.  Patient with reactivation of her recurrent zoster in the right sacral area.  Not painful per patient and she desires no treatment.    ROS: 12 point review of systems performed and negative except as indicated above.    Vitals: Blood pressure 146/92, pulse 92, temperature 99.1 °F (37.3 °C), resp. rate 16, height 5' 6\" (1.676 m), weight 109 kg (240 lb), SpO2 97%.,Body mass index is 38.74 kg/m².    Physical Exam:   General:  Patient is well-developed, obese BMI 38.74, and in no acute distress.  HEENT:  Head normocephalic.  Eyes anicteric.    Cardiovascular:  With regular rhythm.  Lungs:  Normal effort. Nonlabored breathing.  Extremities:  With no significant edema.    Skin: 2-3 scabbing 4mm lesions noted R sacrum/buttock area.  Neurologic:  Patient is alert, pleasantly interactive, and appropriately conversational.  No obvious symbolic language difficulty or " dysarthria, and the patient is fully oriented.      Gait deferred for safety.    Cranial Nerves:   II: Visual fields full to confrontation. Pupils equal, round, reactive to light with normal accomodation.  Cannot visualize optic fundi.  III,IV,VI: extraocular movements intact with no nystagmus.   V: Sensation in the V1 through V3 distributions intact to light touch bilaterally.   VII: Face is symmetric with no weakness noted.   XII: Tongue midline with no atrophy or fasciculations with appropriate movement.     Coordination:  Accurate with finger-to-nose maneuvers bilaterally.    Motor testing with no upper or lower extremity drift.  Full strength to confrontation throughout.    Sensory testing grossly intact to light touch throughout.     Lab, Imaging and other studies: CBC:   Results from last 7 days   Lab Units 06/02/24  0552 06/01/24  1012   WBC Thousand/uL 7.07 6.92   RBC Million/uL 4.21 4.59   HEMOGLOBIN g/dL 13.3 14.6   HEMATOCRIT % 40.4 44.0   MCV fL 96 96   PLATELETS Thousands/uL 183 201   , BMP/CMP:   Results from last 7 days   Lab Units 06/02/24  0552 06/01/24  1012   SODIUM mmol/L 140 139   POTASSIUM mmol/L 4.1 3.9   CHLORIDE mmol/L 104 103   CO2 mmol/L 25 27   BUN mg/dL 13 15   CREATININE mg/dL 0.52* 0.62   CALCIUM mg/dL 8.4 9.0   EGFR ml/min/1.73sq m 96 91   , HgBA1C:   Results from last 7 days   Lab Units 06/02/24  0552   HEMOGLOBIN A1C % 5.6   , TSH:   Results from last 7 days   Lab Units 06/02/24  0552   TSH 3RD GENERATON uIU/mL 3.158   , Coagulation:   Results from last 7 days   Lab Units 06/01/24  1012   INR  0.96   , Lipid Profile:   Results from last 7 days   Lab Units 06/02/24  0552   HDL mg/dL 46*   LDL CALC mg/dL 76   TRIGLYCERIDES mg/dL 189*     VTE Prophylaxis: Sequential compression device (Venodyne)     Counseling / Coordination of Care  I have spent a total time of 45 minutes on 06/02/24 in caring for this patient including Diagnostic results, Prognosis, Risks and benefits of tx options,  Instructions for management, Patient and family education, Importance of tx compliance, Risk factor reductions, Impressions, Counseling / Coordination of care, Documenting in the medical record, Reviewing / ordering tests, medicine, procedures  , Obtaining or reviewing history  , and Communicating with other healthcare professionals .

## 2024-06-03 ENCOUNTER — APPOINTMENT (INPATIENT)
Dept: NON INVASIVE DIAGNOSTICS | Facility: HOSPITAL | Age: 74
DRG: 069 | End: 2024-06-03
Payer: COMMERCIAL

## 2024-06-03 VITALS
SYSTOLIC BLOOD PRESSURE: 121 MMHG | WEIGHT: 240 LBS | TEMPERATURE: 97.7 F | DIASTOLIC BLOOD PRESSURE: 67 MMHG | OXYGEN SATURATION: 92 % | BODY MASS INDEX: 38.57 KG/M2 | HEIGHT: 66 IN | HEART RATE: 64 BPM | RESPIRATION RATE: 17 BRPM

## 2024-06-03 LAB
AORTIC ROOT: 2.9 CM
APICAL FOUR CHAMBER EJECTION FRACTION: 56 %
ASCENDING AORTA: 4 CM
BSA FOR ECHO PROCEDURE: 2.16 M2
E WAVE DECELERATION TIME: 281 MS
E/A RATIO: 0.55
FRACTIONAL SHORTENING: 33 (ref 28–44)
INTERVENTRICULAR SEPTUM IN DIASTOLE (PARASTERNAL SHORT AXIS VIEW): 1.1 CM
INTERVENTRICULAR SEPTUM: 1.1 CM (ref 0.6–1.1)
LAAS-AP2: 17.9 CM2
LAAS-AP4: 15 CM2
LEFT ATRIUM SIZE: 3.9 CM
LEFT ATRIUM VOLUME (MOD BIPLANE): 46 ML
LEFT ATRIUM VOLUME INDEX (MOD BIPLANE): 21.3 ML/M2
LEFT INTERNAL DIMENSION IN SYSTOLE: 3.3 CM (ref 2.1–4)
LEFT VENTRICULAR INTERNAL DIMENSION IN DIASTOLE: 4.9 CM (ref 3.5–6)
LEFT VENTRICULAR POSTERIOR WALL IN END DIASTOLE: 0.8 CM
LEFT VENTRICULAR STROKE VOLUME: 68 ML
LVSV (TEICH): 68 ML
MV E'TISSUE VEL-SEP: 5 CM/S
MV PEAK A VEL: 0.86 M/S
MV PEAK E VEL: 47 CM/S
MV STENOSIS PRESSURE HALF TIME: 82 MS
MV VALVE AREA P 1/2 METHOD: 2.68
RA PRESSURE ESTIMATED: 3 MMHG
RIGHT ATRIUM AREA SYSTOLE A4C: 11.7 CM2
RIGHT VENTRICLE ID DIMENSION: 2.7 CM
RV PSP: 23 MMHG
SL CV LEFT ATRIUM LENGTH A2C: 5.6 CM
SL CV LV EF: 65
SL CV PED ECHO LEFT VENTRICLE DIASTOLIC VOLUME (MOD BIPLANE) 2D: 113 ML
SL CV PED ECHO LEFT VENTRICLE SYSTOLIC VOLUME (MOD BIPLANE) 2D: 45 ML
TR MAX PG: 20 MMHG
TR PEAK VELOCITY: 2.2 M/S
TRICUSPID ANNULAR PLANE SYSTOLIC EXCURSION: 2.9 CM
TRICUSPID VALVE PEAK REGURGITATION VELOCITY: 2.22 M/S
VZV IGG SER QL IA: NORMAL

## 2024-06-03 PROCEDURE — 86787 VARICELLA-ZOSTER ANTIBODY: CPT

## 2024-06-03 PROCEDURE — 94760 N-INVAS EAR/PLS OXIMETRY 1: CPT

## 2024-06-03 PROCEDURE — 93306 TTE W/DOPPLER COMPLETE: CPT | Performed by: INTERNAL MEDICINE

## 2024-06-03 PROCEDURE — 99238 HOSP IP/OBS DSCHRG MGMT 30/<: CPT | Performed by: PSYCHIATRY & NEUROLOGY

## 2024-06-03 PROCEDURE — 93306 TTE W/DOPPLER COMPLETE: CPT

## 2024-06-03 PROCEDURE — 94660 CPAP INITIATION&MGMT: CPT

## 2024-06-03 RX ORDER — CLOPIDOGREL BISULFATE 75 MG/1
75 TABLET ORAL DAILY
Qty: 90 TABLET | Refills: 3 | Status: SHIPPED | OUTPATIENT
Start: 2024-07-04 | End: 2024-06-06 | Stop reason: SDUPTHER

## 2024-06-03 RX ORDER — MAGNESIUM SULFATE HEPTAHYDRATE 40 MG/ML
2 INJECTION, SOLUTION INTRAVENOUS ONCE
Status: COMPLETED | OUTPATIENT
Start: 2024-06-03 | End: 2024-06-03

## 2024-06-03 RX ORDER — CLOPIDOGREL BISULFATE 75 MG/1
75 TABLET ORAL DAILY
Qty: 30 TABLET | Refills: 0 | Status: SHIPPED | OUTPATIENT
Start: 2024-06-04

## 2024-06-03 RX ADMIN — METOPROLOL TARTRATE 50 MG: 50 TABLET, FILM COATED ORAL at 08:54

## 2024-06-03 RX ADMIN — Medication 1 TABLET: at 08:54

## 2024-06-03 RX ADMIN — Medication 2000 UNITS: at 08:54

## 2024-06-03 RX ADMIN — ASPIRIN 81 MG: 81 TABLET, COATED ORAL at 08:54

## 2024-06-03 RX ADMIN — ENOXAPARIN SODIUM 40 MG: 40 INJECTION SUBCUTANEOUS at 08:54

## 2024-06-03 RX ADMIN — MAGNESIUM SULFATE HEPTAHYDRATE 2 G: 40 INJECTION, SOLUTION INTRAVENOUS at 08:54

## 2024-06-03 RX ADMIN — CLOPIDOGREL BISULFATE 75 MG: 75 TABLET ORAL at 08:54

## 2024-06-03 RX ADMIN — Medication 0.5 TABLET: at 08:54

## 2024-06-03 RX ADMIN — LISINOPRIL 40 MG: 20 TABLET ORAL at 08:54

## 2024-06-03 NOTE — PLAN OF CARE
Problem: PAIN - ADULT  Goal: Verbalizes/displays adequate comfort level or baseline comfort level  Description: Interventions:  - Encourage patient to monitor pain and request assistance  - Assess pain using appropriate pain scale  - Administer analgesics based on type and severity of pain and evaluate response  - Implement non-pharmacological measures as appropriate and evaluate response  - Consider cultural and social influences on pain and pain management  - Notify physician/advanced practitioner if interventions unsuccessful or patient reports new pain  Outcome: Progressing     Problem: INFECTION - ADULT  Goal: Absence or prevention of progression during hospitalization  Description: INTERVENTIONS:  - Assess and monitor for signs and symptoms of infection  - Monitor lab/diagnostic results  - Monitor all insertion sites, i.e. indwelling lines, tubes, and drains  - Monitor endotracheal if appropriate and nasal secretions for changes in amount and color  - [place] appropriate cooling/warming therapies per order  - Administer medications as ordered  - Instruct and encourage patient and family to use good hand hygiene technique  - Identify and instruct in appropriate isolation precautions for identified infection/condition  Outcome: Progressing  Goal: Absence of fever/infection during neutropenic period  Description: INTERVENTIONS:  - Monitor WBC    Outcome: Progressing     Problem: SAFETY ADULT  Goal: Patient will remain free of falls  Description: INTERVENTIONS:  - Educate patient/family on patient safety including physical limitations  - Instruct patient to call for assistance with activity   - Consult OT/PT to assist with strengthening/mobility   - Keep Call bell within reach  - Keep bed low and locked with side rails adjusted as appropriate  - Keep care items and personal belongings within reach  - Initiate and maintain comfort rounds  - Make Fall Risk Sign visible to staff  - Offer Toileting every 2 Hours,  in advance of need  - Initiate/Maintain bed alarm  - Obtain necessary fall risk management equipment:   - Apply yellow socks and bracelet for high fall risk patients  - Consider moving patient to room near nurses station  Outcome: Progressing  Goal: Maintain or return to baseline ADL function  Description: INTERVENTIONS:  -  Assess patient's ability to carry out ADLs; assess patient's baseline for ADL function and identify physical deficits which impact ability to perform ADLs (bathing, care of mouth/teeth, toileting, grooming, dressing, etc.)  - Assess/evaluate cause of self-care deficits   - Assess range of motion  - Assess patient's mobility; develop plan if impaired  - Assess patient's need for assistive devices and provide as appropriate  - Encourage maximum independence but intervene and supervise when necessary  - Involve family in performance of ADLs  - Assess for home care needs following discharge   - Consider OT consult to assist with ADL evaluation and planning for discharge  - Provide patient education as appropriate  Outcome: Progressing  Goal: Maintains/Returns to pre admission functional level  Description: INTERVENTIONS:  - Perform AM-PAC 6 Click Basic Mobility/ Daily Activity assessment daily.  - Set and communicate daily mobility goal to care team and patient/family/caregiver.   - Collaborate with rehabilitation services on mobility goals if consulted  - Perform Range of Motion 3 times a day.  - Reposition patient every 2 hours.  - Dangle patient 3 times a day  - Stand patient 3 times a day  - Ambulate patient 3 times a day  - Out of bed to chair 3 times a day   - Out of bed for meals 3 times a day  - Out of bed for toileting  - Record patient progress and toleration of activity level   Outcome: Progressing     Problem: DISCHARGE PLANNING  Goal: Discharge to home or other facility with appropriate resources  Description: INTERVENTIONS:  - Identify barriers to discharge w/patient and caregiver  -  Arrange for needed discharge resources and transportation as appropriate  - Identify discharge learning needs (meds, wound care, etc.)  - Arrange for interpretive services to assist at discharge as needed  - Refer to Case Management Department for coordinating discharge planning if the patient needs post-hospital services based on physician/advanced practitioner order or complex needs related to functional status, cognitive ability, or social support system  Outcome: Progressing     Problem: Knowledge Deficit  Goal: Patient/family/caregiver demonstrates understanding of disease process, treatment plan, medications, and discharge instructions  Description: Complete learning assessment and assess knowledge base.  Interventions:  - Provide teaching at level of understanding  - Provide teaching via preferred learning methods  Outcome: Progressing     Problem: Neurological Deficit  Goal: Neurological status is stable or improving  Description: Interventions:  - Monitor and assess patient's level of consciousness, motor function, sensory function, and level of assistance needed for ADLs.   - Monitor and report changes from baseline. Collaborate with interdisciplinary team to initiate plan and implement interventions as ordered.   - Provide and maintain a safe environment.  - Consider seizure precautions.  - Consider fall precautions.  - Consider aspiration precautions.  - Consider bleeding precautions.  Outcome: Progressing     Problem: Activity Intolerance/Impaired Mobility  Goal: Mobility/activity is maintained at optimum level for patient  Description: Interventions:  - Assess and monitor patient  barriers to mobility and need for assistive/adaptive devices.  - Assess patient's emotional response to limitations.  - Collaborate with interdisciplinary team and initiate plans and interventions as ordered.  - Encourage independent activity per ability.  - Maintain proper body alignment.  - Perform active/passive rom as  tolerated/ordered.  - Plan activities to conserve energy.  - Turn patient as appropriate  Outcome: Progressing     Problem: Communication Impairment  Goal: Ability to express needs and understand communication  Description: Assess patient's communication skills and ability to understand information.  Patient will demonstrate use of effective communication techniques, alternative methods of communication and understanding even if not able to speak.     - Encourage communication and provide alternate methods of communication as needed.  - Collaborate with case management/ for discharge needs.  - Include patient/family/caregiver in decisions related to communication.  Outcome: Progressing     Problem: Potential for Aspiration  Goal: Non-ventilated patient's risk of aspiration is minimized  Description: Assess and monitor vital signs, respiratory status, and labs (WBC).  Monitor for signs of aspiration (tachypnea, cough, rales, wheezing, cyanosis, fever).    - Assess and monitor patient's ability to swallow.  - Place patient up in chair to eat if possible.  - HOB up at 90 degrees to eat if unable to get patient up into chair.  - Supervise patient during oral intake.   - Instruct patient/ family to take small bites.  - Instruct patient/ family to take small single sips when taking liquids.  - Follow patient-specific strategies generated by speech pathologist.  Outcome: Progressing  Goal: Ventilated patient's risk of aspiration is minimized  Description: Assess and monitor vital signs, respiratory status, airway cuff pressure, and labs (WBC).  Monitor for signs of aspiration (tachypnea, cough, rales, wheezing, cyanosis, fever).    - Elevate head of bed 30 degrees if patient has tube feeding.  - Monitor tube feeding.  Outcome: Progressing     Problem: Nutrition  Goal: Nutrition/Hydration status is improving  Description: Monitor and assess patient's nutrition/hydration status for malnutrition (ex- brittle  hair, bruises, dry skin, pale skin and conjunctiva, muscle wasting, smooth red tongue, and disorientation). Collaborate with interdisciplinary team and initiate plan and interventions as ordered.  Monitor patient's weight and dietary intake as ordered or per policy. Utilize nutrition screening tool and intervene per policy. Determine patient's food preferences and provide high-protein, high-caloric foods as appropriate.     - Assist patient with eating.  - Allow adequate time for meals.  - Encourage patient to take dietary supplement as ordered.  - Collaborate with clinical nutritionist.  - Include patient/family/caregiver in decisions related to nutrition.  Outcome: Progressing

## 2024-06-03 NOTE — PLAN OF CARE
Problem: PAIN - ADULT  Goal: Verbalizes/displays adequate comfort level or baseline comfort level  Description: Interventions:  - Encourage patient to monitor pain and request assistance  - Assess pain using appropriate pain scale  - Administer analgesics based on type and severity of pain and evaluate response  - Implement non-pharmacological measures as appropriate and evaluate response  - Consider cultural and social influences on pain and pain management  - Notify physician/advanced practitioner if interventions unsuccessful or patient reports new pain  Outcome: Progressing     Problem: INFECTION - ADULT  Goal: Absence or prevention of progression during hospitalization  Description: INTERVENTIONS:  - Assess and monitor for signs and symptoms of infection  - Monitor lab/diagnostic results  - Monitor all insertion sites, i.e. indwelling lines, tubes, and drains  - Monitor endotracheal if appropriate and nasal secretions for changes in amount and color  - Spraggs appropriate cooling/warming therapies per order  - Administer medications as ordered  - Instruct and encourage patient and family to use good hand hygiene technique  - Identify and instruct in appropriate isolation precautions for identified infection/condition  Outcome: Progressing  Goal: Absence of fever/infection during neutropenic period  Description: INTERVENTIONS:  - Monitor WBC    Outcome: Progressing     Problem: SAFETY ADULT  Goal: Patient will remain free of falls  Description: INTERVENTIONS:  - Educate patient/family on patient safety including physical limitations  - Instruct patient to call for assistance with activity   - Consult OT/PT to assist with strengthening/mobility   - Keep Call bell within reach  - Keep bed low and locked with side rails adjusted as appropriate  - Keep care items and personal belongings within reach  - Initiate and maintain comfort rounds  - Make Fall Risk Sign visible to staff  - Offer Toileting every 4 Hours,  in advance of need  - Initiate/Maintain 4alarm  - Obtain necessary fall risk management equipment: 4  - Apply yellow socks and bracelet for high fall risk patients  - Consider moving patient to room near nurses station  Outcome: Progressing  Goal: Maintain or return to baseline ADL function  Description: INTERVENTIONS:  -  Assess patient's ability to carry out ADLs; assess patient's baseline for ADL function and identify physical deficits which impact ability to perform ADLs (bathing, care of mouth/teeth, toileting, grooming, dressing, etc.)  - Assess/evaluate cause of self-care deficits   - Assess range of motion  - Assess patient's mobility; develop plan if impaired  - Assess patient's need for assistive devices and provide as appropriate  - Encourage maximum independence but intervene and supervise when necessary  - Involve family in performance of ADLs  - Assess for home care needs following discharge   - Consider OT consult to assist with ADL evaluation and planning for discharge  - Provide patient education as appropriate  Outcome: Progressing  Goal: Maintains/Returns to pre admission functional level  Description: INTERVENTIONS:  - Perform AM-PAC 6 Click Basic Mobility/ Daily Activity assessment daily.  - Set and communicate daily mobility goal to care team and patient/family/caregiver.   - Collaborate with rehabilitation services on mobility goals if consulted  - Perform Range of Motion 4 times a day.  - Reposition patient every 4 hours.  - Dangle patient 4 times a day  - Stand patient 4 times a day  - Ambulate patient 4 times a day  - Out of bed to chair 4 times a day   - Out of bed for meals 4 times a day  - Out of bed for toileting  - Record patient progress and toleration of activity level   Outcome: Progressing     Problem: DISCHARGE PLANNING  Goal: Discharge to home or other facility with appropriate resources  Description: INTERVENTIONS:  - Identify barriers to discharge w/patient and caregiver  -  Arrange for needed discharge resources and transportation as appropriate  - Identify discharge learning needs (meds, wound care, etc.)  - Arrange for interpretive services to assist at discharge as needed  - Refer to Case Management Department for coordinating discharge planning if the patient needs post-hospital services based on physician/advanced practitioner order or complex needs related to functional status, cognitive ability, or social support system  Outcome: Progressing     Problem: Knowledge Deficit  Goal: Patient/family/caregiver demonstrates understanding of disease process, treatment plan, medications, and discharge instructions  Description: Complete learning assessment and assess knowledge base.  Interventions:  - Provide teaching at level of understanding  - Provide teaching via preferred learning methods  Outcome: Progressing     Problem: Neurological Deficit  Goal: Neurological status is stable or improving  Description: Interventions:  - Monitor and assess patient's level of consciousness, motor function, sensory function, and level of assistance needed for ADLs.   - Monitor and report changes from baseline. Collaborate with interdisciplinary team to initiate plan and implement interventions as ordered.   - Provide and maintain a safe environment.  - Consider seizure precautions.  - Consider fall precautions.  - Consider aspiration precautions.  - Consider bleeding precautions.  Outcome: Progressing     Problem: Activity Intolerance/Impaired Mobility  Goal: Mobility/activity is maintained at optimum level for patient  Description: Interventions:  - Assess and monitor patient  barriers to mobility and need for assistive/adaptive devices.  - Assess patient's emotional response to limitations.  - Collaborate with interdisciplinary team and initiate plans and interventions as ordered.  - Encourage independent activity per ability.  - Maintain proper body alignment.  - Perform active/passive rom as  tolerated/ordered.  - Plan activities to conserve energy.  - Turn patient as appropriate  Outcome: Progressing     Problem: Communication Impairment  Goal: Ability to express needs and understand communication  Description: Assess patient's communication skills and ability to understand information.  Patient will demonstrate use of effective communication techniques, alternative methods of communication and understanding even if not able to speak.     - Encourage communication and provide alternate methods of communication as needed.  - Collaborate with case management/ for discharge needs.  - Include patient/family/caregiver in decisions related to communication.  Outcome: Progressing     Problem: Potential for Aspiration  Goal: Non-ventilated patient's risk of aspiration is minimized  Description: Assess and monitor vital signs, respiratory status, and labs (WBC).  Monitor for signs of aspiration (tachypnea, cough, rales, wheezing, cyanosis, fever).    - Assess and monitor patient's ability to swallow.  - Place patient up in chair to eat if possible.  - HOB up at 90 degrees to eat if unable to get patient up into chair.  - Supervise patient during oral intake.   - Instruct patient/ family to take small bites.  - Instruct patient/ family to take small single sips when taking liquids.  - Follow patient-specific strategies generated by speech pathologist.  Outcome: Progressing  Goal: Ventilated patient's risk of aspiration is minimized  Description: Assess and monitor vital signs, respiratory status, airway cuff pressure, and labs (WBC).  Monitor for signs of aspiration (tachypnea, cough, rales, wheezing, cyanosis, fever).    - Elevate head of bed 30 degrees if patient has tube feeding.  - Monitor tube feeding.  Outcome: Progressing     Problem: Nutrition  Goal: Nutrition/Hydration status is improving  Description: Monitor and assess patient's nutrition/hydration status for malnutrition (ex- brittle  hair, bruises, dry skin, pale skin and conjunctiva, muscle wasting, smooth red tongue, and disorientation). Collaborate with interdisciplinary team and initiate plan and interventions as ordered.  Monitor patient's weight and dietary intake as ordered or per policy. Utilize nutrition screening tool and intervene per policy. Determine patient's food preferences and provide high-protein, high-caloric foods as appropriate.     - Assist patient with eating.  - Allow adequate time for meals.  - Encourage patient to take dietary supplement as ordered.  - Collaborate with clinical nutritionist.  - Include patient/family/caregiver in decisions related to nutrition.  Outcome: Progressing

## 2024-06-03 NOTE — DISCHARGE INSTR - AVS FIRST PAGE
Dear Sofy Hendrickson,     It was our pleasure to care for you here at [facility].  It is our hope that we were always able to exceed the expected standards for your care during your stay.  You were hospitalized due to ***.  You were cared for on the *** floor by Simona Lawrence DO under the service of Polo Orozco MD with the [facility] who covers for your primary care physician (PCP), Ricardo Lee MD, while you were hospitalized.  If you have any questions or concerns related to this hospitalization, you may contact us at .  For follow up as well as any medication refills, we recommend that you follow up with your primary care physician.  A registered nurse will reach out to you by phone within a few days after your discharge to answer any additional questions that you may have after going home.  However, at this time we provide for you here, the most important instructions / recommendations at discharge:     Notable Medication Adjustments -   Take Aspirin 81mg daily for another 18 days (last day 6/21/24).  Continue to take Plavix 75mg daily long-term.  Testing Required after Discharge -   Zio patch/Holter monitor with cardiology  Important follow up information -   Follow up with neurology  Other Instructions -   See information about TIA  Please review this entire after visit summary as additional general instructions including medication list, appointments, activity, diet, any pertinent wound care, and other additional recommendations from your care team that may be provided for you.      Sincerely,     Simona Lawrence DO     Taking OTC pepcid

## 2024-06-04 ENCOUNTER — TRANSITIONAL CARE MANAGEMENT (OUTPATIENT)
Dept: INTERNAL MEDICINE CLINIC | Facility: CLINIC | Age: 74
End: 2024-06-04

## 2024-06-04 NOTE — UTILIZATION REVIEW
NOTIFICATION OF INPATIENT ADMISSION      AUTHORIZATION REQUEST   SERVICING FACILITY:   Critical access hospital  Address: [address] [place] [address]  Tax ID: [id number]  NPI: [id number] ATTENDING PROVIDER:  Attending Name and NPI#: Polo Orozco Md [[id number]]  Address: [address] [place] [address]  Phone: [phone]   ADMISSION INFORMATION:  Place of Service: Inpatient [facility]  Place of Service Code: 21  Inpatient Admission Date/Time: 6/1/24 11:09 AM  Discharge Date/Time: 6/3/2024  6:34 PM  Admitting Diagnosis Code/Description:  Paresthesia [R20.2]  Tingling [R20.2]  Weakness [R53.1]  Stroke-like symptom [R29.90]     UTILIZATION REVIEW CONTACT:  Reshma Bush, Utilization   Network Utilization Review Department  Phone: [phone]  Fax: [phone]  Email: [email or web address]  Contact for approvals/pending authorizations, clinical reviews, and discharge.     PHYSICIAN ADVISORY SERVICES:  Medical Necessity Denial & Jvar-zc[place] Review  Phone: [phone]  Fax: [phone]  Email: [email or web address]     DISCHARGE SUPPORT TEAM:  For Patients Discharge Needs & Updates  Phone: [phone] opt. 2 Fax: [phone]  Email: [email or web address]

## 2024-06-04 NOTE — DISCHARGE SUMMARY
NEUROLOGY RESIDENCY - DISCHARGE SUMMARY     Name: Sofy Hendrickson   Age & Sex: 73 y.o. female   MRN: [id number]  [facility]   Encounter: [id number]    Discharging Resident Physician: Simona Lawrence DO  Attending: Polo Orozco MD  PCP: Ricardo Lee MD  Admission Date: 6/1/2024  Discharge Date: 06/03/24    OP follow up with neurovascular attending/AP/resident within 6 weeks. OP Zio patch or holter monitor for cardiac monitoring.    ASSESSMENT & PLAN     * TIA (transient ischemic attack)  Assessment & Plan  73-year-old female with hypertension, dyslipidemia, PAT, BPPV, LOLI on CPAP, periodic limb movement disorder, presents with left face and left upper extremity numbness and transient left lower extremity heaviness.  NIH stroke scale on presentation 1 for diminished pin appreciation left V2 distribution and dorsum of left index finger.  Symptoms completely resolved with the time patient was moved to the floor.  ABCD2 score 4.    MRI brain negative for acute infarct.  Did demonstrate punctate chronic small vessel cerebrovascular ischemic disease and chronic cerebellar lacunar infarcts.    CT head demonstrated no acute changes.  CTA head/neck demonstrated no flow consequential stenosis or LVO.  Patient is on aspirin 81 mg daily at baseline.  Blood pressure 216/109 on presentation.  LDL 76.  A1c 5.6.  Cardiology fellow reviewed the patient's telemetry and determined she was having runs of PAT and not likely PAF.  TTE, 6/3: EF 65%, normal systolic function, G1DD, LA normal size, no major valvular dysfunction, IVC normal    Presenting symptoms in keeping with TIA.    Plan:  -Continue 81 mg aspirin daily.  Loaded with Plavix 300 mg x 1 and continued on Plavix 75 mg daily in addition to aspirin this admission.  Plan to continue DAPT x 3 weeks and then on or around 6/22/2024, patient will discontinue aspirin and continue Plavix 75 mg daily indefinitely.  -Patient reports intolerance to atorvastatin.  Can  continue home lovastatin given near goal LDL.  -Goal normotension.  Will restart antihypertensives and have patient monitor blood pressure at home on a daily basis and report to her cardiology team any consistent readings above 130/80.  -Office will call to schedule outpatient follow-up.      LOLI (obstructive sleep apnea)  Assessment & Plan  Continue CPAP at bedtime.    Mixed hyperlipidemia  Assessment & Plan  Continue lovastatin (reported GI intolerance to atorvastatin).  LDL 76.    Zoster without complications  Assessment & Plan  Patient states she experiences recurrent reactivation of right sacral zoster, and currently has a few scabbed over lesions in that area.  She follows with dermatology and is not on suppressive medication.  Currently on isolation precautions.  No further treatment needed during admission.    Glucose intolerance (impaired glucose tolerance)  Assessment & Plan  History of glucose intolerance with most recent A1c normal at 5.6.    Hypertension  Assessment & Plan  Resume home lisinopril and metoprolol.  Patient will monitor her blood pressure at home on a daily basis and report any consistent readings greater than 130/80 to her cardiology team.             Disposition:     Home    Reason for Admission: Transient L face and LUE numbness, along with transient LLE heaviness    Consultations During Hospital Stay:  IP CONSULT TO CASE MANAGEMENT  IP CONSULT TO NUTRITION SERVICES    Procedures Performed:   TTE, 6/3: EF 65%, normal systolic function, G1DD, LA normal size, no major valvular dysfunction, IVC normal    Significant Findings / Test Results:   Labs: Hemoglobin A1c 5.6 (6/2/2024), LDL 76 (6/2/2024)    MRI brain wo contrast    Result Date: 6/1/2024  Impression: No mass effect, acute intracranial hemorrhage or evidence of recent infarction. Minimal chronic microvascular ischemic change and chronic cerebellar lacunar infarcts. Workstation performed: [id number]     CTA stroke alert  (head/neck)    Result Date: 6/1/2024  Impression: Unremarkable cervical and intracranial vasculature with the exception of atherosclerotic change of the carotid bifurcations bilaterally with only minor stenosis. No high-grade stenosis or occlusive disease. Findings were directly discussed with Abhi Espinoza at 7:40 a.m. Workstation performed: [id number]     CT stroke alert brain    Result Date: 6/1/2024  Impression: No acute intracranial abnormality. Findings were directly discussed with Abhi Espinoza at approximately 10:40 a.m. Workstation performed: [id number]       Incidental Findings:   None     Test Results Pending at Discharge (will require follow up):   None     Outpatient Tests Requested:  Zio patch/holter monitor    Complications:  None    Hospital Course:     Ms. Sofy Fairbanks is a 73-year-old female with hypertension, dyslipidemia, PAT, BPPV, LOLI on CPAP, periodic limb movement disorder, presents with left face and left upper extremity numbness and transient left lower extremity heaviness.  NIH stroke scale on presentation 1 for diminished pin appreciation left V2 distribution and dorsum of left index finger.  Symptoms completely resolved with the time patient was moved to the floor.  ABCD2 score 4.     MRI brain negative for acute infarct.  Did demonstrate punctate chronic small vessel cerebrovascular ischemic disease and chronic cerebellar lacunar infarcts.     CT head demonstrated no acute changes.  CTA head/neck demonstrated no flow consequential stenosis or LVO.  Patient is on aspirin 81 mg daily at baseline.  Blood pressure 216/109 on presentation.  LDL 76.  A1c 5.6.  Cardiology fellow reviewed the patient's telemetry and determined she was having runs of PAT and not likely PAF.     Presenting symptoms in keeping with TIA.  Unclear etiology.    Plan for DAPT x 21 days, followed by Plavix monotherapy. Given history of intolerance to atorvastatin, will continue home Lovastatin. OP follow up with neurovascular  "attending/AP/resident within 6 weeks. OP Zio patch or holter monitor for cardiac monitoring.    Condition at Discharge: good     Discharge Day Visit / Exam:     Subjective:  No acute concerns.    Vitals: Blood Pressure: 121/67 (06/03/24 1529)  Pulse: 64 (06/03/24 1529)  Temperature: 97.7 °F (36.5 °C) (06/03/24 1529)  Temp Source: Oral (06/02/24 1542)  Respirations: 17 (06/03/24 1529)  Height: 5' 6\" (167.6 cm) (06/03/24 1529)  Weight - Scale: 109 kg (240 lb) (06/03/24 1529)  SpO2: 92 % (06/03/24 1529)    Exam:     Physical Exam:   General:  Patient is well-developed, obese BMI 38.74, and in no acute distress.  HEENT:  Head normocephalic.  Eyes anicteric.    Cardiovascular:  With regular rhythm.  Lungs:  Normal effort. Nonlabored breathing.  Extremities:  With no significant edema.    Skin: 2-3 scabbing 4mm lesions noted R sacrum/buttock area.  Neurologic:  Patient is alert, pleasantly interactive, and appropriately conversational.  No obvious symbolic language difficulty or dysarthria, and the patient is fully oriented.       Gait deferred for safety.     Cranial Nerves:   II: Visual fields full to confrontation. Pupils equal, round, reactive to light with normal accomodation.  Cannot visualize optic fundi.  III,IV,VI: extraocular movements intact with no nystagmus.   V: Sensation in the V1 through V3 distributions intact to light touch bilaterally.   VII: Face is symmetric with no weakness noted.   XII: Tongue midline with no atrophy or fasciculations with appropriate movement.      Coordination:  Accurate with finger-to-nose maneuvers bilaterally.     Motor testing with no upper or lower extremity drift.  Full strength to resistance throughout.     Sensory testing grossly intact to light touch throughout.     Discharge instructions/Information to patient and family:   See after visit summary for information provided to patient and family.      Provisions for Follow-Up Care:  See after visit summary for information " related to follow-up care and any pertinent home health orders.      Planned Readmission: None    Discharge Statement:  I spent 20 minutes discharging the patient. This time was spent on the day of discharge. I had direct contact with the patient on the day of discharge. Greater than 50% of the total time was spent examining patient, answering all patient questions, arranging and discussing plan of care with patient as well as directly providing post-discharge instructions.  Additional time then spent on discharge activities.      Discharge Medications:  See after visit summary for reconciled discharge medications provided to patient and family.      ** Please Note: This note has been constructed using a voice recognition system **      ======    I have discussed the patient's history, physical exam findings, assessment, and plan in detail with attending, Dr. Orozco    Thank you for allowing me to participate in the care of your patient, Sofy Hendrickson.    Simona Lawrence DO  [facility] Neurology Residency, PGY-2

## 2024-06-05 LAB — VZV IGM SER IA-ACNC: <0.91 INDEX (ref 0–0.9)

## 2024-06-05 NOTE — PROGRESS NOTES
Cardiology Follow Up    Sofy Hendrickson  [id number]  [facility]  [address] [place] [phone]    1. TIA (transient ischemic attack)  Ambulatory Referral to Cardiology    AMB extended holter monitor      2. PAT (paroxysmal atrial tachycardia)        3. Primary hypertension        4. Mixed hyperlipidemia        5. LOLI (obstructive sleep apnea)        6. Morbid obesity with BMI of 40.0-44.9, adult ([facility])            Interval History:   Ms Sofy Hendrickson was met at [facility] on  to 6/3/2024 with TIA.  He presented to the emergency room with left face and left upper extremity numbness and transient left lower extremity heaviness.  NIH stroke scale 1.  Symptoms completely resolved.  MRI of the brain negative for acute infarct.  Did demonstrate punctuate chronic small vessel cerebrovascular ischemia disease and chronic cerebellar lacunar infarcts.  Cardiology consulted.  Telemetry showed runs of PAT but not likely PAF.  Placed on the AP TE x 21 days followed by Plavix monotherapy.  History of of statin intolerance.  She continue on home lovastatin.  TTE LVEF 65%, stock functional normal wall motion normal diastolic function mildly abnormal consistent with grade 1 abnormal relaxation.  Mild MR, mild TR, aortic root normal in size ascending aorta mildly dilated ascending aorta 4 cm.  OP zio or Holter.  She was discharged home.     Sofy presents to our office for hospitalization follow-up visit.  She denies dyspnea with minimal or moderate exertion chest pain palpitations lightheadedness or dizziness.  She denies recurrence of TIA symptoms.  Pressures controlled at home.  According to Sofy prior to the TIA she was under a lot of stress cleaning out buildings on her property after her  .      Medical History   Primary Cardiologist Dr Shields  Paroxysmal atrial tachycardia  LOLI on  CPAP  Hypertension  Dyslipidemia  BPPV  Periodic limb movement disorder  Obesity  Patient Active Problem List   Diagnosis    Hypertension    Morbid obesity with BMI of 40.0-44.9, adult (HCC)    Glucose intolerance (impaired glucose tolerance)    SVT (supraventricular tachycardia)    Zoster without complications    1st degree AV block    Mixed hyperlipidemia    LOLI (obstructive sleep apnea)    Periodic limb movement disorder    Chronic pain of left knee    TIA (transient ischemic attack)     Past Medical History:   Diagnosis Date    Cellulitis of left elbow 10/30/2016    CPAP (continuous positive airway pressure) dependence     Epistaxis     Medial meniscus tear 11/11/2014    Morbid obesity with BMI of 40.0-44.9, adult ([facility]) 12/28/2018    Sleep apnea     SVT (supraventricular tachycardia) 2020    Umbilical hernia      Social History     Socioeconomic History    Marital status:      Spouse name: Not on file    Number of children: Not on file    Years of education: Not on file    Highest education level: Not on file   Occupational History    Not on file   Tobacco Use    Smoking status: Never    Smokeless tobacco: Never    Tobacco comments:     Denied history of never a smoker per [employer]   Vaping Use    Vaping status: Never Used   Substance and Sexual Activity    Alcohol use: No    Drug use: No    Sexual activity: Not Currently     Partners: Male     Comment:    Other Topics Concern    Not on file   Social History Narrative    Exercise habits    Daily Tea Consumption (1 Cup/Day)     Social Determinants of Health     Financial Resource Strain: Low Risk  (8/29/2023)    Overall Financial Resource Strain (CARDIA)     Difficulty of Paying Living Expenses: Not hard at all   Food Insecurity: No Food Insecurity (6/3/2024)    Hunger Vital Sign     Worried About Running Out of Food in the Last Year: Never true     Ran Out of Food in the Last Year: Never true   Transportation Needs: No Transportation Needs  (6/3/2024)    PRAPARE - Transportation     Lack of Transportation (Medical): No     Lack of Transportation (Non-Medical): No   Physical Activity: Not on file   Stress: Not on file   Social Connections: Not on file   Intimate Partner Violence: Not on file   Housing Stability: Low Risk  (6/3/2024)    Housing Stability Vital Sign     Unable to Pay for Housing in the Last Year: No     Number of Times Moved in the Last Year: 1     Homeless in the Last Year: No      Family History   Problem Relation Age of Onset    No Known Problems Mother     Thyroid cancer Daughter 37    Hodgkin's lymphoma Daughter 27    No Known Problems Maternal Grandmother     No Known Problems Maternal Grandfather     No Known Problems Paternal Grandmother     No Known Problems Paternal Grandfather     No Known Problems Paternal Aunt     No Known Problems Paternal Aunt     Thyroid disease Family     Diabetes Family         Diabetes Mellitus    Breast cancer Neg Hx      Past Surgical History:   Procedure Laterality Date    KNEE ARTHROSCOPY W/ MENISCAL REPAIR  07/26/2015    with medial meniscus repair    OOPHORECTOMY Bilateral 2002    NM BIOPSY SOFT TISSUE BACK/FLANK DEEP N/A 10/2/2023    Procedure: EXCISION RIGHT FLANK MASS;  Surgeon: Ryan Singh MD;  Location:  MAIN OR;  Service: General    TOTAL ABDOMINAL HYSTERECTOMY  2002       Current Outpatient Medications:     aspirin (ECOTRIN LOW STRENGTH) 81 mg EC tablet, Take 1 tablet (81 mg total) by mouth daily Do not start before June 4, 2024., Disp: 18 tablet, Rfl: 0    calcium-vitamin D (OSCAL) 250-125 MG-UNIT per tablet, Take 1 tablet by mouth daily., Disp: , Rfl:     Cholecalciferol (Vitamin D3) 50 MCG (2000 UT) capsule, Take 2,000 Units by mouth daily  , Disp: , Rfl:     clopidogrel (PLAVIX) 75 mg tablet, Take 1 tablet (75 mg total) by mouth daily, Disp: 30 tablet, Rfl: 0    [START ON 7/4/2024] clopidogrel (Plavix) 75 mg tablet, Take 1 tablet (75 mg total) by mouth daily Do not start before July  4, 2024., Disp: 90 tablet, Rfl: 3    KRILL OIL PO, 1200 mg QD, Disp: , Rfl:     lisinopril (ZESTRIL) 40 mg tablet, Take 1 tablet (40 mg total) by mouth daily, Disp: 90 tablet, Rfl: 3    lovastatin (MEVACOR) 20 mg tablet, Take 1 tablet (20 mg total) by mouth daily at bedtime, Disp: 90 tablet, Rfl: 3    meclizine (ANTIVERT) 25 mg tablet, Take 1 tablet (25 mg total) by mouth 3 (three) times a day as needed for dizziness, Disp: 30 tablet, Rfl: 0    metoprolol tartrate (LOPRESSOR) 50 mg tablet, Take 1 tablet (50 mg total) by mouth every 12 (twelve) hours, Disp: 180 tablet, Rfl: 3    Multiple Vitamins-Minerals (MULTIVITAMIN ADULT PO), , Disp: , Rfl:     nystatin powder, Apply topically 2 (two) times a day, Disp: 180 g, Rfl: 2  Allergies   Allergen Reactions    Amlodipine Other (See Comments)     Swelling in legs    Lipitor [Atorvastatin] GI Intolerance    Morphine Nausea Only and GI Intolerance     Reaction Date: 09May2011;     Zithromax [Azithromycin] Diarrhea     Reaction Date: 09May2011;        Labs:  Admission on 06/01/2024, Discharged on 06/03/2024   Component Date Value    Sodium 06/01/2024 139     Potassium 06/01/2024 3.9     Chloride 06/01/2024 103     CO2 06/01/2024 27     ANION GAP 06/01/2024 9     BUN 06/01/2024 15     Creatinine 06/01/2024 0.62     Glucose 06/01/2024 110     Calcium 06/01/2024 9.0     eGFR 06/01/2024 91     WBC 06/01/2024 6.92     RBC 06/01/2024 4.59     Hemoglobin 06/01/2024 14.6     Hematocrit 06/01/2024 44.0     MCV 06/01/2024 96     MCH 06/01/2024 31.8     MCHC 06/01/2024 33.2     RDW 06/01/2024 13.2     Platelets 06/01/2024 201     MPV 06/01/2024 10.2     Protime 06/01/2024 12.7     INR 06/01/2024 0.96     PTT 06/01/2024 23     hs TnI 0hr 06/01/2024 5     SARS-CoV-2 06/01/2024 Negative     INFLUENZA A PCR 06/01/2024 Negative     INFLUENZA B PCR 06/01/2024 Negative     RSV PCR 06/01/2024 Negative     POC Glucose 06/01/2024 122     hs TnI 2hr 06/01/2024 5     Delta 2hr hsTnI 06/01/2024 0      Triscuspid Valve Regurgi* 06/03/2024 20.0     RAA A4C 06/03/2024 11.7     LA Volume Index (BP) 06/03/2024 21.3     MV Peak A [place] 06/03/2024 0.86     MV stenosis pressure 1/2* 06/03/2024 82     MV Peak E [place] 06/03/2024 47     E wave deceleration time 06/03/2024 281     E/A ratio 06/03/2024 0.55     MV valve area p 1/2 meth* 06/03/2024 2.68     TR Peak [place] 06/03/2024 2.2     RVID d 06/03/2024 2.7     A4C EF 06/03/2024 56     Tricuspid valve peak reg* 06/03/2024 2.22     Left ventricular stroke * 06/03/2024 68.00     IVSd 06/03/2024 1.10     Tricuspid annular plane * 06/03/2024 2.90     Ao root 06/03/2024 2.90     LVPWd 06/03/2024 0.80     LA size 06/03/2024 3.9     Asc Ao 06/03/2024 4     LA volume (BP) 06/03/2024 46     FS 06/03/2024 33     LVIDS 06/03/2024 3.30     IVS 06/03/2024 1.1     LVIDd 06/03/2024 4.90     LA length (A2C) 06/03/2024 5.60     LEFT VENTRICLE SYSTOLIC * 06/03/2024 45     LV DIASTOLIC VOLUME (MOD* 06/03/2024 113     Left Atrium Area-systoli* 06/03/2024 15     Left Atrium Area-systoli* 06/03/2024 17.9     MV E' Tissue Velocity Se* 06/03/2024 5     LVSV, 2D 06/03/2024 68     BSA 06/03/2024 2.16     LV EF 06/03/2024 65     Est. RA pres 06/03/2024 3.0     Right Ventricular Peak S* 06/03/2024 23.00     hs TnI 4hr 06/01/2024 9     Delta 4hr hsTnI 06/01/2024 4     Ventricular Rate 06/01/2024 71     Atrial Rate 06/01/2024 73     MS Interval 06/01/2024 216     QRSD Interval 06/01/2024 88     QT Interval 06/01/2024 420     QTC Interval 06/01/2024 456     P Axis 06/01/2024 95     QRS Axis 06/01/2024 -40     T Wave Axis 06/01/2024 -12     Cholesterol 06/02/2024 160     Triglycerides 06/02/2024 189 (H)     HDL, Direct 06/02/2024 46 (L)     LDL Calculated 06/02/2024 76     Hemoglobin A1C 06/02/2024 5.6     EAG 06/02/2024 114     TSH 3RD GENERATON 06/02/2024 3.158     Magnesium 06/02/2024 1.8 (L)     Phosphorus 06/02/2024 3.8     Sodium 06/02/2024 140     Potassium 06/02/2024 4.1     Chloride 06/02/2024 104      CO2 06/02/2024 25     ANION GAP 06/02/2024 11     BUN 06/02/2024 13     Creatinine 06/02/2024 0.52 (L)     Glucose 06/02/2024 104     Calcium 06/02/2024 8.4     eGFR 06/02/2024 96     WBC 06/02/2024 7.07     RBC 06/02/2024 4.21     Hemoglobin 06/02/2024 13.3     Hematocrit 06/02/2024 40.4     MCV 06/02/2024 96     MCH 06/02/2024 31.6     MCHC 06/02/2024 32.9     RDW 06/02/2024 13.2     Platelets 06/02/2024 183     MPV 06/02/2024 10.4     Varicella IgG 06/03/2024 IMMUNE    Admission on 05/03/2024, Discharged on 05/03/2024   Component Date Value    WBC 05/03/2024 7.98     RBC 05/03/2024 4.49     Hemoglobin 05/03/2024 14.1     Hematocrit 05/03/2024 42.6     MCV 05/03/2024 95     MCH 05/03/2024 31.4     MCHC 05/03/2024 33.1     RDW 05/03/2024 12.9     MPV 05/03/2024 9.9     Platelets 05/03/2024 204     nRBC 05/03/2024 0     Segmented % 05/03/2024 73     Immature Grans % 05/03/2024 0     Lymphocytes % 05/03/2024 20     Monocytes % 05/03/2024 7     Eosinophils Relative 05/03/2024 0     Basophils Relative 05/03/2024 0     Absolute Neutrophils 05/03/2024 5.76     Absolute Immature Grans 05/03/2024 0.03     Absolute Lymphocytes 05/03/2024 1.59     Absolute Monocytes 05/03/2024 0.55     Eosinophils Absolute 05/03/2024 0.03     Basophils Absolute 05/03/2024 0.02     Sodium 05/03/2024 139     Potassium 05/03/2024 3.9     Chloride 05/03/2024 103     CO2 05/03/2024 28     ANION GAP 05/03/2024 8     BUN 05/03/2024 17     Creatinine 05/03/2024 0.63     Glucose 05/03/2024 115     Calcium 05/03/2024 9.0     AST 05/03/2024 15     ALT 05/03/2024 12     Alkaline Phosphatase 05/03/2024 61     Total Protein 05/03/2024 7.4     Albumin 05/03/2024 4.1     Total Bilirubin 05/03/2024 0.68     eGFR 05/03/2024 90     hs TnI 0hr 05/03/2024 4     Ventricular Rate 05/03/2024 58     Atrial Rate 05/03/2024 58     WV Interval 05/03/2024 210     QRSD Interval 05/03/2024 86     QT Interval 05/03/2024 422     QTC Interval 05/03/2024 414     P Axis  05/03/2024 108     QRS [place] 05/03/2024 -40     T Wave Axis 05/03/2024 -3      Imaging: Echo complete w/ contrast if indicated    Result Date: 6/3/2024  Narrative:   Left Ventricle: Left ventricular cavity size is normal. Wall thickness is normal. The left ventricular ejection fraction is 65%. Systolic function is normal. Wall motion is normal. Diastolic function is mildly abnormal, consistent with grade I (abnormal) relaxation.   Mitral Valve: There is mild regurgitation.   Tricuspid Valve: There is mild regurgitation. The estimated right ventricular systolic pressure is 23.00 mmHg.   Aorta: The aortic root is normal in size. The ascending aorta is mildly dilated. The ascending aorta is 4 cm.     MRI brain wo contrast    Result Date: 6/1/2024  Narrative: MRI BRAIN WITHOUT CONTRAST INDICATION: left-sided numbness, stroke. COMPARISON:   CT from earlier the same day. TECHNIQUE:  Multiplanar, multisequence imaging of the brain was performed. IMAGE QUALITY:  Diagnostic. FINDINGS: BRAIN PARENCHYMA:  There is no discrete mass, mass effect or midline shift. There is no intracranial hemorrhage.  There is no evidence of acute infarction and diffusion imaging is unremarkable. There is mild chronic microvascular ischemic change. There is a focus of chronic hemosiderin deposition along the right caudate. There are chronic cerebellar lacunar infarcts. VENTRICLES:  Normal for the patient's age. SELLA AND PITUITARY GLAND:  Normal. ORBITS:  Normal. PARANASAL SINUSES: There are retention cysts versus polyps within the maxillary sinuses bilaterally. VASCULATURE:  Evaluation of the major intracranial vasculature demonstrates appropriate flow voids. CALVARIUM AND SKULL BASE:  Normal. EXTRACRANIAL SOFT TISSUES:  Normal.     Impression: No mass effect, acute intracranial hemorrhage or evidence of recent infarction. Minimal chronic microvascular ischemic change and chronic cerebellar lacunar infarcts. Workstation performed: [id number]      CTA stroke alert (head/neck)    Result Date: 6/1/2024  Narrative: CTA NECK AND BRAIN WITH CONTRAST INDICATION: Stroke Alert COMPARISON:   None. TECHNIQUE:   Post contrast imaging was performed after administration of iodinated contrast through the neck and brain. Post contrast axial 0.625 mm images timed to opacify the arterial system. 3D rendering was performed on an independent workstation.   MIP reconstructions performed. Coronal reconstructions were performed of the noncontrast portion of the brain. Radiation dose length product (DLP) for this visit:  634.93 mGy-cm .  This examination, like all CT scans performed in the [facility], was performed utilizing techniques to minimize radiation dose exposure, including the use of iterative  reconstruction and automated exposure control. IV Contrast:  85 mL of iohexol (OMNIPAQUE) IMAGE QUALITY:   Diagnostic FINDINGS: CERVICAL VASCULATURE AORTIC ARCH AND GREAT VESSELS:  Normal aortic arch and great vessel origins. Normal visualized subclavian vessels. RIGHT VERTEBRAL ARTERY CERVICAL SEGMENT:  Normal origin. The vessel is normal in caliber throughout the neck. LEFT VERTEBRAL ARTERY CERVICAL SEGMENT:  Normal origin. The vessel is normal in caliber throughout the neck. RIGHT EXTRACRANIAL CAROTID SEGMENT: There is moderate focal atherosclerotic calcification of the distal common carotid artery and proximal cervical internal carotid artery with only very mild smooth narrowing of the ICA origin and proximal bulb. LEFT EXTRACRANIAL CAROTID SEGMENT: Mild atherosclerotic disease of the distal common carotid artery and proximal cervical internal carotid artery without significant stenosis compared to the more distal ICA. NASCET criteria was used to determine the degree of internal carotid artery diameter stenosis. INTRACRANIAL VASCULATURE INTERNAL CAROTID ARTERIES:  Normal enhancement of the intracranial portions of the internal carotid arteries.  Normal  ophthalmic artery origins.  Normal ICA terminus. ANTERIOR CIRCULATION:  Symmetric A1 segments and anterior cerebral arteries with normal enhancement.  Normal anterior communicating artery. MIDDLE CEREBRAL ARTERY CIRCULATION:  M1 segment and middle cerebral artery branches demonstrate normal enhancement bilaterally. DISTAL VERTEBRAL ARTERIES:  Normal distal vertebral arteries.  Posterior inferior cerebellar artery origins are normal. Normal vertebral basilar junction. BASILAR ARTERY:  Basilar artery is normal in caliber.  Normal superior cerebellar arteries. POSTERIOR CEREBRAL ARTERIES: Both posterior cerebral arteries arises from the basilar tip.  Both arteries demonstrate normal enhancement.   Normal posterior communicating arteries. VENOUS STRUCTURES:  Normal. NON VASCULAR ANATOMY BONY STRUCTURES: Scoliosis of the cervical and thoracic spine. Mild cervical spondylitic degenerative change. No acute osseous abnormality. SOFT TISSUES OF THE NECK:  Normal. THORACIC INLET:  Unremarkable.     Impression: Unremarkable cervical and intracranial vasculature with the exception of atherosclerotic change of the carotid bifurcations bilaterally with only minor stenosis. No high-grade stenosis or occlusive disease. Findings were directly discussed with Abhi Espinoza at 7:40 a.m. Workstation performed: [id number]     CT stroke alert brain    Result Date: 6/1/2024  Narrative: CT BRAIN - STROKE ALERT PROTOCOL INDICATION:   Stroke Alert. COMPARISON:  None. TECHNIQUE:  CT examination of the brain was performed.  In addition to axial images, coronal reformatted images were created and submitted for interpretation. Radiation dose length product (DLP) for this visit:  1060 mGy-cm .  This examination, like all CT scans performed in the [facility], was performed utilizing techniques to minimize radiation dose exposure, including the use of iterative reconstruction and automated exposure control. IMAGE QUALITY:  Diagnostic.  FINDINGS: PARENCHYMA:  No intracranial mass, mass effect or midline shift. No CT signs of acute infarction.  No acute parenchymal hemorrhage. Normal intracranial vasculature. VENTRICLES AND EXTRA-AXIAL SPACES:  Normal for the patient's age. VISUALIZED ORBITS: Normal visualized orbits. PARANASAL SINUSES: Normal visualized paranasal sinuses. CALVARIUM AND EXTRACRANIAL SOFT TISSUES:   Normal.     Impression: No acute intracranial abnormality. Findings were directly discussed with Abhi Espinoza at approximately 10:40 a.m. Workstation performed: [id number]       Review of Systems:  Review of Systems   Musculoskeletal:  Positive for arthralgias and joint swelling.   All other systems reviewed and are negative.      Physical Exam:  Physical Exam  Vitals reviewed.   Constitutional:       Appearance: She is obese.   Cardiovascular:      Rate and Rhythm: Normal rate and regular rhythm.      Pulses: Normal pulses.      Heart sounds: Normal heart sounds.   Pulmonary:      Effort: Pulmonary effort is normal.      Breath sounds: Normal breath sounds.   Musculoskeletal:         General: Normal range of motion.      Cervical back: Normal range of motion and neck supple.      Right lower leg: No edema.      Left lower leg: No edema.   Skin:     General: Skin is warm and dry.      Capillary Refill: Capillary refill takes less than 2 seconds.   Neurological:      General: No focal deficit present.      Mental Status: She is alert and oriented to person, place, and time.   Psychiatric:         Mood and Affect: Mood normal.         Behavior: Behavior normal.         Discussion/Summary:  # 6/01/24 TIA, denies recurrence of symptoms.  2-week Zio patch rule out  arrhythmia contributing to TIA continue on Plavix 75 mg daily, aspirin 81 mg daily, Mevacor 20 mg daily, stop  ASA 6/22/24    # Paroxysmal atrial tachycardia continue metoprolol tartrate 50 mg every 12 hours  # Hypertension RUE sitting 132/82 continue on lisinopril 40 mg daily,  metoprolol tartrate 50 mg every 12 hours DASH diet   # Hyperlipidemia  6/02/24 , , HDL 46, LDL 76 continue on Mevacor 20 mg daily heart healthy diet  # LOLI On CPAP   # Obesity BMI 38.79

## 2024-06-06 ENCOUNTER — OFFICE VISIT (OUTPATIENT)
Dept: CARDIOLOGY CLINIC | Facility: CLINIC | Age: 74
End: 2024-06-06
Payer: COMMERCIAL

## 2024-06-06 ENCOUNTER — OFFICE VISIT (OUTPATIENT)
Dept: INTERNAL MEDICINE CLINIC | Facility: CLINIC | Age: 74
End: 2024-06-06
Payer: COMMERCIAL

## 2024-06-06 VITALS
WEIGHT: 240.3 LBS | OXYGEN SATURATION: 96 % | HEIGHT: 66 IN | DIASTOLIC BLOOD PRESSURE: 76 MMHG | HEART RATE: 76 BPM | SYSTOLIC BLOOD PRESSURE: 112 MMHG | BODY MASS INDEX: 38.62 KG/M2

## 2024-06-06 VITALS
HEIGHT: 66 IN | DIASTOLIC BLOOD PRESSURE: 80 MMHG | OXYGEN SATURATION: 97 % | SYSTOLIC BLOOD PRESSURE: 136 MMHG | BODY MASS INDEX: 38.38 KG/M2 | WEIGHT: 238.8 LBS | HEART RATE: 78 BPM

## 2024-06-06 DIAGNOSIS — I47.19 PAT (PAROXYSMAL ATRIAL TACHYCARDIA): ICD-10-CM

## 2024-06-06 DIAGNOSIS — E78.2 MIXED HYPERLIPIDEMIA: ICD-10-CM

## 2024-06-06 DIAGNOSIS — I10 PRIMARY HYPERTENSION: ICD-10-CM

## 2024-06-06 DIAGNOSIS — G47.33 OSA (OBSTRUCTIVE SLEEP APNEA): ICD-10-CM

## 2024-06-06 DIAGNOSIS — G45.9 TIA (TRANSIENT ISCHEMIC ATTACK): Primary | ICD-10-CM

## 2024-06-06 DIAGNOSIS — R73.02 GLUCOSE INTOLERANCE (IMPAIRED GLUCOSE TOLERANCE): ICD-10-CM

## 2024-06-06 DIAGNOSIS — B02.9 ZOSTER WITHOUT COMPLICATIONS: ICD-10-CM

## 2024-06-06 DIAGNOSIS — E66.01 MORBID OBESITY WITH BMI OF 40.0-44.9, ADULT (HCC): ICD-10-CM

## 2024-06-06 DIAGNOSIS — I47.10 SVT (SUPRAVENTRICULAR TACHYCARDIA): ICD-10-CM

## 2024-06-06 PROCEDURE — 99496 TRANSJ CARE MGMT HIGH F2F 7D: CPT | Performed by: STUDENT IN AN ORGANIZED HEALTH CARE EDUCATION/TRAINING PROGRAM

## 2024-06-06 PROCEDURE — 99215 OFFICE O/P EST HI 40 MIN: CPT | Performed by: NURSE PRACTITIONER

## 2024-06-06 RX ORDER — HYDROCHLOROTHIAZIDE 12.5 MG/1
12.5 TABLET ORAL DAILY
Qty: 90 TABLET | Refills: 1 | Status: SHIPPED | OUTPATIENT
Start: 2024-06-06 | End: 2024-12-03

## 2024-06-06 RX ORDER — HYDROCHLOROTHIAZIDE 12.5 MG/1
12.5 TABLET ORAL DAILY
Qty: 30 TABLET | Refills: 5 | Status: SHIPPED | OUTPATIENT
Start: 2024-06-06 | End: 2024-06-06 | Stop reason: SDUPTHER

## 2024-06-06 NOTE — ASSESSMENT & PLAN NOTE
MRI brain: significant for chronic microvascular ischemic disease and chronic cerebellar lacunar infarcts. Negative for acute infarct.  CTA head negative for high grade stenosis or occlusive disease  Continue statin, ASA and Plavix for 3 weeks. Patient to continue Plavix and dc ASA on 6/22/24.  Continue antihypertensive regimen with Lisinopril and Lopressor.   Neurology referral pending - patient advised to call central scheduling to book appt.  Follow up with cardiology for tentative zio patch/holter monitor.

## 2024-06-06 NOTE — TELEPHONE ENCOUNTER
Reason for call:   [x] Refill   [] Prior Auth  [x] Other: not a dup! NEEDS 90 DAY SUPPLY FOR MAIL ORDER    Office:   [x] PCP/Provider - [facility]  [] Specialty/Provider -     Medication:      [facility] - YOLANDE Delgado - [address] [place] [address] Rd [phone]     Does the patient have enough for 3 days?   [] Yes   [x] No - Send as HP to POD

## 2024-06-06 NOTE — ASSESSMENT & PLAN NOTE
Patient on isolation precautions during recent admission.   Patient w/ hx of recurrent reactivation of right sacral zoster with several scabbed over lesions.  Not currently on suppressive therapy.

## 2024-06-06 NOTE — PROGRESS NOTES
Transition of Care Visit  Name: Sofy Hendrickson      : 1950      MRN: [id number]  Encounter Provider: Estefania Choi MD  Encounter Date: 2024   Encounter department: [facility] INTERNAL MEDICINE    Assessment & Plan   1. TIA (transient ischemic attack)  Assessment & Plan:  MRI brain: significant for chronic microvascular ischemic disease and chronic cerebellar lacunar infarcts. Negative for acute infarct.  CTA head negative for high grade stenosis or occlusive disease  Continue statin, ASA and Plavix for 3 weeks. Patient to continue Plavix and dc ASA on 24.  Continue antihypertensive regimen with Lisinopril and Lopressor.   Neurology referral pending - patient advised to call central scheduling to book appt.  Follow up with cardiology for tentative zio patch/holter monitor.   2. LOLI (obstructive sleep apnea)  Assessment & Plan:  Continue CPAP HS  3. Mixed hyperlipidemia  Assessment & Plan:  Managed on Lovastatin  Will continue to monitor   4. Zoster without complications  Assessment & Plan:  Patient on isolation precautions during recent admission.   Patient w/ hx of recurrent reactivation of right sacral zoster with several scabbed over lesions.  Not currently on suppressive therapy.   5. Primary hypertension  Assessment & Plan:  BP elevated above goal of <130/90 at today's visit.   Patient recording blood pressures at home - reports elevated systolic readings into the high 130s. Given recent TIA, have advised starting additional antihypertensive agent. Patient agreeable to initiating therapy on HCTZ 12.5 mg QD. Will recheck BMP 2 weeks following initiation.   Orders:  -     hydroCHLOROthiazide 12.5 mg tablet; Take 1 tablet (12.5 mg total) by mouth daily  -     Basic metabolic panel; Future; Expected date: 2024  6. Glucose intolerance (impaired glucose tolerance)  Assessment & Plan:  Resolved, previously in prediabetic state, now in normal range A1C. Patient congratulated on efforts.    7. SVT (supraventricular tachycardia)  Assessment & Plan:  Stable on current regimen  Continue Lopressor 50 mg BID           History of Present Illness     Transitional Care Management Review:   Sofy Hendrickson is a 73 y.o. female here for TCM follow up.     During the TCM phone call patient stated:  TCM Call       Date and time call was made  6/4/2024 11:16 AM    Hospital care reviewed  Records reviewed    Patient was hospitialized at  [facility]    Date of Admission  06/01/24    Date of discharge  06/03/24    Diagnosis  TIA (transient ischemic attack)    Disposition  Home    Current Symptoms  None          TCM Call       Post hospital issues  None    Scheduled for follow up?  Yes    I have advised the patient to call PCP with any new or worsening symptoms  DENISA NICHOLSON M.A    Living Arrangements  Family members; Spouse or Significiant other    Support System  [place]-based; Family; Friends    The type of support provided  Emotional; Physical    Do you have social support  Yes, as much as I need    Counseling  Patient    Comments  Patient appointment scheduled for 6/14/21          HPI  This is a very pleasant 73 y.o. female w/ PMH of HTN, HLD, PAT, BPPV, LOLI on CPAP, and periodic limb movement who presents to the clinic for TCM follow up. Patient presented to hospital on 6/1/24 for left sided face and upper extremity numbness as well as heaviness in left leg. Stroke protocol performed and patient admitted for observation.     Currently patient reports to resolution of symptoms while in the hospital. Denies headache, visual disturbances, paraesthesias, paralysis, or dizziness.     Review of Systems   Constitutional:  Negative for chills and fever.   HENT:  Negative for congestion, ear pain, rhinorrhea and sinus pain.    Eyes:  Negative for visual disturbance.   Respiratory:  Negative for chest tightness, shortness of breath and wheezing.    Cardiovascular:  Negative for chest pain and palpitations.  "  Gastrointestinal:  Negative for abdominal pain, constipation, diarrhea and vomiting.   Endocrine: Negative for polyuria.   Genitourinary:  Negative for dysuria.   Musculoskeletal:  Negative for myalgias.   Neurological:  Negative for dizziness, syncope and light-headedness.     Objective     /80   Pulse 78   Ht 5' 6\" (1.676 m)   Wt 108 kg (238 lb 12.8 oz)   SpO2 97%   BMI 38.54 kg/m²     Physical Exam  Vitals and nursing note reviewed.   Constitutional:       General: She is not in acute distress.     Appearance: She is well-developed.   HENT:      Head: Normocephalic and atraumatic.      Right Ear: Tympanic membrane, ear canal and external ear normal.      Left Ear: Tympanic membrane, ear canal and external ear normal.      Mouth/Throat:      Pharynx: Posterior oropharyngeal erythema present. No oropharyngeal exudate.   Eyes:      Conjunctiva/sclera: Conjunctivae normal.   Cardiovascular:      Rate and Rhythm: Normal rate and regular rhythm.      Heart sounds: Normal heart sounds. No murmur heard.  Pulmonary:      Effort: Pulmonary effort is normal. No respiratory distress.      Breath sounds: Normal breath sounds.   Abdominal:      Palpations: Abdomen is soft.      Tenderness: There is no abdominal tenderness.   Musculoskeletal:         General: No swelling.      Cervical back: Neck supple.   Lymphadenopathy:      Cervical: No cervical adenopathy.   Skin:     General: Skin is warm and dry.      Capillary Refill: Capillary refill takes less than 2 seconds.   Neurological:      Mental Status: She is alert.   Psychiatric:         Mood and Affect: Mood normal.       Medications have been reviewed by provider in current encounter    Administrative Statements         "

## 2024-06-06 NOTE — ASSESSMENT & PLAN NOTE
BP elevated above goal of <130/90 at today's visit.   Patient recording blood pressures at home - reports elevated systolic readings into the high 130s. Given recent TIA, have advised starting additional antihypertensive agent. Patient agreeable to initiating therapy on HCTZ 12.5 mg QD. Will recheck BMP 2 weeks following initiation.

## 2024-06-06 NOTE — ASSESSMENT & PLAN NOTE
Resolved, previously in prediabetic state, now in normal range A1C. Patient congratulated on efforts.

## 2024-06-07 ENCOUNTER — TELEPHONE (OUTPATIENT)
Dept: NEUROLOGY | Facility: CLINIC | Age: 74
End: 2024-06-07

## 2024-06-07 NOTE — TELEPHONE ENCOUNTER
HFU/SLB/ TIA    DC-Home- 6/3/24      Notes:  OP follow up with neurovascular attending/AP/resident within 6 weeks. OP Zio patch or holter monitor for cardiac monitoring.       Scheduled:  8/13/24 Pam 1:30pm [place]  Pt is placed in [facility]

## 2024-06-10 NOTE — UTILIZATION REVIEW
NOTIFICATION OF ADMISSION DISCHARGE   This is a Notification of Discharge from [facility]. Please be advised that this patient has been discharge from our facility. Below you will find the admission and discharge date and time including the patient’s disposition.   UTILIZATION REVIEW CONTACT:  Reshma Bush  Utilization   Network Utilization Review Department  Phone: [phone] x carefully listen to the prompts. All voicemails are confidential.  Email: [email or web address]     ADMISSION INFORMATION  PRESENTATION DATE: 6/1/2024  9:40 AM  OBERVATION ADMISSION DATE:   INPATIENT ADMISSION DATE: 6/1/24 11:09 AM   DISCHARGE DATE: 6/3/2024  6:34 PM   DISPOSITION:Home/Self Care    Network Utilization Review Department  ATTENTION: Please call with any questions or concerns to [phone] and carefully listen to the prompts so that you are directed to the right person. All voicemails are confidential.   For Discharge needs, contact Care Management DC Support Team at [phone] opt. 2  Send all requests for admission clinical reviews, approved or denied determinations and any other requests to dedicated fax number below belonging to the campus where the patient is receiving treatment. List of dedicated fax numbers for the Facilities:  FACILITY NAME UR FAX NUMBER   ADMISSION DENIALS (Administrative/Medical Necessity) [phone]   DISCHARGE SUPPORT TEAM ([facility]) [phone]   PARENT CHILD HEALTH (Maternity/NICU/Pediatrics) [phone]   [facility] [phone]   [facility] [phone]   [facility] [phone]   [facility] [phone]   Replaced by [facility] [phone]   [facility] [phone]   [facility] [phone]   [facility] [phone]   [facility]  [facility] [phone]   [facility] [phone]   [facility] [phone]   [facility] [phone]

## 2024-07-05 ENCOUNTER — CLINICAL SUPPORT (OUTPATIENT)
Dept: CARDIOLOGY CLINIC | Facility: CLINIC | Age: 74
End: 2024-07-05
Payer: COMMERCIAL

## 2024-07-05 DIAGNOSIS — G45.9 TIA (TRANSIENT ISCHEMIC ATTACK): ICD-10-CM

## 2024-07-05 PROCEDURE — 93248 EXT ECG>7D<15D REV&INTERPJ: CPT

## 2024-07-10 DIAGNOSIS — I47.10 SVT (SUPRAVENTRICULAR TACHYCARDIA): ICD-10-CM

## 2024-07-10 RX ORDER — METOPROLOL TARTRATE 50 MG/1
TABLET, FILM COATED ORAL
Qty: 270 TABLET | Refills: 3 | Status: SHIPPED | OUTPATIENT
Start: 2024-07-10

## 2024-07-29 LAB

## 2024-08-13 ENCOUNTER — OFFICE VISIT (OUTPATIENT)
Dept: NEUROLOGY | Facility: CLINIC | Age: 74
End: 2024-08-13
Payer: COMMERCIAL

## 2024-08-13 VITALS
SYSTOLIC BLOOD PRESSURE: 158 MMHG | TEMPERATURE: 98.4 F | BODY MASS INDEX: 38.73 KG/M2 | HEIGHT: 66 IN | WEIGHT: 241 LBS | DIASTOLIC BLOOD PRESSURE: 98 MMHG | HEART RATE: 65 BPM

## 2024-08-13 DIAGNOSIS — G47.33 OSA (OBSTRUCTIVE SLEEP APNEA): ICD-10-CM

## 2024-08-13 DIAGNOSIS — I10 PRIMARY HYPERTENSION: ICD-10-CM

## 2024-08-13 DIAGNOSIS — E78.2 MIXED HYPERLIPIDEMIA: ICD-10-CM

## 2024-08-13 DIAGNOSIS — Z86.73 HISTORY OF STROKE: Primary | ICD-10-CM

## 2024-08-13 DIAGNOSIS — I47.10 SVT (SUPRAVENTRICULAR TACHYCARDIA): ICD-10-CM

## 2024-08-13 PROBLEM — G45.9 TIA (TRANSIENT ISCHEMIC ATTACK): Status: RESOLVED | Noted: 2024-06-01 | Resolved: 2024-08-13

## 2024-08-13 PROCEDURE — 99215 OFFICE O/P EST HI 40 MIN: CPT | Performed by: PSYCHIATRY & NEUROLOGY

## 2024-08-13 PROCEDURE — G2211 COMPLEX E/M VISIT ADD ON: HCPCS | Performed by: PSYCHIATRY & NEUROLOGY

## 2024-08-13 NOTE — PATIENT INSTRUCTIONS
Stroke: Sofy presents for a follow-up evaluation with regard to a recent transient ischemic attack and at that time her MRI showed a small chronic appearing cerebellar stroke.  I was able to review the imaging with her in the office today.  She does have at least 1 and possibly 2 small strokes in the cerebellum, and otherwise very minimal microvascular disease.  Her stroke risk factors are otherwise well-controlled therefore this most recent transient ischemic attack is somewhat surprising.  Given her history of supraventricular tachycardia it will be important to more fully rule out atrial fibrillation  -For the time being for stroke prevention she should continue her current combination of Plavix, statin, and appropriate blood pressure and glycemic control  -She should continue to treat obstructive sleep apnea  -She should continue to monitor her blood pressure on a regular basis targeting numbers less than 130/80 most of the time  -We will defer to her primary care team for monitoring of her cholesterol panel and blood sugar numbers and would recommend targeting an LDL cholesterol of less than 70 and hemoglobin A1c of less than 7% both of which she has achieved  -Her Zio patch reportedly did not show any clear thrombogenic arrhythmias.  For this reason I would recommend an implantable loop recorder.  I will send a message in this regard to her primary care team and cardiologist so that she can discuss the option with them moving forward    From my standpoint she is doing exceptionally well.  She will return to the office to see me in 6 months but I would be happy to see her sooner if the need should arise.  If she has strokelike symptoms such as sudden painless loss of vision or double vision, difficulty speaking or swallowing, vertigo/room spinning that does not quickly resolve, or sudden weakness/numbness/loss of coordination affecting 1 side of the face or body she should proceed by ambulance to the nearest  emergency room immediately.

## 2024-08-13 NOTE — PROGRESS NOTES
"SUSHILA Hendrickson is a 73-year-old female who experienced a TIA on June 1st. Approximately a month before the TIA, she had episodes of dizziness and was evaluated in the ER, where she was diagnosed with BPPV. The symptoms were reproducible, and she was taught the Epley maneuver.    At the onset of her TIA, she developed numbness in her left lower leg, forearm, face, and a sensation of heaviness in her left leg. She came to the ER after performing a stroke evaluation on herself. Since the stroke, she has not experienced any recurrent symptoms.    Cardiology follow-up revealed known supraventricular arrhythmias, leading to an increase in her metoprolol dosage from 50 mg to 75 mg every 12 hours. Her PCP prescribed hydrochlorothiazide 12.5 mg and magnesium glycinate 240 mg daily for sleep, with which she sleeps 6-7 hours per night.    She is compliant with her medications, uses CPAP nightly, and swims twice a week. She has no history of prior bleeding or clotting. She was on aspirin a few years ago and currently takes lovastatin as prescribed.       Objective:    Blood pressure 158/98, pulse 65, temperature 98.4 °F (36.9 °C), temperature source Temporal, height 5' 6\" (1.676 m), weight 109 kg (241 lb).    Physical Exam  Constitutional:       Appearance: Normal appearance.   HENT:      Head: Normocephalic and atraumatic.   Eyes:      General: Lids are normal.      Extraocular Movements: Extraocular movements intact.      Pupils: Pupils are equal, round, and reactive to light.   Neurological:      Mental Status: She is oriented to person, place, and time. Mental status is at baseline.      Coordination: Coordination is intact.      Deep Tendon Reflexes: Reflexes are normal and symmetric.   Psychiatric:         Mood and Affect: Mood normal.         Speech: Speech normal.         Neurological Exam  Mental Status  Awake, alert and oriented to person, place and time. Oriented to person, place, time and situation. Oriented " to person, place, and time. Recent and remote memory are intact. Speech is normal. Language is fluent with no aphasia. Attention and concentration are normal.    Cranial Nerves  CN II: Visual acuity is normal. Visual fields full to confrontation.  CN III, IV, VI: Extraocular movements intact bilaterally. Normal lids and orbits bilaterally. Pupils equal round and reactive to light bilaterally.  CN V: Facial sensation is normal.  CN VII: Full and symmetric facial movement.  CN VIII: Hearing is normal.  CN IX, X: Palate elevates symmetrically. Normal gag reflex.  CN XI: Shoulder shrug strength is normal.  CN XII: Tongue midline without atrophy or fasciculations.    Motor  Normal muscle bulk throughout. Normal muscle tone. No abnormal involuntary movements.    Sensory  Sensation is intact to light touch, pinprick, vibration and proprioception in all four extremities.    Reflexes  Deep tendon reflexes are 2+ and symmetric in all four extremities.    Coordination    Finger-to-nose, rapid alternating movements and heel-to-shin normal bilaterally without dysmetria.

## 2024-08-13 NOTE — PROGRESS NOTES
Patient ID: Sofy Hendrickson is a 73 y.o. female.    Assessment/Plan:   Patient Instructions   Stroke: Sofy presents for a follow-up evaluation with regard to a recent transient ischemic attack and at that time her MRI showed a small chronic appearing cerebellar stroke.  I was able to review the imaging with her in the office today.  She does have at least 1 and possibly 2 small strokes in the cerebellum, and otherwise very minimal microvascular disease.  Her stroke risk factors are otherwise well-controlled therefore this most recent transient ischemic attack is somewhat surprising.  Given her history of supraventricular tachycardia it will be important to more fully rule out atrial fibrillation  -For the time being for stroke prevention she should continue her current combination of Plavix, statin, and appropriate blood pressure and glycemic control  -She should continue to treat obstructive sleep apnea  -She should continue to monitor her blood pressure on a regular basis targeting numbers less than 130/80 most of the time  -We will defer to her primary care team for monitoring of her cholesterol panel and blood sugar numbers and would recommend targeting an LDL cholesterol of less than 70 and hemoglobin A1c of less than 7% both of which she has achieved  -Her Zio patch reportedly did not show any clear thrombogenic arrhythmias.  For this reason I would recommend an implantable loop recorder.  I will send a message in this regard to her primary care team and cardiologist so that she can discuss the option with them moving forward    From my standpoint she is doing exceptionally well.  She will return to the office to see me in 6 months but I would be happy to see her sooner if the need should arise.  If she has strokelike symptoms such as sudden painless loss of vision or double vision, difficulty speaking or swallowing, vertigo/room spinning that does not quickly resolve, or sudden weakness/numbness/loss of  coordination affecting 1 side of the face or body she should proceed by ambulance to the nearest emergency room immediately.       Diagnoses and all orders for this visit:    History of stroke    Mixed hyperlipidemia    LOLI (obstructive sleep apnea)    SVT (supraventricular tachycardia)    Primary hypertension           Subjective:    HPI    I reviewed, personally confirmed, and agree with the history as documented by the medical student.  Documentation reflects my recommended edits      HPI    Sofy Hendrickson is a 73-year-old female who experienced a TIA on June 1st. Approximately a month before the TIA, she had episodes of dizziness and was evaluated in the ER, where she was diagnosed with BPPV. The symptoms were reproducible, and she was taught the Epley maneuver.    At the onset of her TIA, she developed numbness in her left lower leg, forearm, face, and a sensation of heaviness in her left leg. She came to the ER after performing a stroke evaluation on herself. Since the stroke, she has not experienced any recurrent symptoms.    Cardiology follow-up revealed known supraventricular arrhythmias, leading to an increase in her metoprolol dosage from 50 mg to 75 mg every 12 hours. Her PCP prescribed hydrochlorothiazide 12.5 mg and magnesium glycinate 240 mg daily for sleep, with which she sleeps 6-7 hours per night.    She is compliant with her medications, uses CPAP nightly, and swims twice a week. She has no history of prior bleeding or clotting. She was on aspirin a few years ago and currently takes lovastatin as prescribed.    Ultimately given that her symptoms localize well to a right sided subcortical stroke/transient ischemic attack and based on her history I agree that TIA is the most likely diagnosis.  I also directly reviewed her prior MRI imaging with neuroradiology and agree that there is evidence of 1 chronic appearing splinterlike stroke in the left cerebellar hemisphere and a smaller round possible stroke  "in the right cerebellar hemisphere.    Ultimately she has minimal risk factors for small vessel ischemic disease and very very little microvascular disease otherwise.  Bearing this in mind and considering that the most recent TIA occurred and spite of otherwise appropriate secondary stroke prevention measures and with her history of supraventricular tachycardia we would recommend implantable loop recorder.    I was present for, directly observed, and agree with the exam as documented by the medical student.  Documentation below reflects my recommended edits.    I was present for, directly observed, and agree with the exam as documented by the medical student.  Documentation below reflects my recommended edits.       Objective:    Blood pressure 158/98, pulse 65, temperature 98.4 °F (36.9 °C), temperature source Temporal, height 5' 6\" (1.676 m), weight 109 kg (241 lb).    Physical Exam  Constitutional:       Appearance: Normal appearance.   HENT:      Head: Normocephalic and atraumatic.   Eyes:      General: Lids are normal.      Extraocular Movements: Extraocular movements intact.      Pupils: Pupils are equal, round, and reactive to light.   Neurological:      Mental Status: She is oriented. Mental status is at baseline.      Coordination: Coordination is intact.      Deep Tendon Reflexes: Reflexes are normal and symmetric.   Psychiatric:         Mood and Affect: Mood normal.         Speech: Speech normal.         Neurological Exam  Mental Status  Awake, alert and oriented to person, place and time. Oriented to person, place, time and situation. Oriented to person, place, and time. Recent and remote memory are intact. Speech is normal. Language is fluent with no aphasia. Attention and concentration are normal.    Cranial Nerves  CN II:  Visual fields full to confrontation.  CN III, IV, VI: Extraocular movements intact bilaterally. Normal lids and orbits bilaterally. Pupils equal round and reactive to light " bilaterally.  CN V: Facial sensation is normal.  CN VII: Full and symmetric facial movement.  CN VIII: Hearing is normal.  CN IX, X: Palate elevates symmetrically. Normal gag reflex.  CN XI: Shoulder shrug strength is normal.  CN XII: Tongue midline without atrophy or fasciculations.    Motor  Normal muscle bulk throughout. Normal muscle tone. No abnormal involuntary movements.    Sensory  Sensation is intact to light touch, temperature, and vibration     Reflexes  Deep tendon reflexes are 2+ and symmetric in all four extremities.    Coordination    Finger-to-nose, rapid alternating movements and heel-to-shin normal bilaterally without dysmetria.            ROS:    Review of Systems   Constitutional:  Negative for appetite change and fever.   HENT: Negative.  Negative for hearing loss, tinnitus, trouble swallowing and voice change.    Eyes: Negative.  Negative for photophobia and pain.   Respiratory: Negative.  Negative for shortness of breath.    Cardiovascular: Negative.  Negative for palpitations.   Gastrointestinal: Negative.  Negative for nausea and vomiting.   Endocrine: Negative.  Negative for cold intolerance.   Genitourinary: Negative.  Negative for dysuria, frequency and urgency.   Musculoskeletal: Negative.  Negative for myalgias and neck pain.   Skin: Negative.  Negative for rash.   Neurological: Negative.  Negative for dizziness, tremors, seizures, syncope, facial asymmetry, speech difficulty, weakness, light-headedness, numbness and headaches.   Hematological: Negative.  Does not bruise/bleed easily.   Psychiatric/Behavioral: Negative.  Negative for confusion, hallucinations and sleep disturbance.

## 2024-08-26 ENCOUNTER — TELEPHONE (OUTPATIENT)
Age: 74
End: 2024-08-26

## 2024-08-26 DIAGNOSIS — E78.2 MIXED HYPERLIPIDEMIA: Primary | ICD-10-CM

## 2024-08-26 DIAGNOSIS — I10 PRIMARY HYPERTENSION: ICD-10-CM

## 2024-08-26 DIAGNOSIS — Z13.0 SCREENING FOR DEFICIENCY ANEMIA: ICD-10-CM

## 2024-08-26 DIAGNOSIS — Z12.11 SCREENING FOR COLON CANCER: ICD-10-CM

## 2024-08-26 NOTE — TELEPHONE ENCOUNTER
Sofy would like to know if any lab works needs to be complete before the next wellness visit on 11/7/24. She also mentioned that she did lab work not to long ago but is not sure if insurance would cover the lab work for the wellness visit.

## 2024-08-26 NOTE — TELEPHONE ENCOUNTER
Please let the patient know that orders have been placed at the [facility] laboratory for her upcoming physical on 11/7/2024.  Please have her fast for 10 to 12 hours a night before.  Thank you

## 2024-09-05 NOTE — PROGRESS NOTES
Cardiology Follow Up    Sofy Hendrickson  1950  [id number]  [facility]  [address] [place] [address] [id number] [phone]    1. TIA (transient ischemic attack)        2. SVT (supraventricular tachycardia)        3. Primary hypertension        4. Mixed hyperlipidemia        5. LOLI (obstructive sleep apnea)        6. Morbid obesity with BMI of 40.0-44.9, adult ([facility])              Interval History:   Ms Sofy Hendrickson was met at [facility] on  to 6/3/2024 with TIA.  He presented to the emergency room with left face and left upper extremity numbness and transient left lower extremity heaviness.  NIH stroke scale 1.  Symptoms completely resolved.  MRI of the brain negative for acute infarct.  Did demonstrate punctuate chronic small vessel cerebrovascular ischemia disease and chronic cerebellar lacunar infarcts.  Cardiology consulted.  Telemetry showed runs of PAT but not likely PAF.  Placed on the AP TE x 21 days followed by Plavix monotherapy.  History of of statin intolerance.  She continue on home lovastatin.  TTE LVEF 65%, stock functional normal wall motion normal diastolic function mildly abnormal consistent with grade 1 abnormal relaxation.  Mild MR, mild TR, aortic root normal in size ascending aorta mildly dilated ascending aorta 4 cm.  OP zio or Holter.  She was discharged home.     On 24 Sofy was seen in our office for hospitalization follow-up visit.  She denied dyspnea with minimal or moderate exertion chest pain palpitations lightheadedness or dizziness.  She denied recurrence of TIA symptoms.  Pressures controlled at home.  According to Sofy prior to the TIA she was under a lot of stress cleaning out buildings on her property after her  . 2 week Zio ordered.    Zio worn for 30 days 22 hours on 6/15 - heart rate 66 bpm high rate 144 bpm low heart rate 38 bpm.  651 supraventricular  tachycardic runs occurring fastest 4 beats with maximal heart rate 144 bpm longest lasting 1 4.3 seconds with average heart rate and 98 bpm.  Isolated SVE's 2.8% SVE couplets 2.3% SVE triplets 1.7% isolated VE's less than 1%, VE couplets less than 1% VE triplets less than 1% ventricular bigeminy and trigeminy present.  Metoprolol increased to 75 mg every 12 hours    8/13/24 Sofy was seen by Dr Orozco who reviewed imaging.  1 possibly 2 small strokes in the cerebellum otherwise very minimal microvascular disease.  He felt was important to rule out A-fib.    Sofy presents to our office for a follow up visit.  She denies dyspnea with minimal or moderate exertion, CP, palpitations, lightheadedness or dizziness.  I reviewed the results of the Zio.  Home BP stable.      Medical History   Primary Cardiologist Dr Shields  Paroxysmal atrial tachycardia  LOLI on CPAP  Hypertension  Dyslipidemia 6/02/24   HDL 46 LDL 76  BPPV  Periodic limb movement disorder  Obesity  Patient Active Problem List   Diagnosis    Hypertension    Morbid obesity with BMI of 40.0-44.9, adult (HCC)    SVT (supraventricular tachycardia)    Zoster without complications    1st degree AV block    Mixed hyperlipidemia    LOLI (obstructive sleep apnea)    Periodic limb movement disorder    Chronic pain of left knee    History of stroke     Past Medical History:   Diagnosis Date    Allergic     See list    Arthritis     Last few years    Cellulitis of left elbow 10/30/2016    Coronary artery disease     Occasional svt in past    CPAP (continuous positive airway pressure) dependence     Epistaxis     Hypertension     Had for many years    Medial meniscus tear 11/11/2014    Morbid obesity with BMI of 40.0-44.9, adult (HCC) 12/28/2018    Obesity     Sleep apnea     SVT (supraventricular tachycardia) 2020    TIA (transient ischemic attack) 06/01/2024    Umbilical hernia     Urinary tract infection     In distant past    Visual impairment     Wear  glasses     Social History     Socioeconomic History    Marital status:      Spouse name: Not on file    Number of children: Not on file    Years of education: Not on file    Highest education level: Not on file   Occupational History    Not on file   Tobacco Use    Smoking status: Never    Smokeless tobacco: Never    Tobacco comments:     Denied history of never a smoker per [employer]   Vaping Use    Vaping status: Never Used   Substance and Sexual Activity    Alcohol use: No    Drug use: No    Sexual activity: Not Currently     Partners: Male     Birth control/protection: Abstinence, Post-menopausal     Comment:    Other Topics Concern    Not on file   Social History Narrative    Exercise habits    Daily Tea Consumption (1 Cup/Day)     Social Determinants of Health     Financial Resource Strain: Low Risk  (8/29/2023)    Overall Financial Resource Strain (CARDIA)     Difficulty of Paying Living Expenses: Not hard at all   Food Insecurity: No Food Insecurity (6/3/2024)    Hunger Vital Sign     Worried About Running Out of Food in the Last Year: Never true     Ran Out of Food in the Last Year: Never true   Transportation Needs: No Transportation Needs (6/3/2024)    PRAPARE - Transportation     Lack of Transportation (Medical): No     Lack of Transportation (Non-Medical): No   Physical Activity: Not on file   Stress: Not on file   Social Connections: Not on file   Intimate Partner Violence: Not on file   Housing Stability: Low Risk  (6/3/2024)    Housing Stability Vital Sign     Unable to Pay for Housing in the Last Year: No     Number of Times Moved in the Last Year: 1     Homeless in the Last Year: No      Family History   Problem Relation Age of Onset    Dementia Mother         Had vascular dementia in her mid 80s    Heart disease Mother     Thyroid disease Mother     Arthritis Mother     Autoimmune disease Mother     Hearing loss Mother     Glaucoma Mother     Hypertension Father     Thyroid cancer  Daughter 37    Hodgkin's lymphoma Daughter 27    No Known Problems Maternal Grandmother     No Known Problems Maternal Grandfather     No Known Problems Paternal Grandmother     No Known Problems Paternal Grandfather     No Known Problems Paternal Aunt     No Known Problems Paternal Aunt     Thyroid disease Family     Diabetes Family         Diabetes Mellitus    Breast cancer Neg Hx      Past Surgical History:   Procedure Laterality Date    HERNIA REPAIR      KNEE ARTHROSCOPY W/ MENISCAL REPAIR  07/26/2015    with medial meniscus repair    KNEE SURGERY      OOPHORECTOMY Bilateral 2002    UT BIOPSY SOFT TISSUE BACK/FLANK DEEP N/A 10/02/2023    Procedure: EXCISION RIGHT FLANK MASS;  Surgeon: Ryan Singh MD;  Location:  MAIN OR;  Service: General    TOTAL ABDOMINAL HYSTERECTOMY  2002       Current Outpatient Medications:     calcium-vitamin D (OSCAL) 250-125 MG-UNIT per tablet, Take 1 tablet by mouth daily., Disp: , Rfl:     Cholecalciferol (Vitamin D3) 50 MCG (2000 UT) capsule, Take 2,000 Units by mouth daily  , Disp: , Rfl:     clopidogrel (PLAVIX) 75 mg tablet, Take 1 tablet (75 mg total) by mouth daily, Disp: 30 tablet, Rfl: 0    hydroCHLOROthiazide 12.5 mg tablet, Take 1 tablet (12.5 mg total) by mouth daily, Disp: 90 tablet, Rfl: 1    KRILL OIL PO, 1200 mg QD, Disp: , Rfl:     lisinopril (ZESTRIL) 40 mg tablet, Take 1 tablet (40 mg total) by mouth daily, Disp: 90 tablet, Rfl: 3    lovastatin (MEVACOR) 20 mg tablet, Take 1 tablet (20 mg total) by mouth daily at bedtime, Disp: 90 tablet, Rfl: 3    meclizine (ANTIVERT) 25 mg tablet, Take 1 tablet (25 mg total) by mouth 3 (three) times a day as needed for dizziness, Disp: 30 tablet, Rfl: 0    metoprolol tartrate (LOPRESSOR) 50 mg tablet, Metoprolol Tartrate 75mg Q12 hours, Disp: 270 tablet, Rfl: 3    Multiple Vitamins-Minerals (MULTIVITAMIN ADULT PO), , Disp: , Rfl:     nystatin powder, Apply topically 2 (two) times a day (Patient taking differently: Apply  topically if needed), Disp: 180 g, Rfl: 2  Allergies   Allergen Reactions    Amlodipine Other (See Comments)     Swelling in legs    Lipitor [Atorvastatin] GI Intolerance    Morphine Nausea Only and GI Intolerance     Reaction Date: 09May2011;     Zithromax [Azithromycin] Diarrhea     Reaction Date: 09May2011;        Labs:  No visits with results within 2 Month(s) from this visit.   Latest known visit with results is:   Admission on 06/01/2024, Discharged on 06/03/2024   Component Date Value    Sodium 06/01/2024 139     Potassium 06/01/2024 3.9     Chloride 06/01/2024 103     CO2 06/01/2024 27     ANION GAP 06/01/2024 9     BUN 06/01/2024 15     Creatinine 06/01/2024 0.62     Glucose 06/01/2024 110     Calcium 06/01/2024 9.0     eGFR 06/01/2024 91     WBC 06/01/2024 6.92     RBC 06/01/2024 4.59     Hemoglobin 06/01/2024 14.6     Hematocrit 06/01/2024 44.0     MCV 06/01/2024 96     MCH 06/01/2024 31.8     MCHC 06/01/2024 33.2     RDW 06/01/2024 13.2     Platelets 06/01/2024 201     MPV 06/01/2024 10.2     Protime 06/01/2024 12.7     INR 06/01/2024 0.96     PTT 06/01/2024 23     hs TnI 0hr 06/01/2024 5     SARS-CoV-2 06/01/2024 Negative     INFLUENZA A PCR 06/01/2024 Negative     INFLUENZA B PCR 06/01/2024 Negative     RSV PCR 06/01/2024 Negative     POC Glucose 06/01/2024 122     hs TnI 2hr 06/01/2024 5     Delta 2hr hsTnI 06/01/2024 0     Triscuspid Valve Regurgi* 06/03/2024 20.0     RAA A4C 06/03/2024 11.7     LA Volume Index (BP) 06/03/2024 21.3     MV Peak A [place] 06/03/2024 0.86     MV stenosis pressure 1/2* 06/03/2024 82     MV Peak E [place] 06/03/2024 47     E wave deceleration time 06/03/2024 281     E/A ratio 06/03/2024 0.55     MV valve area p 1/2 meth* 06/03/2024 2.68     TR Peak [place] 06/03/2024 2.2     RVID d 06/03/2024 2.7     A4C EF 06/03/2024 56     Tricuspid valve peak reg* 06/03/2024 2.22     Left ventricular stroke * 06/03/2024 68.00     IVSd 06/03/2024 1.10     Tricuspid annular plane * 06/03/2024 2.90      Ao root 06/03/2024 2.90     LVPWd 06/03/2024 0.80     LA size 06/03/2024 3.9     Asc Ao 06/03/2024 4     LA volume (BP) 06/03/2024 46     FS 06/03/2024 33     LVIDS 06/03/2024 3.30     IVS 06/03/2024 1.1     LVIDd 06/03/2024 4.90     LA length (A2C) 06/03/2024 5.60     LEFT VENTRICLE SYSTOLIC * 06/03/2024 45     LV DIASTOLIC VOLUME (MOD* 06/03/2024 113     Left Atrium Area-systoli* 06/03/2024 15     Left Atrium Area-systoli* 06/03/2024 17.9     MV E' Tissue Velocity Se* 06/03/2024 5     LVSV, 2D 06/03/2024 68     BSA 06/03/2024 2.16     LV EF 06/03/2024 65     Est. RA pres 06/03/2024 3.0     Right Ventricular Peak S* 06/03/2024 23.00     hs TnI 4hr 06/01/2024 9     Delta 4hr hsTnI 06/01/2024 4     Ventricular Rate 06/01/2024 71     Atrial Rate 06/01/2024 73     WI Interval 06/01/2024 216     QRSD Interval 06/01/2024 88     QT Interval 06/01/2024 420     QTC Interval 06/01/2024 456     P Axis 06/01/2024 95     QRS Axis 06/01/2024 -40     T Wave Axis 06/01/2024 -12     Cholesterol 06/02/2024 160     Triglycerides 06/02/2024 189 (H)     HDL, Direct 06/02/2024 46 (L)     LDL Calculated 06/02/2024 76     Hemoglobin A1C 06/02/2024 5.6     EAG 06/02/2024 114     TSH 3RD GENERATON 06/02/2024 3.158     Magnesium 06/02/2024 1.8 (L)     Phosphorus 06/02/2024 3.8     Sodium 06/02/2024 140     Potassium 06/02/2024 4.1     Chloride 06/02/2024 104     CO2 06/02/2024 25     ANION GAP 06/02/2024 11     BUN 06/02/2024 13     Creatinine 06/02/2024 0.52 (L)     Glucose 06/02/2024 104     Calcium 06/02/2024 8.4     eGFR 06/02/2024 96     WBC 06/02/2024 7.07     RBC 06/02/2024 4.21     Hemoglobin 06/02/2024 13.3     Hematocrit 06/02/2024 40.4     MCV 06/02/2024 96     MCH 06/02/2024 31.6     MCHC 06/02/2024 32.9     RDW 06/02/2024 13.2     Platelets 06/02/2024 183     MPV 06/02/2024 10.4     Varicella IgM 06/03/2024 <0.91     Varicella IgG 06/03/2024 IMMUNE      Imaging: Echo complete w/ contrast if indicated    Result Date:  6/3/2024  Narrative:   Left Ventricle: Left ventricular cavity size is normal. Wall thickness is normal. The left ventricular ejection fraction is 65%. Systolic function is normal. Wall motion is normal. Diastolic function is mildly abnormal, consistent with grade I (abnormal) relaxation.   Mitral Valve: There is mild regurgitation.   Tricuspid Valve: There is mild regurgitation. The estimated right ventricular systolic pressure is 23.00 mmHg.   Aorta: The aortic root is normal in size. The ascending aorta is mildly dilated. The ascending aorta is 4 cm.     MRI brain wo contrast    Result Date: 6/1/2024  Narrative: MRI BRAIN WITHOUT CONTRAST INDICATION: left-sided numbness, stroke. COMPARISON:   CT from earlier the same day. TECHNIQUE:  Multiplanar, multisequence imaging of the brain was performed. IMAGE QUALITY:  Diagnostic. FINDINGS: BRAIN PARENCHYMA:  There is no discrete mass, mass effect or midline shift. There is no intracranial hemorrhage.  There is no evidence of acute infarction and diffusion imaging is unremarkable. There is mild chronic microvascular ischemic change. There is a focus of chronic hemosiderin deposition along the right caudate. There are chronic cerebellar lacunar infarcts. VENTRICLES:  Normal for the patient's age. SELLA AND PITUITARY GLAND:  Normal. ORBITS:  Normal. PARANASAL SINUSES: There are retention cysts versus polyps within the maxillary sinuses bilaterally. VASCULATURE:  Evaluation of the major intracranial vasculature demonstrates appropriate flow voids. CALVARIUM AND SKULL BASE:  Normal. EXTRACRANIAL SOFT TISSUES:  Normal.     Impression: No mass effect, acute intracranial hemorrhage or evidence of recent infarction. Minimal chronic microvascular ischemic change and chronic cerebellar lacunar infarcts. Workstation performed: [id number]     CTA stroke alert (head/neck)    Result Date: 6/1/2024  Narrative: CTA NECK AND BRAIN WITH CONTRAST INDICATION: Stroke Alert COMPARISON:    None. TECHNIQUE:   Post contrast imaging was performed after administration of iodinated contrast through the neck and brain. Post contrast axial 0.625 mm images timed to opacify the arterial system. 3D rendering was performed on an independent workstation.   MIP reconstructions performed. Coronal reconstructions were performed of the noncontrast portion of the brain. Radiation dose length product (DLP) for this visit:  634.93 mGy-cm .  This examination, like all CT scans performed in the [facility], was performed utilizing techniques to minimize radiation dose exposure, including the use of iterative  reconstruction and automated exposure control. IV Contrast:  85 mL of iohexol (OMNIPAQUE) IMAGE QUALITY:   Diagnostic FINDINGS: CERVICAL VASCULATURE AORTIC ARCH AND GREAT VESSELS:  Normal aortic arch and great vessel origins. Normal visualized subclavian vessels. RIGHT VERTEBRAL ARTERY CERVICAL SEGMENT:  Normal origin. The vessel is normal in caliber throughout the neck. LEFT VERTEBRAL ARTERY CERVICAL SEGMENT:  Normal origin. The vessel is normal in caliber throughout the neck. RIGHT EXTRACRANIAL CAROTID SEGMENT: There is moderate focal atherosclerotic calcification of the distal common carotid artery and proximal cervical internal carotid artery with only very mild smooth narrowing of the ICA origin and proximal bulb. LEFT EXTRACRANIAL CAROTID SEGMENT: Mild atherosclerotic disease of the distal common carotid artery and proximal cervical internal carotid artery without significant stenosis compared to the more distal ICA. NASCET criteria was used to determine the degree of internal carotid artery diameter stenosis. INTRACRANIAL VASCULATURE INTERNAL CAROTID ARTERIES:  Normal enhancement of the intracranial portions of the internal carotid arteries.  Normal ophthalmic artery origins.  Normal ICA terminus. ANTERIOR CIRCULATION:  Symmetric A1 segments and anterior cerebral arteries with normal  enhancement.  Normal anterior communicating artery. MIDDLE CEREBRAL ARTERY CIRCULATION:  M1 segment and middle cerebral artery branches demonstrate normal enhancement bilaterally. DISTAL VERTEBRAL ARTERIES:  Normal distal vertebral arteries.  Posterior inferior cerebellar artery origins are normal. Normal vertebral basilar junction. BASILAR ARTERY:  Basilar artery is normal in caliber.  Normal superior cerebellar arteries. POSTERIOR CEREBRAL ARTERIES: Both posterior cerebral arteries arises from the basilar tip.  Both arteries demonstrate normal enhancement.   Normal posterior communicating arteries. VENOUS STRUCTURES:  Normal. NON VASCULAR ANATOMY BONY STRUCTURES: Scoliosis of the cervical and thoracic spine. Mild cervical spondylitic degenerative change. No acute osseous abnormality. SOFT TISSUES OF THE NECK:  Normal. THORACIC INLET:  Unremarkable.     Impression: Unremarkable cervical and intracranial vasculature with the exception of atherosclerotic change of the carotid bifurcations bilaterally with only minor stenosis. No high-grade stenosis or occlusive disease. Findings were directly discussed with Abhi Espinoza at 7:40 a.m. Workstation performed: [id number]     CT stroke alert brain    Result Date: 6/1/2024  Narrative: CT BRAIN - STROKE ALERT PROTOCOL INDICATION:   Stroke Alert. COMPARISON:  None. TECHNIQUE:  CT examination of the brain was performed.  In addition to axial images, coronal reformatted images were created and submitted for interpretation. Radiation dose length product (DLP) for this visit:  1060 mGy-cm .  This examination, like all CT scans performed in the [facility], was performed utilizing techniques to minimize radiation dose exposure, including the use of iterative reconstruction and automated exposure control. IMAGE QUALITY:  Diagnostic. FINDINGS: PARENCHYMA:  No intracranial mass, mass effect or midline shift. No CT signs of acute infarction.  No acute parenchymal  hemorrhage. Normal intracranial vasculature. VENTRICLES AND EXTRA-AXIAL SPACES:  Normal for the patient's age. VISUALIZED ORBITS: Normal visualized orbits. PARANASAL SINUSES: Normal visualized paranasal sinuses. CALVARIUM AND EXTRACRANIAL SOFT TISSUES:   Normal.     Impression: No acute intracranial abnormality. Findings were directly discussed with Abhi Espinoza at approximately 10:40 a.m. Workstation performed: [id number]       Review of Systems:  Review of Systems   Musculoskeletal:  Positive for arthralgias and joint swelling.   All other systems reviewed and are negative.      Physical Exam:  Physical Exam  Vitals reviewed.   Constitutional:       Appearance: She is obese.   Cardiovascular:      Rate and Rhythm: Normal rate and regular rhythm.      Pulses: Normal pulses.      Heart sounds: Normal heart sounds.   Pulmonary:      Effort: Pulmonary effort is normal.      Breath sounds: Normal breath sounds.   Musculoskeletal:         General: Normal range of motion.      Cervical back: Normal range of motion and neck supple.      Right lower leg: No edema.      Left lower leg: No edema.   Skin:     General: Skin is warm and dry.      Capillary Refill: Capillary refill takes less than 2 seconds.   Neurological:      General: No focal deficit present.      Mental Status: She is alert and oriented to person, place, and time.   Psychiatric:         Mood and Affect: Mood normal.         Behavior: Behavior normal.       Discussion/Summary:  # 6/01/24 TIA, imaging reviewed by Dr. Orozco 1 possibly 2 small strokes in the cerebellum otherwise very minimal microvascular disease.  denies recurrence of symptoms.  2-week Zio patch did not show PAF.and did show 651 supraventricular tachycardic runs occurring fastest 4 beats with maximal heart rate 144 bpm longest lasting 1 4.3 seconds with average heart rate and 98 bpm.  Isolated SVE's 2.8% SVE couplets 2.3% SVE triplets 1.7%.  Metoprolol tartrate increased from 50 mg every 12  hours to 75 mg every 12 hours after Zio resulted.  Continue on Plavix 75 mg daily, Mevacor 20 mg daily.  Mag level was 1.8 and started on Magnesium gluconate 240mg daily.  Loop implant discussed with education on procedure.  She would like to discuss procedure with Dr Lee.  Sofy will call our office in the future if interested.    # SVT metoprolol to tartrate increased from 50 mg every 12 hours to 75 mg every 12 hours.    # Hypertension RUE controlled on metoprolol tartrate 75 mg every 12 hours, HCTZ 12.5 mg daily  DASH diet   # Hyperlipidemia  6/02/24 , , HDL 46, LDL 76 continue on Mevacor 20 mg daily heart healthy diet  # LOLI compliant with CPAP   # Obesity BMI 38.79 swimming three times a week

## 2024-09-09 ENCOUNTER — OFFICE VISIT (OUTPATIENT)
Dept: CARDIOLOGY CLINIC | Facility: CLINIC | Age: 74
End: 2024-09-09
Payer: COMMERCIAL

## 2024-09-09 ENCOUNTER — TELEPHONE (OUTPATIENT)
Age: 74
End: 2024-09-09

## 2024-09-09 VITALS
HEIGHT: 66 IN | WEIGHT: 238.7 LBS | HEART RATE: 64 BPM | DIASTOLIC BLOOD PRESSURE: 88 MMHG | SYSTOLIC BLOOD PRESSURE: 132 MMHG | BODY MASS INDEX: 38.36 KG/M2 | OXYGEN SATURATION: 99 %

## 2024-09-09 DIAGNOSIS — I10 PRIMARY HYPERTENSION: ICD-10-CM

## 2024-09-09 DIAGNOSIS — E78.2 MIXED HYPERLIPIDEMIA: ICD-10-CM

## 2024-09-09 DIAGNOSIS — I47.10 SVT (SUPRAVENTRICULAR TACHYCARDIA): ICD-10-CM

## 2024-09-09 DIAGNOSIS — E66.01 MORBID OBESITY WITH BMI OF 40.0-44.9, ADULT (HCC): ICD-10-CM

## 2024-09-09 DIAGNOSIS — G45.9 TIA (TRANSIENT ISCHEMIC ATTACK): Primary | ICD-10-CM

## 2024-09-09 DIAGNOSIS — G47.33 OSA (OBSTRUCTIVE SLEEP APNEA): ICD-10-CM

## 2024-09-09 PROCEDURE — 99215 OFFICE O/P EST HI 40 MIN: CPT | Performed by: NURSE PRACTITIONER

## 2024-09-09 RX ORDER — UREA 10 %
240 LOTION (ML) TOPICAL DAILY
COMMUNITY

## 2024-09-09 NOTE — TELEPHONE ENCOUNTER
Inbound call received from patient with regards to her Plavix Rx.    Patient stated she has a dental procedure, tooth extraction scheduled for next Tuesday 9/17/24 and is asking how long she needs to hold her Plavix Rx before getting the procedure done.     Patient is on Plavix 75 mg daily for TIA.    Dr. Olvera: please advise.     Clinical: If pt unable to answer the phone, okay to leave a detailed message on VM.

## 2024-09-10 NOTE — TELEPHONE ENCOUNTER
Called and LVM for pt regarding pt's message. Informed her of what Dr. Orozco has responded back with. Advised pt to call back in if she has any more questions.

## 2024-09-10 NOTE — TELEPHONE ENCOUNTER
Polo Orozco MD  [facility]; Rui Yoo MA20 hours ago (4:03 PM)       If the Plavix needs to be held for this dental procedure it would actually have to be held for 5 to 7 days before the procedure.  It would be appropriate to check with the dentist office to make sure they were need it held, but if they do that the typical timeframe   ______________  I called patient to advise same; reached , left detailed message with recommendation.  I also included on vm that form was received by dental office regarding plavix for Dr. Orozco to complete.    Clinical Team:  Please assist with request to stop plavix form completion, scanned to media encounter dated 9/10.  Thank you.

## 2024-09-12 NOTE — TELEPHONE ENCOUNTER
Recd call from Dr. Gordon Patel, dental office asking for recommendation regarding plavix.  Per communication thread:    Polo Orozco MD   to Neurology [facility]  Rui Yoo MA       9/9/24  4:03 PM   If the Plavix needs to be held for this dental procedure it would actually have to be held for 5 to 7 days before the procedure.  It would be appropriate to check with the dentist office to make sure they were need it held, but if they do that the typical timeframe    ____________  Amanda, aware of recommendation; I let her know we also left detailed message for patient.  Dental office requesting letter with above recommendation and they will also call patient with same.    Letter generated with above recommendation and faxed to dental office as requested:   [phone]    Dr. Orozco, no need to complete request to stop blood thinner form

## 2024-09-30 DIAGNOSIS — I10 ESSENTIAL HYPERTENSION: ICD-10-CM

## 2024-09-30 DIAGNOSIS — E83.42 HYPOMAGNESEMIA: Primary | ICD-10-CM

## 2024-09-30 RX ORDER — LISINOPRIL 40 MG/1
40 TABLET ORAL DAILY
Qty: 90 TABLET | Refills: 1 | Status: SHIPPED | OUTPATIENT
Start: 2024-09-30

## 2024-09-30 NOTE — TELEPHONE ENCOUNTER
Reason for call:   [x] Refill   [] Prior Auth  [] Other:     Office:   [x] PCP/Provider -   [] Specialty/Provider -     Medication: lisinopril (ZESTRIL) 40 mg     Dose/Frequency: Take 1 tablet (40 mg total) by mouth daily     Quantity: 90    Pharmacy: [facility]     Does the patient have enough for 3 days?   [] Yes   [x] No - Send as HP to POD

## 2024-10-02 ENCOUNTER — OFFICE VISIT (OUTPATIENT)
Dept: SLEEP CENTER | Facility: CLINIC | Age: 74
End: 2024-10-02
Payer: COMMERCIAL

## 2024-10-02 VITALS
HEART RATE: 59 BPM | WEIGHT: 238 LBS | HEIGHT: 66 IN | BODY MASS INDEX: 38.25 KG/M2 | SYSTOLIC BLOOD PRESSURE: 144 MMHG | DIASTOLIC BLOOD PRESSURE: 76 MMHG

## 2024-10-02 DIAGNOSIS — G47.61 PERIODIC LIMB MOVEMENT DISORDER: ICD-10-CM

## 2024-10-02 DIAGNOSIS — G47.33 OSA (OBSTRUCTIVE SLEEP APNEA): Primary | ICD-10-CM

## 2024-10-02 DIAGNOSIS — G47.00 INSOMNIA, UNSPECIFIED TYPE: ICD-10-CM

## 2024-10-02 DIAGNOSIS — E66.01 MORBID OBESITY WITH BMI OF 40.0-44.9, ADULT (HCC): ICD-10-CM

## 2024-10-02 PROCEDURE — 99214 OFFICE O/P EST MOD 30 MIN: CPT | Performed by: INTERNAL MEDICINE

## 2024-10-02 PROCEDURE — G2211 COMPLEX E/M VISIT ADD ON: HCPCS | Performed by: INTERNAL MEDICINE

## 2024-10-02 RX ORDER — DOXEPIN HYDROCHLORIDE 10 MG/ML
3 SOLUTION ORAL
Qty: 30 ML | Refills: 1 | Status: SHIPPED | OUTPATIENT
Start: 2024-10-02 | End: 2024-10-10

## 2024-10-02 NOTE — PATIENT INSTRUCTIONS
Patient Education     Doxepin (Systemic) (COLT castano)   Brand Names: [place] Silenor   Brand Names: Larisa GHULAM-Doxepin; Silenor; SINEquan   Warning   Drugs like this one have raised the chance of suicidal thoughts or actions in children and young adults. The risk may be greater in people who have had these thoughts or actions in the past. All people who take this drug need to be watched closely. Call the doctor right away if signs like depression, nervousness, restlessness, grouchiness, panic attacks, or changes in mood or actions are new or worse. Call the doctor right away if any thoughts or actions of suicide occur.  This drug is not approved for use in children. Talk with the doctor.  What is this drug used for?   It is used to treat depression.  It is used to treat anxiety.  It is used to treat sleep problems.  It may be given to you for other reasons. Talk with the doctor.  What do I need to tell my doctor BEFORE I take this drug?   For all uses of this drug:   If you are allergic to this drug; any part of this drug; or any other drugs, foods, or substances. Tell your doctor about the allergy and what signs you had.  If you have any of these health problems: Trouble passing urine or glaucoma.  If you are taking any of these drugs: Linezolid or methylene blue.  If you have taken certain drugs for depression or Parkinson's disease in the last 14 days. This includes isocarboxazid, phenelzine, tranylcypromine, selegiline, or rasagiline. Very high blood pressure may happen.  If you are taking any other drugs that can make you sleepy. There are many drugs that can do this. Ask your doctor or pharmacist if you are not sure.  If you are breast-feeding. Do not breast-feed while you take this drug.  For sleep problems:   If you have sleep apnea.  This is not a list of all drugs or health problems that interact with this drug.  Tell your doctor and pharmacist about all of your drugs (prescription or OTC, natural products,  vitamins) and health problems. You must check to make sure that it is safe for you to take this drug with all of your drugs and health problems. Do not start, stop, or change the dose of any drug without checking with your doctor.  What are some things I need to know or do while I take this drug?   For all uses of this drug:   Tell all of your health care providers that you take this drug. This includes your doctors, nurses, pharmacists, and dentists.  Avoid drinking alcohol while taking this drug.  Talk with your doctor before you use marijuana, other forms of cannabis, or prescription or OTC drugs that may slow your actions.  Some people may have a higher chance of eye problems with this drug. Your doctor may want you to have an eye exam to see if you have a higher chance of these eye problems. Call your doctor right away if you have eye pain, change in eyesight, or swelling or redness in or around the eye.  This drug may make you sunburn more easily. Use care if you will be in the sun. Tell your doctor if you sunburn easily while taking this drug.  If you have high blood sugar (diabetes), you will need to watch your blood sugar closely.  Tell your doctor if you have signs of high or low blood sugar like breath that smells like fruit, dizziness, fast breathing, fast heartbeat, feeling confused, feeling sleepy, feeling weak, flushing, headache, unusual thirst or hunger, passing urine more often, shaking, or sweating.  If you are 65 or older, use this drug with care. You could have more side effects.  Some drugs may cause fertility problems. This may affect the ability to have children. If you have questions or concerns, talk with the doctor.  Tell your doctor if you are pregnant or plan on getting pregnant. You will need to talk about the benefits and risks of using this drug while you are pregnant.  Taking this drug in the third trimester of pregnancy may lead to some health problems in the . Talk with the  doctor.  For sleep:   Avoid driving and doing other tasks or actions that call for you to be alert after you take this drug. You may still feel sleepy the day after you take this drug. Avoid these tasks or actions until you feel fully awake.  Use this drug for short periods of time. If signs show up again, talk with the doctor.  Some people have done certain tasks or actions while they were not fully awake like driving, making and eating food, and having sex. Most of the time, people do not remember doing these things. Tell your doctor if this happens to you.  For other reasons:   Avoid driving and doing other tasks or actions that call for you to be alert until you see how this drug affects you.  It may take several weeks to see the full effects.  Do not stop taking this drug all of a sudden without calling your doctor. You may have a greater risk of side effects. If you need to stop this drug, you will want to slowly stop it as ordered by your doctor.  What are some side effects that I need to call my doctor about right away?   WARNING/CAUTION: Even though it may be rare, some people may have very bad and sometimes deadly side effects when taking a drug. Tell your doctor or get medical help right away if you have any of the following signs or symptoms that may be related to a very bad side effect:  Signs of an allergic reaction, like rash; hives; itching; red, swollen, blistered, or peeling skin with or without fever; wheezing; tightness in the chest or throat; trouble breathing, swallowing, or talking; unusual hoarseness; or swelling of the mouth, face, lips, tongue, or throat.  Signs of high or low blood pressure like very bad headache or dizziness, passing out, or change in eyesight.  A fast heartbeat.  Trouble passing urine.  Very nervous and excitable.  Fever, chills, or sore throat; any unexplained bruising or bleeding; or feeling very tired or weak.  Yellow skin or eyes.  Ringing in ears.  Change in sex  interest.  Enlarged breasts or nipple discharge.  Swelling of the testicles.  If you have asthma, use this drug with care. Worsening of asthma has happened in people using this drug. Call your doctor right away if your asthma gets worse while you use this drug.  What are some other side effects of this drug?   All drugs may cause side effects. However, many people have no side effects or only have minor side effects. Call your doctor or get medical help if any of these side effects or any other side effects bother you or do not go away:  Feeling dizzy, sleepy, tired, or weak.  Constipation, diarrhea, stomach pain, upset stomach, throwing up, or decreased appetite.  Dry mouth.  Change in taste.  Mouth irritation or mouth sores.  Weight gain.  Sweating a lot.  Flushing.  Hair loss.  Headache.  These are not all of the side effects that may occur. If you have questions about side effects, call your doctor. Call your doctor for medical advice about side effects.  You may report side effects to your national health agency.  You may report side effects to the FDA at 1-736.712.9613. You may also report side effects at https://www.fda.gov/medwatch.  How is this drug best taken?   Use this drug as ordered by your doctor. Read all information given to you. Follow all instructions closely.  For sleep:   Take within 30 minutes of bedtime.  Do not take within 3 hours of a meal.  Do not take this drug unless you can get a full night's sleep (at least 7 to 8 hours) before you need to be active again.  If you have been taking this drug for many weeks, talk with your doctor before stopping. You may want to slowly stop this drug.  For other reasons:   Take at bedtime if you are taking once a day.  Keep taking this drug as you have been told by your doctor or other health care provider, even if you feel well.  Liquid:   Measure liquid doses carefully. Use the measuring device that comes with this drug.  Mix the dose with 1/2 cup (4  ounces/120 mL) of water, milk, or fruit juice and drink right away.  After mixing, take your dose right away. Do not store for future use.  Do not mix in any carbonated drinks or in grape juice.  What do I do if I miss a dose?   For sleep:   If you take this drug on a regular basis, take a missed dose as soon as you think about it.  If you will not be able to get a full night's sleep (at least 7 hours) after taking the missed dose, skip the missed dose and go back to your normal time.  Do not take 2 doses at the same time or extra doses.  Many times this drug is taken on an as needed basis. Do not take more often than told by the doctor.  For other reasons:   Take a missed dose as soon as you think about it.  If it is close to the time for your next dose, skip the missed dose and go back to your normal time.  Do not take 2 doses at the same time or extra doses.  How do I store and/or throw out this drug?   Store at room temperature protected from light. Store in a dry place. Do not store in a bathroom.  Keep all drugs in a safe place. Keep all drugs out of the reach of children and pets.  Throw away unused or  drugs. Do not flush down a toilet or pour down a drain unless you are told to do so. Check with your pharmacist if you have questions about the best way to throw out drugs. There may be drug take-back programs in your area.  General drug facts   If your symptoms or health problems do not get better or if they become worse, call your doctor.  Do not share your drugs with others and do not take anyone else's drugs.  Some drugs may have another patient information leaflet. If you have any questions about this drug, please talk with your doctor, nurse, pharmacist, or other health care provider.  This drug comes with an extra patient fact sheet called a Medication Guide. Read it with care. Read it again each time this drug is refilled. If you have any questions about this drug, please talk with the doctor,  pharmacist, or other health care provider.  If you think there has been an overdose, call your poison control center or get medical care right away. Be ready to tell or show what was taken, how much, and when it happened.  Consumer Information Use and Disclaimer   This generalized information is a limited summary of diagnosis, treatment, and/or medication information. It is not meant to be comprehensive and should be used as a tool to help the user understand and/or assess potential diagnostic and treatment options. It does NOT include all information about conditions, treatments, medications, side effects, or risks that may apply to a specific patient. It is not intended to be medical advice or a substitute for the medical advice, diagnosis, or treatment of a health care provider based on the health care provider's examination and assessment of a patient's specific and unique circumstances. Patients must speak with a health care provider for complete information about their health, medical questions, and treatment options, including any risks or benefits regarding use of medications. This information does not endorse any treatments or medications as safe, effective, or approved for treating a specific patient. [employer] and its affiliates disclaim any warranty or liability relating to this information or the use thereof. The use of this information is governed by the Terms of Use, available at https://www.wolterskluwer.com/en/know/clinical-effectiveness-terms.  Last Reviewed Date   2023-01-11  Copyright   © 2024 [employer] and its affiliates and/or licensors. All rights reserved.

## 2024-10-02 NOTE — PROGRESS NOTES
Sleep Medicine Outpatient Follow Up Note   Sofy Hendrickson 73 y.o. female MRN: [id number]  10/2/2024      Reason for Consultation:    Chief Complaint   Patient presents with    Follow-up         Assessment/Plan:    1. LOLI (obstructive sleep apnea)  -     PAP DME Resupply/Reorder  Mild symptomatic LOLI with AHI 5.6 events per hour  Reviewed the compliance with 100% using the machine averaging 7 to 8 hours of night sleep with minimal residual AHI, she is complaining of sometimes sleep interruption in the middle night and difficulty falling back asleep she is not quite sure why the test she would read and usually fall asleep back - but sometimes its difficult to fall asleep. Will start small dose Doxepin 3 mg HS    2. Insomnia, unspecified type  -     doxepin (SINEquan) 10 mg/mL solution; Take 0.3 mL (3 mg total) by mouth daily at bedtime    3. Periodic limb movement disorder  Resolved with ttt of LOLI and she is taking Mg supplement     4. Morbid obesity with BMI of 40.0-44.9, adult (HCC)  BMI 38.41 noted she would benefit from weight loss       Health Maintenance  Immunization History   Administered Date(s) Administered    Influenza Quadrivalent, 6-35 Months IM 11/16/2016, 10/20/2017        Return in about 1 year (around 10/2/2025).    History of Present Illness   HPI:  Sofy Hendrickson is a 73 y.o. female who has a past medical history of supraventricular tachycardia, mild obstructive sleep apnea, periodic limb movements stable on CPAP who is presenting for follow-up.    He has been reporting average 7 hours of sleep, have 1 awakening to use the bathroom would sometimes having difficulty falling back asleep.  She keep the CPAP on after awakening in case she falls back asleep.      She is still much less tired than prior to CPAP.  She doesn't feel the need for napping.      She had a TIA recently and getting work up to rule out A fib     When she worked she worked 3-11pm.  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    She has no symptoms of restless leg syndrome, parasomnias, or cataplexy.      She does not note any significant noticeable periodic limb movements during sleep.  She does not get awakened prematurely by irregular movements.  She is able to go through her day without any significant fatigue.    Sitting and reading: (P) Would never doze  Watching TV: (P) Slight chance of dozing     As a passenger in a car for an hour without a break: (P) Would never doze  Lying down to rest in the afternoon when circumstances permit: (P) Slight chance of dozing  Sitting and talking to someone: (P) Would never doze  Sitting quietly after a lunch without alcohol: (P) Would never doze  In a car, while stopped for a few minutes in traffic: (P) Would never doze            Historical Information   Past Medical History:   Diagnosis Date    Allergic     See list    Arthritis     Last few years    Cellulitis of left elbow 10/30/2016    Coronary artery disease     Occasional svt in past    CPAP (continuous positive airway pressure) dependence     Epistaxis     Hypertension     Had for many years    Medial meniscus tear 11/11/2014    Morbid obesity with BMI of 40.0-44.9, adult ([facility]) 12/28/2018    Obesity     Sleep apnea     SVT (supraventricular tachycardia) ([facility]) 2020    TIA (transient ischemic attack) 06/01/2024    Umbilical hernia     Urinary tract infection     In distant past    Visual impairment     Wear glasses     Past Surgical History:   Procedure Laterality Date    HERNIA REPAIR      KNEE ARTHROSCOPY W/ MENISCAL REPAIR  07/26/2015    with medial meniscus repair    KNEE SURGERY      OOPHORECTOMY Bilateral 2002    CO BIOPSY SOFT TISSUE BACK/FLANK DEEP N/A 10/02/2023    Procedure: EXCISION RIGHT FLANK MASS;  Surgeon: Ryan Singh MD;  Location:  MAIN OR;  Service: General    TOTAL ABDOMINAL HYSTERECTOMY  2002     Family History   Problem Relation Age of Onset    Dementia Mother         Had vascular dementia in her  mid 80s    Heart disease Mother     Thyroid disease Mother     Arthritis Mother     Autoimmune disease Mother     Hearing loss Mother     Glaucoma Mother     Hypertension Father     Thyroid cancer Daughter 37    Hodgkin's lymphoma Daughter 27    No Known Problems Maternal Grandmother     No Known Problems Maternal Grandfather     No Known Problems Paternal Grandmother     No Known Problems Paternal Grandfather     No Known Problems Paternal Aunt     No Known Problems Paternal Aunt     Thyroid disease Family     Diabetes Family         Diabetes Mellitus    Breast cancer Neg Hx          Meds/Allergies     Current Outpatient Medications:     calcium-vitamin D (OSCAL) 250-125 MG-UNIT per tablet, Take 1 tablet by mouth daily., Disp: , Rfl:     Cholecalciferol (Vitamin D3) 50 MCG (2000 UT) capsule, Take 2,000 Units by mouth daily  , Disp: , Rfl:     clopidogrel (PLAVIX) 75 mg tablet, Take 1 tablet (75 mg total) by mouth daily, Disp: 30 tablet, Rfl: 0    doxepin (SINEquan) 10 mg/mL solution, Take 0.3 mL (3 mg total) by mouth daily at bedtime, Disp: 30 mL, Rfl: 1    hydroCHLOROthiazide 12.5 mg tablet, Take 1 tablet (12.5 mg total) by mouth daily, Disp: 90 tablet, Rfl: 1    KRILL OIL PO, 1200 mg QD, Disp: , Rfl:     lisinopril (ZESTRIL) 40 mg tablet, Take 1 tablet (40 mg total) by mouth daily, Disp: 90 tablet, Rfl: 1    lovastatin (MEVACOR) 20 mg tablet, Take 1 tablet (20 mg total) by mouth daily at bedtime, Disp: 90 tablet, Rfl: 3    meclizine (ANTIVERT) 25 mg tablet, Take 1 tablet (25 mg total) by mouth 3 (three) times a day as needed for dizziness, Disp: 30 tablet, Rfl: 0    metoprolol tartrate (LOPRESSOR) 50 mg tablet, Metoprolol Tartrate 75mg Q12 hours, Disp: 270 tablet, Rfl: 3    Multiple Vitamins-Minerals (MULTIVITAMIN ADULT PO), , Disp: , Rfl:     nystatin powder, Apply topically 2 (two) times a day (Patient taking differently: Apply topically if needed), Disp: 180 g, Rfl: 2    magnesium gluconate (MAGONATE) 500 mg  "tablet, Take 240 mg by mouth in the morning, Disp: , Rfl:   Allergies   Allergen Reactions    Amlodipine Other (See Comments)     Swelling in legs    Lipitor [Atorvastatin] GI Intolerance    Morphine Nausea Only and GI Intolerance     Reaction Date: 09May2011;     Zithromax [Azithromycin] Diarrhea     Reaction Date: 09May2011;        Vitals: Blood pressure 144/76, pulse 59, height 5' 6\" (1.676 m), weight 108 kg (238 lb). Body mass index is 38.41 kg/m².      Physical Exam  Vitals and nursing note reviewed.   Constitutional:       General: She is not in acute distress.     Appearance: She is well-developed. She is not ill-appearing, toxic-appearing or diaphoretic.   HENT:      Head: Normocephalic and atraumatic.      Nose: Nose normal.      Mouth/Throat:      Comments: Mallampati 3.  No uvular, tonsillar hypertrophy.  No macroglossia  Eyes:      General:         Right eye: No discharge.         Left eye: No discharge.      Conjunctiva/sclera: Conjunctivae normal.   Pulmonary:      Effort: Pulmonary effort is normal.   Abdominal:      General: Abdomen is flat.   Musculoskeletal:         General: No swelling. Normal range of motion.      Cervical back: Normal range of motion and neck supple.      Right lower leg: No edema.      Left lower leg: No edema.   Skin:     General: Skin is dry.      Coloration: Skin is not jaundiced or pale.      Findings: No bruising.   Neurological:      General: No focal deficit present.      Mental Status: She is alert and oriented to person, place, and time. Mental status is at baseline.   Psychiatric:         Mood and Affect: Mood normal.         Behavior: Behavior normal.         Thought Content: Thought content normal.             Labs:   I have personally reviewed pertinent lab results.    ABG: No results found for: \"PHART\", \"IKU0RNP\", \"PO2ART\", \"EHT1JDX\", \"F1VKTZTO\", \"BEART\", \"SOURCE\",   BNP: No results found for: \"BNP\",   CBC:  Lab Results   Component Value Date    WBC 7.07 " "06/02/2024    HGB 13.3 06/02/2024    HCT 40.4 06/02/2024    MCV 96 06/02/2024     06/02/2024    EOSPCT 0 05/03/2024    EOSABS 0.03 05/03/2024    NEUTOPHILPCT 73 05/03/2024    LYMPHOPCT 20 05/03/2024   ,   CMP:   Lab Results   Component Value Date    SODIUM 140 06/02/2024    K 4.1 06/02/2024     06/02/2024    CO2 25 06/02/2024    ANIONGAP 6 10/14/2015    BUN 13 06/02/2024    CREATININE 0.52 (L) 06/02/2024    GLUCOSE 103 10/14/2015    CALCIUM 8.4 06/02/2024    AST 15 05/03/2024    ALT 12 05/03/2024    ALKPHOS 61 05/03/2024    PROT 7.4 10/14/2015    BILITOT 0.68 10/14/2015    EGFR 96 06/02/2024   ,   PT/INR:   Lab Results   Component Value Date    INR 0.96 06/01/2024   ,   Ferrtin: No components found for: \"FERRTIN\",  Magensium: No results found for: \"MAGNESIUM\",    Sleep Study: Personally reviewed the data and interpretation for the sleep studies.  9/21/2021   SLEEP:   Patient slept for 237.5 minutes out of 407.1 minutes in bed representing a sleep efficiency of 58.3%. Sleep architecture showed a sleep latency of 17.0 minutes and a REM sleep latency of 137.5 minutes. There was 9.3% Stage N1 noted. There was 74.1% Stage N2 noted. There was 5.1% Stage N3 noted. There was 11.6% REM sleep noted. The patient had 65 arousals for an average of 16.4 arousals per hour of sleep.   RESPIRATORY:   There were a total of 22 respiratory events made up of 0 obstructive apneas, 0 mixed apneas, 0 central apneas, and 22 hypopneas resulting in an apnea/hypopnea index (AHI) of 5.6 events per hour of sleep. The AHI in the supine position was N/A. The AHI during REM sleep was 48.0.   OXIMETRY:   The baseline oxygen saturation with the patient awake was 95.3%. The mean oxygen saturation throughout the study was 94.4%. The amount of sleep time below 90% was 1.5 minutes. The lowest oxygen saturation was 86.0%.   BODY POSITION:   The patient slept 0.0% of the evening in the supine position, 90.3% on left side, 9.7% on right side, " and 0.0% in the prone position.   LEG MOVEMENT:   There were 207 PLMs; PLM index of 52.3; 28 PLMs associated with arousal; PLM w/arousal index of 7.1.     IMPRESSION:   The patient slept for a total of 237 minutes representing sleep efficiency 58%, sleep architecture showed slightly reduced stage R. The patient had increased number of sleep disordered breathing events with an average apnea-hypopnea index of 5.6 events per hour. Thus, the patient meets the criteria for diagnosis of mild obstructive sleep apnea. In addition, the patient had increased number of periodic limb movements with a PLM index of 52.3 events per hour.   An in-lab CPAP titration study would be recommended.   Checking a CBC, BUN, creatinine, TSH, iron studies, ferritin, vitamin B12, vitamin-D and magnesium level would be recommended to rule out underlying metabolic disorders associated with periodic limb movements.   Clinical correlation suggested.       10/2/2021 - CPAP titration     SLEEP:   Patient slept for 350.0 minutes out of 393.8 minutes in bed representing a sleep efficiency of 88.9%. Sleep architecture showed a sleep latency of 7.9 minutes and a REM sleep latency of 85.5minutes. There was 16.4% Stage N1 noted. There was 35.4% Stage N2 noted. There was 19.3% Stage N3 noted. There was 28.9% Stage REM noted. The patient had 60 arousals for an average of 10.3arousals per hour of sleep.   TREATMENT:   Positive airway pressure was initiated at 5cm H2O pressure and titrated up to 8cm H2O pressure using the Resmed AirFit F10 Full face, Small interface   OXIMETRY:   The baseline oxygen saturation with the patient awake was 96.5%. The mean oxygen saturation throughout the study was 95.0%. The amount of sleep time below 90% was 0.0 minutes. The lowest oxygen saturation was 90.0%.   BODY POSITION:   The patient slept 0.0% of the evening in the supine position, 100.0% on left side, 0.0% on right side, and 0.0% in the prone position.   LEG MOVEMENT:  "  There were 351 PLMs; PLM index of 60.2; 18 PLMs associated with arousal; PLM w/arousal index of 3.1.   IMPRESSION:   Mrs. Hendrickson underwent titration of CPAP. She demonstrated a decreased sleep latency but normal REM latency. This is likely due to previously untreated LOLI but hypersomnia/narcolepsy can not be ruled out. Sleep staging was normal. She underwent titration of CPAP up to 8 cc of H2O pressure. She was doing well at that pressure but still had a few respiratory events. Therefore, I recommend a trial of auto-titrating CPAP 8-15 cc of H2O pressure with heated humidification. Compliance should be evaluated in 1-2 months.   In addition, she had severe limb movement (PLM index - 60.2). I would check iron and magnesium levels. If daytime sleepiness persists, I would consider a trial of Neurontin, Lyrica, Mirapex or Requip.     Compliance data:  8/28-9/26/2024.  100% use more than 4 hours.  Average use is 7 hours and 59 minutes.  Pressure set at 8-15 cm H2O.  EPR is at 2.    95th percentile pressure 13.1.  Residual AHI 0.4      Transthoracic Echo:  LEFT VENTRICLE:  Systolic function was normal. Ejection fraction was estimated to be 60 %.  There were no regional wall motion abnormalities.  Wall thickness was at the upper limits of normal.  MITRAL VALVE:  There was trace regurgitation.  TRICUSPID VALVE:  There was mild regurgitation.  PULMONIC VALVE:  There was trace regurgitation.  AORTA:  There was mild dilatation of the ascending aorta. 4.0 cm      Eamon Argueta MD    Portions of the record may have been created with voice recognition software. Occasional wrong word or \"sound a like\" substitutions may have occurred due to the inherent limitations of voice recognition software. Please read the chart carefully and recognize, using context, where substitutions have occurred.     "

## 2024-10-03 ENCOUNTER — TELEPHONE (OUTPATIENT)
Dept: SLEEP CENTER | Facility: CLINIC | Age: 74
End: 2024-10-03

## 2024-10-07 LAB
DME PARACHUTE DELIVERY DATE REQUESTED: NORMAL
DME PARACHUTE ITEM DESCRIPTION: NORMAL
DME PARACHUTE ORDER STATUS: NORMAL
DME PARACHUTE SUPPLIER NAME: NORMAL
DME PARACHUTE SUPPLIER PHONE: NORMAL

## 2024-10-09 ENCOUNTER — TELEPHONE (OUTPATIENT)
Dept: SLEEP CENTER | Facility: CLINIC | Age: 74
End: 2024-10-09

## 2024-10-09 ENCOUNTER — TELEPHONE (OUTPATIENT)
Age: 74
End: 2024-10-09

## 2024-10-09 NOTE — TELEPHONE ENCOUNTER
Tiffany from [facility] called because they received a prior auth request for the patient's doxepin. She said she faxed over the clinical criteria required to [phone]

## 2024-10-09 NOTE — TELEPHONE ENCOUNTER
PA for DOXEPIN SOLUTION SUBMITTED     via    []CMM-KEY:   []Surescripts-Case ID #   []Availity-Auth ID # NDC #   []Faxed to plan     [x]Other website/PROMPT YOLANDE RUCKER EOC ID [id number]      []Phone call Case ID #     Office notes sent, clinical questions answered. Awaiting determination    Turnaround time for your insurance to make a decision on your Prior Authorization can take 7-21 business days.

## 2024-10-09 NOTE — TELEPHONE ENCOUNTER
Incoming call from patient stating that she was in to see Dr. Argueta recently and he prescribed a new medication for her Doxepin.  States she received a call from [facility] order department requesting a call back for clarification of the prescription. Number to call is [phone]    Call to [facility] order pharmacy representative who states they need a prior auth for this medication.     BIN - [id number]  VONNIE- NVTD  ID # - [id number]    Message routed to Prior Auth Department

## 2024-10-10 DIAGNOSIS — G47.00 INSOMNIA, UNSPECIFIED TYPE: Primary | ICD-10-CM

## 2024-10-10 RX ORDER — DOXEPIN 6 MG/1
6 TABLET, FILM COATED ORAL
Qty: 30 TABLET | Refills: 2 | Status: SHIPPED | OUTPATIENT
Start: 2024-10-10

## 2024-10-10 NOTE — TELEPHONE ENCOUNTER
PA for DOXEPIN  DENIED    Reason:(Screenshot if applicable)        Message sent to office clinical pool Yes    Denial letter scanned into Media Yes    Appeal started No (Provider will need to decide if appeal is warranted and send clinical documentation to Prior Authorization Team addressing reasons given for denial for initiation.)    **Please follow up with your patient regarding denial and next steps**

## 2024-10-10 NOTE — TELEPHONE ENCOUNTER
Received prior authorization denial for doxepin.    Denial letter scanned to media.    Dr. Argueta, please advise how you would like to proceed.  Thank you.

## 2024-11-01 ENCOUNTER — RA CDI HCC (OUTPATIENT)
Dept: OTHER | Facility: HOSPITAL | Age: 74
End: 2024-11-01

## 2024-11-01 NOTE — PROGRESS NOTES
HCC coding opportunities          Chart Reviewed number of suggestions sent to Provider: 2  E66.01, I77.810     Patients Insurance     Medicare Insurance: [employer]vantage

## 2024-11-04 ENCOUNTER — APPOINTMENT (OUTPATIENT)
Dept: LAB | Facility: CLINIC | Age: 74
End: 2024-11-04
Payer: COMMERCIAL

## 2024-11-04 DIAGNOSIS — I10 PRIMARY HYPERTENSION: ICD-10-CM

## 2024-11-04 DIAGNOSIS — E78.2 MIXED HYPERLIPIDEMIA: ICD-10-CM

## 2024-11-04 DIAGNOSIS — Z12.11 SCREENING FOR COLON CANCER: ICD-10-CM

## 2024-11-04 DIAGNOSIS — E83.42 HYPOMAGNESEMIA: ICD-10-CM

## 2024-11-04 DIAGNOSIS — Z13.0 SCREENING FOR DEFICIENCY ANEMIA: ICD-10-CM

## 2024-11-04 LAB
ALBUMIN SERPL BCG-MCNC: 4 G/DL (ref 3.5–5)
ALP SERPL-CCNC: 57 U/L (ref 34–104)
ALT SERPL W P-5'-P-CCNC: 13 U/L (ref 7–52)
ANION GAP SERPL CALCULATED.3IONS-SCNC: 7 MMOL/L (ref 4–13)
AST SERPL W P-5'-P-CCNC: 17 U/L (ref 13–39)
BASOPHILS # BLD AUTO: 0.03 THOUSANDS/ΜL (ref 0–0.1)
BASOPHILS NFR BLD AUTO: 0 % (ref 0–1)
BILIRUB SERPL-MCNC: 0.74 MG/DL (ref 0.2–1)
BILIRUB UR QL STRIP: NEGATIVE
BUN SERPL-MCNC: 17 MG/DL (ref 5–25)
CALCIUM SERPL-MCNC: 9 MG/DL (ref 8.4–10.2)
CHLORIDE SERPL-SCNC: 102 MMOL/L (ref 96–108)
CHOLEST SERPL-MCNC: 177 MG/DL
CLARITY UR: CLEAR
CO2 SERPL-SCNC: 32 MMOL/L (ref 21–32)
COLOR UR: NORMAL
CREAT SERPL-MCNC: 0.6 MG/DL (ref 0.6–1.3)
EOSINOPHIL # BLD AUTO: 0.08 THOUSAND/ΜL (ref 0–0.61)
EOSINOPHIL NFR BLD AUTO: 1 % (ref 0–6)
ERYTHROCYTE [DISTWIDTH] IN BLOOD BY AUTOMATED COUNT: 13.1 % (ref 11.6–15.1)
GFR SERPL CREATININE-BSD FRML MDRD: 91 ML/MIN/1.73SQ M
GLUCOSE P FAST SERPL-MCNC: 121 MG/DL (ref 65–99)
GLUCOSE UR STRIP-MCNC: NEGATIVE MG/DL
HCT VFR BLD AUTO: 44.1 % (ref 34.8–46.1)
HDLC SERPL-MCNC: 43 MG/DL
HGB BLD-MCNC: 14.6 G/DL (ref 11.5–15.4)
HGB UR QL STRIP.AUTO: NEGATIVE
IMM GRANULOCYTES # BLD AUTO: 0.02 THOUSAND/UL (ref 0–0.2)
IMM GRANULOCYTES NFR BLD AUTO: 0 % (ref 0–2)
KETONES UR STRIP-MCNC: NEGATIVE MG/DL
LDLC SERPL CALC-MCNC: 85 MG/DL (ref 0–100)
LEUKOCYTE ESTERASE UR QL STRIP: NEGATIVE
LYMPHOCYTES # BLD AUTO: 2.26 THOUSANDS/ΜL (ref 0.6–4.47)
LYMPHOCYTES NFR BLD AUTO: 27 % (ref 14–44)
MAGNESIUM SERPL-MCNC: 1.9 MG/DL (ref 1.9–2.7)
MCH RBC QN AUTO: 32.3 PG (ref 26.8–34.3)
MCHC RBC AUTO-ENTMCNC: 33.1 G/DL (ref 31.4–37.4)
MCV RBC AUTO: 98 FL (ref 82–98)
MONOCYTES # BLD AUTO: 0.44 THOUSAND/ΜL (ref 0.17–1.22)
MONOCYTES NFR BLD AUTO: 5 % (ref 4–12)
NEUTROPHILS # BLD AUTO: 5.56 THOUSANDS/ΜL (ref 1.85–7.62)
NEUTS SEG NFR BLD AUTO: 67 % (ref 43–75)
NITRITE UR QL STRIP: NEGATIVE
NONHDLC SERPL-MCNC: 134 MG/DL
NRBC BLD AUTO-RTO: 0 /100 WBCS
PH UR STRIP.AUTO: 6.5 [PH]
PLATELET # BLD AUTO: 202 THOUSANDS/UL (ref 149–390)
PMV BLD AUTO: 10.8 FL (ref 8.9–12.7)
POTASSIUM SERPL-SCNC: 4.8 MMOL/L (ref 3.5–5.3)
PROT SERPL-MCNC: 6.9 G/DL (ref 6.4–8.4)
PROT UR STRIP-MCNC: NEGATIVE MG/DL
RBC # BLD AUTO: 4.52 MILLION/UL (ref 3.81–5.12)
SODIUM SERPL-SCNC: 141 MMOL/L (ref 135–147)
SP GR UR STRIP.AUTO: 1.01 (ref 1–1.03)
TRIGL SERPL-MCNC: 243 MG/DL
UROBILINOGEN UR STRIP-ACNC: <2 MG/DL
WBC # BLD AUTO: 8.39 THOUSAND/UL (ref 4.31–10.16)

## 2024-11-04 PROCEDURE — 81003 URINALYSIS AUTO W/O SCOPE: CPT

## 2024-11-04 PROCEDURE — 80053 COMPREHEN METABOLIC PANEL: CPT

## 2024-11-04 PROCEDURE — 80061 LIPID PANEL: CPT

## 2024-11-04 PROCEDURE — 83735 ASSAY OF MAGNESIUM: CPT

## 2024-11-04 PROCEDURE — 36415 COLL VENOUS BLD VENIPUNCTURE: CPT

## 2024-11-04 PROCEDURE — 85025 COMPLETE CBC W/AUTO DIFF WBC: CPT

## 2024-11-05 ENCOUNTER — APPOINTMENT (OUTPATIENT)
Dept: LAB | Facility: CLINIC | Age: 74
End: 2024-11-05
Payer: COMMERCIAL

## 2024-11-05 LAB — HEMOCCULT STL QL IA: NEGATIVE

## 2024-11-05 PROCEDURE — G0328 FECAL BLOOD SCRN IMMUNOASSAY: HCPCS

## 2024-11-07 ENCOUNTER — OFFICE VISIT (OUTPATIENT)
Age: 74
End: 2024-11-07
Payer: COMMERCIAL

## 2024-11-07 VITALS
DIASTOLIC BLOOD PRESSURE: 80 MMHG | WEIGHT: 238.4 LBS | HEART RATE: 71 BPM | TEMPERATURE: 98.5 F | SYSTOLIC BLOOD PRESSURE: 126 MMHG | OXYGEN SATURATION: 97 % | BODY MASS INDEX: 38.31 KG/M2 | HEIGHT: 66 IN

## 2024-11-07 DIAGNOSIS — G47.00 INSOMNIA, UNSPECIFIED TYPE: ICD-10-CM

## 2024-11-07 DIAGNOSIS — G45.9 TIA (TRANSIENT ISCHEMIC ATTACK): Primary | ICD-10-CM

## 2024-11-07 DIAGNOSIS — R73.09 ABNORMAL GLUCOSE: ICD-10-CM

## 2024-11-07 DIAGNOSIS — E78.2 MIXED HYPERLIPIDEMIA: ICD-10-CM

## 2024-11-07 DIAGNOSIS — E83.42 HYPOMAGNESEMIA: ICD-10-CM

## 2024-11-07 DIAGNOSIS — I10 PRIMARY HYPERTENSION: ICD-10-CM

## 2024-11-07 DIAGNOSIS — E78.1 HYPERTRIGLYCERIDEMIA: ICD-10-CM

## 2024-11-07 PROCEDURE — 99214 OFFICE O/P EST MOD 30 MIN: CPT | Performed by: INTERNAL MEDICINE

## 2024-11-07 PROCEDURE — G0439 PPPS, SUBSEQ VISIT: HCPCS | Performed by: INTERNAL MEDICINE

## 2024-11-07 RX ORDER — DOXEPIN 6 MG/1
3 TABLET, FILM COATED ORAL
Qty: 30 TABLET | Refills: 2 | Status: SHIPPED | OUTPATIENT
Start: 2024-11-07

## 2024-11-07 RX ORDER — HYDROCHLOROTHIAZIDE 12.5 MG/1
12.5 TABLET ORAL DAILY
Qty: 90 TABLET | Refills: 3 | Status: SHIPPED | OUTPATIENT
Start: 2024-11-07 | End: 2025-11-02

## 2024-11-07 RX ORDER — UREA 10 %
500 LOTION (ML) TOPICAL DAILY
Qty: 100 TABLET | Refills: 3 | Status: SHIPPED | OUTPATIENT
Start: 2024-11-07

## 2024-11-07 NOTE — ASSESSMENT & PLAN NOTE
Blood pressure assessment today confirms good control continue lisinopril at 40 mg daily and metoprolol tartrate at 50 mg 1-1/2 tablets twice a day comprehensive metabolic profile pertaining to electrolyte balance and kidney performance has been reviewed.    Orders:    hydroCHLOROthiazide 12.5 mg tablet; Take 1 tablet (12.5 mg total) by mouth daily

## 2024-11-07 NOTE — ASSESSMENT & PLAN NOTE
Low normal magnesium value recommend increasing magnesium to 500 mg daily.  Follow-up test in 4 months    Orders:    magnesium gluconate (MAGONATE) 500 mg tablet; Take 1 tablet (500 mg total) by mouth in the morning    Magnesium; Future

## 2024-11-07 NOTE — PATIENT INSTRUCTIONS
Medicare Preventive Visit Patient Instructions  Thank you for completing your Welcome to Medicare Visit or Medicare Annual Wellness Visit today. Your next wellness visit will be due in one year (11/8/2025).  The screening/preventive services that you may require over the next 5-10 years are detailed below. Some tests may not apply to you based off risk factors and/or age. Screening tests ordered at today's visit but not completed yet may show as past due. Also, please note that scanned in results may not display below.  Preventive Screenings:  Service Recommendations Previous Testing/Comments   Colorectal Cancer Screening  * Colonoscopy    * Fecal Occult Blood Test (FOBT)/Fecal Immunochemical Test (FIT)  * Fecal DNA/Cologuard Test  * Flexible Sigmoidoscopy Age: 45-75 years old   Colonoscopy: every 10 years (may be performed more frequently if at higher risk)  OR  FOBT/FIT: every 1 year  OR  Cologuard: every 3 years  OR  Sigmoidoscopy: every 5 years  Screening may be recommended earlier than age 45 if at higher risk for colorectal cancer. Also, an individualized decision between you and your healthcare provider will decide whether screening between the ages of 76-85 would be appropriate. Colonoscopy: 01/24/2019  FOBT/FIT: 11/05/2024  Cologuard: Not on file  Sigmoidoscopy: Not on file    Screening Current     Breast Cancer Screening Age: 40+ years old  Frequency: every 1-2 years  Not required if history of left and right mastectomy Mammogram: 05/17/2023    Screening Current   Cervical Cancer Screening Between the ages of 21-29, pap smear recommended once every 3 years.   Between the ages of 30-65, can perform pap smear with HPV co-testing every 5 years.   Recommendations may differ for women with a history of total hysterectomy, cervical cancer, or abnormal pap smears in past. Pap Smear: Not on file    Screening Not Indicated   Hepatitis C Screening Once for adults born between 1945 and 1965  More frequently in  patients at high risk for Hepatitis C Hep C Antibody: 08/25/2022    Screening Current   Diabetes Screening 1-2 times per year if you're at risk for diabetes or have pre-diabetes Fasting glucose: 121 mg/dL (11/4/2024)  A1C: 5.6 % (6/2/2024)  Screening Current   Cholesterol Screening Once every 5 years if you don't have a lipid disorder. May order more often based on risk factors. Lipid panel: 11/04/2024    Screening Not Indicated  History Lipid Disorder     Other Preventive Screenings Covered by Medicare:  Abdominal Aortic Aneurysm (AAA) Screening: covered once if your at risk. You're considered to be at risk if you have a family history of AAA.  Lung Cancer Screening: covers low dose CT scan once per year if you meet all of the following conditions: (1) Age 55-77; (2) No signs or symptoms of lung cancer; (3) Current smoker or have quit smoking within the last 15 years; (4) You have a tobacco smoking history of at least 20 pack years (packs per day multiplied by number of years you smoked); (5) You get a written order from a healthcare provider.  Glaucoma Screening: covered annually if you're considered high risk: (1) You have diabetes OR (2) Family history of glaucoma OR (3)  aged 50 and older OR (4)  American aged 65 and older  Osteoporosis Screening: covered every 2 years if you meet one of the following conditions: (1) You're estrogen deficient and at risk for osteoporosis based off medical history and other findings; (2) Have a vertebral abnormality; (3) On glucocorticoid therapy for more than 3 months; (4) Have primary hyperparathyroidism; (5) On osteoporosis medications and need to assess response to drug therapy.   Last bone density test (DXA Scan): Not on file.  HIV Screening: covered annually if you're between the age of 15-65. Also covered annually if you are younger than 15 and older than 65 with risk factors for HIV infection. For pregnant patients, it is covered up to 3 times per  pregnancy.    Immunizations:  Immunization Recommendations   Influenza Vaccine Annual influenza vaccination during flu season is recommended for all persons aged >= 6 months who do not have contraindications   Pneumococcal Vaccine   * Pneumococcal conjugate vaccine = PCV13 (Prevnar 13), PCV15 (Vaxneuvance), PCV20 (Prevnar 20)  * Pneumococcal polysaccharide vaccine = PPSV23 (Pneumovax) Adults 19-63 yo with certain risk factors or if 65+ yo  If never received any pneumonia vaccine: recommend Prevnar 20 (PCV20)  Give PCV20 if previously received 1 dose of PCV13 or PPSV23   Hepatitis B Vaccine 3 dose series if at intermediate or high risk (ex: diabetes, end stage renal disease, liver disease)   Respiratory syncytial virus (RSV) Vaccine - COVERED BY MEDICARE PART D  * RSVPreF3 (Arexvy) CDC recommends that adults 60 years of age and older may receive a single dose of RSV vaccine using shared clinical decision-making (SCDM)   Tetanus (Td) Vaccine - COST NOT COVERED BY MEDICARE PART B Following completion of primary series, a booster dose should be given every 10 years to maintain immunity against tetanus. Td may also be given as tetanus wound prophylaxis.   Tdap Vaccine - COST NOT COVERED BY MEDICARE PART B Recommended at least once for all adults. For pregnant patients, recommended with each pregnancy.   Shingles Vaccine (Shingrix) - COST NOT COVERED BY MEDICARE PART B  2 shot series recommended in those 19 years and older who have or will have weakened immune systems or those 50 years and older     Health Maintenance Due:      Topic Date Due   • Breast Cancer Screening: Mammogram  05/17/2024   • Colorectal Cancer Screening  01/24/2029   • Hepatitis C Screening  Completed     Immunizations Due:      Topic Date Due   • Pneumococcal Vaccine: 65+ Years (1 of 2 - PCV) Never done   • Influenza Vaccine (1) 09/01/2024   • COVID-19 Vaccine (1 - 2023-24 season) Never done     Advance Directives   What are advance directives?   Advance directives are legal documents that state your wishes and plans for medical care. These plans are made ahead of time in case you lose your ability to make decisions for yourself. Advance directives can apply to any medical decision, such as the treatments you want, and if you want to donate organs.   What are the types of advance directives?  There are many types of advance directives, and each state has rules about how to use them. You may choose a combination of any of the following:  Living will:  This is a written record of the treatment you want. You can also choose which treatments you do not want, which to limit, and which to stop at a certain time. This includes surgery, medicine, IV fluid, and tube feedings.   Durable power of  for healthcare (DPAHC):  This is a written record that states who you want to make healthcare choices for you when you are unable to make them for yourself. This person, called a proxy, is usually a family member or a friend. You may choose more than 1 proxy.  Do not resuscitate (DNR) order:  A DNR order is used in case your heart stops beating or you stop breathing. It is a request not to have certain forms of treatment, such as CPR. A DNR order may be included in other types of advance directives.  Medical directive:  This covers the care that you want if you are in a coma, near death, or unable to make decisions for yourself. You can list the treatments you want for each condition. Treatment may include pain medicine, surgery, blood transfusions, dialysis, IV or tube feedings, and a ventilator (breathing machine).  Values history:  This document has questions about your views, beliefs, and how you feel and think about life. This information can help others choose the care that you would choose.  Why are advance directives important?  An advance directive helps you control your care. Although spoken wishes may be used, it is better to have your wishes written down.  Spoken wishes can be misunderstood, or not followed. Treatments may be given even if you do not want them. An advance directive may make it easier for your family to make difficult choices about your care.   Urinary Incontinence   Urinary incontinence (UI)  is when you lose control of your bladder. UI develops because your bladder cannot store or empty urine properly. The 3 most common types of UI are stress incontinence, urge incontinence, or both.  Medicines:   May be given to help strengthen your bladder control. Report any side effects of medication to your healthcare provider.  Do pelvic muscle exercises often:  Your pelvic muscles help you stop urinating. Squeeze these muscles tight for 5 seconds, then relax for 5 seconds. Gradually work up to squeezing for 10 seconds. Do 3 sets of 15 repetitions a day, or as directed. This will help strengthen your pelvic muscles and improve bladder control.  Train your bladder:  Go to the bathroom at set times, such as every 2 hours, even if you do not feel the urge to go. You can also try to hold your urine when you feel the urge to go. For example, hold your urine for 5 minutes when you feel the urge to go. As that becomes easier, hold your urine for 10 minutes.   Self-care:   Keep a UI record.  Write down how often you leak urine and how much you leak. Make a note of what you were doing when you leaked urine.  Drink liquids as directed. You may need to limit the amount of liquid you drink to help control your urine leakage. Do not drink any liquid right before you go to bed. Limit or do not have drinks that contain caffeine or alcohol.   Prevent constipation.  Eat a variety of high-fiber foods. Good examples are high-fiber cereals, beans, vegetables, and whole-grain breads. Walking is the best way to trigger your intestines to have a bowel movement.  Exercise regularly and maintain a healthy weight.  Weight loss and exercise will decrease pressure on your bladder and help  you control your leakage.   Use a catheter as directed  to help empty your bladder. A catheter is a tiny, plastic tube that is put into your bladder to drain your urine.   Go to behavior therapy as directed.  Behavior therapy may be used to help you learn to control your urge to urinate.    Weight Management   Why it is important to manage your weight:  Being overweight increases your risk of health conditions such as heart disease, high blood pressure, type 2 diabetes, and certain types of cancer. It can also increase your risk for osteoarthritis, sleep apnea, and other respiratory problems. Aim for a slow, steady weight loss. Even a small amount of weight loss can lower your risk of health problems.  How to lose weight safely:  A safe and healthy way to lose weight is to eat fewer calories and get regular exercise. You can lose up about 1 pound a week by decreasing the number of calories you eat by 500 calories each day.   Healthy meal plan for weight management:  A healthy meal plan includes a variety of foods, contains fewer calories, and helps you stay healthy. A healthy meal plan includes the following:  Eat whole-grain foods more often.  A healthy meal plan should contain fiber. Fiber is the part of grains, fruits, and vegetables that is not broken down by your body. Whole-grain foods are healthy and provide extra fiber in your diet. Some examples of whole-grain foods are whole-wheat breads and pastas, oatmeal, brown rice, and bulgur.  Eat a variety of vegetables every day.  Include dark, leafy greens such as spinach, kale, leslie greens, and mustard greens. Eat yellow and orange vegetables such as carrots, sweet potatoes, and winter squash.   Eat a variety of fruits every day.  Choose fresh or canned fruit (canned in its own juice or light syrup) instead of juice. Fruit juice has very little or no fiber.  Eat low-fat dairy foods.  Drink fat-free (skim) milk or 1% milk. Eat fat-free yogurt and low-fat  cottage cheese. Try low-fat cheeses such as mozzarella and other reduced-fat cheeses.  Choose meat and other protein foods that are low in fat.  Choose beans or other legumes such as split peas or lentils. Choose fish, skinless poultry (chicken or turkey), or lean cuts of red meat (beef or pork). Before you cook meat or poultry, cut off any visible fat.   Use less fat and oil.  Try baking foods instead of frying them. Add less fat, such as margarine, sour cream, regular salad dressing and mayonnaise to foods. Eat fewer high-fat foods. Some examples of high-fat foods include french fries, doughnuts, ice cream, and cakes.  Eat fewer sweets.  Limit foods and drinks that are high in sugar. This includes candy, cookies, regular soda, and sweetened drinks.  Exercise:  Exercise at least 30 minutes per day on most days of the week. Some examples of exercise include walking, biking, dancing, and swimming. You can also fit in more physical activity by taking the stairs instead of the elevator or parking farther away from stores. Ask your healthcare provider about the best exercise plan for you.      © Copyright [employer] 2018 Information is for End User's use only and may not be sold, redistributed or otherwise used for commercial purposes. All illustrations and images included in CareNotes® are the copyrighted property of A.D.A[employer]M., Inc. or [employer]

## 2024-11-07 NOTE — ASSESSMENT & PLAN NOTE
Taking 3 mg of doxepin at bedtime    Orders:    Doxepin HCl 6 MG TABS; Take 0.5 tablets (3 mg total) by mouth daily at bedtime

## 2024-11-07 NOTE — ASSESSMENT & PLAN NOTE
Recommend follow-up lipid profile for reevaluation of cholesterol and triglyceride elevations in 4 months.  Dietary adjustments and weight loss are recommended along with continuation of current statin medication.    Orders:    Lipid panel; Future

## 2024-11-07 NOTE — ASSESSMENT & PLAN NOTE
TIA event earlier this year in June.  No subsequent events noted by the patient.  She is on Plavix and statin therapy.  Currently on lovastatin 20 mg daily was unable to tolerate newer generation of statin medications.

## 2024-11-07 NOTE — PROGRESS NOTES
Ambulatory Visit  Name: Sofy Hendrickson      : 1950      MRN: [id number]  Encounter Provider: Ricardo Lee MD  Encounter Date: 2024   Encounter department: [facility] INTERNAL MEDICINE    Assessment & Plan  Hypomagnesemia  Low normal magnesium value recommend increasing magnesium to 500 mg daily.  Follow-up test in 4 months    Orders:    magnesium gluconate (MAGONATE) 500 mg tablet; Take 1 tablet (500 mg total) by mouth in the morning    Magnesium; Future    Primary hypertension  Blood pressure assessment today confirms good control continue lisinopril at 40 mg daily and metoprolol tartrate at 50 mg 1-1/2 tablets twice a day comprehensive metabolic profile pertaining to electrolyte balance and kidney performance has been reviewed.    Orders:    hydroCHLOROthiazide 12.5 mg tablet; Take 1 tablet (12.5 mg total) by mouth daily    Insomnia, unspecified type  Taking 3 mg of doxepin at bedtime    Orders:    Doxepin HCl 6 MG TABS; Take 0.5 tablets (3 mg total) by mouth daily at bedtime    Mixed hyperlipidemia  Recommend follow-up lipid profile for reevaluation of cholesterol and triglyceride elevations in 4 months.  Dietary adjustments and weight loss are recommended along with continuation of current statin medication.    Orders:    Lipid panel; Future    Hypertriglyceridemia    Abnormal glucose    TIA (transient ischemic attack)  TIA event earlier this year in .  No subsequent events noted by the patient.  She is on Plavix and statin therapy.  Currently on lovastatin 20 mg daily was unable to tolerate newer generation of statin medications.            Preventive health issues were discussed with patient, and age appropriate screening tests were ordered as noted in patient's After Visit Summary. Personalized health advice and appropriate referrals for health education or preventive services given if needed, as noted in patient's After Visit Summary.    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  She did experience a TIA event in June of this year she presented with left face and left arm numbness and heaviness in her left leg.  Symptoms resolved completely and there was no evidence of permanent damage in the brain.  She does have evidence of previous chronic cerebellar lacunar infarctions.  He was placed on Plavix 75 mg daily.  She has tolerated the medication without any side effects.  She reports no new neurologic events since her event in June.  Patient has had some occasional palpitations when she lies on the left side in bed but no chest pain or palpitations during normal activities.  She remains active swimming on a regular basis.       Patient Care Team:  Ricardo Lee MD as PCP - General  Ricardo Lee MD as PCP - PCP-[facility] ([facility])  Ricardo Lee MD as PCP - [facility]CP-[facility] ([facility])    Review of Systems   All other systems reviewed and are negative.    Medical History Reviewed by provider this encounter:       Annual Wellness Visit Questionnaire   Sofy is here for her Subsequent Wellness visit. Last Medicare Wellness visit information reviewed, patient interviewed, no change since last AWV.     Health Risk Assessment:   Patient rates overall health as very good. Patient feels that their physical health rating is same. Patient is very satisfied with their life. Eyesight was rated as slightly worse. Hearing was rated as same. Patient feels that their emotional and mental health rating is same. Patients states they are never, rarely angry. Patient states they are never, rarely unusually tired/fatigued. Pain experienced in the last 7 days has been none. Patient states that she has experienced no weight loss or gain in last 6 months.     Depression Screening:   PHQ-2 Score: 0      Fall Risk Screening:   In the past year, patient has experienced: no history of falling in past year       Urinary Incontinence Screening:   Patient has leaked urine accidently in the last six months. I try to avoid food triggers    Home Safety:  Patient does not have trouble with stairs inside or outside of their home. Patient has working smoke alarms and has working carbon monoxide detector. Home safety hazards include: none.     Nutrition:   Current diet is Low Carb and Limited junk food. Trying to limit snacks    Medications:   Patient is currently taking over-the-counter supplements. OTC medications include: see medication list. Patient is able to manage medications.     Activities of Daily Living (ADLs)/Instrumental Activities of Daily Living (IADLs):   Walk and transfer into and out of bed and chair?: Yes  Dress and groom yourself?: Yes    Bathe or shower yourself?: Yes    Feed yourself? Yes  Do your laundry/housekeeping?: Yes  Manage your money, pay your bills and track your expenses?: Yes  Make your own meals?: Yes    Do your own shopping?: Yes    Durable Medical Equipment Suppliers  [employer]    Previous Hospitalizations:   Any hospitalizations or ED visits within the last 12 months?: Yes    How many hospitalizations have you had in the last year?: 1-2    Hospitalization Comments: Had a  tia 6/1/24    Advance Care Planning:   Living will: Yes    Durable POA for healthcare: Yes    Advanced directive: Yes      PREVENTIVE SCREENINGS      Cardiovascular Screening:    General: Screening Not Indicated and History Lipid Disorder      Diabetes Screening:     General: Screening Current      Colorectal Cancer Screening:     General: Screening Current      Breast Cancer Screening:     General: Screening Current      Cervical Cancer Screening:    General: Screening Not Indicated      Lung Cancer Screening:     General: Screening Not Indicated      Hepatitis C Screening:    General: Screening Current    Screening, Brief Intervention, and Referral to Treatment (SBIRT)    Screening  Typical number of drinks in a day:  "0  Typical number of drinks in a week: 0  Interpretation: Low risk drinking behavior.    AUDIT-C Screenin) How often did you have a drink containing alcohol in the past year? never  2) How many drinks did you have on a typical day when you were drinking in the past year? 0  3) How often did you have 6 or more drinks on one occasion in the past year? never    AUDIT-C Score: 0  Interpretation: Score 0-2 (female): Negative screen for alcohol misuse    Single Item Drug Screening:  How often have you used an illegal drug (including marijuana) or a prescription medication for non-medical reasons in the past year? never    Single Item Drug Screen Score: 0  Interpretation: Negative screen for possible drug use disorder    Social Determinants of Health     Financial Resource Strain: Low Risk  (2023)    Overall Financial Resource Strain (CARDIA)     Difficulty of Paying Living Expenses: Not hard at all   Food Insecurity: No Food Insecurity (2024)    Hunger Vital Sign     Worried About Running Out of Food in the Last Year: Never true     Ran Out of Food in the Last Year: Never true   Transportation Needs: No Transportation Needs (2024)    PRAPARE - Transportation     Lack of Transportation (Medical): No     Lack of Transportation (Non-Medical): No   Housing Stability: Low Risk  (2024)    Housing Stability Vital Sign     Unable to Pay for Housing in the Last Year: No     Number of Times Moved in the Last Year: 0     Homeless in the Last Year: No   Utilities: Not At Risk (2024)    [facility] Utilities     Threatened with loss of utilities: No     No results found.    Objective     Ht 5' 6\" (1.676 m)   BMI 38.41 kg/m²     Physical Exam  Vitals and nursing note reviewed.   Constitutional:       General: She is not in acute distress.     Appearance: She is well-developed.   HENT:      Head: Normocephalic and atraumatic.      Right Ear: Tympanic membrane, ear canal and external ear normal.      Left Ear: " Tympanic membrane, ear canal and external ear normal.      Nose: Nose normal.      Mouth/Throat:      Mouth: Mucous membranes are moist.      Pharynx: Oropharynx is clear.   Eyes:      Extraocular Movements: Extraocular movements intact.      Conjunctiva/sclera: Conjunctivae normal.      Pupils: Pupils are equal, round, and reactive to light.   Neck:      Vascular: No carotid bruit.   Cardiovascular:      Rate and Rhythm: Normal rate and regular rhythm.      Heart sounds: Normal heart sounds. No murmur heard.  Pulmonary:      Effort: Pulmonary effort is normal. No respiratory distress.      Breath sounds: Normal breath sounds. No wheezing, rhonchi or rales.   Abdominal:      General: Bowel sounds are normal. There is no distension.      Palpations: Abdomen is soft. There is no mass.      Tenderness: There is no abdominal tenderness. There is no guarding.      Hernia: A hernia is present.      Comments: Reducible umbilical hernia occipitally 2 inches in diameter   Musculoskeletal:      Cervical back: Normal range of motion and neck supple. No rigidity or tenderness.      Right lower leg: No edema.      Left lower leg: No edema.   Lymphadenopathy:      Cervical: No cervical adenopathy.   Skin:     General: Skin is warm and dry.      Capillary Refill: Capillary refill takes less than 2 seconds.      Coloration: Skin is not jaundiced or pale.   Neurological:      General: No focal deficit present.      Mental Status: She is alert. Mental status is at baseline.   Psychiatric:         Mood and Affect: Mood normal.         Behavior: Behavior normal.         Thought Content: Thought content normal.         Judgment: Judgment normal.

## 2024-11-15 ENCOUNTER — HOSPITAL ENCOUNTER (OUTPATIENT)
Dept: MAMMOGRAPHY | Facility: MEDICAL CENTER | Age: 74
Discharge: HOME | End: 2024-11-15
Payer: COMMERCIAL

## 2024-11-15 VITALS — HEIGHT: 62 IN | BODY MASS INDEX: 43.79 KG/M2 | WEIGHT: 238 LBS

## 2024-11-15 DIAGNOSIS — Z12.31 ENCOUNTER FOR SCREENING MAMMOGRAM FOR MALIGNANT NEOPLASM OF BREAST: ICD-10-CM

## 2024-11-15 PROCEDURE — 77067 SCR MAMMO BI INCL CAD: CPT

## 2024-11-15 PROCEDURE — 77063 BREAST TOMOSYNTHESIS BI: CPT

## 2024-11-21 ENCOUNTER — OFFICE VISIT (OUTPATIENT)
Dept: CARDIOLOGY CLINIC | Facility: CLINIC | Age: 74
End: 2024-11-21
Payer: COMMERCIAL

## 2024-11-21 VITALS
OXYGEN SATURATION: 98 % | HEART RATE: 72 BPM | SYSTOLIC BLOOD PRESSURE: 128 MMHG | DIASTOLIC BLOOD PRESSURE: 70 MMHG | WEIGHT: 237.2 LBS | HEIGHT: 62 IN | BODY MASS INDEX: 43.65 KG/M2

## 2024-11-21 DIAGNOSIS — I47.10 SVT (SUPRAVENTRICULAR TACHYCARDIA) (HCC): ICD-10-CM

## 2024-11-21 DIAGNOSIS — I10 PRIMARY HYPERTENSION: ICD-10-CM

## 2024-11-21 DIAGNOSIS — G45.9 TIA (TRANSIENT ISCHEMIC ATTACK): Primary | ICD-10-CM

## 2024-11-21 DIAGNOSIS — E78.2 MIXED HYPERLIPIDEMIA: ICD-10-CM

## 2024-11-21 PROCEDURE — 99214 OFFICE O/P EST MOD 30 MIN: CPT | Performed by: INTERNAL MEDICINE

## 2024-11-21 NOTE — PATIENT INSTRUCTIONS
Recommendations:  1. Continue current medications.  2. Patient will consider loop recorder.   3. Follow up 6 months.

## 2024-11-21 NOTE — PROGRESS NOTES
Cardiology   Sofy Hendrickson 73 y.o. female MRN: [id number]        Impression:  1. Paroxysmal atrial tachycardia - on metoprolol.  Benign.  Short episodes, but no sustained episodes on metoprolol.   2. Sleep Apnea - on CPAP.   3. Dyslipidemia - on statin.  4. Hypertension - controlled.   5. TIA/CVA - unclear etiology.  No obvious afib on Zio monitor.  However, Neurology concerned about subclinical atrial fibrillation.      Recommendations:  1. Continue current medications.  2. Patient will consider loop recorder.   3. Follow up 6 months.         HPI: Sofy Hendrickson is a 73 y.o. year old female with paroxysmal atrial tachycardia, hypertension, and dyslipidemia who presents for follow up.  Had TIA/CVA 6/24 - Echo demonstrated structurally normal heart, Zio monitor demonstrated episodes of SVT lasting several seconds, but no afib.          Review of Systems   Constitutional: Negative.    HENT: Negative.     Eyes: Negative.    Respiratory:  Negative for chest tightness and shortness of breath.    Cardiovascular:  Negative for chest pain, palpitations and leg swelling.   Gastrointestinal: Negative.    Endocrine: Negative.    Genitourinary: Negative.    Musculoskeletal: Negative.    Skin: Negative.    Allergic/Immunologic: Negative.    Neurological: Negative.    Hematological: Negative.    Psychiatric/Behavioral: Negative.     All other systems reviewed and are negative.        Past Medical History:   Diagnosis Date    Allergic     See list    Arthritis     Last few years    Cellulitis of left elbow 10/30/2016    Coronary artery disease     Occasional svt in past    CPAP (continuous positive airway pressure) dependence     Epistaxis     Hypertension     Had for many years    Medial meniscus tear 11/11/2014    Morbid obesity with BMI of 40.0-44.9, adult (Formerly [facility]) 12/28/2018    Obesity     Sleep apnea     SVT (supraventricular tachycardia) (Formerly [facility]) 2020    TIA (transient ischemic attack) 06/01/2024    Umbilical hernia     Urinary  tract infection     In distant past    Visual impairment     Wear glasses     Past Surgical History:   Procedure Laterality Date    HERNIA REPAIR      KNEE ARTHROSCOPY W/ MENISCAL REPAIR  07/26/2015    with medial meniscus repair    KNEE SURGERY      OOPHORECTOMY Bilateral 2002    NM BIOPSY SOFT TISSUE BACK/FLANK DEEP N/A 10/02/2023    Procedure: EXCISION RIGHT FLANK MASS;  Surgeon: Ryan Singh MD;  Location:  MAIN OR;  Service: General    TOTAL ABDOMINAL HYSTERECTOMY  2002     Social History     Substance and Sexual Activity   Alcohol Use No     Social History     Substance and Sexual Activity   Drug Use No     Social History     Tobacco Use   Smoking Status Never   Smokeless Tobacco Never   Tobacco Comments    Denied history of never a smoker per [employer]     Family History   Problem Relation Age of Onset    Dementia Mother         Had vascular dementia in her mid 80s    Heart disease Mother     Thyroid disease Mother     Arthritis Mother     Autoimmune disease Mother     Hearing loss Mother     Glaucoma Mother     Hypertension Father     Thyroid cancer Daughter 37    Hodgkin's lymphoma Daughter 27    No Known Problems Maternal Grandmother     No Known Problems Maternal Grandfather     No Known Problems Paternal Grandmother     No Known Problems Paternal Grandfather     No Known Problems Paternal Aunt     No Known Problems Paternal Aunt     Thyroid disease Family     Diabetes Family         Diabetes Mellitus    Breast cancer Neg Hx        Allergies:  Allergies   Allergen Reactions    Amlodipine Other (See Comments)     Swelling in legs    Lipitor [Atorvastatin] GI Intolerance    Morphine Nausea Only and GI Intolerance     Reaction Date: 09May2011;     Zithromax [Azithromycin] Diarrhea     Reaction Date: 09May2011;        Medications:     Current Outpatient Medications:     calcium-vitamin D (OSCAL) 250-125 MG-UNIT per tablet, Take 1 tablet by mouth daily., Disp: , Rfl:     Cholecalciferol (Vitamin D3) 50 MCG  (2000 UT) capsule, Take 2,000 Units by mouth daily  , Disp: , Rfl:     clopidogrel (PLAVIX) 75 mg tablet, Take 1 tablet (75 mg total) by mouth daily, Disp: 30 tablet, Rfl: 0    Doxepin HCl 6 MG TABS, Take 0.5 tablets (3 mg total) by mouth daily at bedtime, Disp: 30 tablet, Rfl: 2    hydroCHLOROthiazide 12.5 mg tablet, Take 1 tablet (12.5 mg total) by mouth daily, Disp: 90 tablet, Rfl: 3    KRILL OIL PO, 1200 mg QD, Disp: , Rfl:     lisinopril (ZESTRIL) 40 mg tablet, Take 1 tablet (40 mg total) by mouth daily, Disp: 90 tablet, Rfl: 1    lovastatin (MEVACOR) 20 mg tablet, Take 1 tablet (20 mg total) by mouth daily at bedtime, Disp: 90 tablet, Rfl: 3    magnesium gluconate (MAGONATE) 500 mg tablet, Take 1 tablet (500 mg total) by mouth in the morning, Disp: 100 tablet, Rfl: 3    meclizine (ANTIVERT) 25 mg tablet, Take 1 tablet (25 mg total) by mouth 3 (three) times a day as needed for dizziness, Disp: 30 tablet, Rfl: 0    metoprolol tartrate (LOPRESSOR) 50 mg tablet, Metoprolol Tartrate 75mg Q12 hours, Disp: 270 tablet, Rfl: 3    Multiple Vitamins-Minerals (MULTIVITAMIN ADULT PO), , Disp: , Rfl:     nystatin powder, Apply topically 2 (two) times a day, Disp: 180 g, Rfl: 2      Wt Readings from Last 3 Encounters:   11/21/24 108 kg (237 lb 3.2 oz)   11/15/24 108 kg (238 lb)   11/07/24 108 kg (238 lb 6.4 oz)     Temp Readings from Last 3 Encounters:   11/07/24 98.5 °F (36.9 °C) (Tympanic)   08/13/24 98.4 °F (36.9 °C) (Temporal)   06/03/24 97.7 °F (36.5 °C)     BP Readings from Last 3 Encounters:   11/21/24 128/70   11/07/24 126/80   10/02/24 144/76     Pulse Readings from Last 3 Encounters:   11/21/24 72   11/07/24 71   10/02/24 59         Physical Exam  HENT:      Head: Atraumatic.      Mouth/Throat:      Mouth: Mucous membranes are moist.   Eyes:      Extraocular Movements: Extraocular movements intact.   Cardiovascular:      Rate and Rhythm: Normal rate and regular rhythm.      Heart sounds: Normal heart sounds.    Pulmonary:      Effort: Pulmonary effort is normal.      Breath sounds: Normal breath sounds.   Abdominal:      General: Abdomen is flat.   Musculoskeletal:         General: Normal range of motion.      Cervical back: Normal range of motion.   Skin:     General: Skin is warm.   Neurological:      General: No focal deficit present.      Mental Status: She is alert and oriented to person, place, and time.   Psychiatric:         Mood and Affect: Mood normal.         Behavior: Behavior normal.           Laboratory Studies:  CMP:  Lab Results   Component Value Date     10/14/2015    K 4.8 2024     2024    CO2 32 2024    ANIONGAP 6 10/14/2015    BUN 17 2024    CREATININE 0.60 2024    GLUCOSE 103 10/14/2015    AST 17 2024    ALT 13 2024    BILITOT 0.68 10/14/2015    EGFR 91 2024       Lipid Profile:   Lab Results   Component Value Date    CHOL 176 2015     Lab Results   Component Value Date    HDL 43 (L) 2024     Lab Results   Component Value Date    LDLCALC 85 2024     Lab Results   Component Value Date    TRIG 243 (H) 2024       Cardiac testing:   EKG reviewed personally:   Results for orders placed during the hospital encounter of 21    Echo complete with contrast if indicated    [place] [address]    Transthoracic Echocardiogram  2D, M-mode, Doppler, and Color Doppler    Study date:  2021    Patient: CLEMENCIA WOODRUFF  MR number: [id number]  Account number: [id number]  : 1950  Age: 70 years  Gender: Female  Status: Outpatient  Location: [facility]  Height: 67 in  Weight: 254.5 lb  BP: 132/ 78 mmHg    Indications: Supraventricular Tachycardia    Diagnoses: I47.1 - Supraventricular tachycardia    Sonographer:  Betito Ritter RDCS  Primary Physician:  Ricardo Lee MD  Referring Physician:  ANABELLE GRAY  Group:   [facility]  Interpreting Physician:  Jose Briseno MD    SUMMARY    LEFT VENTRICLE:  Systolic function was normal. Ejection fraction was estimated to be 60 %.  There were no regional wall motion abnormalities.  Wall thickness was at the upper limits of normal.    MITRAL VALVE:  There was trace regurgitation.    TRICUSPID VALVE:  There was mild regurgitation.    PULMONIC VALVE:  There was trace regurgitation.    AORTA:  There was mild dilatation of the ascending aorta. 4.0 cm    PROCEDURE: The study was performed in the [facility] [place]. This was a routine study. The transthoracic approach was used. The study included complete 2D imaging, M-mode, complete spectral Doppler, and color Doppler.  The heart rate was 78 bpm, at the start of the study. Images were obtained from the parasternal, apical, subcostal, and suprasternal notch acoustic windows. Echocardiographic views were limited due to lung interference. Image quality was  adequate.    LEFT VENTRICLE: Size was normal. Systolic function was normal. Ejection fraction was estimated to be 60 %. There were no regional wall motion abnormalities. Wall thickness was at the upper limits of normal. No evidence of apical thrombus.  DOPPLER: Left ventricular diastolic function parameters were normal.    RIGHT VENTRICLE: The size was normal. Systolic function was normal. Wall thickness was normal.    LEFT ATRIUM: Size was normal.    RIGHT ATRIUM: Size was normal.    MITRAL VALVE: Valve structure was normal. There was normal leaflet separation. DOPPLER: The transmitral velocity was within the normal range. There was no evidence for stenosis. There was trace regurgitation.    AORTIC VALVE: The valve was trileaflet. Leaflets exhibited normal thickness and normal cuspal separation. DOPPLER: Transaortic velocity was within the normal range. There was no evidence for stenosis. There was no significant  regurgitation.    TRICUSPID VALVE:  The valve structure was normal. There was normal leaflet separation. DOPPLER: The transtricuspid velocity was within the normal range. There was no evidence for stenosis. There was mild regurgitation.    PULMONIC VALVE: Leaflets exhibited normal thickness, no calcification, and normal cuspal separation. DOPPLER: The transpulmonic velocity was within the normal range. There was trace regurgitation.    PERICARDIUM: There was no pericardial effusion. The pericardium was normal in appearance.    AORTA: The root exhibited normal size. There was mild dilatation of the ascending aorta. 4.0 cm    SYSTEMIC VEINS: IVC: The inferior vena cava was normal in size.    SYSTEM MEASUREMENT TABLES    2D  %FS: 35.32 %  Ao Diam: 3.28 cm  Ao asc: 3.98 cm  EDV(Teich): 106.34 ml  EF(Teich): 64.62 %  ESV(Teich): 37.62 ml  IVSd: 1.15 cm  LA Area: 12.19 cm2  LA Diam: 3.65 cm  LVEDV MOD A4C: 70.62 ml  LVEF MOD A4C: 67.79 %  LVESV MOD A4C: 22.75 ml  LVIDd: 4.78 cm  LVIDs: 3.09 cm  LVLd A4C: 7.14 cm  LVLs A4C: 5.9 cm  LVOT Diam: 2 cm  LVPWd: 1.02 cm  RA Area: 14.94 cm2  RVIDd: 3.26 cm  SV MOD A4C: 47.88 ml  SV(Teich): 68.72 ml    MM  TAPSE: 1.91 cm    PW  E' Sept: 0.1 m/s  E/E' Sept: 6.47  MV A [place]: 0.87 m/s  MV Dec [place]: 3.44 m/s2  MV DecT: 189.73 ms  MV E [place]: 0.65 m/s  MV E/A Ratio: 0.75  MV PHT: 55.02 ms  MVA By PHT: 4 cm2    [employer] Accredited Echocardiography Laboratory    Prepared and electronically signed by    Jose Briseno MD  Signed 07-Jul-2021 15:00:59    No results found for this or any previous visit.    No results found for this or any previous visit.    No results found for this or any previous visit.

## 2024-12-09 ENCOUNTER — TELEPHONE (OUTPATIENT)
Age: 74
End: 2024-12-09

## 2024-12-09 DIAGNOSIS — G47.00 INSOMNIA, UNSPECIFIED TYPE: ICD-10-CM

## 2024-12-09 RX ORDER — DOXEPIN 6 MG/1
3 TABLET, FILM COATED ORAL
Qty: 30 TABLET | Refills: 2 | Status: SHIPPED | OUTPATIENT
Start: 2024-12-09 | End: 2024-12-11

## 2024-12-09 NOTE — TELEPHONE ENCOUNTER
Pt states that Doxepin 3 mg is working fine.  Pt states that she does not need the 6 mg.    Pt requesting a prescription for Doxepin 3 mg   Sent to [facility].

## 2024-12-09 NOTE — TELEPHONE ENCOUNTER
They did not come in 3 mg tablets formulation- she will have to cut the 6 mg in half, I sent the 6 mg prescription to the [facility] mail pharmacy

## 2024-12-10 NOTE — TELEPHONE ENCOUNTER
Patient called back regarding a message that was left from the office.  Patient advised that [facility] advised her they do have the Doxepin 3 mg tablets in stock and patient would like a prescription for 3 mg to be sent to [facility].  Patient stated that the 6 mg tablets are not scored and when cutting in half they crumble.

## 2024-12-10 NOTE — TELEPHONE ENCOUNTER
Called Pt and LM on VM regarding Doxepin and Dr. Argueta's message. Reiterated the Doxepin come in 6mg but she will need to cut/split them for the 3mg dosage. Stated Rx was sent to her preferred pharmacy. If she has questions she can call us back, if not her Pharmacy has the script and will get it to her. Left office contact information to CB if needed.

## 2024-12-10 NOTE — TELEPHONE ENCOUNTER
There is no 3 mg tablets  Happy to order the liquid, if that's the case but not sure it would be covered

## 2024-12-11 DIAGNOSIS — F51.01 PRIMARY INSOMNIA: Primary | ICD-10-CM

## 2024-12-11 RX ORDER — DOXEPIN 3 MG/1
3 TABLET, FILM COATED ORAL
Qty: 30 TABLET | Refills: 3 | Status: SHIPPED | OUTPATIENT
Start: 2024-12-11 | End: 2024-12-13

## 2024-12-11 NOTE — TELEPHONE ENCOUNTER
Called [facility] pharmacy and spoke to Tessa who confirmed that they have doxepin 3mg tablets in stock.  Dr. Argueta notified.  Rx placed.    Patient made aware. Patient states the 3mg dosing is working great along with taking magnesium.  She is able to get good quality sleep.

## 2024-12-13 ENCOUNTER — TELEPHONE (OUTPATIENT)
Age: 74
End: 2024-12-13

## 2024-12-13 RX ORDER — DOXEPIN 3 MG/1
3 TABLET, FILM COATED ORAL
Qty: 90 TABLET | Refills: 1 | Status: SHIPPED | OUTPATIENT
Start: 2024-12-13

## 2024-12-13 NOTE — TELEPHONE ENCOUNTER
[facility]    They would like to have a new script for 90 tablets with 1 refill  If appropriate please send over a new script    Doxepin HCl 3 MG TABS     [facility]  CB: [phone]

## 2025-02-11 DIAGNOSIS — E78.5 HYPERLIPIDEMIA, UNSPECIFIED HYPERLIPIDEMIA TYPE: ICD-10-CM

## 2025-02-11 RX ORDER — LOVASTATIN 20 MG/1
20 TABLET ORAL
Qty: 90 TABLET | Refills: 1 | Status: SHIPPED | OUTPATIENT
Start: 2025-02-11

## 2025-02-11 NOTE — TELEPHONE ENCOUNTER
Reason for call:   [x] Refill   [] Prior Auth  [] Other:     Office:   [x] PCP/Provider - Ricardo Lee MD / [facility]   [] Specialty/Provider -     Medication: lovastatin (MEVACOR) 20 mg tablet     Dose/Frequency: Take 1 tablet (20 mg total) by mouth daily at bedtime     Quantity: 90    Pharmacy: [facility] - [address]     Does the patient have enough for 3 days?   [x] Yes   [] No - Send as HP to POD

## 2025-02-18 ENCOUNTER — OFFICE VISIT (OUTPATIENT)
Dept: NEUROLOGY | Facility: CLINIC | Age: 75
End: 2025-02-18
Payer: COMMERCIAL

## 2025-02-18 VITALS
BODY MASS INDEX: 43.24 KG/M2 | DIASTOLIC BLOOD PRESSURE: 70 MMHG | SYSTOLIC BLOOD PRESSURE: 122 MMHG | HEIGHT: 62 IN | HEART RATE: 91 BPM | WEIGHT: 235 LBS

## 2025-02-18 DIAGNOSIS — I65.23 BILATERAL CAROTID ARTERY STENOSIS: ICD-10-CM

## 2025-02-18 DIAGNOSIS — I10 PRIMARY HYPERTENSION: ICD-10-CM

## 2025-02-18 DIAGNOSIS — E78.2 MIXED HYPERLIPIDEMIA: ICD-10-CM

## 2025-02-18 DIAGNOSIS — G47.33 OSA (OBSTRUCTIVE SLEEP APNEA): ICD-10-CM

## 2025-02-18 DIAGNOSIS — Z86.73 HISTORY OF STROKE: Primary | ICD-10-CM

## 2025-02-18 PROCEDURE — 99214 OFFICE O/P EST MOD 30 MIN: CPT | Performed by: PSYCHIATRY & NEUROLOGY

## 2025-02-18 PROCEDURE — G2211 COMPLEX E/M VISIT ADD ON: HCPCS | Performed by: PSYCHIATRY & NEUROLOGY

## 2025-02-18 NOTE — PROGRESS NOTES
Name: Sofy Hendrickson      : 1950      MRN: [id number]  Encounter Provider: Polo Orozco MD  Encounter Date: 2025   Encounter department: NEUROLOGY [facility]  :  Assessment & Plan  History of stroke  Patient Instructions   Stroke: Sofy presents for a follow-up evaluation with regard to prior potential TIA symptoms and incidentally discovered chronic appearing stroke on her MRI.  She reports no new strokelike symptoms, she is taking her medication as prescribed, and she is staying active from day-to-day  -For stroke prevention she should continue her combination of Plavix, Mevacor, and appropriate blood pressure and glycemic control  -We recommended ongoing LDL cholesterol of less than 70, hemoglobin A1c less than 7%, and blood pressure less than 130/the time.  She should continue to follow-up with her primary care doctor in this regard but if her next cholesterol check does show an LDL cholesterol greater than 70 we would recommend considering increasing or adjusting her Mevacor  -She should stay physically active on a regular basis.  I would recommend she continue using her CPAP to treat obstructive sleep apnea  -In the future may be reasonable to consider an implantable loop recorder at her discretion to more fully rule out atrial fibrillation over time  -She has mild bilateral carotid artery stenosis noted on her CTA from last .  We will plan to recheck that this coming .    I will plan for her to return to the office in 8 months time to see me directly but would be happy to see her sooner if the need should arise.  If she has any symptoms concerning for TIA or stroke including sudden painless loss of vision or double vision, difficulty speaking or swallowing, vertigo/room spinning that does not quickly resolve, or weakness/numbness/loss of coordination affecting 1 side of the face or body she should proceed by ambulance to the nearest emergency room  immediately.      Orders:  •  VAS carotid complete study; Future    Primary hypertension         LOLI (obstructive sleep apnea)         Mixed hyperlipidemia         Bilateral carotid artery stenosis    Orders:  •  VAS carotid complete study; Future          History of Present Illness   SUSHILA Goetz presents for a follow-up evaluation with regard to her prior stroke.  She reports no new strokelike symptoms.  She is taking her medication as prescribed and did not endorse any significant side effects.  She will be due for a carotid Doppler ultrasound in June.  Her LDL cholesterol is a little bit elevated at this point but it was checked shortly after the winter holidays.  We will continue to monitor        Review of Systems   Constitutional:  Negative for appetite change, fatigue and fever.   HENT: Negative.  Negative for hearing loss, tinnitus, trouble swallowing and voice change.    Eyes: Negative.  Negative for photophobia, pain and visual disturbance.   Respiratory: Negative.  Negative for shortness of breath.    Cardiovascular: Negative.  Negative for palpitations.   Gastrointestinal: Negative.  Negative for nausea and vomiting.   Endocrine: Negative.  Negative for cold intolerance.   Genitourinary: Negative.  Negative for dysuria, frequency and urgency.   Musculoskeletal:  Negative for back pain, gait problem, myalgias, neck pain and neck stiffness.   Skin: Negative.  Negative for rash.   Allergic/Immunologic: Negative.    Neurological: Negative.  Negative for dizziness, tremors, seizures, syncope, facial asymmetry, speech difficulty, weakness, light-headedness, numbness and headaches.   Hematological: Negative.  Does not bruise/bleed easily.   Psychiatric/Behavioral: Negative.  Negative for confusion, hallucinations and sleep disturbance.     I have personally reviewed the MA's review of systems and made changes as necessary.         Objective   /70 (BP Location: Left arm, Patient Position: Sitting, Cuff  "Size: Large)   Pulse 91   Ht 5' 2\" (1.575 m)   Wt 107 kg (235 lb)   BMI 42.98 kg/m²     Physical Exam  Neurological Exam    At the time of my examination the patient was awake and alert and in no distress.  They are in excellent historian with no dysarthria or aphasia.  There were no clear new cranial neuropathies or lateralizing signs.  There were no significant tremors or ataxia.  The patient was able to rise easily without assistance and their gait was stable.            "

## 2025-02-18 NOTE — PATIENT INSTRUCTIONS
Stroke: Sofy presents for a follow-up evaluation with regard to prior potential TIA symptoms and incidentally discovered chronic appearing stroke on her MRI.  She reports no new strokelike symptoms, she is taking her medication as prescribed, and she is staying active from day-to-day  -For stroke prevention she should continue her combination of Plavix, Mevacor, and appropriate blood pressure and glycemic control  -We recommended ongoing LDL cholesterol of less than 70, hemoglobin A1c less than 7%, and blood pressure less than 130/the time.  She should continue to follow-up with her primary care doctor in this regard but if her next cholesterol check does show an LDL cholesterol greater than 70 we would recommend considering increasing or adjusting her Mevacor  -She should stay physically active on a regular basis.  I would recommend she continue using her CPAP to treat obstructive sleep apnea  -In the future may be reasonable to consider an implantable loop recorder at her discretion to more fully rule out atrial fibrillation over time  -She has mild bilateral carotid artery stenosis noted on her CTA from last June.  We will plan to recheck that this coming June.    I will plan for her to return to the office in 8 months time to see me directly but would be happy to see her sooner if the need should arise.  If she has any symptoms concerning for TIA or stroke including sudden painless loss of vision or double vision, difficulty speaking or swallowing, vertigo/room spinning that does not quickly resolve, or weakness/numbness/loss of coordination affecting 1 side of the face or body she should proceed by ambulance to the nearest emergency room immediately.

## 2025-02-22 ENCOUNTER — RA CDI HCC (OUTPATIENT)
Dept: OTHER | Facility: HOSPITAL | Age: 75
End: 2025-02-22

## 2025-02-26 NOTE — ASSESSMENT & PLAN NOTE
Patient Instructions   Stroke: Sofy presents for a follow-up evaluation with regard to prior potential TIA symptoms and incidentally discovered chronic appearing stroke on her MRI.  She reports no new strokelike symptoms, she is taking her medication as prescribed, and she is staying active from day-to-day  -For stroke prevention she should continue her combination of Plavix, Mevacor, and appropriate blood pressure and glycemic control  -We recommended ongoing LDL cholesterol of less than 70, hemoglobin A1c less than 7%, and blood pressure less than 130/the time.  She should continue to follow-up with her primary care doctor in this regard but if her next cholesterol check does show an LDL cholesterol greater than 70 we would recommend considering increasing or adjusting her Mevacor  -She should stay physically active on a regular basis.  I would recommend she continue using her CPAP to treat obstructive sleep apnea  -In the future may be reasonable to consider an implantable loop recorder at her discretion to more fully rule out atrial fibrillation over time  -She has mild bilateral carotid artery stenosis noted on her CTA from last June.  We will plan to recheck that this coming June.    I will plan for her to return to the office in 8 months time to see me directly but would be happy to see her sooner if the need should arise.  If she has any symptoms concerning for TIA or stroke including sudden painless loss of vision or double vision, difficulty speaking or swallowing, vertigo/room spinning that does not quickly resolve, or weakness/numbness/loss of coordination affecting 1 side of the face or body she should proceed by ambulance to the nearest emergency room immediately.      Orders:  •  VAS carotid complete study; Future

## 2025-03-03 ENCOUNTER — APPOINTMENT (OUTPATIENT)
Dept: LAB | Facility: CLINIC | Age: 75
End: 2025-03-03
Payer: COMMERCIAL

## 2025-03-03 DIAGNOSIS — R73.09 ABNORMAL GLUCOSE: ICD-10-CM

## 2025-03-03 DIAGNOSIS — E83.42 HYPOMAGNESEMIA: ICD-10-CM

## 2025-03-03 DIAGNOSIS — E78.1 HYPERTRIGLYCERIDEMIA: ICD-10-CM

## 2025-03-03 DIAGNOSIS — E78.2 MIXED HYPERLIPIDEMIA: ICD-10-CM

## 2025-03-03 LAB
ALBUMIN SERPL BCG-MCNC: 4 G/DL (ref 3.5–5)
ALP SERPL-CCNC: 59 U/L (ref 34–104)
ALT SERPL W P-5'-P-CCNC: 19 U/L (ref 7–52)
ANION GAP SERPL CALCULATED.3IONS-SCNC: 10 MMOL/L (ref 4–13)
AST SERPL W P-5'-P-CCNC: 20 U/L (ref 13–39)
BILIRUB SERPL-MCNC: 0.91 MG/DL (ref 0.2–1)
BUN SERPL-MCNC: 15 MG/DL (ref 5–25)
CALCIUM SERPL-MCNC: 9.1 MG/DL (ref 8.4–10.2)
CHLORIDE SERPL-SCNC: 102 MMOL/L (ref 96–108)
CHOLEST SERPL-MCNC: 159 MG/DL (ref ?–200)
CO2 SERPL-SCNC: 29 MMOL/L (ref 21–32)
CREAT SERPL-MCNC: 0.69 MG/DL (ref 0.6–1.3)
GFR SERPL CREATININE-BSD FRML MDRD: 87 ML/MIN/1.73SQ M
GLUCOSE P FAST SERPL-MCNC: 117 MG/DL (ref 65–99)
HDLC SERPL-MCNC: 44 MG/DL
LDLC SERPL CALC-MCNC: 74 MG/DL (ref 0–100)
MAGNESIUM SERPL-MCNC: 1.8 MG/DL (ref 1.9–2.7)
NONHDLC SERPL-MCNC: 115 MG/DL
POTASSIUM SERPL-SCNC: 4.4 MMOL/L (ref 3.5–5.3)
PROT SERPL-MCNC: 6.8 G/DL (ref 6.4–8.4)
SODIUM SERPL-SCNC: 141 MMOL/L (ref 135–147)
TRIGL SERPL-MCNC: 205 MG/DL (ref ?–150)

## 2025-03-03 PROCEDURE — 36415 COLL VENOUS BLD VENIPUNCTURE: CPT

## 2025-03-03 PROCEDURE — 80053 COMPREHEN METABOLIC PANEL: CPT

## 2025-03-03 PROCEDURE — 83735 ASSAY OF MAGNESIUM: CPT

## 2025-03-03 PROCEDURE — 80061 LIPID PANEL: CPT

## 2025-03-07 ENCOUNTER — OFFICE VISIT (OUTPATIENT)
Age: 75
End: 2025-03-07
Payer: COMMERCIAL

## 2025-03-07 VITALS
DIASTOLIC BLOOD PRESSURE: 76 MMHG | OXYGEN SATURATION: 97 % | BODY MASS INDEX: 43.17 KG/M2 | HEART RATE: 60 BPM | SYSTOLIC BLOOD PRESSURE: 128 MMHG | WEIGHT: 234.6 LBS | HEIGHT: 62 IN | TEMPERATURE: 99.1 F

## 2025-03-07 DIAGNOSIS — E78.2 MIXED HYPERLIPIDEMIA: ICD-10-CM

## 2025-03-07 DIAGNOSIS — I10 PRIMARY HYPERTENSION: Primary | ICD-10-CM

## 2025-03-07 DIAGNOSIS — E66.01 MORBID OBESITY WITH BMI OF 40.0-44.9, ADULT (HCC): ICD-10-CM

## 2025-03-07 DIAGNOSIS — R73.09 ABNORMAL GLUCOSE: ICD-10-CM

## 2025-03-07 DIAGNOSIS — I47.10 SVT (SUPRAVENTRICULAR TACHYCARDIA) (HCC): ICD-10-CM

## 2025-03-07 DIAGNOSIS — Z86.73 HISTORY OF STROKE: ICD-10-CM

## 2025-03-07 DIAGNOSIS — E83.42 HYPOMAGNESEMIA: ICD-10-CM

## 2025-03-07 DIAGNOSIS — E78.1 HYPERTRIGLYCERIDEMIA: ICD-10-CM

## 2025-03-07 DIAGNOSIS — Z13.0 SCREENING FOR DEFICIENCY ANEMIA: ICD-10-CM

## 2025-03-07 DIAGNOSIS — R39.9 UTI SYMPTOMS: ICD-10-CM

## 2025-03-07 DIAGNOSIS — Z12.11 SCREENING FOR COLON CANCER: ICD-10-CM

## 2025-03-07 PROCEDURE — 99214 OFFICE O/P EST MOD 30 MIN: CPT | Performed by: INTERNAL MEDICINE

## 2025-03-07 PROCEDURE — G2211 COMPLEX E/M VISIT ADD ON: HCPCS | Performed by: INTERNAL MEDICINE

## 2025-03-07 RX ORDER — METOPROLOL TARTRATE 50 MG
TABLET ORAL
Qty: 270 TABLET | Refills: 3 | Status: SHIPPED | OUTPATIENT
Start: 2025-03-07

## 2025-03-07 NOTE — ASSESSMENT & PLAN NOTE
Triglyceride readings reviewed they show downward trend but still would like patient to continue to be cautious with concentrated sugar and carbohydrates in her diet to reduce triglycerides further

## 2025-03-07 NOTE — PROGRESS NOTES
Name: Sofy Hendrickson      : 1950      MRN: [id number]  Encounter Provider: Ricardo Lee MD  Encounter Date: 3/7/2025   Encounter department: [facility] INTERNAL MEDICINE    Assessment & Plan  SVT (supraventricular tachycardia) (HCC)  Patient reports occasional palpitation but no sustained tachycardia or rapid heart rate.  Continue metoprolol at 50 mg every 12 hours    Orders:    metoprolol tartrate (LOPRESSOR) 50 mg tablet; Metoprolol Tartrate 50mg Q12 hours    Morbid obesity with BMI of 40.0-44.9, adult (HCC)           Primary hypertension  Blood pressure assessment confirms good control continue lisinopril 40 mg daily and chlorothiazide 12.5 mg daily and metoprolol tartrate 50 mg every 12 hours.         History of stroke  Consultation note with Dr. Orozco of cardiology appreciated         Mixed hyperlipidemia  Current lipid profile appreciated recommend continuation of low-cholesterol diet and lovastatin at 20 mg daily         Hypertriglyceridemia  Triglyceride readings reviewed they show downward trend but still would like patient to continue to be cautious with concentrated sugar and carbohydrates in her diet to reduce triglycerides further              History of Present Illness     This 74-year-old female patient returns to our office today for a routine follow-up visit.  Denies any recent chest pain palpitation shortness of breath symptoms.  Indicates she continues to exercise regularly by swimming.      Review of Systems   All other systems reviewed and are negative.    Past Medical History:   Diagnosis Date    Allergic     See list    Arthritis     Last few years    Cellulitis of left elbow 10/30/2016    Coronary artery disease     Occasional svt in past    CPAP (continuous positive airway pressure) dependence     Epistaxis     Hypertension     Had for many years    Medial meniscus tear 2014    Morbid obesity with BMI of 40.0-44.9, adult ([facility]) 2018    Obesity      Sleep apnea     SVT (supraventricular tachycardia) ([facility]) 2020    TIA (transient ischemic attack) 06/01/2024    Umbilical hernia     Urinary tract infection     In distant past    Visual impairment     Wear glasses     Past Surgical History:   Procedure Laterality Date    HERNIA REPAIR      KNEE ARTHROSCOPY W/ MENISCAL REPAIR  07/26/2015    with medial meniscus repair    KNEE SURGERY      OOPHORECTOMY Bilateral 2002    RI BIOPSY SOFT TISSUE BACK/FLANK DEEP N/A 10/02/2023    Procedure: EXCISION RIGHT FLANK MASS;  Surgeon: Ryan Singh MD;  Location:  MAIN OR;  Service: General    TOTAL ABDOMINAL HYSTERECTOMY  2002     Family History   Problem Relation Age of Onset    Dementia Mother         Had vascular dementia in her mid 80s    Heart disease Mother     Thyroid disease Mother     Arthritis Mother     Autoimmune disease Mother     Hearing loss Mother     Glaucoma Mother     Hypertension Father     Thyroid cancer Daughter 37    Hodgkin's lymphoma Daughter 27    No Known Problems Maternal Grandmother     No Known Problems Maternal Grandfather     No Known Problems Paternal Grandmother     No Known Problems Paternal Grandfather     No Known Problems Paternal Aunt     No Known Problems Paternal Aunt     Thyroid disease Family     Diabetes Family         Diabetes Mellitus    Breast cancer Neg Hx      Social History     Tobacco Use    Smoking status: Never    Smokeless tobacco: Never    Tobacco comments:     Denied history of never a smoker per [employer]   Vaping Use    Vaping status: Never Used   Substance and Sexual Activity    Alcohol use: No    Drug use: No    Sexual activity: Not Currently     Partners: Male     Birth control/protection: Abstinence, Post-menopausal     Comment:      Current Outpatient Medications on File Prior to Visit   Medication Sig    calcium-vitamin D (OSCAL) 250-125 MG-UNIT per tablet Take 1 tablet by mouth daily.    Cholecalciferol (Vitamin D3) 50 MCG (2000 UT) capsule Take 2,000 Units  "by mouth daily      clopidogrel (PLAVIX) 75 mg tablet Take 1 tablet (75 mg total) by mouth daily    Doxepin HCl 3 MG TABS Take 1 tablet (3 mg total) by mouth daily at bedtime    hydroCHLOROthiazide 12.5 mg tablet Take 1 tablet (12.5 mg total) by mouth daily    KRILL OIL PO 1200 mg QD    lisinopril (ZESTRIL) 40 mg tablet Take 1 tablet (40 mg total) by mouth daily    lovastatin (MEVACOR) 20 mg tablet Take 1 tablet (20 mg total) by mouth daily at bedtime    magnesium gluconate (MAGONATE) 500 mg tablet Take 1 tablet (500 mg total) by mouth in the morning    Multiple Vitamins-Minerals (MULTIVITAMIN ADULT PO)     nystatin powder Apply topically 2 (two) times a day    [DISCONTINUED] metoprolol tartrate (LOPRESSOR) 50 mg tablet Metoprolol Tartrate 75mg Q12 hours    meclizine (ANTIVERT) 25 mg tablet Take 1 tablet (25 mg total) by mouth 3 (three) times a day as needed for dizziness (Patient not taking: Reported on 2/18/2025)     Allergies   Allergen Reactions    Amlodipine Other (See Comments)     Swelling in legs    Lipitor [Atorvastatin] GI Intolerance    Morphine Nausea Only and GI Intolerance     Reaction Date: 09May2011;     Zithromax [Azithromycin] Diarrhea     Reaction Date: 09May2011;      Immunization History   Administered Date(s) Administered    Influenza Quadrivalent, 6-35 Months IM 11/16/2016, 10/20/2017     Objective   /76   Pulse 60   Temp 99.1 °F (37.3 °C) (Tympanic)   Ht 5' 2\" (1.575 m)   Wt 106 kg (234 lb 9.6 oz)   SpO2 97%   BMI 42.91 kg/m²     Physical Exam  Vitals and nursing note reviewed.   Constitutional:       General: She is not in acute distress.     Appearance: She is well-developed.   HENT:      Head: Normocephalic and atraumatic.   Eyes:      Conjunctiva/sclera: Conjunctivae normal.   Cardiovascular:      Rate and Rhythm: Normal rate and regular rhythm.      Heart sounds: No murmur heard.  Pulmonary:      Effort: Pulmonary effort is normal. No respiratory distress.      Breath sounds: " Normal breath sounds. No wheezing or rales.   Abdominal:      Palpations: Abdomen is soft.   Musculoskeletal:      Cervical back: Neck supple.   Skin:     General: Skin is warm and dry.      Capillary Refill: Capillary refill takes less than 2 seconds.   Neurological:      Mental Status: She is alert.   Psychiatric:         Mood and Affect: Mood normal.

## 2025-03-07 NOTE — ASSESSMENT & PLAN NOTE
Patient reports occasional palpitation but no sustained tachycardia or rapid heart rate.  Continue metoprolol at 50 mg every 12 hours    Orders:    metoprolol tartrate (LOPRESSOR) 50 mg tablet; Metoprolol Tartrate 50mg Q12 hours

## 2025-03-07 NOTE — ASSESSMENT & PLAN NOTE
Current lipid profile appreciated recommend continuation of low-cholesterol diet and lovastatin at 20 mg daily

## 2025-03-07 NOTE — ASSESSMENT & PLAN NOTE
Blood pressure assessment confirms good control continue lisinopril 40 mg daily and chlorothiazide 12.5 mg daily and metoprolol tartrate 50 mg every 12 hours.

## 2025-04-01 DIAGNOSIS — I10 ESSENTIAL HYPERTENSION: ICD-10-CM

## 2025-04-01 NOTE — TELEPHONE ENCOUNTER
Reason for call:   [x] Refill   [] Prior Auth  [] Other:     Office:   [x] PCP/Provider - [facility]   [] Specialty/Provider -     Medication: lisinopril (ZESTRIL) 40 mg tab     Dose/Frequency: 40 mg daily    Quantity: 90    Pharmacy: [facility]     Local Pharmacy   Does the patient have enough for 3 days?   [x] Yes   [] No - Send as HP to POD    Mail Away Pharmacy   Does the patient have enough for 10 days?   [] Yes   [] No - Send as HP to POD

## 2025-04-02 RX ORDER — LISINOPRIL 40 MG/1
40 TABLET ORAL DAILY
Qty: 90 TABLET | Refills: 1 | Status: SHIPPED | OUTPATIENT
Start: 2025-04-02

## 2025-06-04 ENCOUNTER — NURSE TRIAGE (OUTPATIENT)
Age: 75
End: 2025-06-04
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
## 2025-06-04 RX ORDER — DOXYCYCLINE 100 MG/1
100 CAPSULE ORAL EVERY 12 HOURS SCHEDULED
Qty: 42 CAPSULE | Refills: 0 | Status: SHIPPED | OUTPATIENT
Start: 2025-06-04 | End: 2025-06-25

## 2025-06-04 NOTE — TELEPHONE ENCOUNTER
"REASON FOR CONVERSATION: Tick Bite    SYMPTOMS: Patient bit by tick 5 days ago on back of neck. Size of apple seed. She will send picture of tick via My chart.  Fully removed, no issue at bite site.  2-3 days after she had muscle aches and fatigue for one day. It has resolved. Asking if antibiotic is needed.    OTHER HEALTH INFORMATION: history of Lyme disease    PROTOCOL DISPOSITION: Home Care  CARE ADVICE PROVIDED: Declined OV   WASH the bite area and your hands with soap and water after removing the tick.   Put a small amount of ANTIBIOTIC OINTMENT on the bite once.  CALL BACK IF:  * Fever or rash occur in the next 4 weeks  * Bite begins to look infected  * You become worse    PRACTICE FOLLOW-UP: advise patient is antibiotic needed     Reason for Disposition   Tick bite with no complications    Answer Assessment - Initial Assessment Questions  1. ATTACHED:  \"Is the tick still on the skin?\"  (e.g., yes, no, unsure)      no  2. ONSET - TICK STILL ATTACHED:  \"How long do you think the tick has been on your skin?\" (e.g., hours, days, unsure)  Note:  Is there a recent activity (camping, hiking) where the caller may have been exposed?      no  3. ONSET - TICK NOT STILL ATTACHED: \"If the tick has been removed, how long do you think the tick was attached before you removed it?\" (e.g., 5 hours, 2 days). \"When was this?\"      Fully removed   4. LOCATION: \"Where is the tick bite located?\" (e.g., arm, leg)      Base of head near neck   5. TYPE of TICK: \"Is it a wood tick or a deer tick?\" (e.g., deer tick, wood tick; unsure)      Wood tick  6. SIZE of TICK: \"How big is the tick?\" (e.g., size of poppy seed, apple seed, watermelon seed; unsure) Note: Deer ticks can be the size of a poppy seed (nymph) or an apple seed (adult).        Apple seed  7. ENGORGED: \"Did the tick look flat or engorged (full, swollen)?\" (e.g., flat, engorged; unsure)      Not engorged   8. OTHER SYMPTOMS: \"Do you have any other symptoms?\" (e.g., fever, " "rash, redness at bite area, red ring around bite)      Muscle ache   9. PREGNANCY: \"Is there any chance you are pregnant?\" \"When was your last menstrual period?\"      no    Protocols used: Tick Bite-Adult-[place]    "

## 2025-06-04 NOTE — TELEPHONE ENCOUNTER
Call has been returned to the patient reviewed the situation and we are in agreement to initiate antibiotic therapy as a precautionary measure.  100 mg doxycycline twice a day for 3 weeks has been prescribed

## 2025-06-04 NOTE — PROGRESS NOTES
Recent tick bite now with some arthropathy type symptoms where was no bull's-eye at the site of the tick bite but has secondary symptoms will initiate doxycycline 100 mg twice a day for 3 weeks.

## 2025-06-18 ENCOUNTER — HOSPITAL ENCOUNTER (OUTPATIENT)
Dept: NON INVASIVE DIAGNOSTICS | Facility: CLINIC | Age: 75
Discharge: HOME | End: 2025-06-18
Payer: COMMERCIAL

## 2025-06-18 DIAGNOSIS — I65.23 BILATERAL CAROTID ARTERY STENOSIS: ICD-10-CM

## 2025-06-18 DIAGNOSIS — Z86.73 HISTORY OF STROKE: ICD-10-CM

## 2025-06-18 PROCEDURE — 93880 EXTRACRANIAL BILAT STUDY: CPT

## 2025-06-18 PROCEDURE — 93880 EXTRACRANIAL BILAT STUDY: CPT | Performed by: SURGERY

## 2025-06-19 ENCOUNTER — RESULTS FOLLOW-UP (OUTPATIENT)
Dept: NEUROLOGY | Facility: CLINIC | Age: 75
End: 2025-06-19

## 2025-06-19 DIAGNOSIS — G45.9 TIA (TRANSIENT ISCHEMIC ATTACK): ICD-10-CM

## 2025-06-19 DIAGNOSIS — I65.23 CAROTID ATHEROSCLEROSIS, BILATERAL: Primary | ICD-10-CM

## 2025-06-19 NOTE — TELEPHONE ENCOUNTER
Reason for call:   [x] Refill   [] Prior Auth  [] Other:     Office:   [] PCP/Provider -   [x] Specialty/Provider - Neuro    Medication: clopidogrel (PLAVIX) 75 mg tablet     Dose/Frequency: Take 1 tablet (75 mg total) by mouth daily     Quantity: 30    Pharmacy:  [facility] - [place], [place] - [address] [place] [address]      Local Pharmacy   Does the patient have enough for 3 days?   [] Yes   [] No - Send as HP to POD    Mail Away Pharmacy   Does the patient have enough for 10 days?   [x] Yes   [] No - Send as HP to POD

## 2025-06-20 RX ORDER — CLOPIDOGREL BISULFATE 75 MG/1
75 TABLET ORAL DAILY
Qty: 90 TABLET | Refills: 1 | Status: SHIPPED | OUTPATIENT
Start: 2025-06-20

## 2025-07-01 DIAGNOSIS — F51.01 PRIMARY INSOMNIA: ICD-10-CM

## 2025-07-01 RX ORDER — DOXEPIN 3 MG/1
3 TABLET, FILM COATED ORAL
Qty: 90 TABLET | Refills: 1 | Status: SHIPPED | OUTPATIENT
Start: 2025-07-01

## 2025-07-01 NOTE — TELEPHONE ENCOUNTER
Reason for call:   [x] Refill   [] Prior Auth  [] Other:     Office:   [] PCP/Provider -   [x] Specialty/Provider - Sleep Med    Medication: Doxepin HCl 3 MG TABS     Dose/Frequency: Take 1 tablet (3 mg total) by mouth daily at bedtime     Quantity: 90    Pharmacy:  [facility] - [address] [facility]   Does the patient have enough for 3 days?   [] Yes   [] No - Send as HP to POD    Mail Away Pharmacy   Does the patient have enough for 10 days?   [x] Yes   [] No - Send as HP to POD

## 2025-07-01 NOTE — TELEPHONE ENCOUNTER
Last office visit 10/2/2024.  Next office visit 10/14/2025.    Dr. Argueta, please review Rx request and sign if appropriate.  Thank you.

## 2025-08-07 DIAGNOSIS — E78.5 HYPERLIPIDEMIA, UNSPECIFIED HYPERLIPIDEMIA TYPE: ICD-10-CM

## 2025-08-07 RX ORDER — LOVASTATIN 20 MG/1
20 TABLET ORAL
Qty: 90 TABLET | Refills: 1 | Status: SHIPPED | OUTPATIENT
Start: 2025-08-07

## (undated) DEVICE — SYRINGE 10ML LL CONTROL TOP

## (undated) DEVICE — NEEDLE 25G X 1 1/2

## (undated) DEVICE — SKIN MARKER DUAL TIP WITH RULER CAP, FLEXIBLE RULER AND LABELS: Brand: DEVON

## (undated) DEVICE — BASIC SINGLE BASIN-LF: Brand: MEDLINE INDUSTRIES, INC.

## (undated) DEVICE — SWABSTCK, BENZOIN TINCTURE, 1/PK, STRL: Brand: APLICARE

## (undated) DEVICE — 3M™ STERI-STRIP™ REINFORCED ADHESIVE SKIN CLOSURES, R1547, 1/2 IN X 4 IN (12 MM X 100 MM), 6 STRIPS/ENVELOPE: Brand: 3M™ STERI-STRIP™

## (undated) DEVICE — STERILE POLYISOPRENE POWDER-FREE SURGICAL GLOVES: Brand: PROTEXIS

## (undated) DEVICE — INTENDED FOR TISSUE SEPARATION, AND OTHER PROCEDURES THAT REQUIRE A SHARP SURGICAL BLADE TO PUNCTURE OR CUT.: Brand: BARD-PARKER SAFETY BLADES SIZE 15, STERILE

## (undated) DEVICE — LIGHT GLOVE GREEN

## (undated) DEVICE — SYRINGE 30ML LL

## (undated) DEVICE — SCD SEQUENTIAL COMPRESSION COMFORT SLEEVE MEDIUM KNEE LENGTH: Brand: KENDALL SCD

## (undated) DEVICE — SUT VICRYL 3-0 SH 27 IN J416H

## (undated) DEVICE — DISPOSABLE OR TOWEL: Brand: CARDINAL HEALTH

## (undated) DEVICE — NEEDLE BLUNT 18 G X 1 1/2IN

## (undated) DEVICE — CHLORAPREP HI-LITE 26ML ORANGE

## (undated) DEVICE — SPONGE 4 X 4 XRAY 16 PLY STRL LF RFD

## (undated) DEVICE — SPONGE LAP 18 X 4 IN STRL RFD

## (undated) DEVICE — 1820 FOAM BLOCK NEEDLE COUNTER: Brand: DEVON

## (undated) DEVICE — SUT MONOCRYL 4-0 PS-2 27 IN Y426H

## (undated) DEVICE — MINOR PROCEDURE DRAPE: Brand: CONVERTORS

## (undated) DEVICE — PENCIL ELECTROSURG E-Z CLEAN -0035H

## (undated) DEVICE — BASIC PACK: Brand: CONVERTORS